# Patient Record
Sex: MALE | Race: WHITE | Employment: OTHER | URBAN - METROPOLITAN AREA
[De-identification: names, ages, dates, MRNs, and addresses within clinical notes are randomized per-mention and may not be internally consistent; named-entity substitution may affect disease eponyms.]

---

## 2017-10-09 ENCOUNTER — ED HISTORICAL/CONVERTED ENCOUNTER (OUTPATIENT)
Dept: OTHER | Age: 62
End: 2017-10-09

## 2017-11-10 ENCOUNTER — OP HISTORICAL/CONVERTED ENCOUNTER (OUTPATIENT)
Dept: OTHER | Age: 62
End: 2017-11-10

## 2018-01-05 ENCOUNTER — OP HISTORICAL/CONVERTED ENCOUNTER (OUTPATIENT)
Dept: OTHER | Age: 63
End: 2018-01-05

## 2018-02-12 ENCOUNTER — OP HISTORICAL/CONVERTED ENCOUNTER (OUTPATIENT)
Dept: OTHER | Age: 63
End: 2018-02-12

## 2018-02-14 ENCOUNTER — OP HISTORICAL/CONVERTED ENCOUNTER (OUTPATIENT)
Dept: OTHER | Age: 63
End: 2018-02-14

## 2018-02-15 ENCOUNTER — OP HISTORICAL/CONVERTED ENCOUNTER (OUTPATIENT)
Dept: OTHER | Age: 63
End: 2018-02-15

## 2018-03-12 ENCOUNTER — OP HISTORICAL/CONVERTED ENCOUNTER (OUTPATIENT)
Dept: OTHER | Age: 63
End: 2018-03-12

## 2018-03-14 ENCOUNTER — ED HISTORICAL/CONVERTED ENCOUNTER (OUTPATIENT)
Dept: OTHER | Age: 63
End: 2018-03-14

## 2018-03-19 ENCOUNTER — IP HISTORICAL/CONVERTED ENCOUNTER (OUTPATIENT)
Dept: OTHER | Age: 63
End: 2018-03-19

## 2018-04-01 ENCOUNTER — OP HISTORICAL/CONVERTED ENCOUNTER (OUTPATIENT)
Dept: OTHER | Age: 63
End: 2018-04-01

## 2018-04-13 ENCOUNTER — IP HISTORICAL/CONVERTED ENCOUNTER (OUTPATIENT)
Dept: OTHER | Age: 63
End: 2018-04-13

## 2018-05-21 ENCOUNTER — ED HISTORICAL/CONVERTED ENCOUNTER (OUTPATIENT)
Dept: OTHER | Age: 63
End: 2018-05-21

## 2018-05-23 ENCOUNTER — ED HISTORICAL/CONVERTED ENCOUNTER (OUTPATIENT)
Dept: OTHER | Age: 63
End: 2018-05-23

## 2018-05-24 ENCOUNTER — IP HISTORICAL/CONVERTED ENCOUNTER (OUTPATIENT)
Dept: OTHER | Age: 63
End: 2018-05-24

## 2018-06-02 ENCOUNTER — ED HISTORICAL/CONVERTED ENCOUNTER (OUTPATIENT)
Dept: OTHER | Age: 63
End: 2018-06-02

## 2018-06-24 ENCOUNTER — OP HISTORICAL/CONVERTED ENCOUNTER (OUTPATIENT)
Dept: OTHER | Age: 63
End: 2018-06-24

## 2018-06-29 ENCOUNTER — OP HISTORICAL/CONVERTED ENCOUNTER (OUTPATIENT)
Dept: OTHER | Age: 63
End: 2018-06-29

## 2018-06-29 ENCOUNTER — ED HISTORICAL/CONVERTED ENCOUNTER (OUTPATIENT)
Dept: OTHER | Age: 63
End: 2018-06-29

## 2018-07-04 ENCOUNTER — ED HISTORICAL/CONVERTED ENCOUNTER (OUTPATIENT)
Dept: OTHER | Age: 63
End: 2018-07-04

## 2018-07-06 ENCOUNTER — ED HISTORICAL/CONVERTED ENCOUNTER (OUTPATIENT)
Dept: OTHER | Age: 63
End: 2018-07-06

## 2018-07-20 ENCOUNTER — OP HISTORICAL/CONVERTED ENCOUNTER (OUTPATIENT)
Dept: OTHER | Age: 63
End: 2018-07-20

## 2018-07-23 ENCOUNTER — ED HISTORICAL/CONVERTED ENCOUNTER (OUTPATIENT)
Dept: OTHER | Age: 63
End: 2018-07-23

## 2018-08-12 ENCOUNTER — ED HISTORICAL/CONVERTED ENCOUNTER (OUTPATIENT)
Dept: OTHER | Age: 63
End: 2018-08-12

## 2022-02-07 ENCOUNTER — APPOINTMENT (OUTPATIENT)
Dept: GENERAL RADIOLOGY | Age: 67
DRG: 207 | End: 2022-02-07
Attending: STUDENT IN AN ORGANIZED HEALTH CARE EDUCATION/TRAINING PROGRAM
Payer: MEDICARE

## 2022-02-07 ENCOUNTER — APPOINTMENT (OUTPATIENT)
Dept: CT IMAGING | Age: 67
DRG: 207 | End: 2022-02-07
Attending: STUDENT IN AN ORGANIZED HEALTH CARE EDUCATION/TRAINING PROGRAM
Payer: MEDICARE

## 2022-02-07 ENCOUNTER — HOSPITAL ENCOUNTER (INPATIENT)
Age: 67
LOS: 18 days | Discharge: NURSING, REHAB OR LONG-TERM CARE | DRG: 207 | End: 2022-02-25
Attending: EMERGENCY MEDICINE | Admitting: INTERNAL MEDICINE
Payer: MEDICARE

## 2022-02-07 DIAGNOSIS — I67.4 HYPERTENSIVE ENCEPHALOPATHY: ICD-10-CM

## 2022-02-07 DIAGNOSIS — R09.02 HYPOXIA: ICD-10-CM

## 2022-02-07 DIAGNOSIS — I16.1 HYPERTENSIVE EMERGENCY: ICD-10-CM

## 2022-02-07 DIAGNOSIS — I21.4 NSTEMI (NON-ST ELEVATED MYOCARDIAL INFARCTION) (HCC): ICD-10-CM

## 2022-02-07 DIAGNOSIS — R55 SYNCOPE AND COLLAPSE: Primary | ICD-10-CM

## 2022-02-07 PROBLEM — J96.90 RESPIRATORY FAILURE (HCC): Status: ACTIVE | Noted: 2022-02-07

## 2022-02-07 LAB
ALBUMIN SERPL-MCNC: 2.9 G/DL (ref 3.5–5)
ALBUMIN/GLOB SERPL: 0.8 {RATIO} (ref 1.1–2.2)
ALP SERPL-CCNC: 78 U/L (ref 45–117)
ALT SERPL-CCNC: 34 U/L (ref 12–78)
ANION GAP SERPL CALC-SCNC: 3 MMOL/L (ref 5–15)
APPEARANCE UR: CLEAR
APTT PPP: 24.8 SEC (ref 21.2–34.1)
ARTERIAL PATENCY WRIST A: ABNORMAL
AST SERPL W P-5'-P-CCNC: 15 U/L (ref 15–37)
BACTERIA URNS QL MICRO: NEGATIVE /HPF
BASE EXCESS BLDA CALC-SCNC: 1.1 MMOL/L (ref 0–2)
BASOPHILS # BLD: 0 K/UL (ref 0–0.1)
BASOPHILS NFR BLD: 0 % (ref 0–1)
BDY SITE: ABNORMAL
BILIRUB SERPL-MCNC: 0.3 MG/DL (ref 0.2–1)
BILIRUB UR QL: NEGATIVE
BNP SERPL-MCNC: 30 PG/ML
BUN SERPL-MCNC: 24 MG/DL (ref 6–20)
BUN/CREAT SERPL: 22 (ref 12–20)
CA-I BLD-MCNC: 8.8 MG/DL (ref 8.5–10.1)
CHLORIDE SERPL-SCNC: 102 MMOL/L (ref 97–108)
CO2 SERPL-SCNC: 29 MMOL/L (ref 21–32)
COLOR UR: YELLOW
COVID-19 RAPID TEST, COVR: NOT DETECTED
CREAT SERPL-MCNC: 1.1 MG/DL (ref 0.7–1.3)
DIFFERENTIAL METHOD BLD: ABNORMAL
EOSINOPHIL # BLD: 0 K/UL (ref 0–0.4)
EOSINOPHIL NFR BLD: 0 % (ref 0–7)
EPAP/CPAP/PEEP, PAPEEP: 15
ERYTHROCYTE [DISTWIDTH] IN BLOOD BY AUTOMATED COUNT: 14.6 % (ref 11.5–14.5)
FIO2 ON VENT: 100 %
GAS FLOW.O2 SETTING OXYMISER: 15 L/MIN
GLOBULIN SER CALC-MCNC: 3.8 G/DL (ref 2–4)
GLUCOSE SERPL-MCNC: 115 MG/DL (ref 65–100)
GLUCOSE UR STRIP.AUTO-MCNC: 50 MG/DL
HCO3 BLDA-SCNC: 25 MMOL/L (ref 22–26)
HCT VFR BLD AUTO: 38.1 % (ref 36.6–50.3)
HGB BLD-MCNC: 12.5 G/DL (ref 12.1–17)
HGB UR QL STRIP: ABNORMAL
IMM GRANULOCYTES # BLD AUTO: 0.1 K/UL (ref 0–0.04)
IMM GRANULOCYTES NFR BLD AUTO: 1 % (ref 0–0.5)
INR PPP: 1 (ref 0.9–1.1)
IPAP/PIP, IPAPIP: 22
KETONES UR QL STRIP.AUTO: NEGATIVE MG/DL
LEUKOCYTE ESTERASE UR QL STRIP.AUTO: NEGATIVE
LYMPHOCYTES # BLD: 0.9 K/UL (ref 0.8–3.5)
LYMPHOCYTES NFR BLD: 8 % (ref 12–49)
MCH RBC QN AUTO: 29 PG (ref 26–34)
MCHC RBC AUTO-ENTMCNC: 32.8 G/DL (ref 30–36.5)
MCV RBC AUTO: 88.4 FL (ref 80–99)
MONOCYTES # BLD: 0.3 K/UL (ref 0–1)
MONOCYTES NFR BLD: 2 % (ref 5–13)
NEUTS SEG # BLD: 9.9 K/UL (ref 1.8–8)
NEUTS SEG NFR BLD: 89 % (ref 32–75)
NITRITE UR QL STRIP.AUTO: NEGATIVE
NRBC # BLD: 0 K/UL (ref 0–0.01)
NRBC BLD-RTO: 0 PER 100 WBC
PCO2 BLDA: 49 MMHG (ref 35–45)
PH BLDA: 7.36 [PH] (ref 7.35–7.45)
PH UR STRIP: 7 [PH] (ref 5–8)
PLATELET # BLD AUTO: 245 K/UL (ref 150–400)
PMV BLD AUTO: 9.2 FL (ref 8.9–12.9)
PO2 BLDA: 488 MMHG (ref 75–100)
POTASSIUM SERPL-SCNC: 4.8 MMOL/L (ref 3.5–5.1)
PROT SERPL-MCNC: 6.7 G/DL (ref 6.4–8.2)
PROT UR STRIP-MCNC: NEGATIVE MG/DL
PROTHROMBIN TIME: 12.6 SEC (ref 11.9–14.7)
RBC # BLD AUTO: 4.31 M/UL (ref 4.1–5.7)
RBC #/AREA URNS HPF: ABNORMAL /HPF (ref 0–5)
SAO2 % BLD: 99 %
SAO2% DEVICE SAO2% SENSOR NAME: ABNORMAL
SODIUM SERPL-SCNC: 134 MMOL/L (ref 136–145)
SP GR UR REFRACTOMETRY: >1.03 (ref 1–1.03)
SPECIMEN SITE: ABNORMAL
SPECIMEN SOURCE: NORMAL
THERAPEUTIC RANGE,PTTT: NORMAL SEC (ref 82–109)
TROPONIN-HIGH SENSITIVITY: 6 NG/L (ref 0–76)
UA: UC IF INDICATED,UAUC: ABNORMAL
UROBILINOGEN UR QL STRIP.AUTO: 0.1 EU/DL (ref 0.1–1)
VENTILATION MODE VENT: ABNORMAL
WBC # BLD AUTO: 11.1 K/UL (ref 4.1–11.1)
WBC URNS QL MICRO: ABNORMAL /HPF (ref 0–4)

## 2022-02-07 PROCEDURE — 85610 PROTHROMBIN TIME: CPT

## 2022-02-07 PROCEDURE — 36600 WITHDRAWAL OF ARTERIAL BLOOD: CPT

## 2022-02-07 PROCEDURE — 74011000636 HC RX REV CODE- 636: Performed by: STUDENT IN AN ORGANIZED HEALTH CARE EDUCATION/TRAINING PROGRAM

## 2022-02-07 PROCEDURE — 74011000250 HC RX REV CODE- 250: Performed by: STUDENT IN AN ORGANIZED HEALTH CARE EDUCATION/TRAINING PROGRAM

## 2022-02-07 PROCEDURE — 96374 THER/PROPH/DIAG INJ IV PUSH: CPT

## 2022-02-07 PROCEDURE — 31500 INSERT EMERGENCY AIRWAY: CPT

## 2022-02-07 PROCEDURE — 5A1955Z RESPIRATORY VENTILATION, GREATER THAN 96 CONSECUTIVE HOURS: ICD-10-PCS | Performed by: INTERNAL MEDICINE

## 2022-02-07 PROCEDURE — 70450 CT HEAD/BRAIN W/O DYE: CPT

## 2022-02-07 PROCEDURE — 93005 ELECTROCARDIOGRAM TRACING: CPT

## 2022-02-07 PROCEDURE — 84484 ASSAY OF TROPONIN QUANT: CPT

## 2022-02-07 PROCEDURE — 81001 URINALYSIS AUTO W/SCOPE: CPT

## 2022-02-07 PROCEDURE — 0BH17EZ INSERTION OF ENDOTRACHEAL AIRWAY INTO TRACHEA, VIA NATURAL OR ARTIFICIAL OPENING: ICD-10-PCS | Performed by: STUDENT IN AN ORGANIZED HEALTH CARE EDUCATION/TRAINING PROGRAM

## 2022-02-07 PROCEDURE — 94002 VENT MGMT INPAT INIT DAY: CPT

## 2022-02-07 PROCEDURE — 74011250637 HC RX REV CODE- 250/637: Performed by: INTERNAL MEDICINE

## 2022-02-07 PROCEDURE — 51702 INSERT TEMP BLADDER CATH: CPT

## 2022-02-07 PROCEDURE — 80307 DRUG TEST PRSMV CHEM ANLYZR: CPT

## 2022-02-07 PROCEDURE — 71045 X-RAY EXAM CHEST 1 VIEW: CPT

## 2022-02-07 PROCEDURE — 83880 ASSAY OF NATRIURETIC PEPTIDE: CPT

## 2022-02-07 PROCEDURE — 80053 COMPREHEN METABOLIC PANEL: CPT

## 2022-02-07 PROCEDURE — 85730 THROMBOPLASTIN TIME PARTIAL: CPT

## 2022-02-07 PROCEDURE — 83735 ASSAY OF MAGNESIUM: CPT

## 2022-02-07 PROCEDURE — 74011250636 HC RX REV CODE- 250/636: Performed by: STUDENT IN AN ORGANIZED HEALTH CARE EDUCATION/TRAINING PROGRAM

## 2022-02-07 PROCEDURE — 96375 TX/PRO/DX INJ NEW DRUG ADDON: CPT

## 2022-02-07 PROCEDURE — 71275 CT ANGIOGRAPHY CHEST: CPT

## 2022-02-07 PROCEDURE — 65270000029 HC RM PRIVATE

## 2022-02-07 PROCEDURE — 85025 COMPLETE CBC W/AUTO DIFF WBC: CPT

## 2022-02-07 PROCEDURE — 36415 COLL VENOUS BLD VENIPUNCTURE: CPT

## 2022-02-07 PROCEDURE — 82803 BLOOD GASES ANY COMBINATION: CPT

## 2022-02-07 PROCEDURE — 87635 SARS-COV-2 COVID-19 AMP PRB: CPT

## 2022-02-07 PROCEDURE — 99285 EMERGENCY DEPT VISIT HI MDM: CPT

## 2022-02-07 RX ORDER — METOPROLOL SUCCINATE 25 MG/1
25 TABLET, EXTENDED RELEASE ORAL DAILY
COMMUNITY

## 2022-02-07 RX ORDER — METOPROLOL SUCCINATE 25 MG/1
25 TABLET, EXTENDED RELEASE ORAL DAILY
Status: CANCELLED | OUTPATIENT
Start: 2022-02-08

## 2022-02-07 RX ORDER — FINASTERIDE 5 MG/1
5 TABLET, FILM COATED ORAL DAILY
COMMUNITY

## 2022-02-07 RX ORDER — LOSARTAN POTASSIUM 25 MG/1
25 TABLET ORAL DAILY
COMMUNITY
End: 2022-02-25

## 2022-02-07 RX ORDER — GUAIFENESIN 100 MG/5ML
81 LIQUID (ML) ORAL DAILY
COMMUNITY
End: 2022-03-12

## 2022-02-07 RX ORDER — ROCURONIUM BROMIDE 10 MG/ML
100 INJECTION, SOLUTION INTRAVENOUS
Status: COMPLETED | OUTPATIENT
Start: 2022-02-07 | End: 2022-02-07

## 2022-02-07 RX ORDER — HEPARIN SODIUM 1000 [USP'U]/ML
10000 INJECTION, SOLUTION INTRAVENOUS; SUBCUTANEOUS ONCE
Status: DISCONTINUED | OUTPATIENT
Start: 2022-02-07 | End: 2022-02-08

## 2022-02-07 RX ORDER — ALBUTEROL SULFATE 90 UG/1
1 AEROSOL, METERED RESPIRATORY (INHALATION)
COMMUNITY
End: 2022-03-04

## 2022-02-07 RX ORDER — ATORVASTATIN CALCIUM 40 MG/1
40 TABLET, FILM COATED ORAL DAILY
COMMUNITY

## 2022-02-07 RX ORDER — FUROSEMIDE 20 MG/1
20 TABLET ORAL DAILY
COMMUNITY
End: 2022-02-25

## 2022-02-07 RX ORDER — TAMSULOSIN HYDROCHLORIDE 0.4 MG/1
0.4 CAPSULE ORAL DAILY
COMMUNITY

## 2022-02-07 RX ORDER — CLOPIDOGREL BISULFATE 75 MG/1
75 TABLET ORAL DAILY
Status: CANCELLED | OUTPATIENT
Start: 2022-02-08

## 2022-02-07 RX ORDER — ASPIRIN 325 MG
325 TABLET ORAL
Status: COMPLETED | OUTPATIENT
Start: 2022-02-07 | End: 2022-02-07

## 2022-02-07 RX ORDER — ESOMEPRAZOLE MAGNESIUM 40 MG/1
40 CAPSULE, DELAYED RELEASE ORAL DAILY
COMMUNITY

## 2022-02-07 RX ORDER — LOSARTAN POTASSIUM 50 MG/1
25 TABLET ORAL DAILY
Status: CANCELLED | OUTPATIENT
Start: 2022-02-08

## 2022-02-07 RX ORDER — CLOPIDOGREL BISULFATE 75 MG/1
75 TABLET ORAL DAILY
COMMUNITY

## 2022-02-07 RX ORDER — GABAPENTIN 300 MG/1
300 CAPSULE ORAL DAILY
COMMUNITY
End: 2022-02-25

## 2022-02-07 RX ORDER — HEPARIN SODIUM 10000 [USP'U]/100ML
18-36 INJECTION, SOLUTION INTRAVENOUS
Status: DISCONTINUED | OUTPATIENT
Start: 2022-02-07 | End: 2022-02-08

## 2022-02-07 RX ORDER — GABAPENTIN 300 MG/1
300 CAPSULE ORAL DAILY
Status: CANCELLED | OUTPATIENT
Start: 2022-02-08

## 2022-02-07 RX ORDER — MIDAZOLAM IN 0.9 % SOD.CHLORID 1 MG/ML
0-10 PLASTIC BAG, INJECTION (ML) INTRAVENOUS
Status: DISCONTINUED | OUTPATIENT
Start: 2022-02-07 | End: 2022-02-18

## 2022-02-07 RX ORDER — ATORVASTATIN CALCIUM 40 MG/1
40 TABLET, FILM COATED ORAL DAILY
Status: CANCELLED | OUTPATIENT
Start: 2022-02-08

## 2022-02-07 RX ORDER — BUMETANIDE 1 MG/1
1 TABLET ORAL DAILY
COMMUNITY
End: 2022-02-25

## 2022-02-07 RX ORDER — OXCARBAZEPINE 300 MG/1
300 TABLET ORAL DAILY
COMMUNITY
End: 2022-03-04

## 2022-02-07 RX ORDER — GUAIFENESIN 100 MG/5ML
81 LIQUID (ML) ORAL DAILY
Status: CANCELLED | OUTPATIENT
Start: 2022-02-08

## 2022-02-07 RX ORDER — BETHANECHOL CHLORIDE 50 MG/1
200 TABLET ORAL DAILY
COMMUNITY
End: 2022-03-04

## 2022-02-07 RX ORDER — PROPOFOL 10 MG/ML
0-50 VIAL (ML) INTRAVENOUS
Status: DISCONTINUED | OUTPATIENT
Start: 2022-02-07 | End: 2022-02-18

## 2022-02-07 RX ORDER — ETOMIDATE 2 MG/ML
20 INJECTION INTRAVENOUS ONCE
Status: COMPLETED | OUTPATIENT
Start: 2022-02-07 | End: 2022-02-07

## 2022-02-07 RX ORDER — SERTRALINE HYDROCHLORIDE 100 MG/1
100 TABLET, FILM COATED ORAL DAILY
COMMUNITY
End: 2022-02-25

## 2022-02-07 RX ORDER — OMEPRAZOLE 20 MG/1
20 CAPSULE, DELAYED RELEASE ORAL DAILY
COMMUNITY
End: 2022-03-04

## 2022-02-07 RX ORDER — ISOSORBIDE DINITRATE 30 MG/1
30 TABLET ORAL 2 TIMES DAILY
COMMUNITY
End: 2022-02-25

## 2022-02-07 RX ORDER — QUETIAPINE FUMARATE 400 MG/1
400 TABLET, FILM COATED ORAL DAILY
COMMUNITY
End: 2022-02-25

## 2022-02-07 RX ADMIN — ASPIRIN 325 MG ORAL TABLET 325 MG: 325 PILL ORAL at 23:45

## 2022-02-07 RX ADMIN — PROPOFOL 10 MCG/KG/MIN: 10 INJECTION, EMULSION INTRAVENOUS at 22:30

## 2022-02-07 RX ADMIN — ROCURONIUM BROMIDE 100 MG: 10 SOLUTION INTRAVENOUS at 21:19

## 2022-02-07 RX ADMIN — IOPAMIDOL 100 ML: 755 INJECTION, SOLUTION INTRAVENOUS at 22:22

## 2022-02-07 RX ADMIN — Medication 2 MG/HR: at 21:28

## 2022-02-07 RX ADMIN — ETOMIDATE 20 MG: 20 INJECTION, SOLUTION INTRAVENOUS at 21:18

## 2022-02-07 NOTE — Clinical Note
Status[de-identified] INPATIENT [101]   Type of Bed: Intensive Care [6]   Cardiac Monitoring Required?: Yes   Inpatient Hospitalization Certified Necessary for the Following Reasons: 4.  Patient requires ICU level of care interventions (further clarification in H&P documentation)   Admitting Diagnosis: Respiratory failure [facility] [[id number]]   Admitting Physician: Susy RODRIGUEZ [facility] [[id number]]   Attending Physician: Robert Garcia [[id number]]   Estimated Length of Stay: 7+ Midnights   Discharge Plan[de-identified] Home with Office Follow-up

## 2022-02-08 ENCOUNTER — APPOINTMENT (OUTPATIENT)
Dept: GENERAL RADIOLOGY | Age: 67
DRG: 207 | End: 2022-02-08
Attending: HOSPITALIST
Payer: MEDICARE

## 2022-02-08 LAB
AMPHET UR QL SCN: NEGATIVE
ANION GAP SERPL CALC-SCNC: 8 MMOL/L (ref 5–15)
ARTERIAL PATENCY WRIST A: ABNORMAL
BARBITURATES UR QL SCN: NEGATIVE
BASE EXCESS BLDA CALC-SCNC: 3.4 MMOL/L (ref 0–2)
BASOPHILS # BLD: 0 K/UL (ref 0–0.1)
BASOPHILS NFR BLD: 0 % (ref 0–1)
BDY SITE: ABNORMAL
BENZODIAZ UR QL: NEGATIVE
BUN SERPL-MCNC: 20 MG/DL (ref 6–20)
BUN/CREAT SERPL: 24 (ref 12–20)
CA-I BLD-MCNC: 8.2 MG/DL (ref 8.5–10.1)
CANNABINOIDS UR QL SCN: NEGATIVE
CHLORIDE SERPL-SCNC: 103 MMOL/L (ref 97–108)
CO2 SERPL-SCNC: 25 MMOL/L (ref 21–32)
COCAINE UR QL SCN: NEGATIVE
CREAT SERPL-MCNC: 0.85 MG/DL (ref 0.7–1.3)
DIFFERENTIAL METHOD BLD: ABNORMAL
DRUG SCRN COMMENT,DRGCM: NORMAL
EOSINOPHIL # BLD: 0.1 K/UL (ref 0–0.4)
EOSINOPHIL NFR BLD: 0 % (ref 0–7)
EPAP/CPAP/PEEP, PAPEEP: 8
ERYTHROCYTE [DISTWIDTH] IN BLOOD BY AUTOMATED COUNT: 14.6 % (ref 11.5–14.5)
ERYTHROCYTE [SEDIMENTATION RATE] IN BLOOD: 14 MM/HR
FIO2 ON VENT: 80 %
GAS FLOW.O2 SETTING OXYMISER: 15 L/MIN
GLUCOSE SERPL-MCNC: 130 MG/DL (ref 65–100)
HCO3 BLDA-SCNC: 27 MMOL/L (ref 22–26)
HCT VFR BLD AUTO: 34.5 % (ref 36.6–50.3)
HGB BLD-MCNC: 11.7 G/DL (ref 12.1–17)
IMM GRANULOCYTES # BLD AUTO: 0.1 K/UL (ref 0–0.04)
IMM GRANULOCYTES NFR BLD AUTO: 1 % (ref 0–0.5)
IPAP/PIP, IPAPIP: 28
LACTATE SERPL-SCNC: 0.6 MMOL/L (ref 0.4–2)
LYMPHOCYTES # BLD: 2 K/UL (ref 0.8–3.5)
LYMPHOCYTES NFR BLD: 15 % (ref 12–49)
MAGNESIUM SERPL-MCNC: 2.1 MG/DL (ref 1.6–2.4)
MAGNESIUM SERPL-MCNC: 2.1 MG/DL (ref 1.6–2.4)
MCH RBC QN AUTO: 29.8 PG (ref 26–34)
MCHC RBC AUTO-ENTMCNC: 33.9 G/DL (ref 30–36.5)
MCV RBC AUTO: 87.8 FL (ref 80–99)
METHADONE UR QL: NEGATIVE
MONOCYTES # BLD: 0.6 K/UL (ref 0–1)
MONOCYTES NFR BLD: 4 % (ref 5–13)
MRSA DNA SPEC QL NAA+PROBE: NOT DETECTED
NEUTS SEG # BLD: 10.7 K/UL (ref 1.8–8)
NEUTS SEG NFR BLD: 80 % (ref 32–75)
NRBC # BLD: 0 K/UL (ref 0–0.01)
NRBC BLD-RTO: 0 PER 100 WBC
OPIATES UR QL: NEGATIVE
PCO2 BLDA: 41 MMHG (ref 35–45)
PCP UR QL: NEGATIVE
PH BLDA: 7.44 [PH] (ref 7.35–7.45)
PHOSPHATE SERPL-MCNC: 3.5 MG/DL (ref 2.6–4.7)
PLATELET # BLD AUTO: 221 K/UL (ref 150–400)
PMV BLD AUTO: 8.7 FL (ref 8.9–12.9)
PO2 BLDA: 268 MMHG (ref 75–100)
POTASSIUM SERPL-SCNC: 3.7 MMOL/L (ref 3.5–5.1)
RBC # BLD AUTO: 3.93 M/UL (ref 4.1–5.7)
RPR SER QL: NONREACTIVE
SAO2 % BLD: 98 %
SAO2% DEVICE SAO2% SENSOR NAME: ABNORMAL
SODIUM SERPL-SCNC: 136 MMOL/L (ref 136–145)
SPECIMEN SITE: ABNORMAL
TROPONIN-HIGH SENSITIVITY: 144 NG/L (ref 0–76)
TSH SERPL DL<=0.05 MIU/L-ACNC: 1.38 UIU/ML (ref 0.36–3.74)
VENTILATION MODE VENT: ABNORMAL
VIT B12 SERPL-MCNC: 195 PG/ML (ref 193–986)
WBC # BLD AUTO: 13.4 K/UL (ref 4.1–11.1)

## 2022-02-08 PROCEDURE — 74011250636 HC RX REV CODE- 250/636: Performed by: INTERNAL MEDICINE

## 2022-02-08 PROCEDURE — 74011000250 HC RX REV CODE- 250: Performed by: INTERNAL MEDICINE

## 2022-02-08 PROCEDURE — 71045 X-RAY EXAM CHEST 1 VIEW: CPT

## 2022-02-08 PROCEDURE — 86038 ANTINUCLEAR ANTIBODIES: CPT

## 2022-02-08 PROCEDURE — 74011250637 HC RX REV CODE- 250/637: Performed by: INTERNAL MEDICINE

## 2022-02-08 PROCEDURE — 94003 VENT MGMT INPAT SUBQ DAY: CPT

## 2022-02-08 PROCEDURE — 83605 ASSAY OF LACTIC ACID: CPT

## 2022-02-08 PROCEDURE — 82803 BLOOD GASES ANY COMBINATION: CPT

## 2022-02-08 PROCEDURE — 84100 ASSAY OF PHOSPHORUS: CPT

## 2022-02-08 PROCEDURE — 65610000006 HC RM INTENSIVE CARE

## 2022-02-08 PROCEDURE — 80048 BASIC METABOLIC PNL TOTAL CA: CPT

## 2022-02-08 PROCEDURE — 84484 ASSAY OF TROPONIN QUANT: CPT

## 2022-02-08 PROCEDURE — 36600 WITHDRAWAL OF ARTERIAL BLOOD: CPT

## 2022-02-08 PROCEDURE — 82607 VITAMIN B-12: CPT

## 2022-02-08 PROCEDURE — 74011250636 HC RX REV CODE- 250/636: Performed by: HOSPITALIST

## 2022-02-08 PROCEDURE — 85025 COMPLETE CBC W/AUTO DIFF WBC: CPT

## 2022-02-08 PROCEDURE — 86592 SYPHILIS TEST NON-TREP QUAL: CPT

## 2022-02-08 PROCEDURE — 77010033678 HC OXYGEN DAILY

## 2022-02-08 PROCEDURE — 87641 MR-STAPH DNA AMP PROBE: CPT

## 2022-02-08 PROCEDURE — 83735 ASSAY OF MAGNESIUM: CPT

## 2022-02-08 PROCEDURE — 94640 AIRWAY INHALATION TREATMENT: CPT

## 2022-02-08 PROCEDURE — 84443 ASSAY THYROID STIM HORMONE: CPT

## 2022-02-08 PROCEDURE — 85652 RBC SED RATE AUTOMATED: CPT

## 2022-02-08 PROCEDURE — 74011250637 HC RX REV CODE- 250/637: Performed by: HOSPITALIST

## 2022-02-08 RX ORDER — SODIUM CHLORIDE 0.9 % (FLUSH) 0.9 %
5-40 SYRINGE (ML) INJECTION EVERY 8 HOURS
Status: DISCONTINUED | OUTPATIENT
Start: 2022-02-08 | End: 2022-02-18

## 2022-02-08 RX ORDER — CLOPIDOGREL BISULFATE 75 MG/1
75 TABLET ORAL DAILY
Status: DISCONTINUED | OUTPATIENT
Start: 2022-02-09 | End: 2022-02-25 | Stop reason: HOSPADM

## 2022-02-08 RX ORDER — ENOXAPARIN SODIUM 100 MG/ML
40 INJECTION SUBCUTANEOUS DAILY
Status: DISCONTINUED | OUTPATIENT
Start: 2022-02-08 | End: 2022-02-25 | Stop reason: HOSPADM

## 2022-02-08 RX ORDER — ONDANSETRON 4 MG/1
4 TABLET, ORALLY DISINTEGRATING ORAL
Status: DISCONTINUED | OUTPATIENT
Start: 2022-02-08 | End: 2022-02-25 | Stop reason: HOSPADM

## 2022-02-08 RX ORDER — TAMSULOSIN HYDROCHLORIDE 0.4 MG/1
0.4 CAPSULE ORAL DAILY
Status: DISCONTINUED | OUTPATIENT
Start: 2022-02-08 | End: 2022-02-25 | Stop reason: HOSPADM

## 2022-02-08 RX ORDER — AMLODIPINE BESYLATE 5 MG/1
10 TABLET ORAL DAILY
Status: DISCONTINUED | OUTPATIENT
Start: 2022-02-08 | End: 2022-02-25 | Stop reason: HOSPADM

## 2022-02-08 RX ORDER — BUDESONIDE 0.5 MG/2ML
500 INHALANT ORAL
Status: DISCONTINUED | OUTPATIENT
Start: 2022-02-08 | End: 2022-02-25 | Stop reason: HOSPADM

## 2022-02-08 RX ORDER — ACETAMINOPHEN 325 MG/1
650 TABLET ORAL
Status: DISCONTINUED | OUTPATIENT
Start: 2022-02-08 | End: 2022-02-25 | Stop reason: HOSPADM

## 2022-02-08 RX ORDER — SODIUM CHLORIDE 0.9 % (FLUSH) 0.9 %
5-40 SYRINGE (ML) INJECTION AS NEEDED
Status: DISCONTINUED | OUTPATIENT
Start: 2022-02-08 | End: 2022-02-18

## 2022-02-08 RX ORDER — OXCARBAZEPINE 150 MG/1
150 TABLET, FILM COATED ORAL 2 TIMES DAILY
Status: DISCONTINUED | OUTPATIENT
Start: 2022-02-09 | End: 2022-02-25 | Stop reason: HOSPADM

## 2022-02-08 RX ORDER — METOPROLOL SUCCINATE 25 MG/1
25 TABLET, EXTENDED RELEASE ORAL DAILY
Status: DISCONTINUED | OUTPATIENT
Start: 2022-02-09 | End: 2022-02-25 | Stop reason: HOSPADM

## 2022-02-08 RX ORDER — IPRATROPIUM BROMIDE AND ALBUTEROL SULFATE 2.5; .5 MG/3ML; MG/3ML
3 SOLUTION RESPIRATORY (INHALATION)
Status: DISCONTINUED | OUTPATIENT
Start: 2022-02-08 | End: 2022-02-25 | Stop reason: HOSPADM

## 2022-02-08 RX ORDER — FUROSEMIDE 10 MG/ML
40 INJECTION INTRAMUSCULAR; INTRAVENOUS 2 TIMES DAILY
Status: DISCONTINUED | OUTPATIENT
Start: 2022-02-09 | End: 2022-02-09

## 2022-02-08 RX ORDER — ONDANSETRON 2 MG/ML
4 INJECTION INTRAMUSCULAR; INTRAVENOUS
Status: DISCONTINUED | OUTPATIENT
Start: 2022-02-08 | End: 2022-02-25 | Stop reason: HOSPADM

## 2022-02-08 RX ORDER — BETHANECHOL CHLORIDE 25 MG/1
100 TABLET ORAL 2 TIMES DAILY
Status: DISCONTINUED | OUTPATIENT
Start: 2022-02-08 | End: 2022-02-25 | Stop reason: HOSPADM

## 2022-02-08 RX ORDER — ACETAMINOPHEN 650 MG/1
650 SUPPOSITORY RECTAL
Status: DISCONTINUED | OUTPATIENT
Start: 2022-02-08 | End: 2022-02-25 | Stop reason: HOSPADM

## 2022-02-08 RX ORDER — FINASTERIDE 5 MG/1
5 TABLET, FILM COATED ORAL DAILY
Status: DISCONTINUED | OUTPATIENT
Start: 2022-02-08 | End: 2022-02-25 | Stop reason: HOSPADM

## 2022-02-08 RX ORDER — POLYETHYLENE GLYCOL 3350 17 G/17G
17 POWDER, FOR SOLUTION ORAL DAILY PRN
Status: DISCONTINUED | OUTPATIENT
Start: 2022-02-08 | End: 2022-02-25 | Stop reason: HOSPADM

## 2022-02-08 RX ORDER — ATORVASTATIN CALCIUM 40 MG/1
80 TABLET, FILM COATED ORAL DAILY
Status: DISCONTINUED | OUTPATIENT
Start: 2022-02-09 | End: 2022-02-25 | Stop reason: HOSPADM

## 2022-02-08 RX ORDER — LABETALOL HCL 20 MG/4 ML
10 SYRINGE (ML) INTRAVENOUS
Status: DISCONTINUED | OUTPATIENT
Start: 2022-02-08 | End: 2022-02-25 | Stop reason: HOSPADM

## 2022-02-08 RX ORDER — GUAIFENESIN 100 MG/5ML
81 LIQUID (ML) ORAL DAILY
Status: DISCONTINUED | OUTPATIENT
Start: 2022-02-09 | End: 2022-02-25 | Stop reason: HOSPADM

## 2022-02-08 RX ADMIN — PROPOFOL 45 MCG/KG/MIN: 10 INJECTION, EMULSION INTRAVENOUS at 11:02

## 2022-02-08 RX ADMIN — BETHANECHOL CHLORIDE 100 MG: 25 TABLET ORAL at 20:16

## 2022-02-08 RX ADMIN — Medication 7 MG/HR: at 17:51

## 2022-02-08 RX ADMIN — SODIUM CHLORIDE, PRESERVATIVE FREE 20 MG: 5 INJECTION INTRAVENOUS at 20:16

## 2022-02-08 RX ADMIN — Medication 8 MG/HR: at 12:40

## 2022-02-08 RX ADMIN — PROPOFOL 45 MCG/KG/MIN: 10 INJECTION, EMULSION INTRAVENOUS at 08:25

## 2022-02-08 RX ADMIN — Medication 9 MG/HR: at 01:17

## 2022-02-08 RX ADMIN — SODIUM CHLORIDE, PRESERVATIVE FREE 10 ML: 5 INJECTION INTRAVENOUS at 22:08

## 2022-02-08 RX ADMIN — LABETALOL HYDROCHLORIDE 10 MG: 5 INJECTION, SOLUTION INTRAVENOUS at 17:41

## 2022-02-08 RX ADMIN — Medication 10 MG/HR: at 07:06

## 2022-02-08 RX ADMIN — METHYLPREDNISOLONE SODIUM SUCCINATE 40 MG: 40 INJECTION, POWDER, FOR SOLUTION INTRAMUSCULAR; INTRAVENOUS at 20:15

## 2022-02-08 RX ADMIN — BUDESONIDE 500 MCG: 0.5 SUSPENSION RESPIRATORY (INHALATION) at 19:43

## 2022-02-08 RX ADMIN — PROPOFOL 40 MCG/KG/MIN: 10 INJECTION, EMULSION INTRAVENOUS at 16:53

## 2022-02-08 RX ADMIN — PROPOFOL 35 MCG/KG/MIN: 10 INJECTION, EMULSION INTRAVENOUS at 01:01

## 2022-02-08 RX ADMIN — PROPOFOL 45 MCG/KG/MIN: 10 INJECTION, EMULSION INTRAVENOUS at 04:18

## 2022-02-08 RX ADMIN — AMLODIPINE BESYLATE 10 MG: 5 TABLET ORAL at 15:16

## 2022-02-08 RX ADMIN — PROPOFOL 45 MCG/KG/MIN: 10 INJECTION, EMULSION INTRAVENOUS at 13:23

## 2022-02-08 RX ADMIN — PROPOFOL 45 MCG/KG/MIN: 10 INJECTION, EMULSION INTRAVENOUS at 20:14

## 2022-02-08 RX ADMIN — Medication 7 MG/HR: at 23:40

## 2022-02-08 RX ADMIN — SODIUM CHLORIDE, PRESERVATIVE FREE 20 MG: 5 INJECTION INTRAVENOUS at 11:04

## 2022-02-08 RX ADMIN — ENOXAPARIN SODIUM 40 MG: 100 INJECTION SUBCUTANEOUS at 15:13

## 2022-02-08 RX ADMIN — SODIUM CHLORIDE, PRESERVATIVE FREE 10 ML: 5 INJECTION INTRAVENOUS at 15:13

## 2022-02-08 RX ADMIN — IPRATROPIUM BROMIDE AND ALBUTEROL SULFATE 3 ML: 2.5; .5 SOLUTION RESPIRATORY (INHALATION) at 19:43

## 2022-02-08 NOTE — PROGRESS NOTES
1000: Chart reviewed. Reason for Admission:  Respiratory Failure                     RUR Score:          9%           Plan for utilizing home health:      Not at this time    PCP: First and Last name:  Veronica Jamison MD     Name of Practice:    Are you a current patient: Yes/No: YES   Approximate date of last visit: This weekend, per spouse Carlyle Nunes   Can you participate in a virtual visit with your PCP:                     Current Advanced Directive/Advance Care Plan: Full Code      Healthcare Decision Maker:   Click here to complete [address] including selection of the [address] Relationship (ie \"Primary\")           Ana Lilia Gregg, Spouse [phone]                  Transition of Care Plan:  CM telephoned patient's spouse, Carlyle Nunes for discharge planning. Demos verified. Patient lives with his wife and was independent with ADLs/IADLs prior to admission. No prior HH/SNF services. Mrs. Sherron Goldberg would like to see how her  progresses before making decision about future needs. At this time, she anticipates he will return home: self care. Medications are obtained from [facility] in [place]. When medically stable, Mrs. Sherron Goldberg will transport patient home.  CM will continue to follow patient and recs of medical team.    Current Dispo: Home: self-care with spouse, Carlyle Nunes

## 2022-02-08 NOTE — ED PROVIDER NOTES
[id number]  EMERGENCY DEPARTMENT ENCOUNTER NOTE    Date: 2/7/2022  Patient Name: Amna Pizano    History of Presenting Illness     Chief Complaint   Patient presents with    Altered mental status     HPI: Amna Pizano, 73 y.o. male with A past medical history of vocal cord dysfunction, history of PE and was on warfarin that was discontinued a few years ago due to GI bleed, and history of recurrent syncope presents for chest pain, shortness of breath, syncope, and altered mental status. [facility] and shortness of breath. EMS crew noted that over there he started shouting and became altered. He had back-to-back episodes of syncope that lasted for 5 to 10 seconds every 1 minute during which she has normal pulses, normal breathing, and no arrhythmias. On arrival, the patient continued to have the strand of syncope for 10 seconds every 1 minute. The patient startles after sternal rub, takes a few deep breaths, has no postictal phase or confusion, provides additional history and follows commands, and then has another episode of syncope. During his lucid period of 2 minutes during the resuscitation, the patient gave us the entire history of his vocal cord dysfunction PE on warfarin and GI bleed history. He is currently being worked up for recurrent syncope. Medical History   I reviewed the medical, surgical, family, and social history, as well as allergies:    PCP: Trevin Sloan MD    Past Medical History:  No past medical history on file. Past Surgical History:  No past surgical history on file.   Current Outpatient Medications:  Current Outpatient Medications   Medication Instructions    albuterol (ProAir HFA) 90 mcg/actuation inhaler 1 Puff, Inhalation, 4 TIMES DAILY AS NEEDED    aspirin 81 mg, Oral, DAILY    atorvastatin (LIPITOR) 40 mg, Oral, DAILY    bethanechol chloride (URECHOLINE) 200 mg, Oral, DAILY    bumetanide (BUMEX) 1 mg, Oral, DAILY    clopidogreL (PLAVIX) 75 mg, Oral, DAILY    esomeprazole (NEXIUM) 40 mg, Oral, DAILY    finasteride (PROSCAR) 5 mg, Oral, DAILY    fluticasone-umeclidinium-vilanterol (TRELEGY ELLIPTA) 100-62.5-25 mcg inhaler 1 Puff, Inhalation, DAILY    furosemide (LASIX) 20 mg, Oral, DAILY    gabapentin (NEURONTIN) 300 mg, Oral, DAILY    ipratropium-albuteroL (Combivent Respimat)  mcg/actuation inhaler 1 Puff, Inhalation, 2 TIMES DAILY AS NEEDED    isosorbide dinitrate (ISORDIL) 30 mg, Oral, 2 TIMES DAILY    losartan (COZAAR) 25 mg, Oral, DAILY    metoprolol succinate (TOPROL-XL) 25 mg, Oral, DAILY    omeprazole (PRILOSEC) 20 mg, Oral, DAILY    Oxtellar  mg, Oral, DAILY    QUEtiapine (SEROQUEL) 400 mg, Oral, DAILY    sertraline (ZOLOFT) 100 mg, Oral, DAILY    tamsulosin (FLOMAX) 0.4 mg, Oral, DAILY    ticagrelor (BRILINTA) 90 mg, Oral, 2 TIMES DAILY      Family History:  No family history on file. Social History:  Social History     Tobacco Use    Smoking status: Not on file    Smokeless tobacco: Not on file   Substance Use Topics    Alcohol use: Not on file    Drug use: Not on file     Allergies: Allergies   Allergen Reactions    Sulfa (Sulfonamide Antibiotics) Other (comments)     syncope         Review of Systems     Review of Systems  Negative: All other systems negative. Physical Exam and Vital Signs   Vital Signs - Reviewed the patient's vital signs.     Patient Vitals for the past 12 hrs:   Temp Pulse Resp BP SpO2   02/07/22 2317  (!) 119  135/79 93 %   02/07/22 2300  (!) 122 15  93 %   02/07/22 2225 98.4 °F (36.9 °C) (!) 125 16 (!) 219/126 (!) 84 %   02/07/22 2135  (!) 124 15 (!) 230/143 90 %   02/07/22 2111 97.7 °F (36.5 °C) (!) 105 9 (!) 155/119 (!) 86 %     Physical Exam:    GENERAL: awake, alert, cooperative, not in distress  HEENT:  * Pupils equal, EOMI  * Head atraumatic  CV:  * regular rhythm  * warm and perfused extremities bilaterally  PULMONARY: Good air movement, no wheezes or crackles  ABDOMEN: soft, not distended, no guarding, not tenderness to palpation  : No suprapubic tenderness  EXTREMITIES/BACK: warm and perfused, no tenderness, no edema  SKIN: no rashes or signs of trauma  NEURO:  * Speech clear  * Moves U&LE to command    Medical Decision Making   - I am the first and primary provider for this patient and am the primary provider of record. - I reviewed the vital signs, available nursing notes, past medical history, past surgical history, family history and social history. - Initial assessment performed. The patients presenting problems have been discussed, and the staff are in agreement with the care plan formulated and outlined with them. I have encouraged them to ask questions as they arise throughout their visit. - Available medical records, nursing notes, old EKGs, and EMS run sheets (if patient was EMS transported) were reviewed    MDM:   Patient is a 73 y.o. male presenting for syncope. Vitals reveal hypoxia and physical exam reveals recurrent syncope. EKG showed no ischemia. Based on the history, physical exam, risk factors, and vitals signs, I favor the following differential diagnoses: Hypercapnia, COVID-19, PE, ICH. Patient will be placed on the monitor in the ED. patient had an excellent waveform with a saturation between 20 to 50%. He was placed on a nonrebreather with improvement to 80%. The patient still had recurrent syncope. When he has the altered mental status, his rhythm on the monitor is normal without any arrhythmias and he has pulses and has spontaneous breathing. When he got sternal rub, he wakes up and gasps and take a deep breath and gets startled and continues to speak without any confusion. Due to the recurrent syncope, I would not be able to place him on BiPAP or high flow nasal cannula. Will intubate for hypoxia and concern for altered mental status and nontolerance of BiPAP or high flow nasal cannula.     The comprehensive list of differential diagnoses, including the less probable ones, include but is not limited to ACS, CHF, arrhythmia, orthostatic hypotention, vasovagal syncope, hypoglycemia, hypotension, aortic stenosis, seizures, CVA, anemia, PE. Will initiate workup and symptomatic treatment. See ED Course and Reassessment sections for results and interpretations. Results     Labs:  Recent Results (from the past 12 hour(s))   TROPONIN-HIGH SENSITIVITY    Collection Time: 02/07/22  9:18 PM   Result Value Ref Range    Troponin-High Sensitivity 6 0 - 76 ng/L   METABOLIC PANEL, COMPREHENSIVE    Collection Time: 02/07/22  9:20 PM   Result Value Ref Range    Sodium 134 (L) 136 - 145 mmol/L    Potassium 4.8 3.5 - 5.1 mmol/L    Chloride 102 97 - 108 mmol/L    CO2 29 21 - 32 mmol/L    Anion gap 3 (L) 5 - 15 mmol/L    Glucose 115 (H) 65 - 100 mg/dL    BUN 24 (H) 6 - 20 mg/dL    Creatinine 1.10 0.70 - 1.30 mg/dL    BUN/Creatinine ratio 22 (H) 12 - 20      GFR est AA >60 >60 ml/min/1.73m2    GFR est non-AA >60 >60 ml/min/1.73m2    Calcium 8.8 8.5 - 10.1 mg/dL    Bilirubin, total 0.3 0.2 - 1.0 mg/dL    AST (SGOT) 15 15 - 37 U/L    ALT (SGPT) 34 12 - 78 U/L    Alk.  phosphatase 78 45 - 117 U/L    Protein, total 6.7 6.4 - 8.2 g/dL    Albumin 2.9 (L) 3.5 - 5.0 g/dL    Globulin 3.8 2.0 - 4.0 g/dL    A-G Ratio 0.8 (L) 1.1 - 2.2     PROTHROMBIN TIME + INR    Collection Time: 02/07/22  9:20 PM   Result Value Ref Range    Prothrombin time 12.6 11.9 - 14.7 sec    INR 1.0 0.9 - 1.1     PTT    Collection Time: 02/07/22  9:20 PM   Result Value Ref Range    aPTT 24.8 21.2 - 34.1 sec    aPTT, therapeutic range   82 - 109 sec   CBC WITH AUTOMATED DIFF    Collection Time: 02/07/22  9:20 PM   Result Value Ref Range    WBC 11.1 4.1 - 11.1 K/uL    RBC 4.31 4.10 - 5.70 M/uL    HGB 12.5 12.1 - 17.0 g/dL    HCT 38.1 36.6 - 50.3 %    MCV 88.4 80.0 - 99.0 FL    MCH 29.0 26.0 - 34.0 PG    MCHC 32.8 30.0 - 36.5 g/dL    RDW 14.6 (H) 11.5 - 14.5 % PLATELET 356 930 - 937 K/uL    MPV 9.2 8.9 - 12.9 FL    NRBC 0.0 0.0  WBC    ABSOLUTE NRBC 0.00 0.00 - 0.01 K/uL    NEUTROPHILS 89 (H) 32 - 75 %    LYMPHOCYTES 8 (L) 12 - 49 %    MONOCYTES 2 (L) 5 - 13 %    EOSINOPHILS 0 0 - 7 %    BASOPHILS 0 0 - 1 %    IMMATURE GRANULOCYTES 1 (H) 0 - 0.5 %    ABS. NEUTROPHILS 9.9 (H) 1.8 - 8.0 K/UL    ABS. LYMPHOCYTES 0.9 0.8 - 3.5 K/UL    ABS. MONOCYTES 0.3 0.0 - 1.0 K/UL    ABS. EOSINOPHILS 0.0 0.0 - 0.4 K/UL    ABS. BASOPHILS 0.0 0.0 - 0.1 K/UL    ABS. IMM. GRANS. 0.1 (H) 0.00 - 0.04 K/UL    DF AUTOMATED     NT-PRO BNP    Collection Time: 02/07/22  9:20 PM   Result Value Ref Range    NT pro-BNP 30 <125 pg/mL   COVID-19 RAPID TEST    Collection Time: 02/07/22  9:32 PM   Result Value Ref Range    Specimen source Please find results under separate order      COVID-19 rapid test Not Detected Not Detected     BLOOD GAS, ARTERIAL    Collection Time: 02/07/22 10:25 PM   Result Value Ref Range    pH 7.36 7.35 - 7.45      PCO2 49 (H) 35 - 45 mmHg    PO2 488 (H) 75 - 100 mmHg    O2 SAT 99 >95 %    BICARBONATE 25 22 - 26 mmol/L    BASE EXCESS 1.1 0 - 2 mmol/L    O2 METHOD VENT      FIO2 100.0 %    MODE AssistControl/Pressure Control      SET RATE 15      IPAP/PIP 22      EPAP/CPAP/PEEP 15.0      Sample source Arterial      SITE Right Radial      VÍCTOR'S TEST PASS       Radiologic Studies:  CT Results  (Last 48 hours)               02/07/22 2221  CT HEAD WO CONT Final result    Impression:  No evidence of an acute intracranial process. Narrative:  CT HEAD WO CONT       Technique: CT scan of the head was performed with transaxial images obtained. Sagittal and coronal reconstructions were generated .        Dose Reduction:    All CT scans at this facility are performed using dose reduction optimization   techniques as appropriate to a performed exam including the following: Automated   exposure control, adjustments of the mA and/or kV according to patient size, or   use of iterative reconstruction technique. Comparison:  None available       Findings: There is mild generalized cerebral atrophy. There are mild changes of   microvascular ischemic disease. No evidence of acute large territorial   infarction. No intracerebral mass or hemorrhage. No abnormal fluid collections. The calvarium is intact. Mastoid and paranasal sinuses are mostly clear. Dental   disease noted. Orogastric tube looped in the pharynx. 02/07/22 2221  CTA CHEST ABD PELV W WO CONT Final result    Impression:      No aneurysm or dissection of aorta. Atherosclerosis including coronary arteries. Pulmonary emphysema. No intra-abdominal inflammatory changes or bowel obstruction. Follow-up as   clinically indicated. Please see full report. Narrative:  CTA chest, abdomen and pelvis without with intravenous contrast, 100 cc   Isovue-370, 3-D MIP images       Dose reduction: All CT scans at this facility are performed using dose reduction   optimization techniques as appropriate to a performed exam including the   following: Automated exposure control, adjustments of the mA and/or kV according   to patient size, or use of iterative reconstruction technique. INDICATION: Hypoxic       COMPARISON: CT chest 5/24/2018       FINDINGS:       No aneurysm or dissection of thoracic or abdominal aorta. Atherosclerosis   including coronary arteries. No central pulmonary emboli. No pleural or   pericardial effusions. No mediastinal adenopathy. Emphysematous changes in the   lungs. Minimal basilar atelectasis. Bilateral apical pleural thickening/scar   again noted. No pneumothorax. Endotracheal tube. Tip of NG tube in stomach. Difficult to exclude small thyroid nodules due to artifacts. Liver, spleen and pancreas are unremarkable. Gallbladder is present without   pericholecystic stranding. No urinary stone or hydronephrosis on either side.    Bilateral renal lesions, largest about 1.7 cm on the right side posteriorly, are   too small to characterize, may represent cyst cyst, hyperdense cyst or other. No bowel obstruction. Residual contrast in parts of colon. No contrast   extravasation in the remainder of the bowel. No evidence of diverticulitis. Normal appendix. No abscess. No free intraperitoneal air. Prostate measures   about 4.5 x 3.5 cm in diameter containing small calcifications. There are some   fatty changes/atrophy of some of the muscles. No acute fracture in the   visualized bony structures. CXR Results  (Last 48 hours)               02/07/22 2145  XR CHEST PORT Final result    Impression:  1. Endotracheal tube in satisfactory position. 2. Suspect nasogastric tube is coiled in the oropharynx. Its tip is entering the   stomach. 3. No focal infiltrate. No overt edema. 4. No effusion or pneumothorax. Narrative:  HISTORY:  Altered mental status       TECHNIQUE: Frontal view. COMPARISON: July 23, 2018   LIMITATIONS: None       TUBES/LINES: The tracheal tube in satisfactory position. Nasogastric tube in   place. This may be coiled in the oropharynx. Ad[facility] Ell[facility] HEART AND PULMONARY VESSELS: Heart size and pulmonary blood flow are normal.       LUNG PARENCHYMA: Lungs are moderately well inflated and clear. PLEURA: No effusion or pneumothorax.        MEDIASTINUM: Normal       BONE/SOFT TISSUES: Normal       OTHER: None               Medications ordered:  Medications   midazolam in normal saline (VERSED) 1 mg/mL infusion (10 mg/hr IntraVENous Transfusion Rate Change 2/7/22 2245)   heparin (porcine) 1,000 unit/mL injection 10,000 Units (has no administration in time range)   heparin 25,000 units in D5W 250 ml infusion (has no administration in time range)   aspirin tablet 325 mg (has no administration in time range)   propofol (DIPRIVAN) 10 mg/mL infusion (40 mcg/kg/min × 130 kg IntraVENous Transfusion Rate Change 2/7/22 2245)   etomidate (AMIDATE) 2 mg/mL injection 20 mg (20 mg IntraVENous Given 2/7/22 2118)   rocuronium injection 100 mg (100 mg IntraVENous Given 2/7/22 2119)   iopamidoL (ISOVUE-370) 76 % injection 100 mL (100 mL IntraVENous Given 2/7/22 2222)     ED Course and Reassessment     ED Course:     ED Course as of 02/07/22 2355   Mon Feb 07, 202207, 2022 2211 CBC does not show any evidence of acute process. Leukocytosis not present to suggest infection. Hemoglobin not suggestive of acute anemia. Platelet count is normal.   [SS]   8633 Chest x-ray negative for acute pathology: no concern for pulmonary edema, pleural effusion, pneumothorax, or pneumonia. [SS]   5226 Trop 6    COVID-19 testing is negative.   [SS]   3323 The non-contrasted head CT was negative for acute process making acute intracranial bleed unlikely. No evidence of large subacute stroke, ventriculomegaly, or masses. [SS]   0978 CTA -ve for dissection or PE. Patient currently on 80% FiO2 and 8 PEEP at 93%. Will admit. [SS]      ED Course User Index  [SS] Carlito Mendoza MD       Reassessment:    On reassessment, ABG showed normal methemoglobin, CO, but very high PO2 level of 488. Patient is being weaned off the oxygen, currently PEEP was dropped to 8 and FiO2 was dropped to 80%. The patient is responding to propofol without the need for nicardipine drip to drop his blood pressure. CT head was negative. Patient will need to be admitted for recurrent syncope and hypoxia for further work-up. Final Disposition     Admission: [place] [id number]    After completion of ED workup and discussion of results and diagnoses, patient was admitted to the hospital. Case was discussed with admitting provider. ED Procedures   Performed by: Angel Marte MD  Procedures     EKG interpretation (Preliminary):  Rhythm: normal sinus rhythm; and tachycardic . Rate (approx.): 112. Axis: normal;  NV interval: normal;  QRS interval: normal ;  ST/T wave: normal;  Other findings: abnormal EKG: Sinus tachycardia.     Other Procedures  PROCEDURE: Endotracheal Intubation    Performed by: Steve Holder MD  Date: 2/7/2022    - Indication: hypoxia, syncope  - No chart DNI was noted    - Consent obtained from patient prior to the procedure. Indications, risks, and benefits were explained at length including cardiac arrest, hypotension, hypoxia, esophageal intubation, unilateral intubation, vomiting, tracheal stenosis, etc.    An appropriate time out was taken documenting proper side and proper patient. My hands were washed immediately prior to the procedure. - Equipment readily available at bedside included multiple oxygen sources, intubation equipment, BMV, ETCO2, monitors, suction, bougie, Glidoscope, cric kit, ACLS cart. Nursing and support staff present at bedside.  - The patient was placed on a cardiac monitor including continuous pulse oximetry. Hemodynamic status was optimized as much as possible prior to initiating intubation procedure. - Pre-intubation assessment was done to assess for dentures, foreign bodies, anatomy, secretions, and other conditions that may cause difficulty in endotracheal intubation.  - Preoxygenation was achieved via NRB.  - Rapid Sequence Intubation was conducted. The patient received 20 etomidate for induction and 100 holger for adequate paralysis. - Blade used: glideoscope. - Cricoid pressure?: no  - Suction was required?: no  - Epiglottis and cords were visualized  - A 7.0 cuffed endotracheal tube with adequate stylet was used. - Appropriate endotracheal tube position was confirmed by direct visualization of vocal cord passage.  - The balloon was inflated with 10ml air.  - Color change, fogging of the tube, adequate ETCO2, bilateral air entry, and absence of gastric gurgling with bagging were observed. - ET Tube was secured at 24mm at the lip    - Patient was intubated at the 1 attempt. - Post intubation chest x-ray was done and showed appropriate position.     ED Critical Care   and Critical Care  SHOCK/HYPOTENSION CRITICAL CARE NOTE :  11:20 PM    Critical care time is being documented due to the fulfillment of at least one of the following:    - Critical conditions: condition that acutely impairs one or more vital organ systems such that there is a high probability of imminent or life-threatening deterioration in condition. Examples are diagnoses including but not limited to Afib RVR, DKA, PE, Etc. .    - Critical interventions: an action whose failure to initiate would likely allow a sudden, clinically significant decline in the patient's condition. These include  Requirement of transfer or ICU admission  Contemplation or provision of tPA  Drip initiation (pressors, antiarrhythmics, heparin, etc.)  Antidotes given (narcan, charcoal, epi for anaphylaxis, etc..)  >=2L fluid bolus  >=3 Duonebs  >1 IV/IM doses of sedatives, antiepileptics, BP meds, rate control meds, adenosine. Procedures that are suggestive of critical care: chest tubes, cardioversion, BiPAP, IO, etc..    Critical care time is documented based on continuous or non-continuous provision of care that includes face-to-face time, placing orders, chart review, documentation, discussion with consultants, discussion with family. This time calculation is a best approximation and does not include time spent on CPR, EKG interpretation, central line placement, intubation, laceration repairs, and other separately billed procedures. Amount of Critical Care Time: 35minutes    Details of critical care provision is documented above.  A general summary is listed below:    IMPENDING DETERIORATION - Metabolic, Cardiovascular  ASSOCIATED RISK FACTORS - Shock, Hypotension  MANAGEMENT- Bedside Assessment  INTERPRETATION -  Xrays, CT Scan, Blood Gases, ECG and Blood Pressure  INTERVENTIONS - Hemodynamic and Metabolic interventions  CASE REVIEW - Hospitalist/Intensivist  TREATMENT RESPONSE - Stable/Improved  PERFORMED BY - Self    NOTES   :  During this entire length of time I was immediately available to the patient . Gayle Finch MD    Diagnosis     Clinical Impression:   1. Syncope and collapse    2. Hypoxia    3. Hypertensive emergency    4. Hypertensive encephalopathy      Attestations:    Gayle Finch MD    Please note that this dictation was completed with [employer], the computer voice recognition software. Quite often unanticipated grammatical, syntax, homophones, and other interpretive errors are inadvertently transcribed by the computer software. Please disregard these errors. Please excuse any errors that have escaped final proofreading. Thank you.

## 2022-02-08 NOTE — ROUTINE PROCESS
TRANSFER - OUT REPORT:    Verbal report given to mimi on [facility]  being transferred to icu 2 for routine progression of care       Report consisted of patients Situation, Background, Assessment and   Recommendations(SBAR). Information from the following report(s) SBAR was reviewed with the receiving nurse. Lines:   Peripheral IV 02/07/22 Anterior; Left Forearm (Active)       Peripheral IV 02/07/22 Anterior;Proximal;Right Forearm (Active)        Opportunity for questions and clarification was provided.       Patient transported with:   [place]

## 2022-02-08 NOTE — CONSULTS
IMPRESSION:   1. Acute hypoxic respiratory failure  2. NSTEMI  3. Generalized Edema  4. Syncope  5. COPD  6. Additional workup outlined below  7. Pt is at high risk of sudden decline and decompensation with life threatening consequenses and continued end organ dysfunction and failure  8. Pt is critically ill. Time spent with pt and staff actively rendering care, managing pt and coordinating care as stated below;  55 minutes, exclusive of any procedures      RECOMMENDATIONS/PLAN:   1. ICU monitoring  1. Ventilator for mechanical life support and prevent respiratory arrest with protective lung strategies sedated with Versed and propofol  2. Patient is on assist control mode 80% FiO2 PEEP of 8 rate of 15 we will change vent setting and decrease FiO2  3. We will start patient on IV Solu-Medrol nebulizer treatment Covid negative  4. Hold trilogy inhaler will start patient on Pulmicort  5. Chest x-ray no acute infiltrate  6. Patient was to be followed with ENT he never had any appointment with them they were resuming patient has vocal cord dysfunction once extubated we will get ENT evaluation  7. CVP monitoring  8. IV vasopressors for circulatory shock refractory to fluids to maintain SBP> 90  9. Transfuse prn to maintain Hgb > 7  10. Labs to follow electrolytes, renal function and and blood counts  11. Bronchial hygiene with respiratory therapy techniques, bronchodilators  12. Pt needs IV fluids with additives and Drug therapy requiring intensive monitoring for toxicity  13. Prescription drug management with home med reconciliation reviewed  14. DVT, SUP prophylaxis  15.  Will be available to assist in medical management while in the CCU pending disposition     [x] High complexity decision making was performed  [x] See my orders for details  HPI  72-year-old male came in because of chest pain with shortness of breath he has also recurrent episodes of syncope significant past medical history of vocal cord dysfunction, pulmonary [place], coronary artery disease status post stent COPD current everyday smoker no history of sleep apnea he was having swelling of the lower extremities for couple of months today came into the emergency room as previously was having chest pain or shortness of breath he passed out subsequently got intubated and now on ventilator critical care consult was called. PMH:  has no past medical history on file. PSH:   has no past surgical history on file. FHX: family history is not on file. SHX:      ALL:   Allergies   Allergen Reactions    Sulfa (Sulfonamide Antibiotics) Other (comments)     syncope          MEDS:   [x] Reviewed - As Below   [] Not reviewed    Current Facility-Administered Medications   Medication    albuterol-ipratropium (DUO-NEB) 2.5 MG-0.5 MG/3 ML    bethanechol (URECHOLINE) tablet 100 mg    finasteride (PROSCAR) tablet 5 mg    fluticasone-umeclidinium-vilanterol (TRELEGY ELLIPTA) inhaler 1 Puff    . PHARMACY TO SUBSTITUTE PER PROTOCOL (Reordered from: OXcarbazepine (Oxtellar XR) 300 mg Tb24)    tamsulosin (FLOMAX) capsule 0.4 mg    sodium chloride (NS) flush 5-40 mL    sodium chloride (NS) flush 5-40 mL    acetaminophen (TYLENOL) tablet 650 mg    Or    acetaminophen (TYLENOL) suppository 650 mg    polyethylene glycol (MIRALAX) packet 17 g    ondansetron (ZOFRAN ODT) tablet 4 mg    Or    ondansetron (ZOFRAN) injection 4 mg    enoxaparin (LOVENOX) injection 40 mg    famotidine (PF) (PEPCID) 20 mg in 0.9% sodium chloride 10 mL injection    labetaloL (NORMODYNE;TRANDATE) 20 mg/4 mL (5 mg/mL) injection 10 mg    amLODIPine (NORVASC) tablet 10 mg    [START ON 2/9/2022] aspirin chewable tablet 81 mg    [START ON 2/9/2022] atorvastatin (LIPITOR) tablet 80 mg    [START ON 2/9/2022] clopidogreL (PLAVIX) tablet 75 mg    [START ON 2/9/2022] metoprolol succinate (TOPROL-XL) XL tablet 25 mg    midazolam in normal saline (VERSED) 1 mg/mL infusion    propofol (DIPRIVAN) 10 mg/mL infusion      MAR reviewed and pertinent medications noted or modified as needed   Current Facility-Administered Medications   Medication    albuterol-ipratropium (DUO-NEB) 2.5 MG-0.5 MG/3 ML    bethanechol (URECHOLINE) tablet 100 mg    finasteride (PROSCAR) tablet 5 mg    fluticasone-umeclidinium-vilanterol (TRELEGY ELLIPTA) inhaler 1 Puff    . PHARMACY TO SUBSTITUTE PER PROTOCOL (Reordered from: OXcarbazepine (Oxtellar XR) 300 mg Tb24)    tamsulosin (FLOMAX) capsule 0.4 mg    sodium chloride (NS) flush 5-40 mL    sodium chloride (NS) flush 5-40 mL    acetaminophen (TYLENOL) tablet 650 mg    Or    acetaminophen (TYLENOL) suppository 650 mg    polyethylene glycol (MIRALAX) packet 17 g    ondansetron (ZOFRAN ODT) tablet 4 mg    Or    ondansetron (ZOFRAN) injection 4 mg    enoxaparin (LOVENOX) injection 40 mg    famotidine (PF) (PEPCID) 20 mg in 0.9% sodium chloride 10 mL injection    labetaloL (NORMODYNE;TRANDATE) 20 mg/4 mL (5 mg/mL) injection 10 mg    amLODIPine (NORVASC) tablet 10 mg    [START ON 2/9/2022] aspirin chewable tablet 81 mg    [START ON 2/9/2022] atorvastatin (LIPITOR) tablet 80 mg    [START ON 2/9/2022] clopidogreL (PLAVIX) tablet 75 mg    [START ON 2/9/2022] metoprolol succinate (TOPROL-XL) XL tablet 25 mg    midazolam in normal saline (VERSED) 1 mg/mL infusion    propofol (DIPRIVAN) 10 mg/mL infusion      PMH:  has no past medical history on file. PSH:   has no past surgical history on file. FHX: family history is not on file. SHX:       ROS:  Unable to obtain sedated    Hemodynamics:    CO:    CI:    CVP:    SVR:   PAP Systolic:    PAP Diastolic:    PVR:    UZ13:        Ventilator Settings:      Mode Rate TV Press PEEP FiO2 PIP Min.  Vent   Assist control,Pressure control         8 cm H20 40 %  22 cm H2O  10.4 l/min        Vital Signs: Telemetry:    normal sinus rhythm Intake/Output:   Visit Vitals  BP (!) 166/103   Pulse 90   Temp 97.9 °F (36.6 °C)   Resp 15   Ht 6' (1.829 m)   Wt 130 kg (286 lb 9.6 oz)   SpO2 97%   BMI 38.87 kg/m²       Temp (24hrs), Av.5 °F (36.9 °C), Min:97.7 °F (36.5 °C), Max:100.4 °F (38 °C)        O2 Device: Ventilator         Wt Readings from Last 4 Encounters:   22 130 kg (286 lb 9.6 oz)        No intake or output data in the 24 hours ending 22 1608    Last shift:      No intake/output data recorded. Last 3 shifts: No intake/output data recorded. Physical Exam:     General:  intubated on vent  HEENT: NCAT, poor dentition, lips and mucosa dry  Eyes: anicteric; conjunctiva clear  Neck: no nodes, no cuff leak, trach midline; no accessory MM use. Chest: no deformity,   Cardiac: IR regular; no murmur;   Lungs: distant breath sounds;  bilateral wheeze  Abd: soft, NT, hypoactive BS  Ext: no edema; no joint swelling; No clubbing  : NO rubin, clear urine  Neuro: sedated;   Psych- unable to assess  Skin: warm, dry, no cyanosis;   Pulses: 1-2+ Bilateral pedal, radial  Capillary: brisk; pale       DATA:    MAR reviewed and pertinent medications noted or modified as needed  MEDS:   Current Facility-Administered Medications   Medication    albuterol-ipratropium (DUO-NEB) 2.5 MG-0.5 MG/3 ML    bethanechol (URECHOLINE) tablet 100 mg    finasteride (PROSCAR) tablet 5 mg    fluticasone-umeclidinium-vilanterol (TRELEGY ELLIPTA) inhaler 1 Puff    . PHARMACY TO SUBSTITUTE PER PROTOCOL (Reordered from: OXcarbazepine (Oxtellar XR) 300 mg Tb24)    tamsulosin (FLOMAX) capsule 0.4 mg    sodium chloride (NS) flush 5-40 mL    sodium chloride (NS) flush 5-40 mL    acetaminophen (TYLENOL) tablet 650 mg    Or    acetaminophen (TYLENOL) suppository 650 mg    polyethylene glycol (MIRALAX) packet 17 g    ondansetron (ZOFRAN ODT) tablet 4 mg    Or    ondansetron (ZOFRAN) injection 4 mg    enoxaparin (LOVENOX) injection 40 mg    famotidine (PF) (PEPCID) 20 mg in 0.9% sodium chloride 10 mL injection    labetaloL (NORMODYNE;TRANDATE) 20 mg/4 mL (5 mg/mL) injection 10 mg    amLODIPine (NORVASC) tablet 10 mg    [START ON 2/9/2022] aspirin chewable tablet 81 mg    [START ON 2/9/2022] atorvastatin (LIPITOR) tablet 80 mg    [START ON 2/9/2022] clopidogreL (PLAVIX) tablet 75 mg    [START ON 2/9/2022] metoprolol succinate (TOPROL-XL) XL tablet 25 mg    midazolam in normal saline (VERSED) 1 mg/mL infusion    propofol (DIPRIVAN) 10 mg/mL infusion        Labs:    Recent Labs     02/08/22  0353 02/07/22 2120   WBC 13.4* 11.1   HGB 11.7* 12.5    245   INR  --  1.0   APTT  --  24.8     Recent Labs     02/08/22  0352 02/07/22 2120    134*   K 3.7 4.8    102   CO2 25 29   * 115*   BUN 20 24*   CREA 0.85 1.10   CA 8.2* 8.8   MG 2.1 2.1   PHOS 3.5  --    LAC 0.6  --    ALB  --  2.9*   ALT  --  34     Recent Labs     02/08/22  0402 02/07/22  2225   PH 7.44 7.36   PCO2 41 49*   PO2 268* 488*   HCO3 27* 25   FIO2 80.0 100.0     No results for input(s): CPK, CKNDX, TROIQ in the last 72 hours. No lab exists for component: CPKMB  No results found for: BNPP, BNP   No results found for: CULT  Lab Results   Component Value Date/Time    TSH 1.38 02/08/2022 03:53 AM        Imaging:    Results from Hospital Encounter encounter on 02/07/22    XR CHEST PORT    Narrative  HISTORY:  Altered mental status    TECHNIQUE: Frontal view. COMPARISON: July 23, 2018  LIMITATIONS: None    TUBES/LINES: The tracheal tube in satisfactory position. Nasogastric tube in  place. This may be coiled in the oropharynx. Vearl Pippins HEART AND PULMONARY VESSELS: Heart size and pulmonary blood flow are normal.    LUNG PARENCHYMA: Lungs are moderately well inflated and clear. PLEURA: No effusion or pneumothorax. MEDIASTINUM: Normal    BONE/SOFT TISSUES: Normal    OTHER: None    Impression  1. Endotracheal tube in satisfactory position. 2. Suspect nasogastric tube is coiled in the oropharynx. Its tip is entering the  stomach. 3. No focal infiltrate. No overt edema.   4. No effusion or pneumothorax. Results from Hospital Encounter encounter on 02/07/22    CTA CHEST ABD PELV W WO CONT    Narrative  CTA chest, abdomen and pelvis without with intravenous contrast, 100 cc  [id number], 3-D MIP images    Dose reduction: All CT scans at this facility are performed using dose reduction  optimization techniques as appropriate to a performed exam including the  following: Automated exposure control, adjustments of the mA and/or kV according  to patient size, or use of iterative reconstruction technique. INDICATION: Hypoxic    COMPARISON: CT chest 5/24/2018    FINDINGS:    No aneurysm or dissection of thoracic or abdominal aorta. Atherosclerosis  including coronary arteries. No central pulmonary emboli. No pleural or  pericardial effusions. No mediastinal adenopathy. Emphysematous changes in the  lungs. Minimal basilar atelectasis. Bilateral apical pleural thickening/scar  again noted. No pneumothorax. Endotracheal tube. Tip of NG tube in stomach. Difficult to exclude small thyroid nodules due to artifacts. Liver, spleen and pancreas are unremarkable. Gallbladder is present without  pericholecystic stranding. No urinary stone or hydronephrosis on either side. Bilateral renal lesions, largest about 1.7 cm on the right side posteriorly, are  too small to characterize, may represent cyst cyst, hyperdense cyst or other. No bowel obstruction. Residual contrast in parts of colon. No contrast  extravasation in the remainder of the bowel. No evidence of diverticulitis. Normal appendix. No abscess. No free intraperitoneal air. Prostate measures  about 4.5 x 3.5 cm in diameter containing small calcifications. There are some  fatty changes/atrophy of some of the muscles. No acute fracture in the  visualized bony structures. Impression  No aneurysm or dissection of aorta. Atherosclerosis including coronary arteries. Pulmonary emphysema.     No intra-abdominal inflammatory changes or bowel obstruction. Follow-up as  clinically indicated. Please see full report. This care involved high complexity decision making which includes independently reviewing the patient's past medical records, current laboratory results, medication profiles that were immediately available to me and actual Xray images at the bedside in order to assess, support vital system function, and to treat this degree of vital organ system failure, and to prevent further life threatening deterioration of the patients condition. I was in direct communication with the nursing staff throughout this time. Medical Decision Making Today  · Reviewed the flowsheet and previous days notes  · Reviewed and summarized records or history from previous days note or discussions with staff, family  · Parenteral controlled substances - Reviewed/ Adjusted / Bella Spells / Started  · High Risk Drug therapy requiring intensive monitoring for toxicity: eg steroids, pressors, antibiotics  · Review and order of Clinical lab tests  · Review and Order of Radiology tests  · Review and Order of Medicine tests  · Independent visualization of radiologic Images  · Reviewed Ventilator / NiPPV  · I have personally reviewed the patients ECG / Telemetry  · Diagnostic endoscopies with identified risk factors    I have provided total of 55 minutes of critical care time rendering care exclusive of any procedures. During this entire length of time the patient's condition was unstable, unpredictable and critically ill in the CCU/ ICU. I was immediately available to the patient whose care required several interactions with nursing, multidisciplinary team members leading to multiple interventions with fluid resuscitation and medication adjustments to optimize respiratory support, hemodynamic treatment, medication changes based on repeat labs results, reviews, exams and assessments.  The reason for providing this level of medical care was due to a critical illness that impaired one or more vital organ systems, such that there was a high probability of sudden or life threatening deterioration in the patient's condition.

## 2022-02-08 NOTE — PROGRESS NOTES
Progress Note    Patient: Justice Andre MRN: [id number]  SSN: [id number]    YOB: 1955  Age: 73 y.o. Sex: male      Admit Date: 2/7/2022    LOS: 1 day     Subjective:     66M, h/o COPD not on oxygen, pulmonary embolism (not on AC s/t GI bleed), CAD s/p stent presented with acute encephalopathy and syncope complicated by HTN urgency and episode of hypoxia and acute hypoxic respiratory failure and was intubated    HOSPITAL COURSE:   Initial workup showed no signs of infection- CT chest. CXR no pneumonia, UA no signs of infection, cardiology plans on ischemic workup after extubation, description not consistent with seizures. He does have NSTEMI type II s.t HTN urgency. Review of Systems:  Cannot be assessed due to men bob status      Objective:     Vitals:    02/08/22 1500 02/08/22 1516 02/08/22 1533 02/08/22 1546   BP:  (!) 166/103     Pulse:  83 90    Resp:   15    Temp: 97.9 °F (36.6 °C)      SpO2:   97%    Weight:       Height:    6' (1.829 m)        Intake and Output:  Current Shift: No intake/output data recorded. Last three shifts: No intake/output data recorded. Physical Exam:   Physical Exam:  General: Intubated and sedated. Eye: conjunctivae/corneas clear. PERRL, EOM's intact. Throat and Neck: normal and no erythema or exudates noted.  No mass   Lung: clear to auscultation bilaterally  Heart: regular rate and rhythm,   Abdomen: soft, Bowel sounds normal. No masses,  Extremities:  all extremities normal, atraumatic  Skin: Normal.  Neurologic: Omitted   psychiatric: Omitted      Lab/Data Review:  Recent Results (from the past 24 hour(s))   TROPONIN-HIGH SENSITIVITY    Collection Time: 02/07/22  9:18 PM   Result Value Ref Range    Troponin-High Sensitivity 6 0 - 76 ng/L   METABOLIC PANEL, COMPREHENSIVE    Collection Time: 02/07/22  9:20 PM   Result Value Ref Range    Sodium 134 (L) 136 - 145 mmol/L    Potassium 4.8 3.5 - 5.1 mmol/L    Chloride 102 97 - 108 mmol/L    CO2 29 21 - 32 mmol/L    Anion gap 3 (L) 5 - 15 mmol/L    Glucose 115 (H) 65 - 100 mg/dL    BUN 24 (H) 6 - 20 mg/dL    Creatinine 1.10 0.70 - 1.30 mg/dL    BUN/Creatinine ratio 22 (H) 12 - 20      GFR est AA >60 >60 ml/min/1.73m2    GFR est non-AA >60 >60 ml/min/1.73m2    Calcium 8.8 8.5 - 10.1 mg/dL    Bilirubin, total 0.3 0.2 - 1.0 mg/dL    AST (SGOT) 15 15 - 37 U/L    ALT (SGPT) 34 12 - 78 U/L    Alk. phosphatase 78 45 - 117 U/L    Protein, total 6.7 6.4 - 8.2 g/dL    Albumin 2.9 (L) 3.5 - 5.0 g/dL    Globulin 3.8 2.0 - 4.0 g/dL    A-G Ratio 0.8 (L) 1.1 - 2.2     PROTHROMBIN TIME + INR    Collection Time: 02/07/22  9:20 PM   Result Value Ref Range    Prothrombin time 12.6 11.9 - 14.7 sec    INR 1.0 0.9 - 1.1     MAGNESIUM    Collection Time: 02/07/22  9:20 PM   Result Value Ref Range    Magnesium 2.1 1.6 - 2.4 mg/dL   PTT    Collection Time: 02/07/22  9:20 PM   Result Value Ref Range    aPTT 24.8 21.2 - 34.1 sec    aPTT, therapeutic range   82 - 109 sec   CBC WITH AUTOMATED DIFF    Collection Time: 02/07/22  9:20 PM   Result Value Ref Range    WBC 11.1 4.1 - 11.1 K/uL    RBC 4.31 4.10 - 5.70 M/uL    HGB 12.5 12.1 - 17.0 g/dL    HCT 38.1 36.6 - 50.3 %    MCV 88.4 80.0 - 99.0 FL    MCH 29.0 26.0 - 34.0 PG    MCHC 32.8 30.0 - 36.5 g/dL    RDW 14.6 (H) 11.5 - 14.5 %    PLATELET 620 554 - 760 K/uL    MPV 9.2 8.9 - 12.9 FL    NRBC 0.0 0.0  WBC    ABSOLUTE NRBC 0.00 0.00 - 0.01 K/uL    NEUTROPHILS 89 (H) 32 - 75 %    LYMPHOCYTES 8 (L) 12 - 49 %    MONOCYTES 2 (L) 5 - 13 %    EOSINOPHILS 0 0 - 7 %    BASOPHILS 0 0 - 1 %    IMMATURE GRANULOCYTES 1 (H) 0 - 0.5 %    ABS. NEUTROPHILS 9.9 (H) 1.8 - 8.0 K/UL    ABS. LYMPHOCYTES 0.9 0.8 - 3.5 K/UL    ABS. MONOCYTES 0.3 0.0 - 1.0 K/UL    ABS. EOSINOPHILS 0.0 0.0 - 0.4 K/UL    ABS. BASOPHILS 0.0 0.0 - 0.1 K/UL    ABS. IMM.  GRANS. 0.1 (H) 0.00 - 0.04 K/UL    DF AUTOMATED     NT-PRO BNP    Collection Time: 02/07/22  9:20 PM   Result Value Ref Range    NT pro-BNP 30 <125 pg/mL   COVID-19 RAPID TEST    Collection Time: 02/07/22  9:32 PM   Result Value Ref Range    Specimen source Please find results under separate order      COVID-19 rapid test Not Detected Not Detected     BLOOD GAS, ARTERIAL    Collection Time: 02/07/22 10:25 PM   Result Value Ref Range    pH 7.36 7.35 - 7.45      PCO2 49 (H) 35 - 45 mmHg    PO2 488 (H) 75 - 100 mmHg    O2 SAT 99 >95 %    BICARBONATE 25 22 - 26 mmol/L    BASE EXCESS 1.1 0 - 2 mmol/L    O2 METHOD VENT      FIO2 100.0 %    MODE AssistControl/Pressure Control      SET RATE 15      IPAP/PIP 22      EPAP/CPAP/PEEP 15.0      Sample source Arterial      SITE Right Radial      VÍCTOR'S TEST PASS     URINALYSIS W/ REFLEX CULTURE    Collection Time: 02/07/22 10:45 PM    Specimen: Urine   Result Value Ref Range    Color Yellow      Appearance Clear Clear      Specific gravity >1.030 (H) 1.003 - 1.030    pH (UA) 7.0 5.0 - 8.0      Protein Negative Negative mg/dL    Glucose 50 (A) Negative mg/dL    Ketone Negative Negative mg/dL    Bilirubin Negative Negative      Blood Small (A) Negative      Urobilinogen 0.1 0.1 - 1.0 EU/dL    Nitrites Negative Negative      Leukocyte Esterase Negative Negative      UA:UC IF INDICATED Culture not indicated by UA result Culture not indicated by UA result      WBC 0-4 0 - 4 /hpf    RBC 0-5 0 - 5 /hpf    Bacteria Negative Negative /hpf   DRUG SCREEN, URINE    Collection Time: 02/07/22 10:45 PM   Result Value Ref Range    AMPHETAMINES Negative Negative      BARBITURATES Negative Negative      BENZODIAZEPINES Negative Negative      COCAINE Negative Negative      METHADONE Negative Negative      OPIATES Negative Negative      PCP(PHENCYCLIDINE) Negative Negative      THC (TH-CANNABINOL) Negative Negative      Drug screen comment        This test is a screen for drugs of abuse in a medical setting only (i.e., they are unconfirmed results and as such must not be used for non-medical purposes, e.g.,employment testing, legal testing).  Due to its inherent nature, false positive (FP) and false negative (FN) results may be obtained. Therefore, if necessary for medical care, recommend confirmation of positive findings by GC/MS.    TROPONIN-HIGH SENSITIVITY    Collection Time: 02/08/22 12:59 AM   Result Value Ref Range    Troponin-High Sensitivity 144 (HH) 0 - 76 ng/L   LACTIC ACID    Collection Time: 02/08/22  3:52 AM   Result Value Ref Range    Lactic acid 0.6 0.4 - 2.0 mmol/L   MAGNESIUM    Collection Time: 02/08/22  3:52 AM   Result Value Ref Range    Magnesium 2.1 1.6 - 2.4 mg/dL   PHOSPHORUS    Collection Time: 02/08/22  3:52 AM   Result Value Ref Range    Phosphorus 3.5 2.6 - 4.7 mg/dL   METABOLIC PANEL, BASIC    Collection Time: 02/08/22  3:52 AM   Result Value Ref Range    Sodium 136 136 - 145 mmol/L    Potassium 3.7 3.5 - 5.1 mmol/L    Chloride 103 97 - 108 mmol/L    CO2 25 21 - 32 mmol/L    Anion gap 8 5 - 15 mmol/L    Glucose 130 (H) 65 - 100 mg/dL    BUN 20 6 - 20 mg/dL    Creatinine 0.85 0.70 - 1.30 mg/dL    BUN/Creatinine ratio 24 (H) 12 - 20      GFR est AA >60 >60 ml/min/1.73m2    GFR est non-AA >60 >60 ml/min/1.73m2    Calcium 8.2 (L) 8.5 - 10.1 mg/dL   VITAMIN B12    Collection Time: 02/08/22  3:52 AM   Result Value Ref Range    Vitamin B12 195 193 - 986 pg/mL   RPR    Collection Time: 02/08/22  3:52 AM   Result Value Ref Range    RPR NONREACTIVE NONREACTIVE   CBC WITH AUTOMATED DIFF    Collection Time: 02/08/22  3:53 AM   Result Value Ref Range    WBC 13.4 (H) 4.1 - 11.1 K/uL    RBC 3.93 (L) 4.10 - 5.70 M/uL    HGB 11.7 (L) 12.1 - 17.0 g/dL    HCT 34.5 (L) 36.6 - 50.3 %    MCV 87.8 80.0 - 99.0 FL    MCH 29.8 26.0 - 34.0 PG    MCHC 33.9 30.0 - 36.5 g/dL    RDW 14.6 (H) 11.5 - 14.5 %    PLATELET 636 005 - 541 K/uL    MPV 8.7 (L) 8.9 - 12.9 FL    NRBC 0.0 0.0  WBC    ABSOLUTE NRBC 0.00 0.00 - 0.01 K/uL    NEUTROPHILS 80 (H) 32 - 75 %    LYMPHOCYTES 15 12 - 49 %    MONOCYTES 4 (L) 5 - 13 %    EOSINOPHILS 0 0 - 7 %    BASOPHILS 0 0 - 1 % IMMATURE GRANULOCYTES 1 (H) 0 - 0.5 %    ABS. NEUTROPHILS 10.7 (H) 1.8 - 8.0 K/UL    ABS. LYMPHOCYTES 2.0 0.8 - 3.5 K/UL    ABS. MONOCYTES 0.6 0.0 - 1.0 K/UL    ABS. EOSINOPHILS 0.1 0.0 - 0.4 K/UL    ABS. BASOPHILS 0.0 0.0 - 0.1 K/UL    ABS. IMM. GRANS. 0.1 (H) 0.00 - 0.04 K/UL    DF AUTOMATED     SED RATE (ESR)    Collection Time: 02/08/22  3:53 AM   Result Value Ref Range    Sed rate, automated 14 mm/hr   TSH 3RD GENERATION    Collection Time: 02/08/22  3:53 AM   Result Value Ref Range    TSH 1.38 0.36 - 3.74 uIU/mL   BLOOD GAS, ARTERIAL    Collection Time: 02/08/22  4:02 AM   Result Value Ref Range    pH 7.44 7.35 - 7.45      PCO2 41 35 - 45 mmHg    PO2 268 (H) 75 - 100 mmHg    O2 SAT 98 >95 %    BICARBONATE 27 (H) 22 - 26 mmol/L    BASE EXCESS 3.4 (H) 0 - 2 mmol/L    O2 METHOD VENT      FIO2 80.0 %    MODE AssistControl/Pressure Control      SET RATE 15      IPAP/PIP 28      EPAP/CPAP/PEEP 8.0      Sample source Arterial      SITE Right Radial      VÍCTOR'S TEST PASS     MRSA SCREEN - PCR (NASAL)    Collection Time: 02/08/22  9:28 AM   Result Value Ref Range    MRSA by PCR, Nasal Not Detected Not Detected           Assessment and plan:        (1) Acute encephalopathy: GCS 12. Cardiology and neurology input appreciated. It does appear to have some severe apnea. However cardiac and neuro causes to be ruled out. (2) Acute hypoxic respiratory failure : intubated, seddated, wean sedation. Solumedrol , trelegy    (3) CAD s/p stent : aspirin, plavix,  statin    (4) NSTEMI: likely type II:    (4) COPD not on o2: complicates #1    (5) HTN urgency: PRN labetalol. amlodipine    (6) vocal chord dysfunction: OP ENT    DISPO: ICU today, likely extubation tomorrow.      Signed By: Nereida Rojas MD     February 8, 2022

## 2022-02-08 NOTE — ED TRIAGE NOTES
Per EMS: pt at [facility], being sent here for chest pain; pt with intermittent unresponsiveness en route; pt unresponsive to stimuli currently upon arrival

## 2022-02-08 NOTE — ED NOTES
Received care of patient. Currently tolerating sedation well. Hypertensive. Sinus tachy on monitor. To go to icu shortly. Per night shift nurse once patient sedation titrated down he starts to awaken suddenly.

## 2022-02-08 NOTE — H&P
History and Physical    Patient: Freya Nur MRN: [id number]  SSN: [id number]    YOB: 1955  Age: 73 y.o. Sex: male      Subjective:   Limited history was obtained as patient was intubated and sedated. [de-identified] of history obtained from wife at bedside and ED physician. Freya Nur is a 73 y.o. male possible history of vocal cord dysfunction, history of pulmonary embolism (on warfarin due to GI bleed), CAD s/p stent (6 years ago), and COPD. He presented to the ED today with chief complaint of chest pain, shortness of breath, syncope and altered mental status. He initially went to outside ED for chest pain shortness of breath, however noted to have altered mental status by EMS crew. Recurrent, back-to-back episodes of syncope that lasted 5 to 10 seconds, every 1 minutes. He was noted to have normal pulses, normal breathing and no arrhythmia. In the ED, he was initially hypertensive (230/143) and hypoxic, SPO2 in 30s. He continued to have episodes of syncope, but wakes up after sternal rub. No postictal phase of confusion noted per ED physician, and was able to provide history to ED physician. As patient was unable to protect his airway and continue to be hypoxic, patient was intubated. Per wife, patient has been having intermittent, generalized/whole body cramps that last between 15 minutes to 1 hour. Those episodes generally followed by loss of consciousness for about 15 seconds. He usually will have short period of confusion and slurred speech after regaining consciousness. No extremity weakness after the episodes. Wife reports no fever, chills or chest pain. Reports have shortness of breath and cough at baseline due to COPD and has not been worse than normal in the past 3 weeks. Of note, he has been worked-up in the hospital in [facility] (record not found in system), and no causes has been identified. EEG was never done.     Initial work-up showed no leukocytosis, no significant electrolytes abnormalities, no elevated troponin or BNP. UA not indicated of of UTI, tox screen negative. CT head and chest x-ray showed no acute findings. CT PE pending. No past medical history on file. No past surgical history on file. No family history on file. Social History     Tobacco Use    Smoking status: Not on file    Smokeless tobacco: Not on file   Substance Use Topics    Alcohol use: Not on file      Prior to Admission medications    Medication Sig Start Date End Date Taking? Authorizing Provider   albuterol (ProAir HFA) 90 mcg/actuation inhaler Take 1 Puff by inhalation four (4) times daily as needed. Patient not taking: Reported on 2/7/2022    Provider, Historical   aspirin 81 mg chewable tablet Take 81 mg by mouth daily. Patient not taking: Reported on 2/7/2022    Provider, Historical   atorvastatin (Lipitor) 40 mg tablet Take 40 mg by mouth daily. Patient not taking: Reported on 2/7/2022    Provider, Historical   bethanechol chloride (URECHOLINE) 50 mg tablet Take 200 mg by mouth daily. Provider, Historical   bumetanide (BUMEX) 1 mg tablet Take 1 mg by mouth daily. Patient not taking: Reported on 2/7/2022    Provider, Historical   clopidogreL (PLAVIX) 75 mg tab Take 75 mg by mouth daily. Patient not taking: Reported on 2/7/2022    Provider, Historical   esomeprazole (NexIUM) 40 mg capsule Take 40 mg by mouth daily. Patient not taking: Reported on 2/7/2022    Provider, Historical   finasteride (PROSCAR) 5 mg tablet Take 5 mg by mouth daily. Provider, Historical   fluticasone-umeclidinium-vilanterol (TRELEGY ELLIPTA) 100-62.5-25 mcg inhaler Take 1 Puff by inhalation daily. Provider, Historical   furosemide (LASIX) 20 mg tablet Take 20 mg by mouth daily. Patient not taking: Reported on 2/7/2022    Provider, Historical   gabapentin (NEURONTIN) 300 mg capsule Take 300 mg by mouth daily.   Patient not taking: Reported on 2/7/2022    Provider, Historical ipratropium-albuteroL (Combivent Respimat)  mcg/actuation inhaler Take 1 Puff by inhalation two (2) times daily as needed. Patient not taking: Reported on 2/7/2022    Provider, Historical   isosorbide dinitrate (ISORDIL) 30 mg tablet Take 30 mg by mouth two (2) times a day. Patient not taking: Reported on 2/7/2022    Provider, Historical   losartan (COZAAR) 25 mg tablet Take 25 mg by mouth daily. Patient not taking: Reported on 2/7/2022    Provider, Historical   omeprazole (PRILOSEC) 20 mg capsule Take 20 mg by mouth daily. Patient not taking: Reported on 2/7/2022    Provider, Historical   OXcarbazepine (Oxtellar XR) 300 mg Tb24 Take 300 mg by mouth daily. Patient not taking: Reported on 2/7/2022    Provider, Historical   QUEtiapine (SEROqueL) 400 mg tablet Take 400 mg by mouth daily. Patient not taking: Reported on 2/7/2022    Provider, Historical   sertraline (Zoloft) 100 mg tablet Take 100 mg by mouth daily. Patient not taking: Reported on 2/7/2022    Provider, Historical   tamsulosin (Flomax) 0.4 mg capsule Take 0.4 mg by mouth daily. Provider, Historical   ticagrelor (Brilinta) 90 mg tablet Take 90 mg by mouth two (2) times a day. Patient not taking: Reported on 2/7/2022    Provider, Historical   metoprolol succinate (TOPROL-XL) 25 mg XL tablet Take 25 mg by mouth daily. Patient not taking: Reported on 2/7/2022    Provider, Historical        Allergies   Allergen Reactions    Sulfa (Sulfonamide Antibiotics) Other (comments)     syncope         Review of Systems:  Omited      Objective:     Vitals:    02/07/22 2135 02/07/22 2225 02/07/22 2300 02/07/22 2317   BP: (!) 230/143 (!) 219/126  135/79   Pulse: (!) 124 (!) 125 (!) 122 (!) 119   Resp: 15 16 15    Temp:  98.4 °F (36.9 °C)     SpO2: 90% (!) 84% 93% 93%   Weight:       Height:            Physical Exam:  General: Intubated and sedated. Eye: conjunctivae/corneas clear. PERRL, EOM's intact.    Throat and Neck: normal and no erythema or exudates noted. No mass   Lung: clear to auscultation bilaterally  Heart: regular rate and rhythm,   Abdomen: soft, Bowel sounds normal. No masses,  Extremities:  all extremities normal, atraumatic  Skin: Normal.  Neurologic: Omitted   psychiatric: Omitted    Recent Results (from the past 24 hour(s))   TROPONIN-HIGH SENSITIVITY    Collection Time: 02/07/22  9:18 PM   Result Value Ref Range    Troponin-High Sensitivity 6 0 - 76 ng/L   METABOLIC PANEL, COMPREHENSIVE    Collection Time: 02/07/22  9:20 PM   Result Value Ref Range    Sodium 134 (L) 136 - 145 mmol/L    Potassium 4.8 3.5 - 5.1 mmol/L    Chloride 102 97 - 108 mmol/L    CO2 29 21 - 32 mmol/L    Anion gap 3 (L) 5 - 15 mmol/L    Glucose 115 (H) 65 - 100 mg/dL    BUN 24 (H) 6 - 20 mg/dL    Creatinine 1.10 0.70 - 1.30 mg/dL    BUN/Creatinine ratio 22 (H) 12 - 20      GFR est AA >60 >60 ml/min/1.73m2    GFR est non-AA >60 >60 ml/min/1.73m2    Calcium 8.8 8.5 - 10.1 mg/dL    Bilirubin, total 0.3 0.2 - 1.0 mg/dL    AST (SGOT) 15 15 - 37 U/L    ALT (SGPT) 34 12 - 78 U/L    Alk.  phosphatase 78 45 - 117 U/L    Protein, total 6.7 6.4 - 8.2 g/dL    Albumin 2.9 (L) 3.5 - 5.0 g/dL    Globulin 3.8 2.0 - 4.0 g/dL    A-G Ratio 0.8 (L) 1.1 - 2.2     PROTHROMBIN TIME + INR    Collection Time: 02/07/22  9:20 PM   Result Value Ref Range    Prothrombin time 12.6 11.9 - 14.7 sec    INR 1.0 0.9 - 1.1     PTT    Collection Time: 02/07/22  9:20 PM   Result Value Ref Range    aPTT 24.8 21.2 - 34.1 sec    aPTT, therapeutic range   82 - 109 sec   CBC WITH AUTOMATED DIFF    Collection Time: 02/07/22  9:20 PM   Result Value Ref Range    WBC 11.1 4.1 - 11.1 K/uL    RBC 4.31 4.10 - 5.70 M/uL    HGB 12.5 12.1 - 17.0 g/dL    HCT 38.1 36.6 - 50.3 %    MCV 88.4 80.0 - 99.0 FL    MCH 29.0 26.0 - 34.0 PG    MCHC 32.8 30.0 - 36.5 g/dL    RDW 14.6 (H) 11.5 - 14.5 %    PLATELET 035 071 - 779 K/uL    MPV 9.2 8.9 - 12.9 FL    NRBC 0.0 0.0  WBC    ABSOLUTE NRBC 0.00 0.00 - 0.01 K/uL NEUTROPHILS 89 (H) 32 - 75 %    LYMPHOCYTES 8 (L) 12 - 49 %    MONOCYTES 2 (L) 5 - 13 %    EOSINOPHILS 0 0 - 7 %    BASOPHILS 0 0 - 1 %    IMMATURE GRANULOCYTES 1 (H) 0 - 0.5 %    ABS. NEUTROPHILS 9.9 (H) 1.8 - 8.0 K/UL    ABS. LYMPHOCYTES 0.9 0.8 - 3.5 K/UL    ABS. MONOCYTES 0.3 0.0 - 1.0 K/UL    ABS. EOSINOPHILS 0.0 0.0 - 0.4 K/UL    ABS. BASOPHILS 0.0 0.0 - 0.1 K/UL    ABS. IMM. GRANS. 0.1 (H) 0.00 - 0.04 K/UL    DF AUTOMATED     NT-PRO BNP    Collection Time: 02/07/22  9:20 PM   Result Value Ref Range    NT pro-BNP 30 <125 pg/mL   COVID-19 RAPID TEST    Collection Time: 02/07/22  9:32 PM   Result Value Ref Range    Specimen source Please find results under separate order      COVID-19 rapid test Not Detected Not Detected     BLOOD GAS, ARTERIAL    Collection Time: 02/07/22 10:25 PM   Result Value Ref Range    pH 7.36 7.35 - 7.45      PCO2 49 (H) 35 - 45 mmHg    PO2 488 (H) 75 - 100 mmHg    O2 SAT 99 >95 %    BICARBONATE 25 22 - 26 mmol/L    BASE EXCESS 1.1 0 - 2 mmol/L    O2 METHOD VENT      FIO2 100.0 %    MODE AssistControl/Pressure Control      SET RATE 15      IPAP/PIP 22      EPAP/CPAP/PEEP 15.0      Sample source Arterial      SITE Right Radial      VÍCTOR'S TEST PASS     URINALYSIS W/ REFLEX CULTURE    Collection Time: 02/07/22 10:45 PM    Specimen: Urine   Result Value Ref Range    Color Yellow      Appearance Clear Clear      Specific gravity >1.030 (H) 1.003 - 1.030    pH (UA) 7.0 5.0 - 8.0      Protein Negative Negative mg/dL    Glucose 50 (A) Negative mg/dL    Ketone Negative Negative mg/dL    Bilirubin Negative Negative      Blood Small (A) Negative      Urobilinogen 0.1 0.1 - 1.0 EU/dL    Nitrites Negative Negative      Leukocyte Esterase Negative Negative      UA:UC IF INDICATED Culture not indicated by UA result Culture not indicated by UA result      WBC 0-4 0 - 4 /hpf    RBC 0-5 0 - 5 /hpf    Bacteria Negative Negative /hpf       XR Results (maximum last 3):   Results from [facility] encounter on 02/07/22    XR CHEST PORT    Narrative  HISTORY:  Altered mental status    TECHNIQUE: Frontal view. COMPARISON: July 23, 2018  LIMITATIONS: None    TUBES/LINES: The tracheal tube in satisfactory position. Nasogastric tube in  place. This may be coiled in the oropharynx. [facility] HEART AND PULMONARY VESSELS: Heart size and pulmonary blood flow are normal.    LUNG PARENCHYMA: Lungs are moderately well inflated and clear. PLEURA: No effusion or pneumothorax. MEDIASTINUM: Normal    BONE/SOFT TISSUES: Normal    OTHER: None    Impression  1. Endotracheal tube in satisfactory position. 2. Suspect nasogastric tube is coiled in the oropharynx. Its tip is entering the  stomach. 3. No focal infiltrate. No overt edema. 4. No effusion or pneumothorax. CT Results (maximum last 3): Results from Hospital Encounter encounter on 02/07/22    CTA CHEST ABD PELV W WO CONT    Narrative  CTA chest, abdomen and pelvis without with intravenous contrast, 100 cc  [id number], 3-D MIP images    Dose reduction: All CT scans at this facility are performed using dose reduction  optimization techniques as appropriate to a performed exam including the  following: Automated exposure control, adjustments of the mA and/or kV according  to patient size, or use of iterative reconstruction technique. INDICATION: Hypoxic    COMPARISON: CT chest 5/24/2018    FINDINGS:    No aneurysm or dissection of thoracic or abdominal aorta. Atherosclerosis  including coronary arteries. No central pulmonary emboli. No pleural or  pericardial effusions. No mediastinal adenopathy. Emphysematous changes in the  lungs. Minimal basilar atelectasis. Bilateral apical pleural thickening/scar  again noted. No pneumothorax. Endotracheal tube. Tip of NG tube in stomach. Difficult to exclude small thyroid nodules due to artifacts. Liver, spleen and pancreas are unremarkable. Gallbladder is present without  pericholecystic stranding.  No urinary stone or hydronephrosis on either side. Bilateral renal lesions, largest about 1.7 cm on the right side posteriorly, are  too small to characterize, may represent cyst cyst, hyperdense cyst or other. No bowel obstruction. Residual contrast in parts of colon. No contrast  extravasation in the remainder of the bowel. No evidence of diverticulitis. Normal appendix. No abscess. No free intraperitoneal air. Prostate measures  about 4.5 x 3.5 cm in diameter containing small calcifications. There are some  fatty changes/atrophy of some of the muscles. No acute fracture in the  visualized bony structures. Impression  No aneurysm or dissection of aorta. Atherosclerosis including coronary arteries. Pulmonary emphysema. No intra-abdominal inflammatory changes or bowel obstruction. Follow-up as  clinically indicated. Please see full report. CT HEAD WO CONT    Narrative  CT HEAD WO CONT    Technique: CT scan of the head was performed with transaxial images obtained. Sagittal and coronal reconstructions were generated . Dose Reduction:  All CT scans at this facility are performed using dose reduction optimization  techniques as appropriate to a performed exam including the following: Automated  exposure control, adjustments of the mA and/or kV according to patient size, or  use of iterative reconstruction technique. Comparison:  None available    Findings: There is mild generalized cerebral atrophy. There are mild changes of  microvascular ischemic disease. No evidence of acute large territorial  infarction. No intracerebral mass or hemorrhage. No abnormal fluid collections. The calvarium is intact. Mastoid and paranasal sinuses are mostly clear. Dental  disease noted. Orogastric tube looped in the pharynx. Impression  No evidence of an acute intracranial process. MRI Results (maximum last 3): No results found for this or any previous visit. Nuclear Medicine Results (maximum last 3):   No results found for this or any previous visit. US Results (maximum last 3): No results found for this or any previous visit. Assessment:   #Acute respiratory failure with hypoxia  #Altered mental status  #CAD  #COPD  #Hypertension  #Urinary retention  #Vocal cord dysfunction    Plan:   #Acute respiratory failure with hypoxia  -Admit to ICU  -Continue ventilatory support  -Awaiting CTA of lung. #Altered mental status  -Head imaging showed no acute findings  -Unknown cause. Differential diagnosis: ?partial seizure  -Consult neurology  - LFT, TSH, vitamin B12, ESR, SHANE, RPR.  - EEG ordered    #CAD  -Not on home medications. #COPD  -No wheezing on exam, unlikely in exacerbation.   -Nebulizer treatment    #Hypertension  -Monitor for now    #Urinary retention  -Continue home medications    #Vocal cord dysfunction  -Monitor for now    # Renal lesion  - unknown nature.    - Consult nephro  Signed By: Daily Rojas MD     February 8, 2022

## 2022-02-08 NOTE — CONSULTS
Consult Date: 2/8/2022    Consults Mr. WATKINS [facility] is a 73year old man with PMH of PE used to be on coumadin but stopped for GI bleed, vocal cord synfunctioon who was brought in to the Er for hypoxia, \" AMS\", chest pain and recurrent episodes of syncope lasting 5-10 sec. He would awaken after sternal rub but loose consciousness shortly after again. In between the episodes he is able to wake up and is alert and oriented and able to provide the Ed staff his history. He was found to be hypoxic to 86%. It seems he passed out multiple times in the ambulance as well. From the chart he has had episodes of loss of consciousness after whole body cramping episodes at home as well. CT head reported as \"no evidence of acute intracranial process\" although I cannot pull up the image myself. Ct chest/abd/pelvis WNL. Subjective     No past medical history on file. No past surgical history on file. No family history on file. Social History     Tobacco Use    Smoking status: Not on file    Smokeless tobacco: Not on file   Substance Use Topics    Alcohol use: Not on file       Current Facility-Administered Medications   Medication Dose Route Frequency Provider Last Rate Last Admin    albuterol-ipratropium (DUO-NEB) 2.5 MG-0.5 MG/3 ML  3 mL Nebulization Q4H PRN Antoinette Maurice MD        bethanechol chloride (URECHOLINE) tablet 100 mg  100 mg Oral BID Stephen Mason MD        finasteride (PROSCAR) tablet 5 mg  5 mg Oral DAILY Stephen Mason MD        fluticasone-umeclidinium-vilanterol (TRELEGY ELLIPTA) inhaler 1 Puff  1 Puff Inhalation DAILY Stephen Mason MD        . PHARMACY TO SUBSTITUTE PER PROTOCOL (Reordered from: OXcarbazepine (Oxtellar XR) 300 mg Tb24)    Per Protocol Antoinette Maurice MD        tamsulosin (FLOMAX) capsule 0.4 mg  0.4 mg Oral DAILY Antoinette Maurice MD        sodium chloride (NS) flush 5-40 mL  5-40 mL IntraVENous Q8H Antoinette Maurice MD        sodium chloride (NS) flush 5-40 mL  5-40 mL IntraVENous PRN Kimi Hunter MD        acetaminophen (TYLENOL) tablet 650 mg  650 mg Oral Q6H PRN Ralf Maurice MD        Or    acetaminophen (TYLENOL) suppository 650 mg  650 mg Rectal Q6H PRN Ralf Maurice MD        polyethylene glycol (MIRALAX) packet 17 g  17 g Oral DAILY PRN Kimi Hunter MD        ondansetron (ZOFRAN ODT) tablet 4 mg  4 mg Oral Q8H PRN Kimi Hunter MD        Or    ondansetron (ZOFRAN) injection 4 mg  4 mg IntraVENous Q6H PRN Kimi Hunter MD        enoxaparin (LOVENOX) injection 40 mg  40 mg SubCUTAneous DAILY Ralf Maurice MD        famotidine (PF) (PEPCID) 20 mg in 0.9% sodium chloride 10 mL injection  20 mg IntraVENous Q12H Kimi Hunter MD        midazolam in normal saline (VERSED) 1 mg/mL infusion  0-10 mg/hr IntraVENous TITRATE Kimi Hunter MD 10 mL/hr at 02/08/22 0847 10 mg/hr at 02/08/22 0847    propofol (DIPRIVAN) 10 mg/mL infusion  0-50 mcg/kg/min IntraVENous TITRATE Kimi Hunter MD 35.1 mL/hr at 02/08/22 0846 45 mcg/kg/min at 02/08/22 0846        Review of Systems   Unable to perform ROS: Intubated       Objective     Vital signs for last 24 hours:  Visit Vitals  BP (!) 163/68   Pulse 82   Temp 98.2 °F (36.8 °C)   Resp 20   Ht 6' (1.829 m)   Wt 130 kg (286 lb 9.6 oz)   SpO2 98%   BMI 38.87 kg/m²       Intake/Output this shift:  Current Shift: No intake/output data recorded. Last 3 Shifts: No intake/output data recorded.     Data Review:   Recent Results (from the past 24 hour(s))   TROPONIN-HIGH SENSITIVITY    Collection Time: 02/07/22  9:18 PM   Result Value Ref Range    Troponin-High Sensitivity 6 0 - 76 ng/L   METABOLIC PANEL, COMPREHENSIVE    Collection Time: 02/07/22  9:20 PM   Result Value Ref Range    Sodium 134 (L) 136 - 145 mmol/L    Potassium 4.8 3.5 - 5.1 mmol/L    Chloride 102 97 - 108 mmol/L    CO2 29 21 - 32 mmol/L    Anion gap 3 (L) 5 - 15 mmol/L    Glucose 115 (H) 65 - 100 mg/dL    BUN 24 (H) 6 - 20 mg/dL    Creatinine 1.10 0.70 - 1.30 mg/dL BUN/Creatinine ratio 22 (H) 12 - 20      GFR est AA >60 >60 ml/min/1.73m2    GFR est non-AA >60 >60 ml/min/1.73m2    Calcium 8.8 8.5 - 10.1 mg/dL    Bilirubin, total 0.3 0.2 - 1.0 mg/dL    AST (SGOT) 15 15 - 37 U/L    ALT (SGPT) 34 12 - 78 U/L    Alk. phosphatase 78 45 - 117 U/L    Protein, total 6.7 6.4 - 8.2 g/dL    Albumin 2.9 (L) 3.5 - 5.0 g/dL    Globulin 3.8 2.0 - 4.0 g/dL    A-G Ratio 0.8 (L) 1.1 - 2.2     PROTHROMBIN TIME + INR    Collection Time: 02/07/22  9:20 PM   Result Value Ref Range    Prothrombin time 12.6 11.9 - 14.7 sec    INR 1.0 0.9 - 1.1     MAGNESIUM    Collection Time: 02/07/22  9:20 PM   Result Value Ref Range    Magnesium 2.1 1.6 - 2.4 mg/dL   PTT    Collection Time: 02/07/22  9:20 PM   Result Value Ref Range    aPTT 24.8 21.2 - 34.1 sec    aPTT, therapeutic range   82 - 109 sec   CBC WITH AUTOMATED DIFF    Collection Time: 02/07/22  9:20 PM   Result Value Ref Range    WBC 11.1 4.1 - 11.1 K/uL    RBC 4.31 4.10 - 5.70 M/uL    HGB 12.5 12.1 - 17.0 g/dL    HCT 38.1 36.6 - 50.3 %    MCV 88.4 80.0 - 99.0 FL    MCH 29.0 26.0 - 34.0 PG    MCHC 32.8 30.0 - 36.5 g/dL    RDW 14.6 (H) 11.5 - 14.5 %    PLATELET 387 436 - 409 K/uL    MPV 9.2 8.9 - 12.9 FL    NRBC 0.0 0.0  WBC    ABSOLUTE NRBC 0.00 0.00 - 0.01 K/uL    NEUTROPHILS 89 (H) 32 - 75 %    LYMPHOCYTES 8 (L) 12 - 49 %    MONOCYTES 2 (L) 5 - 13 %    EOSINOPHILS 0 0 - 7 %    BASOPHILS 0 0 - 1 %    IMMATURE GRANULOCYTES 1 (H) 0 - 0.5 %    ABS. NEUTROPHILS 9.9 (H) 1.8 - 8.0 K/UL    ABS. LYMPHOCYTES 0.9 0.8 - 3.5 K/UL    ABS. MONOCYTES 0.3 0.0 - 1.0 K/UL    ABS. EOSINOPHILS 0.0 0.0 - 0.4 K/UL    ABS. BASOPHILS 0.0 0.0 - 0.1 K/UL    ABS. IMM.  GRANS. 0.1 (H) 0.00 - 0.04 K/UL    DF AUTOMATED     NT-PRO BNP    Collection Time: 02/07/22  9:20 PM   Result Value Ref Range    NT pro-BNP 30 <125 pg/mL   COVID-19 RAPID TEST    Collection Time: 02/07/22  9:32 PM   Result Value Ref Range    Specimen source Please find results under separate order COVID-19 rapid test Not Detected Not Detected     BLOOD GAS, ARTERIAL    Collection Time: 02/07/22 10:25 PM   Result Value Ref Range    pH 7.36 7.35 - 7.45      PCO2 49 (H) 35 - 45 mmHg    PO2 488 (H) 75 - 100 mmHg    O2 SAT 99 >95 %    BICARBONATE 25 22 - 26 mmol/L    BASE EXCESS 1.1 0 - 2 mmol/L    O2 METHOD VENT      FIO2 100.0 %    MODE AssistControl/Pressure Control      SET RATE 15      IPAP/PIP 22      EPAP/CPAP/PEEP 15.0      Sample source Arterial      SITE Right Radial      VÍCTOR'S TEST PASS     URINALYSIS W/ REFLEX CULTURE    Collection Time: 02/07/22 10:45 PM    Specimen: Urine   Result Value Ref Range    Color Yellow      Appearance Clear Clear      Specific gravity >1.030 (H) 1.003 - 1.030    pH (UA) 7.0 5.0 - 8.0      Protein Negative Negative mg/dL    Glucose 50 (A) Negative mg/dL    Ketone Negative Negative mg/dL    Bilirubin Negative Negative      Blood Small (A) Negative      Urobilinogen 0.1 0.1 - 1.0 EU/dL    Nitrites Negative Negative      Leukocyte Esterase Negative Negative      UA:UC IF INDICATED Culture not indicated by UA result Culture not indicated by UA result      WBC 0-4 0 - 4 /hpf    RBC 0-5 0 - 5 /hpf    Bacteria Negative Negative /hpf   DRUG SCREEN, URINE    Collection Time: 02/07/22 10:45 PM   Result Value Ref Range    AMPHETAMINES Negative Negative      BARBITURATES Negative Negative      BENZODIAZEPINES Negative Negative      COCAINE Negative Negative      METHADONE Negative Negative      OPIATES Negative Negative      PCP(PHENCYCLIDINE) Negative Negative      THC (TH-CANNABINOL) Negative Negative      Drug screen comment        This test is a screen for drugs of abuse in a medical setting only (i.e., they are unconfirmed results and as such must not be used for non-medical purposes, e.g.,employment testing, legal testing). Due to its inherent nature, false positive (FP) and false negative (FN) results may be obtained.  Therefore, if necessary for medical care, recommend confirmation of positive findings by GC/MS. TROPONIN-HIGH SENSITIVITY    Collection Time: 02/08/22 12:59 AM   Result Value Ref Range    Troponin-High Sensitivity 144 (HH) 0 - 76 ng/L   LACTIC ACID    Collection Time: 02/08/22  3:52 AM   Result Value Ref Range    Lactic acid 0.6 0.4 - 2.0 mmol/L   MAGNESIUM    Collection Time: 02/08/22  3:52 AM   Result Value Ref Range    Magnesium 2.1 1.6 - 2.4 mg/dL   PHOSPHORUS    Collection Time: 02/08/22  3:52 AM   Result Value Ref Range    Phosphorus 3.5 2.6 - 4.7 mg/dL   METABOLIC PANEL, BASIC    Collection Time: 02/08/22  3:52 AM   Result Value Ref Range    Sodium 136 136 - 145 mmol/L    Potassium 3.7 3.5 - 5.1 mmol/L    Chloride 103 97 - 108 mmol/L    CO2 25 21 - 32 mmol/L    Anion gap 8 5 - 15 mmol/L    Glucose 130 (H) 65 - 100 mg/dL    BUN 20 6 - 20 mg/dL    Creatinine 0.85 0.70 - 1.30 mg/dL    BUN/Creatinine ratio 24 (H) 12 - 20      GFR est AA >60 >60 ml/min/1.73m2    GFR est non-AA >60 >60 ml/min/1.73m2    Calcium 8.2 (L) 8.5 - 10.1 mg/dL   CBC WITH AUTOMATED DIFF    Collection Time: 02/08/22  3:53 AM   Result Value Ref Range    WBC 13.4 (H) 4.1 - 11.1 K/uL    RBC 3.93 (L) 4.10 - 5.70 M/uL    HGB 11.7 (L) 12.1 - 17.0 g/dL    HCT 34.5 (L) 36.6 - 50.3 %    MCV 87.8 80.0 - 99.0 FL    MCH 29.8 26.0 - 34.0 PG    MCHC 33.9 30.0 - 36.5 g/dL    RDW 14.6 (H) 11.5 - 14.5 %    PLATELET 017 167 - 764 K/uL    MPV 8.7 (L) 8.9 - 12.9 FL    NRBC 0.0 0.0  WBC    ABSOLUTE NRBC 0.00 0.00 - 0.01 K/uL    NEUTROPHILS 80 (H) 32 - 75 %    LYMPHOCYTES 15 12 - 49 %    MONOCYTES 4 (L) 5 - 13 %    EOSINOPHILS 0 0 - 7 %    BASOPHILS 0 0 - 1 %    IMMATURE GRANULOCYTES 1 (H) 0 - 0.5 %    ABS. NEUTROPHILS 10.7 (H) 1.8 - 8.0 K/UL    ABS. LYMPHOCYTES 2.0 0.8 - 3.5 K/UL    ABS. MONOCYTES 0.6 0.0 - 1.0 K/UL    ABS. EOSINOPHILS 0.1 0.0 - 0.4 K/UL    ABS. BASOPHILS 0.0 0.0 - 0.1 K/UL    ABS. IMM.  GRANS. 0.1 (H) 0.00 - 0.04 K/UL    DF AUTOMATED     SED RATE (ESR)    Collection Time: 02/08/22  3:53 AM   Result Value Ref Range    Sed rate, automated 14 mm/hr   TSH 3RD GENERATION    Collection Time: 02/08/22  3:53 AM   Result Value Ref Range    TSH 1.38 0.36 - 3.74 uIU/mL   BLOOD GAS, ARTERIAL    Collection Time: 02/08/22  4:02 AM   Result Value Ref Range    pH 7.44 7.35 - 7.45      PCO2 41 35 - 45 mmHg    PO2 268 (H) 75 - 100 mmHg    O2 SAT 98 >95 %    BICARBONATE 27 (H) 22 - 26 mmol/L    BASE EXCESS 3.4 (H) 0 - 2 mmol/L    O2 METHOD VENT      FIO2 80.0 %    MODE AssistControl/Pressure Control      SET RATE 15      IPAP/PIP 28      EPAP/CPAP/PEEP 8.0      Sample source Arterial      SITE Right Radial      VÍCTOR'S TEST PASS         Physical Exam    Neuro Physical Exam      General: Well developed, well nourished. Patient in no apparent distress. Neurological Exam:  Mental Status: Sedated on propofol: examined off propofol   Does not open eyes to sternal rub, pupils equal and reactive. Does not withdraw any extremity to pain or grimace  Babinski: mute b/l     Assessment and Plan: Mr. Rose Magdaleno is a 73year old man who comes in with hypoxia and recurrent episodes of syncope. The description of these events is NOT consistent with seizures. Cardiac workup needs to be done first. Cancel EEG.  Thank you

## 2022-02-08 NOTE — ED NOTES
Patient arrived in an unresponsive state. Awakens to deep painful stimuli and is alert to environment and time. Patient is able to give small amounts of medical information, then has episode of going unresponsive. Oxygen saturation maintaining around 24 with a good pleth. Intubated by Dr. Didier Martin.

## 2022-02-08 NOTE — CONSULTS
Consult    Patient: Pa Hamilton MRN: [id number]  SSN: [id number]    YOB: 1955  Age: 73 y.o. Sex: male      Subjective:      Pa Hamilton is a 73 y.o. male who is being seen for abnormal troponin. Patient currently intubated. History is taken from chart review and further discussion with other healthcare personnel. Patient with history of vocal cord dysfunction, history of PE on Coumadin that was stopped due to GI bleed. History of recurrent syncope who presented with chest pain, shortness of breath and altered mental status. Patient was having what is described as back-to-back syncopal episodes. These would last for 5 to 10 seconds and 1 minute. During which he has normal pulses and normal breathing and no arrhythmias noted. During these episodes patient will startle after sternal rub. No past medical history on file. No past surgical history on file. No family history on file. Social History     Tobacco Use    Smoking status: Not on file    Smokeless tobacco: Not on file   Substance Use Topics    Alcohol use: Not on file      Current Facility-Administered Medications   Medication Dose Route Frequency Provider Last Rate Last Admin    albuterol-ipratropium (DUO-NEB) 2.5 MG-0.5 MG/3 ML  3 mL Nebulization Q4H PRN Raheem Maurice MD        bethanechol (URECHOLINE) tablet 100 mg  100 mg Oral BID Derrell Smith MD        finasteride (PROSCAR) tablet 5 mg  5 mg Oral DAILY Derrell Smith MD        fluticasone-umeclidinium-vilanterol (TRELEGY ELLIPTA) inhaler 1 Puff  1 Puff Inhalation DAILY Derrell Smith MD        . PHARMACY TO SUBSTITUTE PER PROTOCOL (Reordered from: OXcarbazepine (Oxtellar XR) 300 mg Tb24)    Per Protocol Raheem Maurice MD        tamsulosin (FLOMAX) capsule 0.4 mg  0.4 mg Oral DAILY Raheem Maurice MD        sodium chloride (NS) flush 5-40 mL  5-40 mL IntraVENous Q8H Raheem Maurice MD        sodium chloride (NS) flush 5-40 mL  5-40 mL IntraVENous PRN Amparo Hirsch MD        acetaminophen (TYLENOL) tablet 650 mg  650 mg Oral Q6H PRN Xu Maurice MD        Or    acetaminophen (TYLENOL) suppository 650 mg  650 mg Rectal Q6H PRN Xu Maurice MD        polyethylene glycol (MIRALAX) packet 17 g  17 g Oral DAILY PRN Xu Maurice MD        ondansetron (ZOFRAN ODT) tablet 4 mg  4 mg Oral Q8H PRN Xu Maurice MD        Or    ondansetron (ZOFRAN) injection 4 mg  4 mg IntraVENous Q6H PRN Xu Maurice MD        enoxaparin (LOVENOX) injection 40 mg  40 mg SubCUTAneous DAILY Xu Maurice MD        famotidine (PF) (PEPCID) 20 mg in 0.9% sodium chloride 10 mL injection  20 mg IntraVENous Q12H Xu Maurice MD   20 mg at 02/08/22 1104    labetaloL (NORMODYNE;TRANDATE) 20 mg/4 mL (5 mg/mL) injection 10 mg  10 mg IntraVENous Q4H PRN Heriberto Mulligan MD        amLODIPine (NORVASC) tablet 10 mg  10 mg Oral DAILY Heriberto Mulligan MD        midazolam in normal saline (VERSED) 1 mg/mL infusion  0-10 mg/hr IntraVENous TITRATE Amparo Hirsch MD 8 mL/hr at 02/08/22 1240 8 mg/hr at 02/08/22 1240    propofol (DIPRIVAN) 10 mg/mL infusion  0-50 mcg/kg/min IntraVENous TITRATE Amparo Hirsch MD 35.1 mL/hr at 02/08/22 1323 45 mcg/kg/min at 02/08/22 1323        Allergies   Allergen Reactions    Sulfa (Sulfonamide Antibiotics) Other (comments)     syncope         Review of Systems:  Unable to obtain, intubated    Objective:     Vitals:    02/08/22 0800 02/08/22 0906 02/08/22 1010 02/08/22 1100   BP: (!) 163/68 (!) 153/97     Pulse: 82 81 81    Resp: 20 15 15    Temp:  98.4 °F (36.9 °C)  100.4 °F (38 °C)   SpO2: 98% 98% 98%    Weight:       Height:            Physical Exam:  General:   Intubated   Eyes:  Conjunctivae/corneas clear. PERRL, EOMs intact. Fundi benign   Ears:  Normal TMs and external ear canals both ears. Nose: Nares normal. Septum midline. Mucosa normal. No drainage or sinus tenderness.    Mouth/Throat: Lips, mucosa, and tongue normal. Teeth and gums normal.   Neck: Supple, symmetrical, trachea midline, no adenopathy, thyroid: no enlargment/tenderness/nodules, no carotid bruit and no JVD. Back:   Symmetric, no curvature. ROM normal. No CVA tenderness. Lungs:   Clear to auscultation bilaterally. Heart:  Regular rate and rhythm, S1, S2 normal, no murmur, click, rub or gallop. Abdomen:    Obese. Extremities:  Bilateral lower limb edema. Pulses: 2+ and symmetric all extremities. Skin: Skin color, texture, turgor normal. No rashes or lesions   Lymph nodes: Cervical, supraclavicular, and axillary nodes normal.   Neurologic:  Intubated         Assessment:     Hospital Problems  Never Reviewed          Codes Class Noted POA    Respiratory failure [facility]) ICD-10-CM: J96.90  ICD-9-CM: 636.16  2/7/2022 Unknown            Patient is 61-year-old white male with:  1. Syncope  2. Non-STEMI  3. CAD status post PCI  4. Hyperlipidemia  6. Vocal cord dysfunction  7. History of PE  8. Warfarin was stopped due to GI bleed  9. COPD  7. Obesity  8. Edema  9. Hypertensive emergency   10. Bilateral renal lesions, largest about 1.7 cm on the right side posteriorly. Plan:     Blood pressure upon arrival was 230/140. He was hypoxemic, SPO2 was in the 30% range. He was intubated to protect his airways. Continue to have syncopal episodes while he was down in the emergency room. His wife mentioned that patient was having cramps the last for about 15minutes to 1 hour. This followed by a loss of consciousness for 15 seconds. I think neurological evaluation can be done along with a cardiac evaluation concomitantly. Would recommend having an EEG done. During these episodes rhythm has been stable and pulse was never lost which speaks against cardiac arrhythmias. Anyhow patient with history of CAD which probably will need to be further evaluated with some sort of ischemic test.  We will await extubation. Platelet 688, hemoglobin 11.7, white count is elevated. Potassium is 3.7. Troponin is 144. This does not sound like a primary cardiac event. Blood pressure was severely elevated and could have resulted in troponin leak. Urine drug screen is negative. CT chest was negative for PE. There is pulmonary emphysema. No acute intra-abdominal changes.   CT head was nonacute        Signed By: Vero Rivera MD     February 8, 2022

## 2022-02-09 ENCOUNTER — APPOINTMENT (OUTPATIENT)
Dept: GENERAL RADIOLOGY | Age: 67
DRG: 207 | End: 2022-02-09
Attending: INTERNAL MEDICINE
Payer: MEDICARE

## 2022-02-09 ENCOUNTER — APPOINTMENT (OUTPATIENT)
Dept: NON INVASIVE DIAGNOSTICS | Age: 67
DRG: 207 | End: 2022-02-09
Attending: HOSPITALIST
Payer: MEDICARE

## 2022-02-09 LAB
ANA TITR SER IF: NEGATIVE {TITER}
ANION GAP SERPL CALC-SCNC: 4 MMOL/L (ref 5–15)
ARTERIAL PATENCY WRIST A: ABNORMAL
ATRIAL RATE: 115 BPM
ATRIAL RATE: 122 BPM
BASE EXCESS BLDA CALC-SCNC: 2.9 MMOL/L (ref 0–2)
BASOPHILS # BLD: 0 K/UL (ref 0–0.1)
BASOPHILS NFR BLD: 0 % (ref 0–1)
BDY SITE: ABNORMAL
BUN SERPL-MCNC: 22 MG/DL (ref 6–20)
BUN/CREAT SERPL: 20 (ref 12–20)
CA-I BLD-MCNC: 8.6 MG/DL (ref 8.5–10.1)
CALCULATED P AXIS, ECG09: 58 DEGREES
CALCULATED P AXIS, ECG09: 76 DEGREES
CALCULATED R AXIS, ECG10: 27 DEGREES
CALCULATED R AXIS, ECG10: 49 DEGREES
CALCULATED T AXIS, ECG11: 63 DEGREES
CALCULATED T AXIS, ECG11: 64 DEGREES
CHLORIDE SERPL-SCNC: 101 MMOL/L (ref 97–108)
CO2 SERPL-SCNC: 27 MMOL/L (ref 21–32)
CREAT SERPL-MCNC: 1.1 MG/DL (ref 0.7–1.3)
DIAGNOSIS, 93000: NORMAL
DIAGNOSIS, 93000: NORMAL
DIFFERENTIAL METHOD BLD: ABNORMAL
ECHO AO DESC DIAM: 2.3 CM
ECHO AO DESCENDING AORTA INDEX: 0.92 CM/M2
ECHO AO ROOT DIAM: 3.4 CM
ECHO AO ROOT INDEX: 1.36 CM/M2
ECHO AV PEAK GRADIENT: 8 MMHG
ECHO AV PEAK VELOCITY: 1.4 M/S
ECHO AV VELOCITY RATIO: 0.86
ECHO EST RA PRESSURE: 3 MMHG
ECHO LA AREA 4C: 16.7 CM2
ECHO LA DIAMETER INDEX: 1.48 CM/M2
ECHO LA DIAMETER: 3.7 CM
ECHO LA MAJOR AXIS: 5.5 CM
ECHO LA TO AORTIC ROOT RATIO: 1.09
ECHO LV E' LATERAL VELOCITY: 10 CM/S
ECHO LV E' SEPTAL VELOCITY: 8 CM/S
ECHO LV EJECTION FRACTION BIPLANE: 79 % (ref 55–100)
ECHO LV FRACTIONAL SHORTENING: 46 % (ref 28–44)
ECHO LV INTERNAL DIMENSION DIASTOLE INDEX: 1.64 CM/M2
ECHO LV INTERNAL DIMENSION DIASTOLIC: 4.1 CM (ref 4.2–5.9)
ECHO LV INTERNAL DIMENSION SYSTOLIC INDEX: 0.88 CM/M2
ECHO LV INTERNAL DIMENSION SYSTOLIC: 2.2 CM
ECHO LV IVSD: 1.5 CM (ref 0.6–1)
ECHO LV MASS 2D: 204.9 G (ref 88–224)
ECHO LV MASS INDEX 2D: 81.9 G/M2 (ref 49–115)
ECHO LV POSTERIOR WALL DIASTOLIC: 1.2 CM (ref 0.6–1)
ECHO LV RELATIVE WALL THICKNESS RATIO: 0.59
ECHO LVOT PEAK GRADIENT: 6 MMHG
ECHO LVOT PEAK VELOCITY: 1.2 M/S
ECHO MV A VELOCITY: 1.29 M/S
ECHO MV E DECELERATION TIME (DT): 102 MS
ECHO MV E VELOCITY: 0.84 M/S
ECHO MV E/A RATIO: 0.65
ECHO MV E/E' LATERAL: 8.4
ECHO MV E/E' RATIO (AVERAGED): 9.45
ECHO MV E/E' SEPTAL: 10.5
ECHO MV MAX VELOCITY: 1.3 M/S
ECHO MV MEAN GRADIENT: 3 MMHG
ECHO MV MEAN VELOCITY: 0.8 M/S
ECHO MV PEAK GRADIENT: 6 MMHG
ECHO MV REGURGITANT PEAK GRADIENT: 6 MMHG
ECHO MV REGURGITANT PEAK VELOCITY: 1.2 M/S
ECHO MV VTI: 19.5 CM
ECHO RIGHT VENTRICULAR SYSTOLIC PRESSURE (RVSP): 15 MMHG
ECHO RV TAPSE: 1.9 CM (ref 1.5–2)
ECHO TV REGURGITANT MAX VELOCITY: 1.7 M/S
ECHO TV REGURGITANT PEAK GRADIENT: 12 MMHG
EOSINOPHIL # BLD: 0 K/UL (ref 0–0.4)
EOSINOPHIL NFR BLD: 0 % (ref 0–7)
EPAP/CPAP/PEEP, PAPEEP: 8
ERYTHROCYTE [DISTWIDTH] IN BLOOD BY AUTOMATED COUNT: 14.7 % (ref 11.5–14.5)
FIO2 ON VENT: 40 %
GAS FLOW.O2 SETTING OXYMISER: 15 L/MIN
GLUCOSE SERPL-MCNC: 152 MG/DL (ref 65–100)
HCO3 BLDA-SCNC: 27 MMOL/L (ref 22–26)
HCT VFR BLD AUTO: 39.2 % (ref 36.6–50.3)
HGB BLD-MCNC: 13.1 G/DL (ref 12.1–17)
IMM GRANULOCYTES # BLD AUTO: 0.1 K/UL (ref 0–0.04)
IMM GRANULOCYTES NFR BLD AUTO: 1 % (ref 0–0.5)
IPAP/PIP, IPAPIP: 14
LYMPHOCYTES # BLD: 0.5 K/UL (ref 0.8–3.5)
LYMPHOCYTES NFR BLD: 4 % (ref 12–49)
MCH RBC QN AUTO: 29.4 PG (ref 26–34)
MCHC RBC AUTO-ENTMCNC: 33.4 G/DL (ref 30–36.5)
MCV RBC AUTO: 87.9 FL (ref 80–99)
MONOCYTES # BLD: 0.2 K/UL (ref 0–1)
MONOCYTES NFR BLD: 1 % (ref 5–13)
NEUTS SEG # BLD: 11.8 K/UL (ref 1.8–8)
NEUTS SEG NFR BLD: 94 % (ref 32–75)
NRBC # BLD: 0 K/UL (ref 0–0.01)
NRBC BLD-RTO: 0 PER 100 WBC
P-R INTERVAL, ECG05: 150 MS
P-R INTERVAL, ECG05: 152 MS
PCO2 BLDA: 45 MMHG (ref 35–45)
PH BLDA: 7.41 [PH] (ref 7.35–7.45)
PLATELET # BLD AUTO: 245 K/UL (ref 150–400)
PMV BLD AUTO: 8.9 FL (ref 8.9–12.9)
PO2 BLDA: 120 MMHG (ref 75–100)
POTASSIUM SERPL-SCNC: 5 MMOL/L (ref 3.5–5.1)
Q-T INTERVAL, ECG07: 314 MS
Q-T INTERVAL, ECG07: 334 MS
QRS DURATION, ECG06: 74 MS
QRS DURATION, ECG06: 86 MS
QTC CALCULATION (BEZET), ECG08: 447 MS
QTC CALCULATION (BEZET), ECG08: 462 MS
RBC # BLD AUTO: 4.46 M/UL (ref 4.1–5.7)
SAO2 % BLD: 97 %
SAO2% DEVICE SAO2% SENSOR NAME: ABNORMAL
SODIUM SERPL-SCNC: 132 MMOL/L (ref 136–145)
TROPONIN-HIGH SENSITIVITY: 15 NG/L (ref 0–76)
VENTILATION MODE VENT: ABNORMAL
VENTRICULAR RATE, ECG03: 115 BPM
VENTRICULAR RATE, ECG03: 122 BPM
WBC # BLD AUTO: 12.5 K/UL (ref 4.1–11.1)

## 2022-02-09 PROCEDURE — 77010033678 HC OXYGEN DAILY

## 2022-02-09 PROCEDURE — 74011250636 HC RX REV CODE- 250/636: Performed by: HOSPITALIST

## 2022-02-09 PROCEDURE — 71045 X-RAY EXAM CHEST 1 VIEW: CPT

## 2022-02-09 PROCEDURE — 85025 COMPLETE CBC W/AUTO DIFF WBC: CPT

## 2022-02-09 PROCEDURE — 65610000006 HC RM INTENSIVE CARE

## 2022-02-09 PROCEDURE — 74011250637 HC RX REV CODE- 250/637: Performed by: INTERNAL MEDICINE

## 2022-02-09 PROCEDURE — 94003 VENT MGMT INPAT SUBQ DAY: CPT

## 2022-02-09 PROCEDURE — 94640 AIRWAY INHALATION TREATMENT: CPT

## 2022-02-09 PROCEDURE — 74011000250 HC RX REV CODE- 250: Performed by: INTERNAL MEDICINE

## 2022-02-09 PROCEDURE — 80048 BASIC METABOLIC PNL TOTAL CA: CPT

## 2022-02-09 PROCEDURE — 36415 COLL VENOUS BLD VENIPUNCTURE: CPT

## 2022-02-09 PROCEDURE — 74011250637 HC RX REV CODE- 250/637: Performed by: HOSPITALIST

## 2022-02-09 PROCEDURE — 74011250636 HC RX REV CODE- 250/636: Performed by: INTERNAL MEDICINE

## 2022-02-09 PROCEDURE — 82803 BLOOD GASES ANY COMBINATION: CPT

## 2022-02-09 PROCEDURE — C8929 TTE W OR WO FOL WCON,DOPPLER: HCPCS

## 2022-02-09 PROCEDURE — 84484 ASSAY OF TROPONIN QUANT: CPT

## 2022-02-09 PROCEDURE — 36600 WITHDRAWAL OF ARTERIAL BLOOD: CPT

## 2022-02-09 RX ORDER — FUROSEMIDE 10 MG/ML
40 INJECTION INTRAMUSCULAR; INTRAVENOUS DAILY
Status: DISCONTINUED | OUTPATIENT
Start: 2022-02-10 | End: 2022-02-16

## 2022-02-09 RX ORDER — GLYCOPYRROLATE 0.2 MG/ML
0.1 INJECTION INTRAMUSCULAR; INTRAVENOUS 3 TIMES DAILY
Status: DISCONTINUED | OUTPATIENT
Start: 2022-02-09 | End: 2022-02-21

## 2022-02-09 RX ADMIN — PROPOFOL 35 MCG/KG/MIN: 10 INJECTION, EMULSION INTRAVENOUS at 11:44

## 2022-02-09 RX ADMIN — METHYLPREDNISOLONE SODIUM SUCCINATE 40 MG: 40 INJECTION, POWDER, FOR SOLUTION INTRAMUSCULAR; INTRAVENOUS at 22:40

## 2022-02-09 RX ADMIN — OXCARBAZEPINE 150 MG: 150 TABLET, FILM COATED ORAL at 22:40

## 2022-02-09 RX ADMIN — PROPOFOL 35 MCG/KG/MIN: 10 INJECTION, EMULSION INTRAVENOUS at 04:34

## 2022-02-09 RX ADMIN — IPRATROPIUM BROMIDE AND ALBUTEROL SULFATE 3 ML: 2.5; .5 SOLUTION RESPIRATORY (INHALATION) at 06:36

## 2022-02-09 RX ADMIN — SODIUM CHLORIDE, PRESERVATIVE FREE 10 ML: 5 INJECTION INTRAVENOUS at 05:16

## 2022-02-09 RX ADMIN — ASPIRIN 81 MG CHEWABLE TABLET 81 MG: 81 TABLET CHEWABLE at 09:56

## 2022-02-09 RX ADMIN — METOPROLOL SUCCINATE 25 MG: 25 TABLET, EXTENDED RELEASE ORAL at 09:57

## 2022-02-09 RX ADMIN — ENOXAPARIN SODIUM 40 MG: 100 INJECTION SUBCUTANEOUS at 09:57

## 2022-02-09 RX ADMIN — CLOPIDOGREL BISULFATE 75 MG: 75 TABLET, FILM COATED ORAL at 09:57

## 2022-02-09 RX ADMIN — ATORVASTATIN CALCIUM 80 MG: 40 TABLET, FILM COATED ORAL at 09:56

## 2022-02-09 RX ADMIN — TAMSULOSIN HYDROCHLORIDE 0.4 MG: 0.4 CAPSULE ORAL at 09:57

## 2022-02-09 RX ADMIN — PROPOFOL 35 MCG/KG/MIN: 10 INJECTION, EMULSION INTRAVENOUS at 08:41

## 2022-02-09 RX ADMIN — FINASTERIDE 5 MG: 5 TABLET, FILM COATED ORAL at 09:57

## 2022-02-09 RX ADMIN — Medication 7 MG/HR: at 06:52

## 2022-02-09 RX ADMIN — ACETAMINOPHEN 325MG 650 MG: 325 TABLET ORAL at 01:21

## 2022-02-09 RX ADMIN — SODIUM CHLORIDE, PRESERVATIVE FREE 20 MG: 5 INJECTION INTRAVENOUS at 09:57

## 2022-02-09 RX ADMIN — GLYCOPYRROLATE 0.1 MG: 0.2 INJECTION INTRAMUSCULAR; INTRAVENOUS at 22:40

## 2022-02-09 RX ADMIN — PROPOFOL 35 MCG/KG/MIN: 10 INJECTION, EMULSION INTRAVENOUS at 22:00

## 2022-02-09 RX ADMIN — Medication 7 MG/HR: at 14:25

## 2022-02-09 RX ADMIN — BUDESONIDE 500 MCG: 0.5 SUSPENSION RESPIRATORY (INHALATION) at 06:36

## 2022-02-09 RX ADMIN — SODIUM CHLORIDE, PRESERVATIVE FREE 20 ML: 5 INJECTION INTRAVENOUS at 13:06

## 2022-02-09 RX ADMIN — AMLODIPINE BESYLATE 10 MG: 5 TABLET ORAL at 09:57

## 2022-02-09 RX ADMIN — PROPOFOL 35 MCG/KG/MIN: 10 INJECTION, EMULSION INTRAVENOUS at 01:18

## 2022-02-09 RX ADMIN — PROPOFOL 35 MCG/KG/MIN: 10 INJECTION, EMULSION INTRAVENOUS at 15:39

## 2022-02-09 RX ADMIN — GLYCOPYRROLATE 0.1 MG: 0.2 INJECTION INTRAMUSCULAR; INTRAVENOUS at 15:40

## 2022-02-09 RX ADMIN — Medication 7 MG/HR: at 20:17

## 2022-02-09 RX ADMIN — BUDESONIDE 500 MCG: 0.5 SUSPENSION RESPIRATORY (INHALATION) at 19:56

## 2022-02-09 RX ADMIN — BETHANECHOL CHLORIDE 100 MG: 25 TABLET ORAL at 22:40

## 2022-02-09 RX ADMIN — GLYCOPYRROLATE 0.1 MG: 0.2 INJECTION INTRAMUSCULAR; INTRAVENOUS at 10:10

## 2022-02-09 RX ADMIN — METHYLPREDNISOLONE SODIUM SUCCINATE 40 MG: 40 INJECTION, POWDER, FOR SOLUTION INTRAMUSCULAR; INTRAVENOUS at 13:21

## 2022-02-09 RX ADMIN — IPRATROPIUM BROMIDE AND ALBUTEROL SULFATE 3 ML: 2.5; .5 SOLUTION RESPIRATORY (INHALATION) at 19:56

## 2022-02-09 RX ADMIN — PERFLUTREN 2 ML: 6.52 INJECTION, SUSPENSION INTRAVENOUS at 12:04

## 2022-02-09 RX ADMIN — SODIUM CHLORIDE, PRESERVATIVE FREE 10 ML: 5 INJECTION INTRAVENOUS at 22:49

## 2022-02-09 RX ADMIN — BETHANECHOL CHLORIDE 100 MG: 25 TABLET ORAL at 09:57

## 2022-02-09 RX ADMIN — SODIUM CHLORIDE, PRESERVATIVE FREE 20 MG: 5 INJECTION INTRAVENOUS at 22:39

## 2022-02-09 RX ADMIN — IPRATROPIUM BROMIDE AND ALBUTEROL SULFATE 3 ML: 2.5; .5 SOLUTION RESPIRATORY (INHALATION) at 14:45

## 2022-02-09 RX ADMIN — METHYLPREDNISOLONE SODIUM SUCCINATE 40 MG: 40 INJECTION, POWDER, FOR SOLUTION INTRAMUSCULAR; INTRAVENOUS at 05:16

## 2022-02-09 RX ADMIN — FUROSEMIDE 40 MG: 10 INJECTION, SOLUTION INTRAMUSCULAR; INTRAVENOUS at 09:57

## 2022-02-09 NOTE — CONSULTS
[facility] Consult Note      NAME:  Bennett Pringle   :   1955   MRN:  [id number]     Requesting Physician Kalyn Valle MD   Reason for Consult:  Bladder outlet obst     PCP:  Ramonita Ramon MD     Date/Time:  2022 10:01 PM          Subjective:     CHIEF COMPLAINT: syncope     HISTORY OF PRESENT ILLNESS:     Mr. Jacky Foley is a 73 y.o.  male who is admitted to the medical  Service with syncope and hypoxia. We are asked to evaluate for bladder outlet obst and edema. Hx obtained from wife at bedside. Patient intubated and sedated. Per wife, had recurrent generalized body cramping associated with syncopal episodes. On presentation to ER was hypoxic and subsequently intubated. No antecedent hx of renal failure. CT showed no hydronephrosis but bilateral renal cysts were noted. Serum creatinine normal. Hx of BPH and apparently ad urinary retention noted in ED; rubin placed. No past medical history on file. No past surgical history on file. Social History     Tobacco Use    Smoking status: Not on file    Smokeless tobacco: Not on file   Substance Use Topics    Alcohol use: Not on file        No family history on file. Allergies   Allergen Reactions    Sulfa (Sulfonamide Antibiotics) Other (comments)     syncope          Prior to Admission medications    Medication Sig Start Date End Date Taking? Authorizing Provider   albuterol (ProAir HFA) 90 mcg/actuation inhaler Take 1 Puff by inhalation four (4) times daily as needed. Patient not taking: Reported on 2022    Provider, Historical   aspirin 81 mg chewable tablet Take 81 mg by mouth daily. Patient not taking: Reported on 2022    Provider, Historical   atorvastatin (Lipitor) 40 mg tablet Take 40 mg by mouth daily. Patient not taking: Reported on 2022    Provider, Historical   bethanechol chloride (URECHOLINE) 50 mg tablet Take 200 mg by mouth daily.     Provider, Historical   bumetanide (BUMEX) 1 mg tablet Take 1 mg by mouth daily. Patient not taking: Reported on 2/7/2022    Provider, Historical   clopidogreL (PLAVIX) 75 mg tab Take 75 mg by mouth daily. Patient not taking: Reported on 2/7/2022    Provider, Historical   esomeprazole (NexIUM) 40 mg capsule Take 40 mg by mouth daily. Patient not taking: Reported on 2/7/2022    Provider, Historical   finasteride (PROSCAR) 5 mg tablet Take 5 mg by mouth daily. Provider, Historical   fluticasone-umeclidinium-vilanterol (TRELEGY ELLIPTA) 100-62.5-25 mcg inhaler Take 1 Puff by inhalation daily. Provider, Historical   furosemide (LASIX) 20 mg tablet Take 20 mg by mouth daily. Patient not taking: Reported on 2/7/2022    Provider, Historical   gabapentin (NEURONTIN) 300 mg capsule Take 300 mg by mouth daily. Patient not taking: Reported on 2/7/2022    Provider, Historical   ipratropium-albuteroL (Combivent Respimat)  mcg/actuation inhaler Take 1 Puff by inhalation two (2) times daily as needed. Patient not taking: Reported on 2/7/2022    Provider, Historical   isosorbide dinitrate (ISORDIL) 30 mg tablet Take 30 mg by mouth two (2) times a day. Patient not taking: Reported on 2/7/2022    Provider, Historical   losartan (COZAAR) 25 mg tablet Take 25 mg by mouth daily. Patient not taking: Reported on 2/7/2022    Provider, Historical   omeprazole (PRILOSEC) 20 mg capsule Take 20 mg by mouth daily. Patient not taking: Reported on 2/7/2022    Provider, Historical   OXcarbazepine (Oxtellar XR) 300 mg Tb24 Take 300 mg by mouth daily. Patient not taking: Reported on 2/7/2022    Provider, Historical   QUEtiapine (SEROqueL) 400 mg tablet Take 400 mg by mouth daily. Patient not taking: Reported on 2/7/2022    Provider, Historical   sertraline (Zoloft) 100 mg tablet Take 100 mg by mouth daily. Patient not taking: Reported on 2/7/2022    Provider, Historical   tamsulosin (Flomax) 0.4 mg capsule Take 0.4 mg by mouth daily.     Provider, Historical ticagrelor (Brilinta) 90 mg tablet Take 90 mg by mouth two (2) times a day. Patient not taking: Reported on 2/7/2022    Provider, Historical   metoprolol succinate (TOPROL-XL) 25 mg XL tablet Take 25 mg by mouth daily. Patient not taking: Reported on 2/7/2022    Provider, Historical         Current Facility-Administered Medications:     albuterol-ipratropium (DUO-NEB) 2.5 MG-0.5 MG/3 ML, 3 mL, Nebulization, Q4H PRN, Antoinette Maurice MD    bethanechol (URECHOLINE) tablet 100 mg, 100 mg, Oral, BID, Antoinette Maurice MD, 100 mg at 02/08/22 2016    finasteride (PROSCAR) tablet 5 mg, 5 mg, Oral, DAILY, Antoinette Maurice MD    fluticasone-umeclidinium-vilanterol (TRELEGY ELLIPTA) inhaler 1 Puff, 1 Puff, Inhalation, Iveth Orellana MD    . PHARMACY TO SUBSTITUTE PER PROTOCOL (Reordered from: OXcarbazepine (Oxtellar XR) 300 mg Tb24), , , Per Protocol, Antoinette Maurice MD    tamsulosin (FLOMAX) capsule 0.4 mg, 0.4 mg, Oral, DAILY, Antoinette Maurice MD    sodium chloride (NS) flush 5-40 mL, 5-40 mL, IntraVENous, Q8H, Antoinette Maurice MD, 10 mL at 02/08/22 1513    sodium chloride (NS) flush 5-40 mL, 5-40 mL, IntraVENous, PRN, Antoinette Maurice MD    acetaminophen (TYLENOL) tablet 650 mg, 650 mg, Oral, Q6H PRN **OR** acetaminophen (TYLENOL) suppository 650 mg, 650 mg, Rectal, Q6H PRN, Antoinette Maurice MD    polyethylene glycol (MIRALAX) packet 17 g, 17 g, Oral, DAILY PRN, Antoinette Maurice MD    ondansetron (ZOFRAN ODT) tablet 4 mg, 4 mg, Oral, Q8H PRN **OR** ondansetron (ZOFRAN) injection 4 mg, 4 mg, IntraVENous, Q6H PRN, Antoinette Maurice MD    enoxaparin (LOVENOX) injection 40 mg, 40 mg, SubCUTAneous, DAILY, Antoinette Maurice MD, 40 mg at 02/08/22 1513    famotidine (PF) (PEPCID) 20 mg in 0.9% sodium chloride 10 mL injection, 20 mg, IntraVENous, Q12H, Antoinette Maurice MD, 20 mg at 02/08/22 2016    labetaloL (NORMODYNE;TRANDATE) 20 mg/4 mL (5 mg/mL) injection 10 mg, 10 mg, IntraVENous, Q4H PRN, Stephanie Martinez MD, 10 mg at 02/08/22 1741    amLODIPine (NORVASC) tablet 10 mg, 10 mg, Oral, DAILY, Travis Hidalgo MD, 10 mg at 02/08/22 1516    [START ON 2/9/2022] aspirin chewable tablet 81 mg, 81 mg, Oral, DAILY, Duyen Teixeira MD    [START ON 2/9/2022] atorvastatin (LIPITOR) tablet 80 mg, 80 mg, Oral, DAILY, Duyen Teixeira MD    [START ON 2/9/2022] clopidogreL (PLAVIX) tablet 75 mg, 75 mg, Oral, DAILY, Duyen Teixeira MD  [employer]  [START ON 2/9/2022] metoprolol succinate (TOPROL-XL) XL tablet 25 mg, 25 mg, Oral, DAILY, Duyen Teixeira MD    methylPREDNISolone (PF) (SOLU-MEDROL) injection 40 mg, 40 mg, IntraVENous, Q8H, Aj Lovell MD, 40 mg at 02/08/22 2015    albuterol-ipratropium (DUO-NEB) 2.5 MG-0.5 MG/3 ML, 3 mL, Nebulization, Q6HWA RT, Aj Lovell MD, 3 mL at 02/08/22 1943    budesonide (PULMICORT) 500 mcg/2 ml nebulizer suspension, 500 mcg, Nebulization, BID RT, Aj Lovell MD, 500 mcg at 02/08/22 1943    midazolam in normal saline (VERSED) 1 mg/mL infusion, 0-10 mg/hr, IntraVENous, TITRATE, Humza Maurice MD, Last Rate: 7 mL/hr at 02/08/22 1751, 7 mg/hr at 02/08/22 1751    propofol (DIPRIVAN) 10 mg/mL infusion, 0-50 mcg/kg/min, IntraVENous, TITRATEKaylene Gideon Santos, MD, Last Rate: 35.1 mL/hr at 02/08/22 2014, 45 mcg/kg/min at 02/08/22 2014      Review of Systems:  Review of systems not obtained due to patient factors.        Objective:      VITALS:    Vital signs reviewed; most recent are:    Visit Vitals  BP (!) 135/93 (BP 1 Location: Left upper arm, BP Patient Position: At rest)   Pulse 94   Temp 97.5 °F (36.4 °C)   Resp 15   Ht 6' (1.829 m)   Wt 132 kg (291 lb 0.1 oz)   SpO2 99%   BMI 39.47 kg/m²     SpO2 Readings from Last 6 Encounters:   02/08/22 99%    O2 Flow Rate (L/min): 15 l/min       Intake/Output Summary (Last 24 hours) at 2/8/2022 2201  Last data filed at 2/8/2022 1813  Gross per 24 hour   Intake    Output 600 ml   Net -600 ml            Exam:   Physical Exam:General appearance: appears older than stated age, sedated and intubated  Head: Normocephalic, without obvious abnormality, atraumatic  Lungs: clear vent sounds  Heart: regular rate and rhythm, no S3 or S4, no rub  Abdomen: soft, non-tender. Bowel sounds normal. No masses,  no organomegaly  Extremities: edema: 1+ bilateral lower ext edema  Neurologic: sedated     LABS:  Recent Labs     02/08/22  0352 02/07/22 2120    134*   K 3.7 4.8    102   CO2 25 29   BUN 20 24*   CREA 0.85 1.10   CA 8.2* 8.8   ALB  --  2.9*   PHOS 3.5  --    MG 2.1 2.1     Recent Labs     02/08/22  0353 02/07/22 2120   WBC 13.4* 11.1   HGB 11.7* 12.5   HCT 34.5* 38.1    245     No results found for: GLUCPOC  Recent Labs     02/08/22  0402   PH 7.44   PCO2 41   PO2 268*   HCO3 27*   FIO2 80.0     Recent Labs     02/07/22 2120   INR 1.0     No results for input(s): MIHAI, KU, CLU, CREAU in the last 72 hours. No lab exists for component: PROU     CXR 2-8-22  IMPRESSION  The cardiomediastinal silhouette is appropriate for age, technique,  and lung expansion. Pulmonary vasculature is not congested. The lungs are  essentially clear. No effusion or pneumothorax is seen. CT chest and abd 2-7-22  FINDINGS:     No aneurysm or dissection of thoracic or abdominal aorta. Atherosclerosis  including coronary arteries. No central pulmonary emboli. No pleural or  pericardial effusions. No mediastinal adenopathy. Emphysematous changes in the  lungs. Minimal basilar atelectasis. Bilateral apical pleural thickening/scar  again noted. No pneumothorax. Endotracheal tube. Tip of NG tube in stomach. Difficult to exclude small thyroid nodules due to artifacts.     Liver, spleen and pancreas are unremarkable. Gallbladder is present without  pericholecystic stranding. No urinary stone or hydronephrosis on either side.   Bilateral renal lesions, largest about 1.7 cm on the right side posteriorly, are  too small to characterize, may represent cyst cyst, hyperdense cyst or other.     No bowel obstruction. Residual contrast in parts of colon. No contrast  extravasation in the remainder of the bowel. No evidence of diverticulitis. Normal appendix. No abscess. No free intraperitoneal air. Prostate measures  about 4.5 x 3.5 cm in diameter containing small calcifications. There are some  fatty changes/atrophy of some of the muscles. No acute fracture in the  visualized bony structures.     IMPRESSION     No aneurysm or dissection of aorta. Atherosclerosis including coronary arteries. Pulmonary emphysema.     No intra-abdominal inflammatory changes or bowel obstruction.  Follow-up as  clinically indicated    Assessment:   Renal Specific Problems  Recurrent syncopal episodes  Resp failure on vent  Hypertension  Volume overload with edema  Urinary retention corrected by rubin: hx of BPH        Plan:     Obtain/ Order: labs/cultures/radiology/procedures:  urine lytes and urine creatinine        Therapeutic:    Cont current care  Diuretic Rx  Metoprolol IV for hypertension for now  No renal w/u indicated at this time  Serial observation for renal lesion expansion in 4-6 months        Total time spent with patient: 30 Minutes                  Discussed:  patient's wife, ICU RN, Elda and Dr. Mena Dacosta           ___________________________________________________    Attending Physician: Mike Mtz MD

## 2022-02-09 NOTE — PROGRESS NOTES
Progress Note    Patient: Justice Andre MRN: [id number]  SSN: [id number]    YOB: 1955  Age: 73 y.o. Sex: male      Admit Date: 2/7/2022    LOS: 2 days     Subjective:     66M, h/o COPD not on oxygen, pulmonary embolism (not on AC s/t GI bleed), CAD s/p stent presented with acute encephalopathy and syncope complicated by HTN urgency and episode of hypoxia and acute hypoxic respiratory failure and was intubated    HOSPITAL COURSE:   Initial workup showed no signs of infection- CT chest. CXR no pneumonia, UA no signs of infection, cardiology plans on ischemic workup after extubation, description not consistent with seizures. He does have NSTEMI type II s.t HTN urgency. Plan to wean vent tomorrow    Review of Systems:  Cannot be assessed due to men bob status      Objective:     Vitals:    02/09/22 1200 02/09/22 1300 02/09/22 1445 02/09/22 1500   BP: 129/83 117/75  130/82   Pulse: 98 95 92 96   Resp: 21 21 19 20   Temp: 98.8 °F (37.1 °C) 98.8 °F (37.1 °C)  98.1 °F (36.7 °C)   SpO2: 97% 97% 96% 96%   Weight:       Height:            Intake and Output:  Current Shift: 02/09 0701 - 02/09 1900  In: 146.1 [I.V.:146.1]  Out: 1500 [Urine:1500]  Last three shifts: 02/07 1901 - 02/09 0700  In: -   Out: 1300 [Urine:1300]      Physical Exam:   Physical Exam:  General: Intubated and sedated. Eye: conjunctivae/corneas clear. PERRL, EOM's intact. Throat and Neck: normal and no erythema or exudates noted.  No mass   Lung: clear to auscultation bilaterally  Heart: regular rate and rhythm,   Abdomen: soft, Bowel sounds normal. No masses,  Extremities:  all extremities normal, atraumatic  Skin: Normal.  Neurologic: Omitted   psychiatric: Omitted      Lab/Data Review:  Recent Results (from the past 24 hour(s))   BLOOD GAS, ARTERIAL    Collection Time: 02/09/22  2:45 AM   Result Value Ref Range    pH 7.41 7.35 - 7.45      PCO2 45 35 - 45 mmHg    PO2 120 (H) 75 - 100 mmHg    O2 SAT 97 >95 %    BICARBONATE 27 (H) 22 - 26 mmol/L    BASE EXCESS 2.9 (H) 0 - 2 mmol/L    O2 METHOD VENT      FIO2 40.0 %    MODE AssistControl/Pressure Control      SET RATE 15      IPAP/PIP 14      EPAP/CPAP/PEEP 8.0      SITE Right Radial      VÍCTOR'S TEST PASS     CBC WITH AUTOMATED DIFF    Collection Time: 02/09/22  3:28 AM   Result Value Ref Range    WBC 12.5 (H) 4.1 - 11.1 K/uL    RBC 4.46 4.10 - 5.70 M/uL    HGB 13.1 12.1 - 17.0 g/dL    HCT 39.2 36.6 - 50.3 %    MCV 87.9 80.0 - 99.0 FL    MCH 29.4 26.0 - 34.0 PG    MCHC 33.4 30.0 - 36.5 g/dL    RDW 14.7 (H) 11.5 - 14.5 %    PLATELET 634 229 - 351 K/uL    MPV 8.9 8.9 - 12.9 FL    NRBC 0.0 0.0  WBC    ABSOLUTE NRBC 0.00 0.00 - 0.01 K/uL    NEUTROPHILS 94 (H) 32 - 75 %    LYMPHOCYTES 4 (L) 12 - 49 %    MONOCYTES 1 (L) 5 - 13 %    EOSINOPHILS 0 0 - 7 %    BASOPHILS 0 0 - 1 %    IMMATURE GRANULOCYTES 1 (H) 0 - 0.5 %    ABS. NEUTROPHILS 11.8 (H) 1.8 - 8.0 K/UL    ABS. LYMPHOCYTES 0.5 (L) 0.8 - 3.5 K/UL    ABS. MONOCYTES 0.2 0.0 - 1.0 K/UL    ABS. EOSINOPHILS 0.0 0.0 - 0.4 K/UL    ABS. BASOPHILS 0.0 0.0 - 0.1 K/UL    ABS. IMM.  GRANS. 0.1 (H) 0.00 - 0.04 K/UL    DF AUTOMATED     METABOLIC PANEL, BASIC    Collection Time: 02/09/22  3:28 AM   Result Value Ref Range    Sodium 132 (L) 136 - 145 mmol/L    Potassium 5.0 3.5 - 5.1 mmol/L    Chloride 101 97 - 108 mmol/L    CO2 27 21 - 32 mmol/L    Anion gap 4 (L) 5 - 15 mmol/L    Glucose 152 (H) 65 - 100 mg/dL    BUN 22 (H) 6 - 20 mg/dL    Creatinine 1.10 0.70 - 1.30 mg/dL    BUN/Creatinine ratio 20 12 - 20      GFR est AA >60 >60 ml/min/1.73m2    GFR est non-AA >60 >60 ml/min/1.73m2    Calcium 8.6 8.5 - 10.1 mg/dL   ECHO ADULT COMPLETE    Collection Time: 02/09/22 10:47 AM   Result Value Ref Range    LA Major [place] 5.5 cm    LA Area 4C 16.7 cm2    LA Diameter 3.7 cm    AV Peak Velocity 1.4 m/s    AV Peak Gradient 8 mmHg    Aortic Root 3.4 cm    Descending Aorta 2.3 cm    IVSd 1.5 (A) 0.6 - 1.0 cm    LVIDd 4.1 (A) 4.2 - 5.9 cm    LVIDs 2.2 cm    LVOT Peak Velocity 1.2 m/s    LVOT Peak Gradient 6 mmHg    LVPWd 1.2 (A) 0.6 - 1.0 cm    LV E' Lateral Velocity 10 cm/s    LV E' Septal Velocity 8 cm/s    MR Peak Velocity 1.2 m/s    MR Peak Gradient 6 mmHg    MV Max Velocity 1.3 m/s    MV Peak Gradient 6 mmHg    MV E Wave Deceleration Time 102.0 ms    MV A Velocity 1.29 m/s    MV E Velocity 0.84 m/s    MV Mean Gradient 3 mmHg    MV VTI 19.5 cm    MV Mean Velocity 0.8 m/s    Est. RA Pressure 3 mmHg    TR Max Velocity 1.70 m/s    TR Peak Gradient 12 mmHg    TAPSE 1.9 1.5 - 2.0 cm    Fractional Shortening 2D 46 28 - 44 %    LVIDd Index 1.64 cm/m2    LVIDs Index 0.88 cm/m2    LV RWT Ratio 0.59     LV Mass 2D 204.9 88 - 224 g    LV Mass 2D Index 81.9 49 - 115 g/m2    MV E/A 0.65     E/E' Ratio (Averaged) 9.45     E/E' Lateral 8.40     E/E' Septal 10.50     LA Size Index 1.48 cm/m2    LA/AO Root Ratio 1.09     Ao Root Index 1.36 cm/m2    AV Velocity Ratio 0.86     RVSP 15 mmHg    Descending Aorta Index 0.92 cm/m2    EF BP 79 55 - 100 %         Assessment and plan:        (1) Acute encephalopathy: GCS 12. Cardiology and neurology input appreciated. It does appear to have some severe apnea. However cardiac and neuro causes to be ruled out. (2) Acute hypoxic respiratory failure : intubated, seddated, wean sedation. Solumedrol , trelegy    (3) CAD s/p stent : aspirin, plavix,  statin    (4) NSTEMI: likely type II:    (4) COPD not on o2: complicates #1    (5) HTN urgency: PRN labetalol. amlodipine    (6) vocal chord dysfunction: OP ENT    (7) CHFpEF: lasix 40 daily     DISPO: ICU today, likely extubation tomorrow.      Signed By: Hazel Brandt MD     February 9, 2022

## 2022-02-09 NOTE — PROGRESS NOTES
[facility]     Renal Daily Progress Note:     Admission Date: 2022     Subjective: remain intubated and sedated. K up but specimen hemolyzed. Decreased lower ext edema. Review of Systems  Review of systems not obtained due to patient factors. Objective:     Visit Vitals  BP (!) 140/84   Pulse (!) 104   Temp 98.2 °F (36.8 °C)   Resp 22   Ht 6' (1.829 m)   Wt 132 kg (291 lb 0.1 oz)   SpO2 97%   BMI 39.47 kg/m²     Temp (24hrs), Av.5 °F (36.9 °C), Min:96.4 °F (35.8 °C), Max:100.9 °F (38.3 °C)        Intake/Output Summary (Last 24 hours) at 2022 1006  Last data filed at 2022 0545  Gross per 24 hour   Intake    Output 1300 ml   Net -1300 ml     Current Facility-Administered Medications   Medication Dose Route Frequency    perflutren lipid microspheres (DEFINITY) 1.1 mg/mL contrast injection        glycopyrrolate (ROBINUL) injection 0.1 mg  0.1 mg IntraVENous TID    albuterol-ipratropium (DUO-NEB) 2.5 MG-0.5 MG/3 ML  3 mL Nebulization Q4H PRN    bethanechol (URECHOLINE) tablet 100 mg  100 mg Oral BID    finasteride (PROSCAR) tablet 5 mg  5 mg Oral DAILY    fluticasone-umeclidinium-vilanterol (TRELEGY ELLIPTA) inhaler 1 Puff  1 Puff Inhalation DAILY    . PHARMACY TO SUBSTITUTE PER PROTOCOL (Reordered from: OXcarbazepine (Oxtellar XR) 300 mg Tb24)    Per Protocol    tamsulosin (FLOMAX) capsule 0.4 mg  0.4 mg Oral DAILY    sodium chloride (NS) flush 5-40 mL  5-40 mL IntraVENous Q8H    sodium chloride (NS) flush 5-40 mL  5-40 mL IntraVENous PRN    acetaminophen (TYLENOL) tablet 650 mg  650 mg Oral Q6H PRN    Or    acetaminophen (TYLENOL) suppository 650 mg  650 mg Rectal Q6H PRN    polyethylene glycol (MIRALAX) packet 17 g  17 g Oral DAILY PRN    ondansetron (ZOFRAN ODT) tablet 4 mg  4 mg Oral Q8H PRN    Or    ondansetron (ZOFRAN) injection 4 mg  4 mg IntraVENous Q6H PRN    enoxaparin (LOVENOX) injection 40 mg  40 mg SubCUTAneous DAILY    famotidine (PF) (PEPCID) 20 mg in 0.9% sodium chloride 10 mL injection  20 mg IntraVENous Q12H    labetaloL (NORMODYNE;TRANDATE) 20 mg/4 mL (5 mg/mL) injection 10 mg  10 mg IntraVENous Q4H PRN    amLODIPine (NORVASC) tablet 10 mg  10 mg Oral DAILY    aspirin chewable tablet 81 mg  81 mg Oral DAILY    atorvastatin (LIPITOR) tablet 80 mg  80 mg Oral DAILY    clopidogreL (PLAVIX) tablet 75 mg  75 mg Oral DAILY    metoprolol succinate (TOPROL-XL) XL tablet 25 mg  25 mg Oral DAILY    methylPREDNISolone (PF) (SOLU-MEDROL) injection 40 mg  40 mg IntraVENous Q8H    albuterol-ipratropium (DUO-NEB) 2.5 MG-0.5 MG/3 ML  3 mL Nebulization Q6HWA RT    budesonide (PULMICORT) 500 mcg/2 ml nebulizer suspension  500 mcg Nebulization BID RT    furosemide (LASIX) injection 40 mg  40 mg IntraVENous BID    midazolam in normal saline (VERSED) 1 mg/mL infusion  0-10 mg/hr IntraVENous TITRATE    propofol (DIPRIVAN) 10 mg/mL infusion  0-50 mcg/kg/min IntraVENous TITRATE       Physical Exam:General appearance: appears older than stated age, intubated and sedated  Head: Normocephalic, without obvious abnormality, atraumatic  Neck: supple, symmetrical, trachea midline, no adenopathy and no JVD  Lungs: clear ventilator breath sounds  Heart: RRR no S3 or rub  Abdomen: soft, non-tender. Bowel sounds normal. No masses,  no organomegaly  Extremities: edema -trace bilateral pre-tibial edema  Neurologic: sedated    Data Review:     LABS:  Recent Labs     02/09/22 0328 02/08/22  0352 02/07/22 2120   * 136 134*   K 5.0 3.7 4.8    103 102   CO2 27 25 29   BUN 22* 20 24*   CREA 1.10 0.85 1.10   CA 8.6 8.2* 8.8   ALB  --   --  2.9*   PHOS  --  3.5  --    MG  --  2.1 2.1     Recent Labs     02/09/22  0328 02/08/22  0353 02/07/22 2120   WBC 12.5* 13.4* 11.1   HGB 13.1 11.7* 12.5   HCT 39.2 34.5* 38.1    221 245     No results for input(s): MIHAI, KU, CLU, CREAU in the last 72 hours.     No lab exists for component: PROU    Assessment:   Renal Specific Problems  Hyperkalemia: specimen hemolyzed, will repeat  GILL resolved with rubin  Volume overload with edema - improved        Plan:     Obtain/ Order: labs/cultures/radiology/procedures:  Obtain urine lytes as ordered, renal fxn in AM        Therapeutic:    Decrease lasix to q day for now      Lawyer Carlo MD    [phone]

## 2022-02-09 NOTE — PROGRESS NOTES
IMPRESSION:   1. Acute hypoxic respiratory failure  2. NSTEMI  3. Generalized Edema  4. Syncope  5. COPD  6. Additional workup outlined below  7. Pt is at high risk of sudden decline and decompensation with life threatening consequenses and continued end organ dysfunction and failure  8. Pt is critically ill. Time spent with pt and staff actively rendering care, managing pt and coordinating care as stated below; 30 minutes, exclusive of any procedures      RECOMMENDATIONS/PLAN:   1. ICU monitoring  1. Ventilator for mechanical life support and prevent respiratory arrest with protective lung strategies sedated with Versed and propofol, lot of oral secretion 40 we will start patient on Robinul and also will give 1 dose of Lasix  2. Patient is on assist control mode 80% FiO2 PEEP of 8 rate of 15 we will change vent setting and decrease FiO2 sedated with propofol and Versed  3. We will start patient on IV Solu-Medrol nebulizer treatment Covid negative  4. Hold trilogy inhaler  Started patient on Pulmicort via neb  5. Chest x-ray no acute infiltrate  6. Patient was to be followed with ENT he never had any appointment with them they were resuming patient has vocal cord dysfunction once extubated we will get ENT evaluation  7. CVP monitoring  8. IV vasopressors for circulatory shock refractory to fluids to maintain SBP> 90  9. Transfuse prn to maintain Hgb > 7  10. Labs to follow electrolytes, renal function and and blood counts  11. Bronchial hygiene with respiratory therapy techniques, bronchodilators  12. Pt needs IV fluids with additives and Drug therapy requiring intensive monitoring for toxicity  13. Prescription drug management with home med reconciliation reviewed  14. DVT, SUP prophylaxis  15.  Will be available to assist in medical management while in the CCU pending disposition     [x] High complexity decision making was performed  [x] See my orders for details  HPI  28-year-old male came in because of chest pain with shortness of breath he has also recurrent episodes of syncope significant past medical history of vocal cord dysfunction, pulmonary Moll[place], coronary artery disease status post stent COPD current everyday smoker no history of sleep apnea he was having swelling of the lower extremities for couple of months today came into the emergency room as previously was having chest pain or shortness of breath he passed out subsequently got intubated and now on ventilator critical care consult was called. PMH:  has no past medical history on file. PSH:   has no past surgical history on file. FHX: family history is not on file. SHX:      ALL:   Allergies   Allergen Reactions    Sulfa (Sulfonamide Antibiotics) Other (comments)     syncope          MEDS:   [x] Reviewed - As Below   [] Not reviewed    Current Facility-Administered Medications   Medication    perflutren lipid microspheres (DEFINITY) 1.1 mg/mL contrast injection    albuterol-ipratropium (DUO-NEB) 2.5 MG-0.5 MG/3 ML    bethanechol (URECHOLINE) tablet 100 mg    finasteride (PROSCAR) tablet 5 mg    fluticasone-umeclidinium-vilanterol (TRELEGY ELLIPTA) inhaler 1 Puff    . PHARMACY TO SUBSTITUTE PER PROTOCOL (Reordered from: OXcarbazepine (Oxtellar XR) 300 mg Tb24)    tamsulosin (FLOMAX) capsule 0.4 mg    sodium chloride (NS) flush 5-40 mL    sodium chloride (NS) flush 5-40 mL    acetaminophen (TYLENOL) tablet 650 mg    Or    acetaminophen (TYLENOL) suppository 650 mg    polyethylene glycol (MIRALAX) packet 17 g    ondansetron (ZOFRAN ODT) tablet 4 mg    Or    ondansetron (ZOFRAN) injection 4 mg    enoxaparin (LOVENOX) injection 40 mg    famotidine (PF) (PEPCID) 20 mg in 0.9% sodium chloride 10 mL injection    labetaloL (NORMODYNE;TRANDATE) 20 mg/4 mL (5 mg/mL) injection 10 mg    amLODIPine (NORVASC) tablet 10 mg    aspirin chewable tablet 81 mg    atorvastatin (LIPITOR) tablet 80 mg    clopidogreL (PLAVIX) tablet 75 mg    metoprolol succinate (TOPROL-XL) XL tablet 25 mg    methylPREDNISolone (PF) (SOLU-MEDROL) injection 40 mg    albuterol-ipratropium (DUO-NEB) 2.5 MG-0.5 MG/3 ML    budesonide (PULMICORT) 500 mcg/2 ml nebulizer suspension    furosemide (LASIX) injection 40 mg    midazolam in normal saline (VERSED) 1 mg/mL infusion    propofol (DIPRIVAN) 10 mg/mL infusion      MAR reviewed and pertinent medications noted or modified as needed   Current Facility-Administered Medications   Medication    perflutren lipid microspheres (DEFINITY) 1.1 mg/mL contrast injection    albuterol-ipratropium (DUO-NEB) 2.5 MG-0.5 MG/3 ML    bethanechol (URECHOLINE) tablet 100 mg    finasteride (PROSCAR) tablet 5 mg    fluticasone-umeclidinium-vilanterol (TRELEGY ELLIPTA) inhaler 1 Puff    . PHARMACY TO SUBSTITUTE PER PROTOCOL (Reordered from: OXcarbazepine (Oxtellar XR) 300 mg Tb24)    tamsulosin (FLOMAX) capsule 0.4 mg    sodium chloride (NS) flush 5-40 mL    sodium chloride (NS) flush 5-40 mL    acetaminophen (TYLENOL) tablet 650 mg    Or    acetaminophen (TYLENOL) suppository 650 mg    polyethylene glycol (MIRALAX) packet 17 g    ondansetron (ZOFRAN ODT) tablet 4 mg    Or    ondansetron (ZOFRAN) injection 4 mg    enoxaparin (LOVENOX) injection 40 mg    famotidine (PF) (PEPCID) 20 mg in 0.9% sodium chloride 10 mL injection    labetaloL (NORMODYNE;TRANDATE) 20 mg/4 mL (5 mg/mL) injection 10 mg    amLODIPine (NORVASC) tablet 10 mg    aspirin chewable tablet 81 mg    atorvastatin (LIPITOR) tablet 80 mg    clopidogreL (PLAVIX) tablet 75 mg    metoprolol succinate (TOPROL-XL) XL tablet 25 mg    methylPREDNISolone (PF) (SOLU-MEDROL) injection 40 mg    albuterol-ipratropium (DUO-NEB) 2.5 MG-0.5 MG/3 ML    budesonide (PULMICORT) 500 mcg/2 ml nebulizer suspension    furosemide (LASIX) injection 40 mg    midazolam in normal saline (VERSED) 1 mg/mL infusion    propofol (DIPRIVAN) 10 mg/mL infusion      PMH:  has no past medical history on file. PSH:   has no past surgical history on file. FHX: family history is not on file. SHX:       ROS:  Unable to obtain sedated    Hemodynamics:    CO:    CI:    CVP:    SVR:   PAP Systolic:    PAP Diastolic:    PVR:    UW42:        Ventilator Settings:      Mode Rate TV Press PEEP FiO2 PIP Min. Vent   Assist control,Pressure control         8 cm H20 40 %  23 cm H2O  12.8 l/min        Vital Signs: Telemetry:    normal sinus rhythm Intake/Output:   Visit Vitals  /74 (BP 1 Location: Left upper arm, BP Patient Position: At rest)   Pulse (!) 105   Temp 98.2 °F (36.8 °C)   Resp 18   Ht 6' (1.829 m)   Wt 132 kg (291 lb 0.1 oz)   SpO2 98%   BMI 39.47 kg/m²       Temp (24hrs), Av.5 °F (36.9 °C), Min:96.4 °F (35.8 °C), Max:100.9 °F (38.3 °C)        O2 Device: Ventilator O2 Flow Rate (L/min): 15 l/min       Wt Readings from Last 4 Encounters:   22 132 kg (291 lb 0.1 oz)          Intake/Output Summary (Last 24 hours) at 2022 0850  Last data filed at 2022 0545  Gross per 24 hour   Intake    Output 1300 ml   Net -1300 ml       Last shift:      No intake/output data recorded. Last 3 shifts:  1901 -  0700  In: -   Out: 56 [Urine:1300]       Physical Exam:     General:  intubated on vent  HEENT: NCAT, poor dentition, lips and mucosa dry  Eyes: anicteric; conjunctiva clear  Neck: no nodes, no cuff leak, trach midline; no accessory MM use. Chest: no deformity,   Cardiac: IR regular; no murmur;   Lungs: distant breath sounds;  bilateral wheeze  Abd: soft, NT, hypoactive BS  Ext: no edema; no joint swelling;  No clubbing  : NO rubin, clear urine  Neuro: sedated;   Psych- unable to assess  Skin: warm, dry, no cyanosis;   Pulses: 1-2+ Bilateral pedal, radial  Capillary: brisk; pale       DATA:    MAR reviewed and pertinent medications noted or modified as needed  MEDS:   Current Facility-Administered Medications   Medication    perflutren lipid microspheres (DEFINITY) 1.1 mg/mL contrast injection    albuterol-ipratropium (DUO-NEB) 2.5 MG-0.5 MG/3 ML    bethanechol (URECHOLINE) tablet 100 mg    finasteride (PROSCAR) tablet 5 mg    fluticasone-umeclidinium-vilanterol (TRELEGY ELLIPTA) inhaler 1 Puff    . PHARMACY TO SUBSTITUTE PER PROTOCOL (Reordered from: OXcarbazepine (Oxtellar XR) 300 mg Tb24)    tamsulosin (FLOMAX) capsule 0.4 mg    sodium chloride (NS) flush 5-40 mL    sodium chloride (NS) flush 5-40 mL    acetaminophen (TYLENOL) tablet 650 mg    Or    acetaminophen (TYLENOL) suppository 650 mg    polyethylene glycol (MIRALAX) packet 17 g    ondansetron (ZOFRAN ODT) tablet 4 mg    Or    ondansetron (ZOFRAN) injection 4 mg    enoxaparin (LOVENOX) injection 40 mg    famotidine (PF) (PEPCID) 20 mg in 0.9% sodium chloride 10 mL injection    labetaloL (NORMODYNE;TRANDATE) 20 mg/4 mL (5 mg/mL) injection 10 mg    amLODIPine (NORVASC) tablet 10 mg    aspirin chewable tablet 81 mg    atorvastatin (LIPITOR) tablet 80 mg    clopidogreL (PLAVIX) tablet 75 mg    metoprolol succinate (TOPROL-XL) XL tablet 25 mg    methylPREDNISolone (PF) (SOLU-MEDROL) injection 40 mg    albuterol-ipratropium (DUO-NEB) 2.5 MG-0.5 MG/3 ML    budesonide (PULMICORT) 500 mcg/2 ml nebulizer suspension    furosemide (LASIX) injection 40 mg    midazolam in normal saline (VERSED) 1 mg/mL infusion    propofol (DIPRIVAN) 10 mg/mL infusion        Labs:    Recent Labs     02/09/22  0328 02/08/22  0353 02/07/22  2120   WBC 12.5* 13.4* 11.1   HGB 13.1 11.7* 12.5    221 245   INR  --   --  1.0   APTT  --   --  24.8     Recent Labs     02/09/22  0328 02/08/22  0352 02/07/22  2120   * 136 134*   K 5.0 3.7 4.8    103 102   CO2 27 25 29   * 130* 115*   BUN 22* 20 24*   CREA 1.10 0.85 1.10   CA 8.6 8.2* 8.8   MG  --  2.1 2.1   PHOS  --  3.5  --    LAC  --  0.6  --    ALB  --   --  2.9*   ALT  --   --  34     Recent Labs     02/09/22  0245 02/08/22  0402 02/07/22 2225   PH 7.41 7. 44 7.36   PCO2 45 41 49*   PO2 120* 268* 488*   HCO3 27* 27* 25   FIO2 40.0 80.0 100.0     No results for input(s): CPK, CKNDX, TROIQ in the last 72 hours. No lab exists for component: CPKMB  No results found for: BNPP, BNP   No results found for: CULT  Lab Results   Component Value Date/Time    TSH 1.38 02/08/2022 03:53 AM        Imaging:    Results from [place] encounter on 02/07/22    XR CHEST PORT    Narrative  Chest single view. Comparison single view chest 2/8/2022. Stable ET tube and NG tube. Unchanged appearance for the lungs; no interstitial or alveolar pulmonary edema. Cardiac and mediastinal structures unchanged. No pneumothorax or sizable pleural  effusion. Results from Hospital Encounter encounter on 02/07/22    CTA CHEST ABD PELV W WO CONT    Narrative  CTA chest, abdomen and pelvis without with intravenous contrast, 100 cc  [id number], 3-D MIP images    Dose reduction: All CT scans at this facility are performed using dose reduction  optimization techniques as appropriate to a performed exam including the  following: Automated exposure control, adjustments of the mA and/or kV according  to patient size, or use of iterative reconstruction technique. INDICATION: Hypoxic    COMPARISON: CT chest 5/24/2018    FINDINGS:    No aneurysm or dissection of thoracic or abdominal aorta. Atherosclerosis  including coronary arteries. No central pulmonary emboli. No pleural or  pericardial effusions. No mediastinal adenopathy. Emphysematous changes in the  lungs. Minimal basilar atelectasis. Bilateral apical pleural thickening/scar  again noted. No pneumothorax. Endotracheal tube. Tip of NG tube in stomach. Difficult to exclude small thyroid nodules due to artifacts. Liver, spleen and pancreas are unremarkable. Gallbladder is present without  pericholecystic stranding. No urinary stone or hydronephrosis on either side.   Bilateral renal lesions, largest about 1.7 cm on the right side posteriorly, are  too small to characterize, may represent cyst cyst, hyperdense cyst or other. No bowel obstruction. Residual contrast in parts of colon. No contrast  extravasation in the remainder of the bowel. No evidence of diverticulitis. Normal appendix. No abscess. No free intraperitoneal air. Prostate measures  about 4.5 x 3.5 cm in diameter containing small calcifications. There are some  fatty changes/atrophy of some of the muscles. No acute fracture in the  visualized bony structures. Impression  No aneurysm or dissection of aorta. Atherosclerosis including coronary arteries. Pulmonary emphysema. No intra-abdominal inflammatory changes or bowel obstruction. Follow-up as  clinically indicated. Please see full report. This care involved high complexity decision making which includes independently reviewing the patient's past medical records, current laboratory results, medication profiles that were immediately available to me and actual Xray images at the bedside in order to assess, support vital system function, and to treat this degree of vital organ system failure, and to prevent further life threatening deterioration of the patients condition. I was in direct communication with the nursing staff throughout this time.     Medical Decision Making Today  · Reviewed the flowsheet and previous days notes  · Reviewed and summarized records or history from previous days note or discussions with staff, family  · Parenteral controlled substances - Reviewed/ Adjusted / Rocio Blank / Started  · High Risk Drug therapy requiring intensive monitoring for toxicity: eg steroids, pressors, antibiotics  · Review and order of Clinical lab tests  · Review and Order of Radiology tests  · Review and Order of Medicine tests  · Independent visualization of radiologic Images  · Reviewed Ventilator / NiPPV  · I have personally reviewed the patients ECG / Telemetry

## 2022-02-10 ENCOUNTER — APPOINTMENT (OUTPATIENT)
Dept: GENERAL RADIOLOGY | Age: 67
DRG: 207 | End: 2022-02-10
Attending: INTERNAL MEDICINE
Payer: MEDICARE

## 2022-02-10 LAB
ALBUMIN SERPL-MCNC: 2.6 G/DL (ref 3.5–5)
ANION GAP SERPL CALC-SCNC: 7 MMOL/L (ref 5–15)
ARTERIAL PATENCY WRIST A: ABNORMAL
BASE EXCESS BLDA CALC-SCNC: 4.7 MMOL/L (ref 0–2)
BASOPHILS # BLD: 0 K/UL (ref 0–0.1)
BASOPHILS NFR BLD: 0 % (ref 0–1)
BDY SITE: ABNORMAL
BUN SERPL-MCNC: 35 MG/DL (ref 6–20)
BUN/CREAT SERPL: 37 (ref 12–20)
CA-I BLD-MCNC: 8.6 MG/DL (ref 8.5–10.1)
CHLORIDE SERPL-SCNC: 100 MMOL/L (ref 97–108)
CK SERPL-CCNC: 144 U/L (ref 39–308)
CO2 SERPL-SCNC: 26 MMOL/L (ref 21–32)
CREAT SERPL-MCNC: 0.95 MG/DL (ref 0.7–1.3)
CREAT UR-MCNC: 219 MG/DL
DIFFERENTIAL METHOD BLD: ABNORMAL
EOSINOPHIL # BLD: 0 K/UL (ref 0–0.4)
EOSINOPHIL NFR BLD: 0 % (ref 0–7)
EPAP/CPAP/PEEP, PAPEEP: 8
ERYTHROCYTE [DISTWIDTH] IN BLOOD BY AUTOMATED COUNT: 14.6 % (ref 11.5–14.5)
FIO2 ON VENT: 40 %
GAS FLOW.O2 SETTING OXYMISER: 15 L/MIN
GLUCOSE SERPL-MCNC: 151 MG/DL (ref 65–100)
HCO3 BLDA-SCNC: 29 MMOL/L (ref 22–26)
HCT VFR BLD AUTO: 36.9 % (ref 36.6–50.3)
HGB BLD-MCNC: 12.1 G/DL (ref 12.1–17)
IMM GRANULOCYTES # BLD AUTO: 0.1 K/UL (ref 0–0.04)
IMM GRANULOCYTES NFR BLD AUTO: 1 % (ref 0–0.5)
IPAP/PIP, IPAPIP: 14
LYMPHOCYTES # BLD: 0.6 K/UL (ref 0.8–3.5)
LYMPHOCYTES NFR BLD: 5 % (ref 12–49)
MAGNESIUM SERPL-MCNC: 2.6 MG/DL (ref 1.6–2.4)
MCH RBC QN AUTO: 29 PG (ref 26–34)
MCHC RBC AUTO-ENTMCNC: 32.8 G/DL (ref 30–36.5)
MCV RBC AUTO: 88.5 FL (ref 80–99)
MONOCYTES # BLD: 0.3 K/UL (ref 0–1)
MONOCYTES NFR BLD: 2 % (ref 5–13)
NEUTS SEG # BLD: 12 K/UL (ref 1.8–8)
NEUTS SEG NFR BLD: 92 % (ref 32–75)
NRBC # BLD: 0 K/UL (ref 0–0.01)
NRBC BLD-RTO: 0 PER 100 WBC
PCO2 BLDA: 44 MMHG (ref 35–45)
PH BLDA: 7.44 [PH] (ref 7.35–7.45)
PHOSPHATE SERPL-MCNC: 4.2 MG/DL (ref 2.6–4.7)
PLATELET # BLD AUTO: 221 K/UL (ref 150–400)
PMV BLD AUTO: 9.3 FL (ref 8.9–12.9)
PO2 BLDA: 94 MMHG (ref 75–100)
POTASSIUM SERPL-SCNC: 4.2 MMOL/L (ref 3.5–5.1)
POTASSIUM UR-SCNC: 119 MMOL/L
PROT UR-MCNC: 33 MG/DL (ref 0–11.9)
RBC # BLD AUTO: 4.17 M/UL (ref 4.1–5.7)
SAO2 % BLD: 96 %
SAO2% DEVICE SAO2% SENSOR NAME: ABNORMAL
SODIUM SERPL-SCNC: 133 MMOL/L (ref 136–145)
SODIUM UR-SCNC: 7 MMOL/L
VENTILATION MODE VENT: ABNORMAL
WBC # BLD AUTO: 12.9 K/UL (ref 4.1–11.1)

## 2022-02-10 PROCEDURE — 74011250637 HC RX REV CODE- 250/637: Performed by: INTERNAL MEDICINE

## 2022-02-10 PROCEDURE — 82803 BLOOD GASES ANY COMBINATION: CPT

## 2022-02-10 PROCEDURE — 83735 ASSAY OF MAGNESIUM: CPT

## 2022-02-10 PROCEDURE — 71045 X-RAY EXAM CHEST 1 VIEW: CPT

## 2022-02-10 PROCEDURE — 74011250637 HC RX REV CODE- 250/637: Performed by: HOSPITALIST

## 2022-02-10 PROCEDURE — 77010033678 HC OXYGEN DAILY

## 2022-02-10 PROCEDURE — 84156 ASSAY OF PROTEIN URINE: CPT

## 2022-02-10 PROCEDURE — 74011250636 HC RX REV CODE- 250/636: Performed by: INTERNAL MEDICINE

## 2022-02-10 PROCEDURE — 65610000006 HC RM INTENSIVE CARE

## 2022-02-10 PROCEDURE — 74011000250 HC RX REV CODE- 250: Performed by: INTERNAL MEDICINE

## 2022-02-10 PROCEDURE — 80069 RENAL FUNCTION PANEL: CPT

## 2022-02-10 PROCEDURE — 85025 COMPLETE CBC W/AUTO DIFF WBC: CPT

## 2022-02-10 PROCEDURE — 36600 WITHDRAWAL OF ARTERIAL BLOOD: CPT

## 2022-02-10 PROCEDURE — 82570 ASSAY OF URINE CREATININE: CPT

## 2022-02-10 PROCEDURE — 94640 AIRWAY INHALATION TREATMENT: CPT

## 2022-02-10 PROCEDURE — 82550 ASSAY OF CK (CPK): CPT

## 2022-02-10 PROCEDURE — 84300 ASSAY OF URINE SODIUM: CPT

## 2022-02-10 PROCEDURE — 84133 ASSAY OF URINE POTASSIUM: CPT

## 2022-02-10 PROCEDURE — 36415 COLL VENOUS BLD VENIPUNCTURE: CPT

## 2022-02-10 PROCEDURE — 94003 VENT MGMT INPAT SUBQ DAY: CPT

## 2022-02-10 RX ADMIN — METHYLPREDNISOLONE SODIUM SUCCINATE 40 MG: 40 INJECTION, POWDER, FOR SOLUTION INTRAMUSCULAR; INTRAVENOUS at 14:00

## 2022-02-10 RX ADMIN — PROPOFOL 35 MCG/KG/MIN: 10 INJECTION, EMULSION INTRAVENOUS at 10:25

## 2022-02-10 RX ADMIN — ENOXAPARIN SODIUM 40 MG: 100 INJECTION SUBCUTANEOUS at 08:00

## 2022-02-10 RX ADMIN — BUDESONIDE 500 MCG: 0.5 SUSPENSION RESPIRATORY (INHALATION) at 20:00

## 2022-02-10 RX ADMIN — IPRATROPIUM BROMIDE AND ALBUTEROL SULFATE 3 ML: 2.5; .5 SOLUTION RESPIRATORY (INHALATION) at 06:57

## 2022-02-10 RX ADMIN — SODIUM CHLORIDE, PRESERVATIVE FREE 20 MG: 5 INJECTION INTRAVENOUS at 21:08

## 2022-02-10 RX ADMIN — SODIUM CHLORIDE, PRESERVATIVE FREE 10 ML: 5 INJECTION INTRAVENOUS at 21:08

## 2022-02-10 RX ADMIN — GLYCOPYRROLATE 0.1 MG: 0.2 INJECTION INTRAMUSCULAR; INTRAVENOUS at 15:23

## 2022-02-10 RX ADMIN — SODIUM CHLORIDE, PRESERVATIVE FREE 10 ML: 5 INJECTION INTRAVENOUS at 06:07

## 2022-02-10 RX ADMIN — OXCARBAZEPINE 150 MG: 150 TABLET, FILM COATED ORAL at 08:05

## 2022-02-10 RX ADMIN — METOPROLOL SUCCINATE 25 MG: 25 TABLET, EXTENDED RELEASE ORAL at 08:05

## 2022-02-10 RX ADMIN — Medication 4 MG/HR: at 04:33

## 2022-02-10 RX ADMIN — FUROSEMIDE 40 MG: 10 INJECTION, SOLUTION INTRAMUSCULAR; INTRAVENOUS at 08:04

## 2022-02-10 RX ADMIN — PROPOFOL 30 MCG/KG/MIN: 10 INJECTION, EMULSION INTRAVENOUS at 06:06

## 2022-02-10 RX ADMIN — ATORVASTATIN CALCIUM 80 MG: 40 TABLET, FILM COATED ORAL at 08:05

## 2022-02-10 RX ADMIN — IPRATROPIUM BROMIDE AND ALBUTEROL SULFATE 3 ML: 2.5; .5 SOLUTION RESPIRATORY (INHALATION) at 20:00

## 2022-02-10 RX ADMIN — PROPOFOL 35 MCG/KG/MIN: 10 INJECTION, EMULSION INTRAVENOUS at 01:58

## 2022-02-10 RX ADMIN — BETHANECHOL CHLORIDE 100 MG: 25 TABLET ORAL at 21:10

## 2022-02-10 RX ADMIN — SODIUM CHLORIDE, PRESERVATIVE FREE 20 MG: 5 INJECTION INTRAVENOUS at 08:02

## 2022-02-10 RX ADMIN — METHYLPREDNISOLONE SODIUM SUCCINATE 40 MG: 40 INJECTION, POWDER, FOR SOLUTION INTRAMUSCULAR; INTRAVENOUS at 21:07

## 2022-02-10 RX ADMIN — METHYLPREDNISOLONE SODIUM SUCCINATE 40 MG: 40 INJECTION, POWDER, FOR SOLUTION INTRAMUSCULAR; INTRAVENOUS at 06:07

## 2022-02-10 RX ADMIN — IPRATROPIUM BROMIDE AND ALBUTEROL SULFATE 3 ML: 2.5; .5 SOLUTION RESPIRATORY (INHALATION) at 13:21

## 2022-02-10 RX ADMIN — CLOPIDOGREL BISULFATE 75 MG: 75 TABLET, FILM COATED ORAL at 08:05

## 2022-02-10 RX ADMIN — ASPIRIN 81 MG CHEWABLE TABLET 81 MG: 81 TABLET CHEWABLE at 08:06

## 2022-02-10 RX ADMIN — OXCARBAZEPINE 150 MG: 150 TABLET, FILM COATED ORAL at 21:10

## 2022-02-10 RX ADMIN — GLYCOPYRROLATE 0.1 MG: 0.2 INJECTION INTRAMUSCULAR; INTRAVENOUS at 08:01

## 2022-02-10 RX ADMIN — Medication 3 MG/HR: at 16:20

## 2022-02-10 RX ADMIN — GLYCOPYRROLATE 0.1 MG: 0.2 INJECTION INTRAMUSCULAR; INTRAVENOUS at 21:09

## 2022-02-10 RX ADMIN — PROPOFOL 35 MCG/KG/MIN: 10 INJECTION, EMULSION INTRAVENOUS at 15:20

## 2022-02-10 RX ADMIN — AMLODIPINE BESYLATE 10 MG: 5 TABLET ORAL at 08:06

## 2022-02-10 RX ADMIN — BETHANECHOL CHLORIDE 100 MG: 25 TABLET ORAL at 08:06

## 2022-02-10 RX ADMIN — BUDESONIDE 500 MCG: 0.5 SUSPENSION RESPIRATORY (INHALATION) at 06:57

## 2022-02-10 RX ADMIN — PROPOFOL 35 MCG/KG/MIN: 10 INJECTION, EMULSION INTRAVENOUS at 18:54

## 2022-02-10 RX ADMIN — FINASTERIDE 5 MG: 5 TABLET, FILM COATED ORAL at 08:05

## 2022-02-10 RX ADMIN — SODIUM CHLORIDE, PRESERVATIVE FREE 20 ML: 5 INJECTION INTRAVENOUS at 15:25

## 2022-02-10 RX ADMIN — TAMSULOSIN HYDROCHLORIDE 0.4 MG: 0.4 CAPSULE ORAL at 08:04

## 2022-02-10 NOTE — PROGRESS NOTES
IMPRESSION:   1. Acute hypoxic respiratory failure  2. NSTEMI  3. Generalized Edema  4. Syncope  5. COPD  6. Additional workup outlined below  7. Pt is at high risk of sudden decline and decompensation with life threatening consequenses and continued end organ dysfunction and failure  8. Pt is critically ill. Time spent with pt and staff actively rendering care, managing pt and coordinating care as stated below; 30 minutes, exclusive of any procedures      RECOMMENDATIONS/PLAN:   1. ICU monitoring  1. Ventilator for mechanical life support and prevent respiratory arrest with protective lung strategies sedated with Versed and propofol, oral secretions much improved  2. Patient is on assist control mode 40% FiO2 PEEP of 8 rate of 15 we will change vent setting and decrease FiO2 sedated with propofol and Versed  3. Did sedation vacation this morning patient was put on spontaneous he had low tidal volume goes into apnea now he is back on assist control back on sedation  4. We will start patient on IV Solu-Medrol nebulizer treatment Covid negative  5. Hold trilogy inhaler  Started patient on Pulmicort via neb  6. Chest x-ray no acute infiltrate  7. Patient was to be followed with ENT he never had any appointment with them they were resuming patient has vocal cord dysfunction once extubated we will get ENT evaluation  8. CVP monitoring  9. IV vasopressors for circulatory shock refractory to fluids to maintain SBP> 90  10. Transfuse prn to maintain Hgb > 7  11. Labs to follow electrolytes, renal function and and blood counts  12. Bronchial hygiene with respiratory therapy techniques, bronchodilators  13. Pt needs IV fluids with additives and Drug therapy requiring intensive monitoring for toxicity  14. Prescription drug management with home med reconciliation reviewed  15. DVT, SUP prophylaxis  16.  Will be available to assist in medical management while in the CCU pending disposition     [x] High complexity decision making was performed  [x] See my orders for details  HPI  43-year-old male came in because of chest pain with shortness of breath he has also recurrent episodes of syncope significant past medical history of vocal cord dysfunction, pulmonary [place], coronary artery disease status post stent COPD current everyday smoker no history of sleep apnea he was having swelling of the lower extremities for couple of months today came into the emergency room as previously was having chest pain or shortness of breath he passed out subsequently got intubated and now on ventilator critical care consult was called. PMH:  has no past medical history on file. PSH:   has no past surgical history on file. FHX: family history is not on file.      SHX:      ALL:   Allergies   Allergen Reactions    Sulfa (Sulfonamide Antibiotics) Other (comments)     syncope          MEDS:   [x] Reviewed - As Below   [] Not reviewed    Current Facility-Administered Medications   Medication    glycopyrrolate (ROBINUL) injection 0.1 mg    furosemide (LASIX) injection 40 mg    albuterol-ipratropium (DUO-NEB) 2.5 MG-0.5 MG/3 ML    bethanechol (URECHOLINE) tablet 100 mg    finasteride (PROSCAR) tablet 5 mg    OXcarbazepine (TRILEPTAL) tablet 150 mg    tamsulosin (FLOMAX) capsule 0.4 mg    sodium chloride (NS) flush 5-40 mL    sodium chloride (NS) flush 5-40 mL    acetaminophen (TYLENOL) tablet 650 mg    Or    acetaminophen (TYLENOL) suppository 650 mg    polyethylene glycol (MIRALAX) packet 17 g    ondansetron (ZOFRAN ODT) tablet 4 mg    Or    ondansetron (ZOFRAN) injection 4 mg    enoxaparin (LOVENOX) injection 40 mg    famotidine (PF) (PEPCID) 20 mg in 0.9% sodium chloride 10 mL injection    labetaloL (NORMODYNE;TRANDATE) 20 mg/4 mL (5 mg/mL) injection 10 mg    amLODIPine (NORVASC) tablet 10 mg    aspirin chewable tablet 81 mg    atorvastatin (LIPITOR) tablet 80 mg    clopidogreL (PLAVIX) tablet 75 mg    metoprolol succinate (TOPROL-XL) XL tablet 25 mg    methylPREDNISolone (PF) (SOLU-MEDROL) injection 40 mg    albuterol-ipratropium (DUO-NEB) 2.5 MG-0.5 MG/3 ML    budesonide (PULMICORT) 500 mcg/2 ml nebulizer suspension    midazolam in normal saline (VERSED) 1 mg/mL infusion    propofol (DIPRIVAN) 10 mg/mL infusion      MAR reviewed and pertinent medications noted or modified as needed   Current Facility-Administered Medications   Medication    glycopyrrolate (ROBINUL) injection 0.1 mg    furosemide (LASIX) injection 40 mg    albuterol-ipratropium (DUO-NEB) 2.5 MG-0.5 MG/3 ML    bethanechol (URECHOLINE) tablet 100 mg    finasteride (PROSCAR) tablet 5 mg    OXcarbazepine (TRILEPTAL) tablet 150 mg    tamsulosin (FLOMAX) capsule 0.4 mg    sodium chloride (NS) flush 5-40 mL    sodium chloride (NS) flush 5-40 mL    acetaminophen (TYLENOL) tablet 650 mg    Or    acetaminophen (TYLENOL) suppository 650 mg    polyethylene glycol (MIRALAX) packet 17 g    ondansetron (ZOFRAN ODT) tablet 4 mg    Or    ondansetron (ZOFRAN) injection 4 mg    enoxaparin (LOVENOX) injection 40 mg    famotidine (PF) (PEPCID) 20 mg in 0.9% sodium chloride 10 mL injection    labetaloL (NORMODYNE;TRANDATE) 20 mg/4 mL (5 mg/mL) injection 10 mg    amLODIPine (NORVASC) tablet 10 mg    aspirin chewable tablet 81 mg    atorvastatin (LIPITOR) tablet 80 mg    clopidogreL (PLAVIX) tablet 75 mg    metoprolol succinate (TOPROL-XL) XL tablet 25 mg    methylPREDNISolone (PF) (SOLU-MEDROL) injection 40 mg    albuterol-ipratropium (DUO-NEB) 2.5 MG-0.5 MG/3 ML    budesonide (PULMICORT) 500 mcg/2 ml nebulizer suspension    midazolam in normal saline (VERSED) 1 mg/mL infusion    propofol (DIPRIVAN) 10 mg/mL infusion      PMH:  has no past medical history on file. PSH:   has no past surgical history on file. FHX: family history is not on file.    SHX:       ROS:  Unable to obtain sedated    Hemodynamics:    CO:    CI:    CVP:    SVR:   PAP Systolic:    PAP Diastolic:    PVR:    JD36:        Ventilator Settings:      Mode Rate TV Press PEEP FiO2 PIP Min. Vent   Pressure control,Assist control         8 cm H20 40 %  23 cm H2O  13.7 l/min        Vital Signs: Telemetry:    normal sinus rhythm Intake/Output:   Visit Vitals  /83 (BP 1 Location: Left upper arm, BP Patient Position: At rest)   Pulse (!) 101   Temp (!) 96.6 °F (35.9 °C)   Resp 25   Ht 6' (1.829 m)   Wt 132 kg (291 lb 0.1 oz)   SpO2 97%   BMI 39.47 kg/m²       Temp (24hrs), Av.7 °F (36.5 °C), Min:96.6 °F (35.9 °C), Max:98.8 °F (37.1 °C)        O2 Device: Ventilator O2 Flow Rate (L/min): 15 l/min       Wt Readings from Last 4 Encounters:   22 132 kg (291 lb 0.1 oz)          Intake/Output Summary (Last 24 hours) at 2/10/2022 0926  Last data filed at 2/10/2022 0300  Gross per 24 hour   Intake 246.11 ml   Output 2400 ml   Net -2153.89 ml       Last shift:      No intake/output data recorded. Last 3 shifts:  1901 - 02/10 0700  In: 246.1 [I.V.:146.1]  Out: 3100 [Urine:3100]       Physical Exam:     General:  intubated on vent  HEENT: NCAT, poor dentition, lips and mucosa dry  Eyes: anicteric; conjunctiva clear  Neck: no nodes, no cuff leak, trach midline; no accessory MM use. Chest: no deformity,   Cardiac: IR regular; no murmur;   Lungs: distant breath sounds;  bilateral wheeze  Abd: soft, NT, hypoactive BS  Ext: no edema; no joint swelling;  No clubbing  : NO rubin, clear urine  Neuro: sedated;   Psych- unable to assess  Skin: warm, dry, no cyanosis;   Pulses: 1-2+ Bilateral pedal, radial  Capillary: brisk; pale       DATA:    MAR reviewed and pertinent medications noted or modified as needed  MEDS:   Current Facility-Administered Medications   Medication    glycopyrrolate (ROBINUL) injection 0.1 mg    furosemide (LASIX) injection 40 mg    albuterol-ipratropium (DUO-NEB) 2.5 MG-0.5 MG/3 ML    bethanechol (URECHOLINE) tablet 100 mg    finasteride (PROSCAR) tablet 5 mg    OXcarbazepine (TRILEPTAL) tablet 150 mg    tamsulosin (FLOMAX) capsule 0.4 mg    sodium chloride (NS) flush 5-40 mL    sodium chloride (NS) flush 5-40 mL    acetaminophen (TYLENOL) tablet 650 mg    Or    acetaminophen (TYLENOL) suppository 650 mg    polyethylene glycol (MIRALAX) packet 17 g    ondansetron (ZOFRAN ODT) tablet 4 mg    Or    ondansetron (ZOFRAN) injection 4 mg    enoxaparin (LOVENOX) injection 40 mg    famotidine (PF) (PEPCID) 20 mg in 0.9% sodium chloride 10 mL injection    labetaloL (NORMODYNE;TRANDATE) 20 mg/4 mL (5 mg/mL) injection 10 mg    amLODIPine (NORVASC) tablet 10 mg    aspirin chewable tablet 81 mg    atorvastatin (LIPITOR) tablet 80 mg    clopidogreL (PLAVIX) tablet 75 mg    metoprolol succinate (TOPROL-XL) XL tablet 25 mg    methylPREDNISolone (PF) (SOLU-MEDROL) injection 40 mg    albuterol-ipratropium (DUO-NEB) 2.5 MG-0.5 MG/3 ML    budesonide (PULMICORT) 500 mcg/2 ml nebulizer suspension    midazolam in normal saline (VERSED) 1 mg/mL infusion    propofol (DIPRIVAN) 10 mg/mL infusion        Labs:    Recent Labs     02/10/22  0514 02/09/22  0328 02/08/22  0353 02/07/22  2120 02/07/22  2120   WBC 12.9* 12.5* 13.4*   < > 11.1   HGB 12.1 13.1 11.7*   < > 12.5    245 221   < > 245   INR  --   --   --   --  1.0   APTT  --   --   --   --  24.8    < > = values in this interval not displayed. Recent Labs     02/10/22  0514 02/09/22  0328 02/08/22  0352 02/07/22  2120 02/07/22  2120   * 132* 136   < > 134*   K 4.2 5.0 3.7   < > 4.8    101 103   < > 102   CO2 26 27 25   < > 29   * 152* 130*   < > 115*   BUN 35* 22* 20   < > 24*   CREA 0.95 1.10 0.85   < > 1.10   CA 8.6 8.6 8.2*   < > 8.8   MG  --   --  2.1  --  2.1   PHOS 4.2  --  3.5  --   --    LAC  --   --  0.6  --   --    ALB 2.6*  --   --   --  2.9*   ALT  --   --   --   --  34    < > = values in this interval not displayed.      Recent Labs     02/10/22  0210 02/09/22  0245 02/08/22  0402   PH 7.44 7.41 7.44   PCO2 44 45 41   PO2 94 120* 268*   HCO3 29* 27* 27*   FIO2 40.0 40.0 80.0     Recent Labs     02/10/22  0514        No results found for: BNPP, BNP   No results found for: CULT  Lab Results   Component Value Date/Time    TSH 1.38 02/08/2022 03:53 AM        Imaging:    Results from [place] encounter on 02/07/22    XR CHEST PORT    Narrative  Chest single view. Comparison single view chest 2/8/2022. Stable ET tube and NG tube. Unchanged appearance for the lungs; no interstitial or alveolar pulmonary edema. Cardiac and mediastinal structures unchanged. No pneumothorax or sizable pleural  effusion. Results from Hospital Encounter encounter on 02/07/22    CTA CHEST ABD PELV W WO CONT    Narrative  CTA chest, abdomen and pelvis without with intravenous contrast, 100 cc  Isovue-370, 3-D MIP images    Dose reduction: All CT scans at this facility are performed using dose reduction  optimization techniques as appropriate to a performed exam including the  following: Automated exposure control, adjustments of the mA and/or kV according  to patient size, or use of iterative reconstruction technique. INDICATION: Hypoxic    COMPARISON: CT chest 5/24/2018    FINDINGS:    No aneurysm or dissection of thoracic or abdominal aorta. Atherosclerosis  including coronary arteries. No central pulmonary emboli. No pleural or  pericardial effusions. No mediastinal adenopathy. Emphysematous changes in the  lungs. Minimal basilar atelectasis. Bilateral apical pleural thickening/scar  again noted. No pneumothorax. Endotracheal tube. Tip of NG tube in stomach. Difficult to exclude small thyroid nodules due to artifacts. Liver, spleen and pancreas are unremarkable. Gallbladder is present without  pericholecystic stranding. No urinary stone or hydronephrosis on either side.   Bilateral renal lesions, largest about 1.7 cm on the right side posteriorly, are  too small to characterize, may represent cyst cyst, hyperdense cyst or other. No bowel obstruction. Residual contrast in parts of colon. No contrast  extravasation in the remainder of the bowel. No evidence of diverticulitis. Normal appendix. No abscess. No free intraperitoneal air. Prostate measures  about 4.5 x 3.5 cm in diameter containing small calcifications. There are some  fatty changes/atrophy of some of the muscles. No acute fracture in the  visualized bony structures. Impression  No aneurysm or dissection of aorta. Atherosclerosis including coronary arteries. Pulmonary emphysema. No intra-abdominal inflammatory changes or bowel obstruction. Follow-up as  clinically indicated. Please see full report. This care involved high complexity decision making which includes independently reviewing the patient's past medical records, current laboratory results, medication profiles that were immediately available to me and actual Xray images at the bedside in order to assess, support vital system function, and to treat this degree of vital organ system failure, and to prevent further life threatening deterioration of the patients condition. I was in direct communication with the nursing staff throughout this time.   ·

## 2022-02-10 NOTE — PROGRESS NOTES
Progress Note    Patient: Deion Bethea MRN: [id number]  SSN: [id number]    YOB: 1955  Age: 73 y.o. Sex: male      Admit Date: 2/7/2022    LOS: 3 days     Subjective:     No acute events overnight    Objective:     Vitals:    02/10/22 0500 02/10/22 0600 02/10/22 0700 02/10/22 0714   BP: 119/78 126/83 125/83    Pulse: 81 82 85 84   Resp: 17 17 17 18   Temp:   96.8 °F (36 °C) (!) 96.6 °F (35.9 °C)   SpO2: 95% 96% 95% 94%   Weight:       Height:            Intake and Output:  Current Shift: No intake/output data recorded. Last three shifts: 02/08 1901 - 02/10 0700  In: 246.1 [I.V.:146.1]  Out: 3100 [Urine:3100]    Physical Exam:   General:   Intubated. Eyes:  Conjunctivae/corneas clear. PERRL, EOMs intact. Fundi benign   Ears:  Normal TMs and external ear canals both ears. Nose: Nares normal. Septum midline. Mucosa normal. No drainage or sinus tenderness. Mouth/Throat: Lips, mucosa, and tongue normal. Teeth and gums normal.   Neck: Supple, symmetrical, trachea midline, no adenopathy, thyroid: no enlargment/tenderness/nodules, no carotid bruit and no JVD. Back:   Symmetric, no curvature. ROM normal. No CVA tenderness. Lungs:   Clear to auscultation bilaterally. Heart:  Regular rate and rhythm, S1, S2 normal, no murmur, click, rub or gallop. Abdomen:   Soft, non-tender. Bowel sounds normal. No masses,  No organomegaly. Extremities: Extremities normal, atraumatic, no cyanosis or edema. Pulses: 2+ and symmetric all extremities. Skin: Skin color, texture, turgor normal. No rashes or lesions   Lymph nodes: Cervical, supraclavicular, and axillary nodes normal.   Neurologic:  Intubated       Lab/Data Review: All lab results for the last 24 hours reviewed. Assessment:     Active Problems:    Respiratory failure ([address] (2/7/2022)    Patient is 32-year-old white male with:  1. Syncope  2. Non-STEMI  3. CAD status post PCI of left circumflex artery in 2018  4. Hyperlipidemia  6. Vocal cord dysfunction  7. History of PE  8. Warfarin was stopped due to GI bleed  9. COPD  7. Obesity  8. Edema  9. Hypertensive emergency   10. Bilateral renal lesions, largest about 1.7 cm on the right side posteriorly. Plan:     Patient had increased secretions through the ET tube yesterday. Rhythm has been stable. Currently normal sinus rhythm. Blood pressure is okay. Echocardiogram showed no significant valvular pathology. Ejection fraction is normal without regional wall motion abnormality. Apparently patient had cardiac catheterization and PCI of left circumflex artery in 2018. Currently on aspirin, amlodipine, atorvastatin, Plavix. I placed him tentatively for cardiac catheterization on Monday morning pending his clinical improvement. Good urine output.   Signed By: Myra Vogel MD     February 10, 2022

## 2022-02-10 NOTE — PROGRESS NOTES
Progress Note    Patient: Emilia Temple MRN: [id number]  SSN: [id number]    YOB: 1955  Age: 73 y.o. Sex: male      Admit Date: 2/7/2022    LOS: 3 days     Subjective:     66M, h/o COPD not on oxygen, pulmonary embolism (not on AC s/t GI bleed), CAD s/p stent presented with acute encephalopathy and syncope complicated by HTN urgency and episode of hypoxia and acute hypoxic respiratory failure and was intubated    HOSPITAL COURSE:   Initial workup showed no signs of infection- CT chest. CXR no pneumonia, UA no signs of infection, cardiology plans on ischemic workup after extubation, description not consistent with seizures. He does have NSTEMI type II s.t HTN urgency. Plan to wean vent tomorrow    Review of Systems:  Cannot be assessed due to men bob status      Objective:     Vitals:    02/10/22 1100 02/10/22 1149 02/10/22 1400 02/10/22 1531   BP:   122/72    Pulse:  100 (!) 104 (!) 102   Resp:  19 19 19   Temp: 99.1 °F (37.3 °C)  100.2 °F (37.9 °C)    SpO2:  94% 94% 95%   Weight:       Height:            Intake and Output:  Current Shift: 02/10 0701 - 02/10 1900  In: 581.4 [I.V.:581.4]  Out: 975 [Urine:975]  Last three shifts: 02/08 1901 - 02/10 0700  In: 246.1 [I.V.:146.1]  Out: 3100 [Urine:3100]      Physical Exam:   Physical Exam:  General: Intubated and sedated. Eye: conjunctivae/corneas clear. PERRL, EOM's intact. Throat and Neck: normal and no erythema or exudates noted.  No mass   Lung: clear to auscultation bilaterally  Heart: regular rate and rhythm,   Abdomen: soft, Bowel sounds normal. No masses,  Extremities:  all extremities normal, atraumatic  Skin: Normal.  Neurologic: Omitted   psychiatric: Omitted      Lab/Data Review:  Recent Results (from the past 24 hour(s))   TROPONIN-HIGH SENSITIVITY    Collection Time: 02/09/22  4:40 PM   Result Value Ref Range    Troponin-High Sensitivity 15 0 - 76 ng/L   BLOOD GAS, ARTERIAL    Collection Time: 02/10/22  2:10 AM   Result Value Ref Range    pH 7.44 7.35 - 7.45      PCO2 44 35 - 45 mmHg    PO2 94 75 - 100 mmHg    O2 SAT 96 >95 %    BICARBONATE 29 (H) 22 - 26 mmol/L    BASE EXCESS 4.7 (H) 0 - 2 mmol/L    O2 METHOD VENT      FIO2 40.0 %    MODE AssistControl/Pressure Control      SET RATE 15      IPAP/PIP 14      EPAP/CPAP/PEEP 8.0      SITE Right Radial      VÍCTOR'S TEST PASS     PROTEIN URINE, RANDOM    Collection Time: 02/10/22  5:14 AM   Result Value Ref Range    Protein, urine random 33 (H) 0.0 - 11.9 mg/dL   CREATININE, UR, RANDOM    Collection Time: 02/10/22  5:14 AM   Result Value Ref Range    Creatinine, urine 219.00 mg/dL   SODIUM, UR, RANDOM    Collection Time: 02/10/22  5:14 AM   Result Value Ref Range    Sodium,urine random 7 mmol/L   POTASSIUM, UR, RANDOM    Collection Time: 02/10/22  5:14 AM   Result Value Ref Range    Potassium urine, random 119 mmol/L   RENAL FUNCTION PANEL    Collection Time: 02/10/22  5:14 AM   Result Value Ref Range    Sodium 133 (L) 136 - 145 mmol/L    Potassium 4.2 3.5 - 5.1 mmol/L    Chloride 100 97 - 108 mmol/L    CO2 26 21 - 32 mmol/L    Anion gap 7 5 - 15 mmol/L    Glucose 151 (H) 65 - 100 mg/dL    BUN 35 (H) 6 - 20 mg/dL    Creatinine 0.95 0.70 - 1.30 mg/dL    BUN/Creatinine ratio 37 (H) 12 - 20      GFR est AA >60 >60 ml/min/1.73m2    GFR est non-AA >60 >60 ml/min/1.73m2    Calcium 8.6 8.5 - 10.1 mg/dL    Phosphorus 4.2 2.6 - 4.7 mg/dL    Albumin 2.6 (L) 3.5 - 5.0 g/dL   MAGNESIUM    Collection Time: 02/10/22  5:14 AM   Result Value Ref Range    Magnesium 2.6 (H) 1.6 - 2.4 mg/dL   CK    Collection Time: 02/10/22  5:14 AM   Result Value Ref Range     39 - 308 U/L   CBC WITH AUTOMATED DIFF    Collection Time: 02/10/22  5:14 AM   Result Value Ref Range    WBC 12.9 (H) 4.1 - 11.1 K/uL    RBC 4.17 4.10 - 5.70 M/uL    HGB 12.1 12.1 - 17.0 g/dL    HCT 36.9 36.6 - 50.3 %    MCV 88.5 80.0 - 99.0 FL    MCH 29.0 26.0 - 34.0 PG    MCHC 32.8 30.0 - 36.5 g/dL    RDW 14.6 (H) 11.5 - 14.5 %    PLATELET 671 444 - 400 K/uL    MPV 9.3 8.9 - 12.9 FL    NRBC 0.0 0.0  WBC    ABSOLUTE NRBC 0.00 0.00 - 0.01 K/uL    NEUTROPHILS 92 (H) 32 - 75 %    LYMPHOCYTES 5 (L) 12 - 49 %    MONOCYTES 2 (L) 5 - 13 %    EOSINOPHILS 0 0 - 7 %    BASOPHILS 0 0 - 1 %    IMMATURE GRANULOCYTES 1 (H) 0 - 0.5 %    ABS. NEUTROPHILS 12.0 (H) 1.8 - 8.0 K/UL    ABS. LYMPHOCYTES 0.6 (L) 0.8 - 3.5 K/UL    ABS. MONOCYTES 0.3 0.0 - 1.0 K/UL    ABS. EOSINOPHILS 0.0 0.0 - 0.4 K/UL    ABS. BASOPHILS 0.0 0.0 - 0.1 K/UL    ABS. IMM. GRANS. 0.1 (H) 0.00 - 0.04 K/UL    DF AUTOMATED           Assessment and plan:        (1) Acute encephalopathy: GCS 12. Cardiology and neurology input appreciated. It does appear to have some severe apnea. However cardiac and neuro causes to be ruled out. (2) Acute hypoxic respiratory failure : intubated, seddated, wean sedation. Solumedrol , trelegy    (3) CAD s/p stent : aspirin, plavix,  statin    (4) NSTEMI: likely type II:    (4) COPD not on o2: complicates #1    (5) HTN urgency: PRN labetalol. amlodipine    (6) vocal chord dysfunction: OP ENT    (7) CHFpEF: lasix 40 daily     DISPO: ICU today, likely extubation tomorrow.      Signed By: Alicia Overton MD     February 10, 2022

## 2022-02-10 NOTE — PROGRESS NOTES
Patient's propofol stopped per Dr. Betzaida Hennessy request for sedation vacation, additionally, midazolam stopped as well for same no titration needed per Dr. Kyle Grullon. Will continue to monitor patient.

## 2022-02-10 NOTE — PROGRESS NOTES
Clinical chart reviewed by CM. Discharge plan is to return home with wife, self care. CM will continue to follow.

## 2022-02-11 ENCOUNTER — APPOINTMENT (OUTPATIENT)
Dept: GENERAL RADIOLOGY | Age: 67
DRG: 207 | End: 2022-02-11
Attending: INTERNAL MEDICINE
Payer: MEDICARE

## 2022-02-11 LAB
ARTERIAL PATENCY WRIST A: ABNORMAL
BASE EXCESS BLDA CALC-SCNC: 5.3 MMOL/L (ref 0–2)
BDY SITE: ABNORMAL
EPAP/CPAP/PEEP, PAPEEP: 8
FIO2 ON VENT: 40 %
GAS FLOW.O2 SETTING OXYMISER: 15 L/MIN
GLUCOSE BLD STRIP.AUTO-MCNC: 112 MG/DL (ref 65–117)
HCO3 BLDA-SCNC: 29 MMOL/L (ref 22–26)
IPAP/PIP, IPAPIP: 14
PCO2 BLDA: 41 MMHG (ref 35–45)
PERFORMED BY, TECHID: NORMAL
PH BLDA: 7.47 [PH] (ref 7.35–7.45)
PO2 BLDA: 106 MMHG (ref 75–100)
SAO2 % BLD: 97 %
SAO2% DEVICE SAO2% SENSOR NAME: ABNORMAL
SPECIMEN SITE: ABNORMAL
VENTILATION MODE VENT: ABNORMAL

## 2022-02-11 PROCEDURE — 36600 WITHDRAWAL OF ARTERIAL BLOOD: CPT

## 2022-02-11 PROCEDURE — 82962 GLUCOSE BLOOD TEST: CPT

## 2022-02-11 PROCEDURE — 74011250637 HC RX REV CODE- 250/637: Performed by: INTERNAL MEDICINE

## 2022-02-11 PROCEDURE — 74011000250 HC RX REV CODE- 250: Performed by: INTERNAL MEDICINE

## 2022-02-11 PROCEDURE — 94640 AIRWAY INHALATION TREATMENT: CPT

## 2022-02-11 PROCEDURE — 74011250636 HC RX REV CODE- 250/636: Performed by: INTERNAL MEDICINE

## 2022-02-11 PROCEDURE — 74011000258 HC RX REV CODE- 258: Performed by: INTERNAL MEDICINE

## 2022-02-11 PROCEDURE — 71045 X-RAY EXAM CHEST 1 VIEW: CPT

## 2022-02-11 PROCEDURE — 77010033678 HC OXYGEN DAILY

## 2022-02-11 PROCEDURE — 74011250637 HC RX REV CODE- 250/637: Performed by: HOSPITALIST

## 2022-02-11 PROCEDURE — 82803 BLOOD GASES ANY COMBINATION: CPT

## 2022-02-11 PROCEDURE — 65610000006 HC RM INTENSIVE CARE

## 2022-02-11 RX ADMIN — PROPOFOL 50 MCG/KG/MIN: 10 INJECTION, EMULSION INTRAVENOUS at 23:31

## 2022-02-11 RX ADMIN — SODIUM CHLORIDE, PRESERVATIVE FREE 20 MG: 5 INJECTION INTRAVENOUS at 08:06

## 2022-02-11 RX ADMIN — SODIUM CHLORIDE, PRESERVATIVE FREE 10 ML: 5 INJECTION INTRAVENOUS at 13:49

## 2022-02-11 RX ADMIN — AMLODIPINE BESYLATE 10 MG: 5 TABLET ORAL at 08:06

## 2022-02-11 RX ADMIN — ASPIRIN 81 MG CHEWABLE TABLET 81 MG: 81 TABLET CHEWABLE at 08:06

## 2022-02-11 RX ADMIN — PROPOFOL 30 MCG/KG/MIN: 10 INJECTION, EMULSION INTRAVENOUS at 13:48

## 2022-02-11 RX ADMIN — BETHANECHOL CHLORIDE 100 MG: 25 TABLET ORAL at 08:05

## 2022-02-11 RX ADMIN — PROPOFOL 35 MCG/KG/MIN: 10 INJECTION, EMULSION INTRAVENOUS at 23:00

## 2022-02-11 RX ADMIN — GLYCOPYRROLATE 0.1 MG: 0.2 INJECTION INTRAMUSCULAR; INTRAVENOUS at 08:06

## 2022-02-11 RX ADMIN — PROPOFOL 45 MCG/KG/MIN: 10 INJECTION, EMULSION INTRAVENOUS at 23:15

## 2022-02-11 RX ADMIN — BUDESONIDE 500 MCG: 0.5 SUSPENSION RESPIRATORY (INHALATION) at 19:26

## 2022-02-11 RX ADMIN — DEXMEDETOMIDINE HYDROCHLORIDE 0.4 MCG/KG/HR: 100 INJECTION, SOLUTION, CONCENTRATE INTRAVENOUS at 23:34

## 2022-02-11 RX ADMIN — PROPOFOL 30 MCG/KG/MIN: 10 INJECTION, EMULSION INTRAVENOUS at 21:49

## 2022-02-11 RX ADMIN — SODIUM CHLORIDE, PRESERVATIVE FREE 10 ML: 5 INJECTION INTRAVENOUS at 05:51

## 2022-02-11 RX ADMIN — GLYCOPYRROLATE 0.1 MG: 0.2 INJECTION INTRAMUSCULAR; INTRAVENOUS at 17:03

## 2022-02-11 RX ADMIN — ATORVASTATIN CALCIUM 80 MG: 40 TABLET, FILM COATED ORAL at 08:05

## 2022-02-11 RX ADMIN — FUROSEMIDE 40 MG: 10 INJECTION, SOLUTION INTRAMUSCULAR; INTRAVENOUS at 08:06

## 2022-02-11 RX ADMIN — OXCARBAZEPINE 150 MG: 150 TABLET, FILM COATED ORAL at 09:08

## 2022-02-11 RX ADMIN — IPRATROPIUM BROMIDE AND ALBUTEROL SULFATE 3 ML: 2.5; .5 SOLUTION RESPIRATORY (INHALATION) at 07:23

## 2022-02-11 RX ADMIN — GLYCOPYRROLATE 0.1 MG: 0.2 INJECTION INTRAMUSCULAR; INTRAVENOUS at 21:50

## 2022-02-11 RX ADMIN — PROPOFOL 35 MCG/KG/MIN: 10 INJECTION, EMULSION INTRAVENOUS at 08:35

## 2022-02-11 RX ADMIN — PROPOFOL 30 MCG/KG/MIN: 10 INJECTION, EMULSION INTRAVENOUS at 18:14

## 2022-02-11 RX ADMIN — BETHANECHOL CHLORIDE 100 MG: 25 TABLET ORAL at 21:50

## 2022-02-11 RX ADMIN — PROPOFOL 30 MCG/KG/MIN: 10 INJECTION, EMULSION INTRAVENOUS at 13:02

## 2022-02-11 RX ADMIN — TAMSULOSIN HYDROCHLORIDE 0.4 MG: 0.4 CAPSULE ORAL at 08:05

## 2022-02-11 RX ADMIN — FINASTERIDE 5 MG: 5 TABLET, FILM COATED ORAL at 09:08

## 2022-02-11 RX ADMIN — METHYLPREDNISOLONE SODIUM SUCCINATE 40 MG: 40 INJECTION, POWDER, FOR SOLUTION INTRAMUSCULAR; INTRAVENOUS at 14:49

## 2022-02-11 RX ADMIN — BUDESONIDE 500 MCG: 0.5 SUSPENSION RESPIRATORY (INHALATION) at 07:22

## 2022-02-11 RX ADMIN — CLOPIDOGREL BISULFATE 75 MG: 75 TABLET, FILM COATED ORAL at 08:05

## 2022-02-11 RX ADMIN — PROPOFOL 35 MCG/KG/MIN: 10 INJECTION, EMULSION INTRAVENOUS at 04:11

## 2022-02-11 RX ADMIN — IPRATROPIUM BROMIDE AND ALBUTEROL SULFATE 3 ML: 2.5; .5 SOLUTION RESPIRATORY (INHALATION) at 19:26

## 2022-02-11 RX ADMIN — IPRATROPIUM BROMIDE AND ALBUTEROL SULFATE 3 ML: 2.5; .5 SOLUTION RESPIRATORY (INHALATION) at 13:26

## 2022-02-11 RX ADMIN — SODIUM CHLORIDE, PRESERVATIVE FREE 10 ML: 5 INJECTION INTRAVENOUS at 21:53

## 2022-02-11 RX ADMIN — METHYLPREDNISOLONE SODIUM SUCCINATE 40 MG: 40 INJECTION, POWDER, FOR SOLUTION INTRAMUSCULAR; INTRAVENOUS at 05:50

## 2022-02-11 RX ADMIN — OXCARBAZEPINE 150 MG: 150 TABLET, FILM COATED ORAL at 21:50

## 2022-02-11 RX ADMIN — SODIUM CHLORIDE, PRESERVATIVE FREE 20 MG: 5 INJECTION INTRAVENOUS at 21:50

## 2022-02-11 RX ADMIN — METOPROLOL SUCCINATE 25 MG: 25 TABLET, EXTENDED RELEASE ORAL at 09:08

## 2022-02-11 RX ADMIN — ENOXAPARIN SODIUM 40 MG: 100 INJECTION SUBCUTANEOUS at 08:05

## 2022-02-11 RX ADMIN — METHYLPREDNISOLONE SODIUM SUCCINATE 40 MG: 40 INJECTION, POWDER, FOR SOLUTION INTRAMUSCULAR; INTRAVENOUS at 21:52

## 2022-02-11 NOTE — PROGRESS NOTES
[facility]     Renal Daily Progress Note:     Admission Date: 2022     Subjective: remain intubated and sedated. Electrolytes normalized. Decreased lower ext edema. Venita --> F/U - ZONIA - 2/10/2022    On vent support. Review of Systems  Review of systems not obtained due to patient factors.     Objective:     Visit Vitals  BP (!) 136/91 (BP 1 Location: Right upper arm, BP Patient Position: At rest)   Pulse 95   Temp 97.5 °F (36.4 °C)   Resp 17   Ht 6' 0.01\" (1.829 m)   Wt 123.7 kg (272 lb 11.3 oz)   SpO2 94%   BMI 36.98 kg/m²     Temp (24hrs), Av.6 °F (36.4 °C), Min:96.8 °F (36 °C), Max:99 °F (37.2 °C)        Intake/Output Summary (Last 24 hours) at 2022 1815  Last data filed at 2022 1601  Gross per 24 hour   Intake 1298.01 ml   Output 2025 ml   Net -726.99 ml     Current Facility-Administered Medications   Medication Dose Route Frequency    glycopyrrolate (ROBINUL) injection 0.1 mg  0.1 mg IntraVENous TID    furosemide (LASIX) injection 40 mg  40 mg IntraVENous DAILY    albuterol-ipratropium (DUO-NEB) 2.5 MG-0.5 MG/3 ML  3 mL Nebulization Q4H PRN    bethanechol (URECHOLINE) tablet 100 mg  100 mg Oral BID    finasteride (PROSCAR) tablet 5 mg  5 mg Oral DAILY    OXcarbazepine (TRILEPTAL) tablet 150 mg  150 mg Oral BID    tamsulosin (FLOMAX) capsule 0.4 mg  0.4 mg Oral DAILY    sodium chloride (NS) flush 5-40 mL  5-40 mL IntraVENous Q8H    sodium chloride (NS) flush 5-40 mL  5-40 mL IntraVENous PRN    acetaminophen (TYLENOL) tablet 650 mg  650 mg Oral Q6H PRN    Or    acetaminophen (TYLENOL) suppository 650 mg  650 mg Rectal Q6H PRN    polyethylene glycol (MIRALAX) packet 17 g  17 g Oral DAILY PRN    ondansetron (ZOFRAN ODT) tablet 4 mg  4 mg Oral Q8H PRN    Or    ondansetron (ZOFRAN) injection 4 mg  4 mg IntraVENous Q6H PRN    enoxaparin (LOVENOX) injection 40 mg  40 mg SubCUTAneous DAILY    famotidine (PF) (PEPCID) 20 mg in 0.9% sodium chloride 10 mL injection  20 mg IntraVENous Q12H    labetaloL (NORMODYNE;TRANDATE) 20 mg/4 mL (5 mg/mL) injection 10 mg  10 mg IntraVENous Q4H PRN    amLODIPine (NORVASC) tablet 10 mg  10 mg Oral DAILY    aspirin chewable tablet 81 mg  81 mg Oral DAILY    atorvastatin (LIPITOR) tablet 80 mg  80 mg Oral DAILY    clopidogreL (PLAVIX) tablet 75 mg  75 mg Oral DAILY    metoprolol succinate (TOPROL-XL) XL tablet 25 mg  25 mg Oral DAILY    methylPREDNISolone (PF) (SOLU-MEDROL) injection 40 mg  40 mg IntraVENous Q8H    albuterol-ipratropium (DUO-NEB) 2.5 MG-0.5 MG/3 ML  3 mL Nebulization Q6HWA RT    budesonide (PULMICORT) 500 mcg/2 ml nebulizer suspension  500 mcg Nebulization BID RT    midazolam in normal saline (VERSED) 1 mg/mL infusion  0-10 mg/hr IntraVENous TITRATE    propofol (DIPRIVAN) 10 mg/mL infusion  0-50 mcg/kg/min IntraVENous TITRATE       Physical Exam:    General appearance: appears older than stated age, intubated and sedated    Head: Normocephalic    Lungs: No wheezes, no rales    Heart: No S3 gallop , no pericardial rub    Abdomen:  Not distended    Extremities:  Minimal oedema    Neurologic: sedated    Data Review:     LABS:  Recent Labs     02/10/22  0514 02/09/22  0328   * 132*   K 4.2 5.0    101   CO2 26 27   BUN 35* 22*   CREA 0.95 1.10   CA 8.6 8.6   ALB 2.6*  --    PHOS 4.2  --    MG 2.6*  --      Recent Labs     02/10/22  0514 02/09/22  0328   WBC 12.9* 12.5*   HGB 12.1 13.1   HCT 36.9 39.2    245     Recent Labs     02/10/22  0514   MIHAI 7      CREAU 219.00       Assessment:   Renal Specific Problems  Hyperkalemia: specimen hemolyzed - resolvedt  GILL resolved with rubin  Volume overload with edema - improved - in neg balance        Plan:     Obtain/ Order: labs/cultures/radiology/procedures:      Therapeutic:      Continue daily lasix but consider change to oral via NGT    Dose meds for his gfr. Avoid NSAIDS + IV contrast.    Follow lytes. Not a lot to add.  Labs will be reviewed and patient followed peripherally.  Please recall if additional assistance needed    [place], [address] [place]

## 2022-02-11 NOTE — PROGRESS NOTES
Comprehensive Nutrition Assessment    Type and Reason for Visit: Reassess (interim)    Nutrition Recommendations/Plan:   Continue TF of Promote at 25ml   Flush 165ml water q3hrs   Provide 2 pkt prosouce q3hrs (8pkt/day; 480kcals, 120g pro)  Providinkcals (60%), 158g (98%), 1823ml (100%)          Propofol 35mcg/kg/min providing 792kcals (108%)      Monitor and Record Wts, TF rates, Water flushes, OP, BMs in I/Os       Nutrition Assessment:  Admitted for hypoxia and multiple episodes of syncope, SOB & AMS. () Intubated. +troponin. Plan for cardiac cath Monday, minimal sedation per neuro to assess for brain damage, extube maybe Monday? No changes to TF. No pressors, sedated on versed and propofol. Labs: Na 133, BUN 35, -152, MG 2.6, Alb 2.6. Meds: Pepcid, Lasix, Robinul, solumedrol, versed, Propofol at 30mcg/kg/min. Malnutrition Assessment:  Malnutrition Status:  No malnutrition    Context:  Acute illness     Findings of the 6 clinical characteristics of malnutrition:   Energy Intake:  No significant decrease in energy intake  Weight Loss:  No significant weight loss     Body Fat Loss:  No significant body fat loss,     Muscle Mass Loss:  No significant muscle mass loss,    Fluid Accumulation:  No significant fluid accumulation,        Estimated Daily Nutrient Needs:  Energy (kcal): 1732kcals (14kcals/kg); Weight Used for Energy Requirements: Current  Protein (g): 162g (2.0g/kg IBW); Weight Used for Protein Requirements: Ideal  Fluid (ml/day): 1732ml; Method Used for Fluid Requirements: 1 ml/kcal      Nutrition Related Findings:  NFPE with no acute findings, pt obese. No hx dysphagia. Last BM pta. Trace generalized and BL UE, +1 pitting BL LE edema. Wounds:    None       Current Nutrition Therapies:  DIET NPO  ADULT TUBE FEEDING Orogastric; Other Tube Feeding (Specify);  Promote; Delivery Method: Continuous; Continuous Initial Rate (mL/hr): 25; Continuous Advance Tube Feeding: No; Water Flush Volume (mL): 300; Water Flush Frequency: Q 6 hours    Anthropometric Measures:  · Height:  6' 0.01\" (182.9 cm)  · Current Body Wt:  123.7 kg (272 lb 11.3 oz)   · Admission Body Wt:       · Usual Body Wt:  130 kg (286 lb 9.6 oz)     · Ideal Body Wt:  178 lbs:  153.2 %   · BMI Category:  Obese class 2 (BMI 35.0-39. 9)       Nutrition Diagnosis:   · Inadequate oral intake related to impaired respiratory function as evidenced by intubation      Nutrition Interventions:   Food and/or Nutrient Delivery: Modify tube feeding  Nutrition Education and Counseling: No recommendations at this time  Coordination of Nutrition Care: Continue to monitor while inpatient    Goals:  Meet >75%of EENs in 5 days, wt maintenance within +/-0.5kg in 5 days       Nutrition Monitoring and Evaluation:   Behavioral-Environmental Outcomes: None identified  Food/Nutrient Intake Outcomes: Enteral nutrition intake/tolerance  Physical Signs/Symptoms Outcomes: Hemodynamic status,Weight,Biochemical data,Fluid status or edema    Discharge Planning:    No discharge needs at this time     Electronically signed by [employer] on 2/11/2022 at 2:17 PM    Contact: Ext [phone], or via [employer]

## 2022-02-11 NOTE — PROGRESS NOTES
TRANSFER - OUT REPORT:    Verbal report given to Benedict Hay RN on Mert Vasquez  being transferred to PACU for routine progression of care       Report consisted of patients Situation, Background, Assessment and   Recommendations(SBAR). Information from the following report(s) SBAR, I & O's,& medications were reviewed with the receiving nurse. Opportunity for questions and clarification was provided. Patient transported with:   Respiratory, 2 Registered Nurses and Patient Care Tech. Patient belongings sent with patient.

## 2022-02-11 NOTE — PROGRESS NOTES
Progress Note    Patient: Getachew Farmer MRN: [id number]  SSN: [id number]    YOB: 1955  Age: 73 y.o. Sex: male      Admit Date: 2/7/2022    LOS: 4 days     Subjective:     Patient was seen and examined in the overflow ICU. Remains intubated. He failed weaning trials. Objective:     Vitals:    02/11/22 0500 02/11/22 0512 02/11/22 0600 02/11/22 0645   BP: 136/86  134/83    Pulse: 90  86 85   Resp: 18  16 16   Temp: 97.7 °F (36.5 °C)  97.3 °F (36.3 °C)    SpO2: 96%  98% 97%   Weight:  123.7 kg (272 lb 11.3 oz)     Height:  6' (1.829 m)          Intake and Output:  Current Shift: 02/10 1901 - 02/11 0700  In: 600   Out: 525 [Urine:525]  Last three shifts: 02/09 0701 - 02/10 1900  In: 1434.7 [I.V.:934.7]  Out: 3678 [Urine:3875]    Physical Exam:   General:   Intubated. Eyes:  Conjunctivae/corneas clear. PERRL, EOMs intact. Fundi benign   Ears:  Normal TMs and external ear canals both ears. Nose: Nares normal. Septum midline. Mucosa normal. No drainage or sinus tenderness. Mouth/Throat: Lips, mucosa, and tongue normal. Teeth and gums normal.   Neck: Supple, symmetrical, trachea midline, no adenopathy, thyroid: no enlargment/tenderness/nodules, no carotid bruit and no JVD. Back:   Symmetric, no curvature. ROM normal. No CVA tenderness. Lungs:   Clear to auscultation bilaterally. Heart:  Regular rate and rhythm, S1, S2 normal, no murmur, click, rub or gallop. Abdomen:   Soft, non-tender. Bowel sounds normal. No masses,  No organomegaly. Extremities: Extremities normal, atraumatic, no cyanosis or edema. Pulses: 2+ and symmetric all extremities. Skin: Skin color, texture, turgor normal. No rashes or lesions   Lymph nodes: Cervical, supraclavicular, and axillary nodes normal.   Neurologic:  Intubated       Lab/Data Review: All lab results for the last 24 hours reviewed.        Assessment:     Active Problems:    Respiratory failure ([address] (2/7/2022)    Patient is 23-year-old white male with:  1. Syncope  2. Non-STEMI  3. CAD status post PCI of left circumflex artery in 2018  4. Hyperlipidemia  6. Vocal cord dysfunction  7. History of PE  8. Warfarin was stopped due to GI bleed  9. COPD  7. Obesity  8. Edema  9. Hypertensive emergency   10. Bilateral renal lesions, largest about 1.7 cm on the right side posteriorly. Plan:     Currently in sinus rhythm. Blood pressure is normal.  Urine output is good 3.8 L overnight. White count is 12.9, hemoglobin 12.1. His BMP is pending this morning. Anyhow his troponin was at 15. I have him tentatively scheduled for cardiac catheterization on Monday morning at 730. Prior to that we will await extubating him to further discuss with the patient the consequences of events and his symptoms precisely. Currently on aspirin, atorvastatin, Plavix, IV Lasix daily. ,  Metoprolol.   I will follow  Signed By: Liv Adams MD     February 11, 2022

## 2022-02-11 NOTE — ROUTINE PROCESS
Attempted to contact patient's wife, Brenda Lockwood, via telephone, to inform her of the patient change of rooms. Call went to voice mail. Will attempt again at a later time. [address] with patient's wife, Brenda Lockwood via telephone and informed her of the change of patient bed location.

## 2022-02-11 NOTE — PROGRESS NOTES
Progress Note    Patient: Marco Antonio Lucas MRN: [id number]  SSN: [id number]    YOB: 1955  Age: 73 y.o. Sex: male      Admit Date: 2/7/2022    LOS: 4 days     Subjective:     66M, h/o COPD not on oxygen, pulmonary embolism (not on AC s/t GI bleed), CAD s/p stent presented with acute encephalopathy and syncope complicated by HTN urgency and episode of hypoxia and acute hypoxic respiratory failure and was intubated    HOSPITAL COURSE:   Initial workup showed no signs of infection- CT chest. CXR no pneumonia, UA no signs of infection, cardiology plans on ischemic workup after extubation, description not consistent with seizures. He does have NSTEMI type II s.t HTN urgency. Plan to wean vent tomorrow    Review of Systems:  Cannot be assessed due to men bob status      Objective:     Vitals:    02/11/22 1327 02/11/22 1447 02/11/22 1500 02/11/22 1503   BP:   (!) 143/89    Pulse:  93 95    Resp:  17 20    Temp:   97.5 °F (36.4 °C)    SpO2: 96% 94% 93%    Weight:       Height:    6' 0.01\" (1.829 m)        Intake and Output:  Current Shift: 02/11 0701 - 02/11 1900  In: 300   Out: 1000 [Urine:1000]  Last three shifts: 02/09 1901 - 02/11 0700  In: 1888.6 [I.V.:788.6]  Out: 2400 [Urine:2400]      Physical Exam:   Physical Exam:  General: Intubated and sedated. Eye: conjunctivae/corneas clear. PERRL, EOM's intact. Throat and Neck: normal and no erythema or exudates noted.  No mass   Lung: clear to auscultation bilaterally  Heart: regular rate and rhythm,   Abdomen: soft, Bowel sounds normal. No masses,  Extremities:  all extremities normal, atraumatic  Skin: Normal.  Neurologic: Omitted   psychiatric: Omitted      Lab/Data Review:  Recent Results (from the past 24 hour(s))   BLOOD GAS, ARTERIAL    Collection Time: 02/11/22  6:21 AM   Result Value Ref Range    pH 7.47 (H) 7.35 - 7.45      PCO2 41 35 - 45 mmHg    PO2 106 (H) 75 - 100 mmHg    O2 SAT 97 >95 %    BICARBONATE 29 (H) 22 - 26 mmol/L    BASE EXCESS 5.3 (H) 0 - 2 mmol/L    O2 METHOD VENT      FIO2 40.0 %    MODE AssistControl/Pressure Control      SET RATE 15      IPAP/PIP 14      EPAP/CPAP/PEEP 8.0      Sample source Arterial      SITE Left Radial      VÍCTOR'S TEST PASS           Assessment and plan:        (1) Acute encephalopathy: GCS 12. Cardiology and neurology input appreciated. It does appear to have some severe apnea. However cardiac and neuro causes to be ruled out. (2) Acute hypoxic respiratory failure : intubated, seddated, wean sedation. Solumedrol , trelegy    (3) CAD s/p stent : aspirin, plavix,  statin    (4) NSTEMI: likely type II:    (4) COPD not on o2: complicates #1    (5) HTN urgency: PRN labetalol. amlodipine    (6) vocal chord dysfunction: OP ENT    (7) CHFpEF: lasix 40 daily     DISPO: ICU today, likely extubation tomorrow.      Signed By: Sheba Hawkins MD     February 11, 2022

## 2022-02-11 NOTE — PROGRESS NOTES
IMPRESSION:   1. Acute hypoxic respiratory failure  2. NSTEMI  3. Generalized Edema  4. Syncope  5. COPD  6. Additional workup outlined below  7. Pt is at high risk of sudden decline and decompensation with life threatening consequenses and continued end organ dysfunction and failure  8. Pt is critically ill. Time spent with pt and staff actively rendering care, managing pt and coordinating care as stated below; 30 minutes, exclusive of any procedures      RECOMMENDATIONS/PLAN:   1. ICU monitoring  1. Ventilator for mechanical life support and prevent respiratory arrest with protective lung strategies sedated with Versed and propofol, oral secretions much improved  2. Patient is on assist control mode 40% FiO2 PEEP of 8 rate of 15 we will change vent setting and decrease FiO2 sedated with propofol and Versed  3. Did sedation vacation this morning patient was put on spontaneous he had low tidal volume goes into apnea now he is back on assist control back on sedation will repeat again today  4. Patient on IV Solu-Medrol nebulizer treatment Covid negative  5. Hold trilogy inhaler  Started patient on Pulmicort via neb  6. Chest x-ray no acute infiltrate  7. Patient was to be followed with ENT he never had any appointment with them they were resuming patient has vocal cord dysfunction once extubated we will get ENT evaluation  8. CVP monitoring  9. IV vasopressors for circulatory shock refractory to fluids to maintain SBP> 90  10. For cardiac catheterization on Monday  11. Transfuse prn to maintain Hgb > 7  12. Labs to follow electrolytes, renal function and and blood counts  13. Bronchial hygiene with respiratory therapy techniques, bronchodilators  14. Pt needs IV fluids with additives and Drug therapy requiring intensive monitoring for toxicity  15. Prescription drug management with home med reconciliation reviewed  16. DVT, SUP prophylaxis  17.  Will be available to assist in medical management while in the CCU pending disposition     [x] High complexity decision making was performed  [x] See my orders for details  HPI  59-year-old male came in because of chest pain with shortness of breath he has also recurrent episodes of syncope significant past medical history of vocal cord dysfunction, pulmonary Moll[place], coronary artery disease status post stent COPD current everyday smoker no history of sleep apnea he was having swelling of the lower extremities for couple of months today came into the emergency room as previously was having chest pain or shortness of breath he passed out subsequently got intubated and now on ventilator critical care consult was called. PMH:  has no past medical history on file. PSH:   has no past surgical history on file. FHX: family history is not on file.      SHX:      ALL:   Allergies   Allergen Reactions    Sulfa (Sulfonamide Antibiotics) Other (comments)     syncope          MEDS:   [x] Reviewed - As Below   [] Not reviewed    Current Facility-Administered Medications   Medication    glycopyrrolate (ROBINUL) injection 0.1 mg    furosemide (LASIX) injection 40 mg    albuterol-ipratropium (DUO-NEB) 2.5 MG-0.5 MG/3 ML    bethanechol (URECHOLINE) tablet 100 mg    finasteride (PROSCAR) tablet 5 mg    OXcarbazepine (TRILEPTAL) tablet 150 mg    tamsulosin (FLOMAX) capsule 0.4 mg    sodium chloride (NS) flush 5-40 mL    sodium chloride (NS) flush 5-40 mL    acetaminophen (TYLENOL) tablet 650 mg    Or    acetaminophen (TYLENOL) suppository 650 mg    polyethylene glycol (MIRALAX) packet 17 g    ondansetron (ZOFRAN ODT) tablet 4 mg    Or    ondansetron (ZOFRAN) injection 4 mg    enoxaparin (LOVENOX) injection 40 mg    famotidine (PF) (PEPCID) 20 mg in 0.9% sodium chloride 10 mL injection    labetaloL (NORMODYNE;TRANDATE) 20 mg/4 mL (5 mg/mL) injection 10 mg    amLODIPine (NORVASC) tablet 10 mg    aspirin chewable tablet 81 mg    atorvastatin (LIPITOR) tablet 80 mg    clopidogreL (PLAVIX) tablet 75 mg    metoprolol succinate (TOPROL-XL) XL tablet 25 mg    methylPREDNISolone (PF) (SOLU-MEDROL) injection 40 mg    albuterol-ipratropium (DUO-NEB) 2.5 MG-0.5 MG/3 ML    budesonide (PULMICORT) 500 mcg/2 ml nebulizer suspension    midazolam in normal saline (VERSED) 1 mg/mL infusion    propofol (DIPRIVAN) 10 mg/mL infusion      MAR reviewed and pertinent medications noted or modified as needed   Current Facility-Administered Medications   Medication    glycopyrrolate (ROBINUL) injection 0.1 mg    furosemide (LASIX) injection 40 mg    albuterol-ipratropium (DUO-NEB) 2.5 MG-0.5 MG/3 ML    bethanechol (URECHOLINE) tablet 100 mg    finasteride (PROSCAR) tablet 5 mg    OXcarbazepine (TRILEPTAL) tablet 150 mg    tamsulosin (FLOMAX) capsule 0.4 mg    sodium chloride (NS) flush 5-40 mL    sodium chloride (NS) flush 5-40 mL    acetaminophen (TYLENOL) tablet 650 mg    Or    acetaminophen (TYLENOL) suppository 650 mg    polyethylene glycol (MIRALAX) packet 17 g    ondansetron (ZOFRAN ODT) tablet 4 mg    Or    ondansetron (ZOFRAN) injection 4 mg    enoxaparin (LOVENOX) injection 40 mg    famotidine (PF) (PEPCID) 20 mg in 0.9% sodium chloride 10 mL injection    labetaloL (NORMODYNE;TRANDATE) 20 mg/4 mL (5 mg/mL) injection 10 mg    amLODIPine (NORVASC) tablet 10 mg    aspirin chewable tablet 81 mg    atorvastatin (LIPITOR) tablet 80 mg    clopidogreL (PLAVIX) tablet 75 mg    metoprolol succinate (TOPROL-XL) XL tablet 25 mg    methylPREDNISolone (PF) (SOLU-MEDROL) injection 40 mg    albuterol-ipratropium (DUO-NEB) 2.5 MG-0.5 MG/3 ML    budesonide (PULMICORT) 500 mcg/2 ml nebulizer suspension    midazolam in normal saline (VERSED) 1 mg/mL infusion    propofol (DIPRIVAN) 10 mg/mL infusion      PMH:  has no past medical history on file. PSH:   has no past surgical history on file. FHX: family history is not on file.    SHX:       ROS:  Unable to obtain sedated    Hemodynamics:    CO:    CI:    CVP:    SVR:   PAP Systolic:    PAP Diastolic:    PVR:    PQ38:        Ventilator Settings:      Mode Rate TV Press PEEP FiO2 PIP Min. Vent   Assist control,Pressure control         8 cm H20 35 %  22 cm H2O  40.4 l/min        Vital Signs: Telemetry:    normal sinus rhythm Intake/Output:   Visit Vitals  /83 (BP 1 Location: Right upper arm, BP Patient Position: At rest)   Pulse 84   Temp 97.3 °F (36.3 °C)   Resp 16   Ht 6' (1.829 m)   Wt 123.7 kg (272 lb 11.3 oz)   SpO2 96%   BMI 36.99 kg/m²       Temp (24hrs), Av.3 °F (36.8 °C), Min:96.8 °F (36 °C), Max:100.2 °F (37.9 °C)        O2 Device: Ventilator O2 Flow Rate (L/min): 15 l/min       Wt Readings from Last 4 Encounters:   22 123.7 kg (272 lb 11.3 oz)          Intake/Output Summary (Last 24 hours) at 2022 0850  Last data filed at 2022 0500  Gross per 24 hour   Intake 1788.57 ml   Output 2000 ml   Net -211.43 ml       Last shift:      No intake/output data recorded. Last 3 shifts:  1901 -  0700  In: 1888.6 [I.V.:788.6]  Out: 2400 [Urine:2400]       Physical Exam:     General:  intubated on vent  HEENT: NCAT, poor dentition, lips and mucosa dry  Eyes: anicteric; conjunctiva clear  Neck: no nodes, no cuff leak, trach midline; no accessory MM use. Chest: no deformity,   Cardiac: IR regular; no murmur;   Lungs: distant breath sounds;  bilateral wheeze  Abd: soft, NT, hypoactive BS  Ext: no edema; no joint swelling;  No clubbing  : NO rubin, clear urine  Neuro: sedated;   Psych- unable to assess  Skin: warm, dry, no cyanosis;   Pulses: 1-2+ Bilateral pedal, radial  Capillary: brisk; pale       DATA:    MAR reviewed and pertinent medications noted or modified as needed  MEDS:   Current Facility-Administered Medications   Medication    glycopyrrolate (ROBINUL) injection 0.1 mg    furosemide (LASIX) injection 40 mg    albuterol-ipratropium (DUO-NEB) 2.5 MG-0.5 MG/3 ML    bethanechol (URECHOLINE) tablet 100 mg    finasteride (PROSCAR) tablet 5 mg    OXcarbazepine (TRILEPTAL) tablet 150 mg    tamsulosin (FLOMAX) capsule 0.4 mg    sodium chloride (NS) flush 5-40 mL    sodium chloride (NS) flush 5-40 mL    acetaminophen (TYLENOL) tablet 650 mg    Or    acetaminophen (TYLENOL) suppository 650 mg    polyethylene glycol (MIRALAX) packet 17 g    ondansetron (ZOFRAN ODT) tablet 4 mg    Or    ondansetron (ZOFRAN) injection 4 mg    enoxaparin (LOVENOX) injection 40 mg    famotidine (PF) (PEPCID) 20 mg in 0.9% sodium chloride 10 mL injection    labetaloL (NORMODYNE;TRANDATE) 20 mg/4 mL (5 mg/mL) injection 10 mg    amLODIPine (NORVASC) tablet 10 mg    aspirin chewable tablet 81 mg    atorvastatin (LIPITOR) tablet 80 mg    clopidogreL (PLAVIX) tablet 75 mg    metoprolol succinate (TOPROL-XL) XL tablet 25 mg    methylPREDNISolone (PF) (SOLU-MEDROL) injection 40 mg    albuterol-ipratropium (DUO-NEB) 2.5 MG-0.5 MG/3 ML    budesonide (PULMICORT) 500 mcg/2 ml nebulizer suspension    midazolam in normal saline (VERSED) 1 mg/mL infusion    propofol (DIPRIVAN) 10 mg/mL infusion        Labs:    Recent Labs     02/10/22  0514 02/09/22  0328   WBC 12.9* 12.5*   HGB 12.1 13.1    245     Recent Labs     02/10/22  0514 02/09/22  0328   * 132*   K 4.2 5.0    101   CO2 26 27   * 152*   BUN 35* 22*   CREA 0.95 1.10   CA 8.6 8.6   MG 2.6*  --    PHOS 4.2  --    ALB 2.6*  --      Recent Labs     02/11/22  0621 02/10/22  0210 02/09/22  0245   PH 7.47* 7.44 7.41   PCO2 41 44 45   PO2 106* 94 120*   HCO3 29* 29* 27*   FIO2 40.0 40.0 40.0     Recent Labs     02/10/22  0514        No results found for: BNPP, BNP   No results found for: CULT  Lab Results   Component Value Date/Time    TSH 1.38 02/08/2022 03:53 AM        Imaging:    Results from Hospital Encounter encounter on 02/07/22    XR CHEST PORT    Narrative  AP semiupright radiograph of the chest 2:38 a.m. compared to February 9, 2022. INDICATION: CHF. Endotracheal tube tip projects between the clavicles approximately 7.6 cm above  the annie. Nasogastric tube projects over the stomach although the distal tip  is not included. No infiltrates or effusions. There is minimal apical pleural  thickening. Impression  No acute findings. Results from Hospital Encounter encounter on 02/07/22    CTA CHEST ABD PELV W WO CONT    Narrative  CTA chest, abdomen and pelvis without with intravenous contrast, 100 cc  Isovue-370, 3-D MIP images    Dose reduction: All CT scans at this facility are performed using dose reduction  optimization techniques as appropriate to a performed exam including the  following: Automated exposure control, adjustments of the mA and/or kV according  to patient size, or use of iterative reconstruction technique. INDICATION: Hypoxic    COMPARISON: CT chest 5/24/2018    FINDINGS:    No aneurysm or dissection of thoracic or abdominal aorta. Atherosclerosis  including coronary arteries. No central pulmonary emboli. No pleural or  pericardial effusions. No mediastinal adenopathy. Emphysematous changes in the  lungs. Minimal basilar atelectasis. Bilateral apical pleural thickening/scar  again noted. No pneumothorax. Endotracheal tube. Tip of NG tube in stomach. Difficult to exclude small thyroid nodules due to artifacts. Liver, spleen and pancreas are unremarkable. Gallbladder is present without  pericholecystic stranding. No urinary stone or hydronephrosis on either side. Bilateral renal lesions, largest about 1.7 cm on the right side posteriorly, are  too small to characterize, may represent cyst cyst, hyperdense cyst or other. No bowel obstruction. Residual contrast in parts of colon. No contrast  extravasation in the remainder of the bowel. No evidence of diverticulitis. Normal appendix. No abscess. No free intraperitoneal air.  Prostate measures  about 4.5 x 3.5 cm in diameter containing small calcifications. There are some  fatty changes/atrophy of some of the muscles. No acute fracture in the  visualized bony structures. Impression  No aneurysm or dissection of aorta. Atherosclerosis including coronary arteries. Pulmonary emphysema. No intra-abdominal inflammatory changes or bowel obstruction. Follow-up as  clinically indicated. Please see full report.         ·

## 2022-02-11 NOTE — CONSULTS
Consult Date: 2/11/2022    Consults Mr. Armida Lopez is a 73year old man with PMH of PE used to be on coumadin but stopped for GI bleed, vocal cord synfunctioon who was brought in to the Er for hypoxia, \" AMS\", chest pain and recurrent episodes of syncope lasting 5-10 sec. He would awaken after sternal rub but loose consciousness shortly after again. In between the episodes he is able to wake up and is alert and oriented and able to provide the Ed staff his history. He was found to be hypoxic to 86%. It seems he passed out multiple times in the ambulance as well. From the chart he has had episodes of loss of consciousness after whole body cramping episodes at home as well. CT head reported as \"no evidence of acute intracranial process\" although I cannot pull up the image myself. Ct chest/abd/pelvis WNL. Subjective  intubated and sedated. No past medical history on file. No past surgical history on file. No family history on file.    Social History     Tobacco Use    Smoking status: Not on file    Smokeless tobacco: Not on file   Substance Use Topics    Alcohol use: Not on file       Current Facility-Administered Medications   Medication Dose Route Frequency Provider Last Rate Last Admin    glycopyrrolate (ROBINUL) injection 0.1 mg  0.1 mg IntraVENous TID Ifeoma Lovell MD   0.1 mg at 02/11/22 0806    furosemide (LASIX) injection 40 mg  40 mg IntraVENous DAILY Andreia Owens MD   40 mg at 02/11/22 0806    albuterol-ipratropium (DUO-NEB) 2.5 MG-0.5 MG/3 ML  3 mL Nebulization Q4H PRN Hugo Maurice MD        bethanechol (URECHOLINE) tablet 100 mg  100 mg Oral BID Alix Hall MD   100 mg at 02/11/22 0805    finasteride (PROSCAR) tablet 5 mg  5 mg Oral DAILY Hugo Maurice MD   5 mg at 02/10/22 0805    OXcarbazepine (TRILEPTAL) tablet 150 mg  150 mg Oral BID Alix Hall MD   150 mg at 02/10/22 2110    tamsulosin (FLOMAX) capsule 0.4 mg  0.4 mg Oral DAILY Hugo Maurice MD   0.4 mg at 02/11/22 0805    sodium chloride (NS) flush 5-40 mL  5-40 mL IntraVENous Q8H Richardson Maurice MD   10 mL at 02/11/22 0551    sodium chloride (NS) flush 5-40 mL  5-40 mL IntraVENous PRN Richardson Maurice MD        acetaminophen (TYLENOL) tablet 650 mg  650 mg Oral Q6H PRN Daniel Doe MD   650 mg at 02/09/22 0121    Or    acetaminophen (TYLENOL) suppository 650 mg  650 mg Rectal Q6H PRN Richardson Maurice MD        polyethylene glycol (MIRALAX) packet 17 g  17 g Oral DAILY PRN Richardson Maurice MD        ondansetron (ZOFRAN ODT) tablet 4 mg  4 mg Oral Q8H PRN Richardson Maurice MD        Or    ondansetron (ZOFRAN) injection 4 mg  4 mg IntraVENous Q6H PRN Richardson Maurice MD        enoxaparin (LOVENOX) injection 40 mg  40 mg SubCUTAneous DAILY Richardson Maurice MD   40 mg at 02/11/22 0805    famotidine (PF) (PEPCID) 20 mg in 0.9% sodium chloride 10 mL injection  20 mg IntraVENous Q12H Daniel Doe MD   20 mg at 02/11/22 0806    labetaloL (NORMODYNE;TRANDATE) 20 mg/4 mL (5 mg/mL) injection 10 mg  10 mg IntraVENous Q4H PRN Dory Bosch MD   10 mg at 02/08/22 1741    amLODIPine (NORVASC) tablet 10 mg  10 mg Oral DAILY Dory Bosch MD   10 mg at 02/11/22 9078    aspirin chewable tablet 81 mg  81 mg Oral DAILY Duyen Teixeira MD   81 mg at 02/11/22 0806    atorvastatin (LIPITOR) tablet 80 mg  80 mg Oral DAILY Duyen Teixeira MD   80 mg at 02/11/22 0805    clopidogreL (PLAVIX) tablet 75 mg  75 mg Oral DAILY Duyen Teixeira MD   75 mg at 02/11/22 0805    metoprolol succinate (TOPROL-XL) XL tablet 25 mg  25 mg Oral DAILY Duyen Teixeira MD   25 mg at 02/10/22 0805    methylPREDNISolone (PF) (SOLU-MEDROL) injection 40 mg  40 mg IntraVENous Q8H Shivam Lovell MD   40 mg at 02/11/22 0550    albuterol-ipratropium (DUO-NEB) 2.5 MG-0.5 MG/3 ML  3 mL Nebulization Q6HWA RT Aj Lovell MD   3 mL at 02/11/22 0723    budesonide (PULMICORT) 500 mcg/2 ml nebulizer suspension  500 mcg Nebulization BID RT Chato Stewart MD   500 mcg at 02/11/22 1655    midazolam in normal saline (VERSED) 1 mg/mL infusion  0-10 mg/hr IntraVENous TITRATE Kp Apodaca MD 2 mL/hr at 02/10/22 2202 2 mg/hr at 02/10/22 2202    propofol (DIPRIVAN) 10 mg/mL infusion  0-50 mcg/kg/min IntraVENous TITRATE Kp Apodaca MD 27.3 mL/hr at 02/11/22 0835 35 mcg/kg/min at 02/11/22 0835        Review of Systems   Unable to perform ROS: Intubated       Objective     Vital signs for last 24 hours:  Visit Vitals  /83 (BP 1 Location: Right upper arm, BP Patient Position: At rest)   Pulse 84   Temp 97.3 °F (36.3 °C)   Resp 16   Ht 6' (1.829 m)   Wt 123.7 kg (272 lb 11.3 oz)   SpO2 96%   BMI 36.99 kg/m²       Intake/Output this shift:  Current Shift: No intake/output data recorded. Last 3 Shifts: 02/09 1901 - 02/11 0700  In: 1888.6 [I.V.:788.6]  Out: 2400 [Urine:2400]    Data Review:   Recent Results (from the past 24 hour(s))   BLOOD GAS, ARTERIAL    Collection Time: 02/11/22  6:21 AM   Result Value Ref Range    pH 7.47 (H) 7.35 - 7.45      PCO2 41 35 - 45 mmHg    PO2 106 (H) 75 - 100 mmHg    O2 SAT 97 >95 %    BICARBONATE 29 (H) 22 - 26 mmol/L    BASE EXCESS 5.3 (H) 0 - 2 mmol/L    O2 METHOD VENT      FIO2 40.0 %    MODE AssistControl/Pressure Control      SET RATE 15      IPAP/PIP 14      EPAP/CPAP/PEEP 8.0      Sample source Arterial      SITE Left Radial      VÍCTOR'S TEST PASS         Physical Exam    Neuro Physical Exam      General: Well developed, well nourished. intubated        Neurological Exam:  Mental Status: Sedated on propofol: examined off propofol and versed   Does not open eyes to sternal rub, pupils equal and reactive. Withdraws on left leg to pain  Babinski: mute b/l     Assessment and Plan: Mr. Nelly Bustamante is a 73year old man who comes in with hypoxia and recurrent episodes of syncope. The description of these events is NOT consistent with seizures. So far cardiac workup has not revealed any etiology for these events.    He needs to be on as minimal sedation as possible. STOP versed. Use as minimal propofol as needed. At this time etiology of recurrent syncope is not clear. If this recurs post extubation then eeg can be considered. I also ordered cta head and neck to look for any tight stenosis of bilateral carotids or basilar artery.

## 2022-02-12 ENCOUNTER — APPOINTMENT (OUTPATIENT)
Dept: CT IMAGING | Age: 67
DRG: 207 | End: 2022-02-12
Attending: PSYCHIATRY & NEUROLOGY
Payer: MEDICARE

## 2022-02-12 ENCOUNTER — APPOINTMENT (OUTPATIENT)
Dept: GENERAL RADIOLOGY | Age: 67
DRG: 207 | End: 2022-02-12
Attending: INTERNAL MEDICINE
Payer: MEDICARE

## 2022-02-12 LAB
ARTERIAL PATENCY WRIST A: ABNORMAL
BASE EXCESS BLDA CALC-SCNC: 7.5 MMOL/L (ref 0–2)
BDY SITE: ABNORMAL
EPAP/CPAP/PEEP, PAPEEP: 8
FIO2 ON VENT: 35 %
GAS FLOW.O2 SETTING OXYMISER: 15 L/MIN
GLUCOSE BLD STRIP.AUTO-MCNC: 138 MG/DL (ref 65–117)
HCO3 BLDA-SCNC: 31 MMOL/L (ref 22–26)
PCO2 BLDA: 48 MMHG (ref 35–45)
PERFORMED BY, TECHID: ABNORMAL
PH BLDA: 7.44 [PH] (ref 7.35–7.45)
PO2 BLDA: 81 MMHG (ref 75–100)
SAO2 % BLD: 95 %
SAO2% DEVICE SAO2% SENSOR NAME: ABNORMAL
VENTILATION MODE VENT: ABNORMAL
VT SETTING VENT: 580 ML

## 2022-02-12 PROCEDURE — 74011250637 HC RX REV CODE- 250/637: Performed by: INTERNAL MEDICINE

## 2022-02-12 PROCEDURE — 74011000250 HC RX REV CODE- 250: Performed by: INTERNAL MEDICINE

## 2022-02-12 PROCEDURE — 74011250636 HC RX REV CODE- 250/636: Performed by: INTERNAL MEDICINE

## 2022-02-12 PROCEDURE — 74011250637 HC RX REV CODE- 250/637: Performed by: HOSPITALIST

## 2022-02-12 PROCEDURE — 94640 AIRWAY INHALATION TREATMENT: CPT

## 2022-02-12 PROCEDURE — 65610000006 HC RM INTENSIVE CARE

## 2022-02-12 PROCEDURE — 74011000258 HC RX REV CODE- 258: Performed by: INTERNAL MEDICINE

## 2022-02-12 PROCEDURE — 71045 X-RAY EXAM CHEST 1 VIEW: CPT

## 2022-02-12 PROCEDURE — 70496 CT ANGIOGRAPHY HEAD: CPT

## 2022-02-12 PROCEDURE — 36600 WITHDRAWAL OF ARTERIAL BLOOD: CPT

## 2022-02-12 PROCEDURE — 74011000636 HC RX REV CODE- 636: Performed by: HOSPITALIST

## 2022-02-12 PROCEDURE — 94003 VENT MGMT INPAT SUBQ DAY: CPT

## 2022-02-12 PROCEDURE — 77010033678 HC OXYGEN DAILY

## 2022-02-12 PROCEDURE — 74011250636 HC RX REV CODE- 250/636

## 2022-02-12 PROCEDURE — 82803 BLOOD GASES ANY COMBINATION: CPT

## 2022-02-12 PROCEDURE — 82962 GLUCOSE BLOOD TEST: CPT

## 2022-02-12 RX ORDER — PROPOFOL 10 MG/ML
INJECTION, EMULSION INTRAVENOUS
Status: COMPLETED
Start: 2022-02-12 | End: 2022-02-12

## 2022-02-12 RX ADMIN — DEXMEDETOMIDINE HYDROCHLORIDE 0.5 MCG/KG/HR: 100 INJECTION, SOLUTION, CONCENTRATE INTRAVENOUS at 21:16

## 2022-02-12 RX ADMIN — PROPOFOL 30 MCG/KG/MIN: 10 INJECTION, EMULSION INTRAVENOUS at 10:23

## 2022-02-12 RX ADMIN — BETHANECHOL CHLORIDE 100 MG: 25 TABLET ORAL at 21:16

## 2022-02-12 RX ADMIN — AMLODIPINE BESYLATE 10 MG: 5 TABLET ORAL at 08:40

## 2022-02-12 RX ADMIN — PROPOFOL 30 MCG/KG/MIN: 10 INJECTION, EMULSION INTRAVENOUS at 21:15

## 2022-02-12 RX ADMIN — PROPOFOL 30 MCG/KG/MIN: 10 INJECTION, EMULSION INTRAVENOUS at 13:54

## 2022-02-12 RX ADMIN — FUROSEMIDE 40 MG: 10 INJECTION, SOLUTION INTRAMUSCULAR; INTRAVENOUS at 08:39

## 2022-02-12 RX ADMIN — METHYLPREDNISOLONE SODIUM SUCCINATE 40 MG: 40 INJECTION, POWDER, FOR SOLUTION INTRAMUSCULAR; INTRAVENOUS at 05:40

## 2022-02-12 RX ADMIN — PROPOFOL 30 MCG/KG/MIN: 10 INJECTION, EMULSION INTRAVENOUS at 21:14

## 2022-02-12 RX ADMIN — ASPIRIN 81 MG CHEWABLE TABLET 81 MG: 81 TABLET CHEWABLE at 08:40

## 2022-02-12 RX ADMIN — ENOXAPARIN SODIUM 40 MG: 100 INJECTION SUBCUTANEOUS at 08:40

## 2022-02-12 RX ADMIN — SODIUM CHLORIDE, PRESERVATIVE FREE 10 ML: 5 INJECTION INTRAVENOUS at 13:08

## 2022-02-12 RX ADMIN — METHYLPREDNISOLONE SODIUM SUCCINATE 40 MG: 40 INJECTION, POWDER, FOR SOLUTION INTRAMUSCULAR; INTRAVENOUS at 13:54

## 2022-02-12 RX ADMIN — SODIUM CHLORIDE, PRESERVATIVE FREE 10 ML: 5 INJECTION INTRAVENOUS at 21:18

## 2022-02-12 RX ADMIN — GLYCOPYRROLATE 0.1 MG: 0.2 INJECTION INTRAMUSCULAR; INTRAVENOUS at 15:21

## 2022-02-12 RX ADMIN — TAMSULOSIN HYDROCHLORIDE 0.4 MG: 0.4 CAPSULE ORAL at 08:40

## 2022-02-12 RX ADMIN — IPRATROPIUM BROMIDE AND ALBUTEROL SULFATE 3 ML: 2.5; .5 SOLUTION RESPIRATORY (INHALATION) at 20:07

## 2022-02-12 RX ADMIN — SODIUM CHLORIDE, PRESERVATIVE FREE 20 MG: 5 INJECTION INTRAVENOUS at 08:39

## 2022-02-12 RX ADMIN — PROPOFOL 30 MCG/KG/MIN: 10 INJECTION, EMULSION INTRAVENOUS at 17:41

## 2022-02-12 RX ADMIN — METOPROLOL SUCCINATE 25 MG: 25 TABLET, EXTENDED RELEASE ORAL at 08:43

## 2022-02-12 RX ADMIN — GLYCOPYRROLATE 0.1 MG: 0.2 INJECTION INTRAMUSCULAR; INTRAVENOUS at 21:17

## 2022-02-12 RX ADMIN — BETHANECHOL CHLORIDE 100 MG: 25 TABLET ORAL at 08:40

## 2022-02-12 RX ADMIN — IPRATROPIUM BROMIDE AND ALBUTEROL SULFATE 3 ML: 2.5; .5 SOLUTION RESPIRATORY (INHALATION) at 08:46

## 2022-02-12 RX ADMIN — IPRATROPIUM BROMIDE AND ALBUTEROL SULFATE 3 ML: 2.5; .5 SOLUTION RESPIRATORY (INHALATION) at 14:04

## 2022-02-12 RX ADMIN — GLYCOPYRROLATE 0.1 MG: 0.2 INJECTION INTRAMUSCULAR; INTRAVENOUS at 08:39

## 2022-02-12 RX ADMIN — OXCARBAZEPINE 150 MG: 150 TABLET, FILM COATED ORAL at 08:40

## 2022-02-12 RX ADMIN — SODIUM CHLORIDE, PRESERVATIVE FREE 10 ML: 5 INJECTION INTRAVENOUS at 05:41

## 2022-02-12 RX ADMIN — METHYLPREDNISOLONE SODIUM SUCCINATE 40 MG: 40 INJECTION, POWDER, FOR SOLUTION INTRAMUSCULAR; INTRAVENOUS at 21:16

## 2022-02-12 RX ADMIN — ATORVASTATIN CALCIUM 80 MG: 40 TABLET, FILM COATED ORAL at 08:40

## 2022-02-12 RX ADMIN — BUDESONIDE 500 MCG: 0.5 SUSPENSION RESPIRATORY (INHALATION) at 20:07

## 2022-02-12 RX ADMIN — IOPAMIDOL 100 ML: 755 INJECTION, SOLUTION INTRAVENOUS at 11:40

## 2022-02-12 RX ADMIN — PROPOFOL 30 MCG/KG/MIN: 10 INJECTION, EMULSION INTRAVENOUS at 16:04

## 2022-02-12 RX ADMIN — SODIUM CHLORIDE, PRESERVATIVE FREE 20 MG: 5 INJECTION INTRAVENOUS at 21:17

## 2022-02-12 RX ADMIN — PROPOFOL 30 MCG/KG/MIN: 10 INJECTION, EMULSION INTRAVENOUS at 05:40

## 2022-02-12 RX ADMIN — DEXMEDETOMIDINE HYDROCHLORIDE 0.5 MCG/KG/HR: 100 INJECTION, SOLUTION, CONCENTRATE INTRAVENOUS at 05:00

## 2022-02-12 RX ADMIN — DEXMEDETOMIDINE HYDROCHLORIDE 0.5 MCG/KG/HR: 100 INJECTION, SOLUTION, CONCENTRATE INTRAVENOUS at 10:23

## 2022-02-12 RX ADMIN — PROPOFOL 30 MCG/KG/MIN: 10 INJECTION, EMULSION INTRAVENOUS at 03:13

## 2022-02-12 RX ADMIN — CLOPIDOGREL BISULFATE 75 MG: 75 TABLET, FILM COATED ORAL at 08:41

## 2022-02-12 RX ADMIN — FINASTERIDE 5 MG: 5 TABLET, FILM COATED ORAL at 08:40

## 2022-02-12 RX ADMIN — OXCARBAZEPINE 150 MG: 150 TABLET, FILM COATED ORAL at 21:18

## 2022-02-12 RX ADMIN — BUDESONIDE 500 MCG: 0.5 SUSPENSION RESPIRATORY (INHALATION) at 08:46

## 2022-02-12 RX ADMIN — DEXMEDETOMIDINE HYDROCHLORIDE 0.5 MCG/KG/HR: 100 INJECTION, SOLUTION, CONCENTRATE INTRAVENOUS at 17:42

## 2022-02-12 NOTE — PROGRESS NOTES
Progress Note    Patient: Cynthia Chand MRN: [id number]  SSN: [id number]    YOB: 1955  Age: 73 y.o. Sex: male      Admit Date: 2/7/2022    LOS: 5 days     Subjective:     Patient was seen and examined in ICU o  Objective:     Vitals:    02/12/22 0500 02/12/22 0600 02/12/22 0700 02/12/22 0846   BP: 125/80 125/78 134/86    Pulse: 81 80 78 77   Resp: 18 19 17 17   Temp: 97.1 °F (36.2 °C) 98.8 °F (37.1 °C) 98.6 °F (37 °C)    SpO2: 98% 98% 98% 99%   Weight: 120.7 kg (266 lb 1.5 oz)      Height:            Intake and Output:  Current Shift: 02/12 0701 - 02/12 1900  In: 300   Out: -   Last three shifts: 02/10 1901 - 02/12 0700  In: 3676.3 [I.V.:676.3]  Out: 6383 [Urine:3675]    Physical Exam:   General:   Intubated. Eyes:  Conjunctivae/corneas clear. PERRL, EOMs intact. Fundi benign   Ears:  Normal TMs and external ear canals both ears. Nose: Nares normal. Septum midline. Mucosa normal. No drainage or sinus tenderness. Mouth/Throat: Lips, mucosa, and tongue normal. Teeth and gums normal.   Neck: Supple, symmetrical, trachea midline, no adenopathy, thyroid: no enlargment/tenderness/nodules, no carotid bruit and no JVD. Back:   Symmetric, no curvature. ROM normal. No CVA tenderness. Lungs:   Clear to auscultation bilaterally. Heart:  Regular rate and rhythm, S1, S2 normal, no murmur, click, rub or gallop. Abdomen:   Soft, non-tender. Bowel sounds normal. No masses,  No organomegaly. Extremities: Extremities normal, atraumatic, no cyanosis or edema. Pulses: 2+ and symmetric all extremities. Skin: Skin color, texture, turgor normal. No rashes or lesions   Lymph nodes: Cervical, supraclavicular, and axillary nodes normal.   Neurologic:  Intubated       Lab/Data Review: All lab results for the last 24 hours reviewed. Assessment:     Active Problems:    Respiratory failure ([facility] [address] (2/7/2022)    Patient is 71-year-old white male with:  1. Syncope  2. Non-STEMI  3.   CAD status post PCI of left circumflex artery in 2018  4. Hyperlipidemia  6. Vocal cord dysfunction  7. History of PE  8. Warfarin was stopped due to GI bleed  9. COPD  7. Obesity  8. Edema  9. Hypertensive emergency   10. Bilateral renal lesions, largest about 1.7 cm on the right side posteriorly. Plan:     Patient is agitated every time the sedatives weaned off. Plans noted for CT head. Continue supportive care for now. Plans once extubated is to proceed with cardiac catheterization. Possible extubation tomorrow. Sodium 133.   Signed By: Aziza Avelar MD     February 12, 2022

## 2022-02-12 NOTE — PROGRESS NOTES
Neurology Progress Note    Date: 2/12/2022    Mr. Nelly Bustamante is a 73year old man is seen in follow-up and he is still intubated on sedation. With PMH of PE used to be on coumadin but stopped for GI bleed, vocal cord dysfunctioon who was brought in to the ER for hypoxia, \" AMS\", chest pain and recurrent episodes of syncope lasting 5-10 sec. He would awaken after sternal rub but loose consciousness shortly after again. In between the episodes he is able to wake up and is alert and oriented and able to provide the Ed staff his history. He was found to be hypoxic to 86%. It seems he passed out multiple times in the ambulance as well. From the chart he has had episodes of loss of consciousness after whole body cramping episodes at home as well. CT head reported as \"no evidence of acute intracranial process\". Ct chest/abd/pelvis WNL. Subjective  intubated and sedated. No past medical history on file. No past surgical history on file. No family history on file.    Social History     Tobacco Use    Smoking status: Not on file    Smokeless tobacco: Not on file   Substance Use Topics    Alcohol use: Not on file       Current Facility-Administered Medications   Medication Dose Route Frequency Provider Last Rate Last Admin    dexmedeTOMidine (PRECEDEX) 400 mcg in 0.9% sodium chloride (MBP/ADV) 100 mL MBP  0.1-1.4 mcg/kg/hr IntraVENous TITRATE Gabriella Mccoy MD 15.5 mL/hr at 02/12/22 1023 0.5 mcg/kg/hr at 02/12/22 1023    glycopyrrolate (ROBINUL) injection 0.1 mg  0.1 mg IntraVENous TID Jose Lovell MD   0.1 mg at 02/12/22 1521    furosemide (LASIX) injection 40 mg  40 mg IntraVENous DAILY Parker Rothman MD   40 mg at 02/12/22 0839    albuterol-ipratropium (DUO-NEB) 2.5 MG-0.5 MG/3 ML  3 mL Nebulization Q4H PRN Etta Maurice MD        bethanechol (URECHOLINE) tablet 100 mg  100 mg Oral BID Kp Apodaca MD   100 mg at 02/12/22 0840    finasteride (PROSCAR) tablet 5 mg  5 mg Oral DAILY [facility] MD   5 mg at 02/12/22 0840    OXcarbazepine (TRILEPTAL) tablet 150 mg  150 mg Oral BID Derrell Smith MD   150 mg at 02/12/22 0840    tamsulosin (FLOMAX) capsule 0.4 mg  0.4 mg Oral DAILY Raheem Maurice MD   0.4 mg at 02/12/22 0840    sodium chloride (NS) flush 5-40 mL  5-40 mL IntraVENous Q8H Raheem Maurice MD   10 mL at 02/12/22 1308    sodium chloride (NS) flush 5-40 mL  5-40 mL IntraVENous PRN Raheem Maurice MD        acetaminophen (TYLENOL) tablet 650 mg  650 mg Oral Q6H PRN Derrell Smith MD   650 mg at 02/09/22 0121    Or    acetaminophen (TYLENOL) suppository 650 mg  650 mg Rectal Q6H PRN Raheem Maurice MD        polyethylene glycol (MIRALAX) packet 17 g  17 g Oral DAILY PRN Raheem Maurice MD        ondansetron (ZOFRAN ODT) tablet 4 mg  4 mg Oral Q8H PRN Raheem Maurice MD        Or    ondansetron (ZOFRAN) injection 4 mg  4 mg IntraVENous Q6H PRN Raheem Maurice MD        enoxaparin (LOVENOX) injection 40 mg  40 mg SubCUTAneous DAILY Raheem Maurice MD   40 mg at 02/12/22 0840    famotidine (PF) (PEPCID) 20 mg in 0.9% sodium chloride 10 mL injection  20 mg IntraVENous Q12H Derrell Smith MD   20 mg at 02/12/22 0839    labetaloL (NORMODYNE;TRANDATE) 20 mg/4 mL (5 mg/mL) injection 10 mg  10 mg IntraVENous Q4H PRN Iam Matthews MD   10 mg at 02/08/22 1741    amLODIPine (NORVASC) tablet 10 mg  10 mg Oral DAILY Iam Matthews MD   10 mg at 02/12/22 0840    aspirin chewable tablet 81 mg  81 mg Oral DAILY Duyen Teixeira MD   81 mg at 02/12/22 0840    atorvastatin (LIPITOR) tablet 80 mg  80 mg Oral DAILY Duyen Teixeira MD   80 mg at 02/12/22 0840    clopidogreL (PLAVIX) tablet 75 mg  75 mg Oral DAILY Duyen Teixeira MD   75 mg at 02/12/22 0841    metoprolol succinate (TOPROL-XL) XL tablet 25 mg  25 mg Oral DAILY Duyen Teixeira MD   25 mg at 02/12/22 0843    methylPREDNISolone (PF) (SOLU-MEDROL) injection 40 mg  40 mg IntraVENous Q8H Karol Lovell MD   40 mg at 02/12/22 1345  albuterol-ipratropium (DUO-NEB) 2.5 MG-0.5 MG/3 ML  3 mL Nebulization Q6HWA RT Aj Lovell MD   3 mL at 02/12/22 1404    budesonide (PULMICORT) 500 mcg/2 ml nebulizer suspension  500 mcg Nebulization BID RT Uriah Lovell MD   500 mcg at 02/12/22 0846    midazolam in normal saline (VERSED) 1 mg/mL infusion  0-10 mg/hr IntraVENous TITRATE Hayde Armenta MD 2 mL/hr at 02/10/22 2202 2 mg/hr at 02/10/22 2202    propofol (DIPRIVAN) 10 mg/mL infusion  0-50 mcg/kg/min IntraVENous TITRATE Hayde Armenta MD 23.4 mL/hr at 02/12/22 1604 30 mcg/kg/min at 02/12/22 1604      Review of Systems   Unable to perform ROS: Intubated     Objective     Vital signs for last 24 hours:  Visit Vitals  /80 (BP 1 Location: Right upper arm, BP Patient Position: At rest)   Pulse 76   Temp 98.8 °F (37.1 °C)   Resp 18   Ht 6' 0.01\" (1.829 m)   Wt 120.7 kg (266 lb 1.5 oz)   SpO2 93%   BMI 36.08 kg/m²     Intake/Output this shift:  Current Shift: 02/12 0701 - 02/12 1900  In: 900   Out: -   Last 3 Shifts: 02/10 1901 - 02/12 0700  In: 3676.3 [I.V.:676.3]  Out: 6215 [Urine:3675]    Data Review:   Recent Results (from the past 24 hour(s))   GLUCOSE, POC    Collection Time: 02/11/22 11:03 PM   Result Value Ref Range    Glucose (POC) 112 65 - 117 mg/dL    Performed by Nash Gomez    BLOOD GAS, ARTERIAL    Collection Time: 02/12/22  3:17 AM   Result Value Ref Range    pH 7.44 7.35 - 7.45      PCO2 48 (H) 35 - 45 mmHg    PO2 81 75 - 100 mmHg    O2 SAT 95 (L) >95 %    BICARBONATE 31 (H) 22 - 26 mmol/L    BASE EXCESS 7.5 (H) 0 - 2 mmol/L    O2 METHOD VENT      FIO2 35.0 %    MODE Assist Control/Volume Control      Tidal volume 580      SET RATE 15      EPAP/CPAP/PEEP 8.0      SITE Right Radial      VÍCTOR'S TEST PASS       Physical Exam    Neuro Physical Exam    General: Well developed, well nourished.   Morbidly obese and intubated on sedation    Neurological Exam:  Mental Status: Sedated on propofol: examined Does not open eyes to sternal rub, pupils equal and reactive. Withdraws both legs to pain  Babinski: mute b/l     Assessment and Plan: Mr. Marianne Mariee is a 73year old man who comes in with hypoxia and recurrent episodes of syncope. The description of these events is NOT consistent with seizures. So far cardiac workup has not revealed any etiology for these events. He needs to be on as minimal sedation as possible. He is still on versed. Use as minimal propofol as needed. At this time etiology of recurrent syncope is not clear. If this recurs post extubation then eeg can be considered. CTA head and neck did not reveal any stenotic disease, other than 3035% stenosis of the origin of the right ICA, which does not explain the spells he had been having. It is possible that he may be going in and out of hypoxia contributing to these spells.     Thank you,    Bk Frost MD

## 2022-02-12 NOTE — PROGRESS NOTES
Spiritual Care Assessment/Progress Note  [facility]      NAME: Sherri Salinas      MRN: [id number]  AGE: 73 y.o. SEX: male  Catholic Affiliation: Sikhism   Language: English     2/12/2022     Total Time (in minutes): 30     Spiritual Assessment begun in [address] ICU through conversation with:         [x]Patient        [x] Family    [] Friend(s)        Reason for Consult: Initial/Spiritual assessment, critical care,Request by staff     Spiritual beliefs: (Please include comment if needed)     [] Identifies with a edward tradition:         [] Supported by a edward community:            [] Claims no spiritual orientation:           [] Seeking spiritual identity:                [] Adheres to an individual form of spirituality:           [x] Not able to assess:                           Identified resources for coping:      [] Prayer                               [] Music                  [] Guided Imagery     [x] Family/friends                 [] Pet visits     [] Devotional reading                         [] Unknown     [] Other:                                               Interventions offered during this visit: (See comments for more details)          Family/Friend(s):  Affirmation of emotions/emotional suffering,Affirmation of edward,Catharsis/review of pertinent events in supportive environment,Coping skills reviewed/reinforced,Iconic (affirming the presence of God/Higher Power),Initial Assessment,Life review/legacy,Normalization of emotional/spiritual concerns,Prayer (assurance of),Catholic beliefs/image of God discussed     Plan of Care:     [] Support spiritual and/or cultural needs    [] Support AMD and/or advance care planning process      [] Support grieving process   [] Coordinate Rites and/or Rituals    [] Coordination with community clergy   [] No spiritual needs identified at this time   [] Detailed Plan of Care below (See Comments)  [] Make referral to Music Therapy  [] Make referral to Pet Therapy     [] Make referral to Addiction services  [] Make referral to [facility]  [] Make referral to Spiritual Care Partner  [] No future visits requested        [x] Contact Spiritual Care for further referrals     Comments: The purpose of the visit was in response to a staff referral to offer family care. The patient was was unable to share during the visit, however his daughter was at the bedside. She was tearful as she shared the events leading up to where her father being admitted. She shared how she had been struggling with his sudden decline. She mentioned how she has been willing to accept her father's condition and how she believes he may not recover entirely, but she feels encouraged by her family and strengthened by the comfort and support they have for her father, their grandfather, and the rest of his family. She tearfully sings to him a childhood song that he taught her, and when she did, he moved his lips along with her, of which she mentioned was hopeful. The  listened empathically, encouraged meaningful story-telling and reflection, supported singing as comfort, and provided the ministry of spiritual care. [facility] [address] [place].    can be reached by calling the  at General acute hospital  [phone]

## 2022-02-12 NOTE — PROGRESS NOTES
Progress Note    Patient: Harmeet Martel MRN: [id number]  SSN: [id number]    YOB: 1955  Age: 73 y.o. Sex: male      Admit Date: 2/7/2022    LOS: 5 days     Subjective:     66M, h/o COPD not on oxygen, pulmonary embolism (not on AC s/t GI bleed), CAD s/p stent presented with acute encephalopathy and syncope complicated by HTN urgency and episode of hypoxia and acute hypoxic respiratory failure and was intubated    HOSPITAL COURSE:   Initial workup showed no signs of infection- CT chest. CXR no pneumonia, UA no signs of infection, cardiology plans on ischemic workup after extubation, description not consistent with seizures. He does have NSTEMI type II s.t HTN urgency. Plan to wean vent tomorrow    Review of Systems:  Cannot be assessed due to men bob status      Objective:     Vitals:    02/12/22 1200 02/12/22 1300 02/12/22 1400 02/12/22 1405   BP: 133/78 128/80 (!) (P) 107/53    Pulse:       Resp: 20 19 (P) 20    Temp:       SpO2: 97% 97% 96% 94%   Weight:       Height:            Intake and Output:  Current Shift: 02/12 0701 - 02/12 1900  In: 600   Out: -   Last three shifts: 02/10 1901 - 02/12 0700  In: 3676.3 [I.V.:676.3]  Out: 8948 [Urine:3675]      Physical Exam:   Physical Exam:  General: Intubated and sedated. Eye: conjunctivae/corneas clear. PERRL, EOM's intact. Throat and Neck: normal and no erythema or exudates noted.  No mass   Lung: clear to auscultation bilaterally  Heart: regular rate and rhythm,   Abdomen: soft, Bowel sounds normal. No masses,  Extremities:  all extremities normal, atraumatic  Skin: Normal.  Neurologic: Omitted   psychiatric: Omitted      Lab/Data Review:  Recent Results (from the past 24 hour(s))   GLUCOSE, POC    Collection Time: 02/11/22 11:03 PM   Result Value Ref Range    Glucose (POC) 112 65 - 117 mg/dL    Performed by Lendon Cabot    BLOOD GAS, ARTERIAL    Collection Time: 02/12/22  3:17 AM   Result Value Ref Range    pH 7.44 7.35 - 7.45      PCO2 48 (H) 35 - 45 mmHg    PO2 81 75 - 100 mmHg    O2 SAT 95 (L) >95 %    BICARBONATE 31 (H) 22 - 26 mmol/L    BASE EXCESS 7.5 (H) 0 - 2 mmol/L    O2 METHOD VENT      FIO2 35.0 %    MODE Assist Control/Volume Control      Tidal volume 580      SET RATE 15      EPAP/CPAP/PEEP 8.0      SITE Right Radial      VÍCTOR'S TEST PASS           Assessment and plan:        (1) Acute encephalopathy: GCS 12. Cardiology and neurology input appreciated. It does appear to have some severe apnea. However cardiac and neuro causes to be ruled out. (2) Acute hypoxic respiratory failure : intubated, seddated, wean sedation. Solumedrol , trelegy    (3) CAD s/p stent : aspirin, plavix,  statin    (4) NSTEMI: likely type II:    (4) COPD not on o2: complicates #1    (5) HTN urgency: PRN labetalol. amlodipine    (6) vocal chord dysfunction: OP ENT    (7) CHFpEF: lasix 40 daily     DISPO: ICU today, likely extubation tomorrow.      Signed By: Ca Zeng MD     February 12, 2022

## 2022-02-12 NOTE — PROGRESS NOTES
IMPRESSION:   1. Acute hypoxic respiratory failure  2. NSTEMI  3. Generalized Edema  4. Syncope  5. COPD  6. Additional workup outlined below  7. Pt is at high risk of sudden decline and decompensation with life threatening consequenses and continued end organ dysfunction and failure  8. Pt is critically ill. Time spent with pt and staff actively rendering care, managing pt and coordinating care as stated below; 30 minutes, exclusive of any procedures      RECOMMENDATIONS/PLAN:   1. ICU monitoring  1. Ventilator for mechanical life support and prevent respiratory arrest with protective lung strategies sedated   2. Patient is on assist control mode 40% FiO2 PEEP of 8 rate of 15 will change vent setting and decrease FiO2 sedated with propofol and Versed  3. Did sedation vacation this morning patient was put on spontaneous he had low tidal volume goes into apnea now he is back on assist control back on sedation   4. Patient on IV Solu-Medrol nebulizer treatment Covid negative  5. Hold trilogy inhaler  Started patient on Pulmicort via neb  6. Chest x-ray no acute infiltrate which shows emphysematous changes  7. Patient was to be followed with ENT he never had any appointment with them they were resuming patient has vocal cord dysfunction once extubated we will get ENT evaluation  8. CVP monitoring  9. IV vasopressors for circulatory shock refractory to fluids to maintain SBP> 90  10. For cardiac catheterization on Monday  11. Transfuse prn to maintain Hgb > 7  12. Labs to follow electrolytes, renal function and and blood counts  13. Bronchial hygiene with respiratory therapy techniques, bronchodilators  14. Pt needs IV fluids with additives and Drug therapy requiring intensive monitoring for toxicity  15. Prescription drug management with home med reconciliation reviewed  16. DVT, SUP prophylaxis  17.  Will be available to assist in medical management while in the CCU pending disposition     [x] High complexity decision making was performed  [x] See my orders for details  HPI  61-year-old male came in because of chest pain with shortness of breath he has also recurrent episodes of syncope significant past medical history of vocal cord dysfunction, pulmonary Moll[place], coronary artery disease status post stent COPD current everyday smoker no history of sleep apnea he was having swelling of the lower extremities for couple of months today came into the emergency room as previously was having chest pain or shortness of breath he passed out subsequently got intubated and now on ventilator critical care consult was called. PMH:  has no past medical history on file. PSH:   has no past surgical history on file. FHX: family history is not on file.      SHX:      ALL:   Allergies   Allergen Reactions    Sulfa (Sulfonamide Antibiotics) Other (comments)     syncope          MEDS:   [x] Reviewed - As Below   [] Not reviewed    Current Facility-Administered Medications   Medication    dexmedeTOMidine (PRECEDEX) 400 mcg in 0.9% sodium chloride (MBP/ADV) 100 mL MBP    glycopyrrolate (ROBINUL) injection 0.1 mg    furosemide (LASIX) injection 40 mg    albuterol-ipratropium (DUO-NEB) 2.5 MG-0.5 MG/3 ML    bethanechol (URECHOLINE) tablet 100 mg    finasteride (PROSCAR) tablet 5 mg    OXcarbazepine (TRILEPTAL) tablet 150 mg    tamsulosin (FLOMAX) capsule 0.4 mg    sodium chloride (NS) flush 5-40 mL    sodium chloride (NS) flush 5-40 mL    acetaminophen (TYLENOL) tablet 650 mg    Or    acetaminophen (TYLENOL) suppository 650 mg    polyethylene glycol (MIRALAX) packet 17 g    ondansetron (ZOFRAN ODT) tablet 4 mg    Or    ondansetron (ZOFRAN) injection 4 mg    enoxaparin (LOVENOX) injection 40 mg    famotidine (PF) (PEPCID) 20 mg in 0.9% sodium chloride 10 mL injection    labetaloL (NORMODYNE;TRANDATE) 20 mg/4 mL (5 mg/mL) injection 10 mg    amLODIPine (NORVASC) tablet 10 mg    aspirin chewable tablet 81 mg    atorvastatin (LIPITOR) tablet 80 mg    clopidogreL (PLAVIX) tablet 75 mg    metoprolol succinate (TOPROL-XL) XL tablet 25 mg    methylPREDNISolone (PF) (SOLU-MEDROL) injection 40 mg    albuterol-ipratropium (DUO-NEB) 2.5 MG-0.5 MG/3 ML    budesonide (PULMICORT) 500 mcg/2 ml nebulizer suspension    midazolam in normal saline (VERSED) 1 mg/mL infusion    propofol (DIPRIVAN) 10 mg/mL infusion      MAR reviewed and pertinent medications noted or modified as needed   Current Facility-Administered Medications   Medication    dexmedeTOMidine (PRECEDEX) 400 mcg in 0.9% sodium chloride (MBP/ADV) 100 mL MBP    glycopyrrolate (ROBINUL) injection 0.1 mg    furosemide (LASIX) injection 40 mg    albuterol-ipratropium (DUO-NEB) 2.5 MG-0.5 MG/3 ML    bethanechol (URECHOLINE) tablet 100 mg    finasteride (PROSCAR) tablet 5 mg    OXcarbazepine (TRILEPTAL) tablet 150 mg    tamsulosin (FLOMAX) capsule 0.4 mg    sodium chloride (NS) flush 5-40 mL    sodium chloride (NS) flush 5-40 mL    acetaminophen (TYLENOL) tablet 650 mg    Or    acetaminophen (TYLENOL) suppository 650 mg    polyethylene glycol (MIRALAX) packet 17 g    ondansetron (ZOFRAN ODT) tablet 4 mg    Or    ondansetron (ZOFRAN) injection 4 mg    enoxaparin (LOVENOX) injection 40 mg    famotidine (PF) (PEPCID) 20 mg in 0.9% sodium chloride 10 mL injection    labetaloL (NORMODYNE;TRANDATE) 20 mg/4 mL (5 mg/mL) injection 10 mg    amLODIPine (NORVASC) tablet 10 mg    aspirin chewable tablet 81 mg    atorvastatin (LIPITOR) tablet 80 mg    clopidogreL (PLAVIX) tablet 75 mg    metoprolol succinate (TOPROL-XL) XL tablet 25 mg    methylPREDNISolone (PF) (SOLU-MEDROL) injection 40 mg    albuterol-ipratropium (DUO-NEB) 2.5 MG-0.5 MG/3 ML    budesonide (PULMICORT) 500 mcg/2 ml nebulizer suspension    midazolam in normal saline (VERSED) 1 mg/mL infusion    propofol (DIPRIVAN) 10 mg/mL infusion      [facility]:  has no past medical history on file.   PSH:   has no past surgical history on file. FHX: family history is not on file. SHX:       ROS:  Unable to obtain sedated    Hemodynamics:    CO:    CI:    CVP:    SVR:   PAP Systolic:    PAP Diastolic:    PVR:    HY83:        Ventilator Settings:      Mode Rate TV Press PEEP FiO2 PIP Min. Vent   Assist control,Volume control    580 ml    8 cm H20 35 %  39 cm H2O  9.7 l/min        Vital Signs: Telemetry:    normal sinus rhythm Intake/Output:   Visit Vitals  /86 (BP 1 Location: Right upper arm, BP Patient Position: At rest)   Pulse 78   Temp 98.6 °F (37 °C)   Resp 17   Ht 6' 0.01\" (1.829 m)   Wt 120.7 kg (266 lb 1.5 oz)   SpO2 98%   BMI 36.08 kg/m²       Temp (24hrs), Av.5 °F (36.9 °C), Min:97.1 °F (36.2 °C), Max:99.3 °F (37.4 °C)        O2 Device: Ventilator O2 Flow Rate (L/min): 15 l/min       Wt Readings from Last 4 Encounters:   22 120.7 kg (266 lb 1.5 oz)          Intake/Output Summary (Last 24 hours) at 2022 0750  Last data filed at 2022 0748  Gross per 24 hour   Intake 3376.31 ml   Output 3150 ml   Net 226.31 ml       Last shift:       07 -  1900  In: 300   Out: -   Last 3 shifts: 02/10 190 -  0700  In: 3676.3 [I.V.:676.3]  Out: 4192 [Urine:3675]       Physical Exam:     General:  intubated on vent  HEENT: NCAT, poor dentition, lips and mucosa dry  Eyes: anicteric; conjunctiva clear  Neck: no nodes, no cuff leak, trach midline; no accessory MM use. Chest: no deformity,   Cardiac: IR regular; no murmur;   Lungs: distant breath sounds;  bilateral wheeze  Abd: soft, NT, hypoactive BS  Ext: no edema; no joint swelling;  No clubbing  : NO rubin, clear urine  Neuro: sedated;   Psych- unable to assess  Skin: warm, dry, no cyanosis;   Pulses: 1-2+ Bilateral pedal, radial  Capillary: brisk; pale       DATA:    MAR reviewed and pertinent medications noted or modified as needed  MEDS:   Current Facility-Administered Medications   Medication    dexmedeTOMidine (PRECEDEX) 400 mcg in 0.9% sodium chloride (MBP/ADV) 100 mL MBP    glycopyrrolate (ROBINUL) injection 0.1 mg    furosemide (LASIX) injection 40 mg    albuterol-ipratropium (DUO-NEB) 2.5 MG-0.5 MG/3 ML    bethanechol (URECHOLINE) tablet 100 mg    finasteride (PROSCAR) tablet 5 mg    OXcarbazepine (TRILEPTAL) tablet 150 mg    tamsulosin (FLOMAX) capsule 0.4 mg    sodium chloride (NS) flush 5-40 mL    sodium chloride (NS) flush 5-40 mL    acetaminophen (TYLENOL) tablet 650 mg    Or    acetaminophen (TYLENOL) suppository 650 mg    polyethylene glycol (MIRALAX) packet 17 g    ondansetron (ZOFRAN ODT) tablet 4 mg    Or    ondansetron (ZOFRAN) injection 4 mg    enoxaparin (LOVENOX) injection 40 mg    famotidine (PF) (PEPCID) 20 mg in 0.9% sodium chloride 10 mL injection    labetaloL (NORMODYNE;TRANDATE) 20 mg/4 mL (5 mg/mL) injection 10 mg    amLODIPine (NORVASC) tablet 10 mg    aspirin chewable tablet 81 mg    atorvastatin (LIPITOR) tablet 80 mg    clopidogreL (PLAVIX) tablet 75 mg    metoprolol succinate (TOPROL-XL) XL tablet 25 mg    methylPREDNISolone (PF) (SOLU-MEDROL) injection 40 mg    albuterol-ipratropium (DUO-NEB) 2.5 MG-0.5 MG/3 ML    budesonide (PULMICORT) 500 mcg/2 ml nebulizer suspension    midazolam in normal saline (VERSED) 1 mg/mL infusion    propofol (DIPRIVAN) 10 mg/mL infusion        Labs:    Recent Labs     02/10/22  0514   WBC 12.9*   HGB 12.1        Recent Labs     02/10/22  0514   *   K 4.2      CO2 26   *   BUN 35*   CREA 0.95   CA 8.6   MG 2.6*   PHOS 4.2   ALB 2.6*     Recent Labs     02/12/22  0317 02/11/22  0621 02/10/22  0210   PH 7.44 7.47* 7.44   PCO2 48* 41 44   PO2 81 106* 94   HCO3 31* 29* 29*   FIO2 35.0 40.0 40.0     Recent Labs     02/10/22  0514        No results found for: BNPP, BNP   No results found for: CULT  Lab Results   Component Value Date/Time    TSH 1.38 02/08/2022 03:53 AM        Imaging:    Results from Hospital Encounter encounter on 02/07/22    XR CHEST PORT    Narrative  CHF. Comparison chest x-ray 2/10/2022. Impression  FINDINGS: IMPRESSION: Single frontal view of the chest.  ET tube, enteric tube remain. Normal heart size. No vascular congestion or pulmonary edema. The lungs are well inflated. No focal infiltrate, pleural effusion, or  pneumothorax. Unchanged biapical pleural-parenchymal thickening. Upper lung  oligemia from emphysema. No free air under the diaphragm. Results from Hospital Encounter encounter on 02/07/22    CTA CHEST ABD PELV W WO CONT    Narrative  CTA chest, abdomen and pelvis without with intravenous contrast, 100 cc  Isovue-370, 3-D MIP images    Dose reduction: All CT scans at this facility are performed using dose reduction  optimization techniques as appropriate to a performed exam including the  following: Automated exposure control, adjustments of the mA and/or kV according  to patient size, or use of iterative reconstruction technique. INDICATION: Hypoxic    COMPARISON: CT chest 5/24/2018    FINDINGS:    No aneurysm or dissection of thoracic or abdominal aorta. Atherosclerosis  including coronary arteries. No central pulmonary emboli. No pleural or  pericardial effusions. No mediastinal adenopathy. Emphysematous changes in the  lungs. Minimal basilar atelectasis. Bilateral apical pleural thickening/scar  again noted. No pneumothorax. Endotracheal tube. Tip of NG tube in stomach. Difficult to exclude small thyroid nodules due to artifacts. Liver, spleen and pancreas are unremarkable. Gallbladder is present without  pericholecystic stranding. No urinary stone or hydronephrosis on either side. Bilateral renal lesions, largest about 1.7 cm on the right side posteriorly, are  too small to characterize, may represent cyst cyst, hyperdense cyst or other. No bowel obstruction. Residual contrast in parts of colon. No contrast  extravasation in the remainder of the bowel.  No evidence of diverticulitis. Normal appendix. No abscess. No free intraperitoneal air. Prostate measures  about 4.5 x 3.5 cm in diameter containing small calcifications. There are some  fatty changes/atrophy of some of the muscles. No acute fracture in the  visualized bony structures. Impression  No aneurysm or dissection of aorta. Atherosclerosis including coronary arteries. Pulmonary emphysema. No intra-abdominal inflammatory changes or bowel obstruction. Follow-up as  clinically indicated. Please see full report.         · 2/12 patient condition got worse yesterday and he became cyanotic while he was on a ventilator transferred to ICU he is back on Precedex and propofol ABG acceptable

## 2022-02-13 ENCOUNTER — APPOINTMENT (OUTPATIENT)
Dept: GENERAL RADIOLOGY | Age: 67
DRG: 207 | End: 2022-02-13
Attending: INTERNAL MEDICINE
Payer: MEDICARE

## 2022-02-13 LAB
ARTERIAL PATENCY WRIST A: ABNORMAL
BASE EXCESS BLDA CALC-SCNC: 7.5 MMOL/L (ref 0–2)
BDY SITE: ABNORMAL
EPAP/CPAP/PEEP, PAPEEP: 8
FIO2 ON VENT: 35 %
GAS FLOW.O2 SETTING OXYMISER: 15 L/MIN
GLUCOSE BLD STRIP.AUTO-MCNC: 130 MG/DL (ref 65–117)
GLUCOSE BLD STRIP.AUTO-MCNC: 132 MG/DL (ref 65–117)
GLUCOSE BLD STRIP.AUTO-MCNC: 143 MG/DL (ref 65–117)
HCO3 BLDA-SCNC: 31 MMOL/L (ref 22–26)
PCO2 BLDA: 48 MMHG (ref 35–45)
PERFORMED BY, TECHID: ABNORMAL
PH BLDA: 7.45 [PH] (ref 7.35–7.45)
PO2 BLDA: 88 MMHG (ref 75–100)
SAO2 % BLD: 95 %
SAO2% DEVICE SAO2% SENSOR NAME: ABNORMAL
VENTILATION MODE VENT: ABNORMAL
VT SETTING VENT: 580 ML

## 2022-02-13 PROCEDURE — 74011250637 HC RX REV CODE- 250/637: Performed by: INTERNAL MEDICINE

## 2022-02-13 PROCEDURE — 82962 GLUCOSE BLOOD TEST: CPT

## 2022-02-13 PROCEDURE — 77010033678 HC OXYGEN DAILY

## 2022-02-13 PROCEDURE — 74011000250 HC RX REV CODE- 250: Performed by: INTERNAL MEDICINE

## 2022-02-13 PROCEDURE — 65610000006 HC RM INTENSIVE CARE

## 2022-02-13 PROCEDURE — 74011250636 HC RX REV CODE- 250/636: Performed by: INTERNAL MEDICINE

## 2022-02-13 PROCEDURE — 87086 URINE CULTURE/COLONY COUNT: CPT

## 2022-02-13 PROCEDURE — 94640 AIRWAY INHALATION TREATMENT: CPT

## 2022-02-13 PROCEDURE — 71045 X-RAY EXAM CHEST 1 VIEW: CPT

## 2022-02-13 PROCEDURE — 74011000258 HC RX REV CODE- 258: Performed by: INTERNAL MEDICINE

## 2022-02-13 PROCEDURE — 36600 WITHDRAWAL OF ARTERIAL BLOOD: CPT

## 2022-02-13 PROCEDURE — 87185 SC STD ENZYME DETCJ PER NZM: CPT

## 2022-02-13 PROCEDURE — 82803 BLOOD GASES ANY COMBINATION: CPT

## 2022-02-13 PROCEDURE — 74011250637 HC RX REV CODE- 250/637: Performed by: HOSPITALIST

## 2022-02-13 PROCEDURE — 94003 VENT MGMT INPAT SUBQ DAY: CPT

## 2022-02-13 PROCEDURE — 87077 CULTURE AEROBIC IDENTIFY: CPT

## 2022-02-13 PROCEDURE — 87070 CULTURE OTHR SPECIMN AEROBIC: CPT

## 2022-02-13 PROCEDURE — 87186 SC STD MICRODIL/AGAR DIL: CPT

## 2022-02-13 RX ORDER — ACETAZOLAMIDE 500 MG/1
500 CAPSULE, EXTENDED RELEASE ORAL EVERY 6 HOURS
Status: COMPLETED | OUTPATIENT
Start: 2022-02-13 | End: 2022-02-13

## 2022-02-13 RX ADMIN — OXCARBAZEPINE 150 MG: 150 TABLET, FILM COATED ORAL at 09:06

## 2022-02-13 RX ADMIN — SODIUM CHLORIDE, PRESERVATIVE FREE 10 ML: 5 INJECTION INTRAVENOUS at 21:24

## 2022-02-13 RX ADMIN — TAMSULOSIN HYDROCHLORIDE 0.4 MG: 0.4 CAPSULE ORAL at 09:06

## 2022-02-13 RX ADMIN — METHYLPREDNISOLONE SODIUM SUCCINATE 40 MG: 40 INJECTION, POWDER, FOR SOLUTION INTRAMUSCULAR; INTRAVENOUS at 13:06

## 2022-02-13 RX ADMIN — METHYLPREDNISOLONE SODIUM SUCCINATE 40 MG: 40 INJECTION, POWDER, FOR SOLUTION INTRAMUSCULAR; INTRAVENOUS at 21:22

## 2022-02-13 RX ADMIN — BETHANECHOL CHLORIDE 100 MG: 25 TABLET ORAL at 09:06

## 2022-02-13 RX ADMIN — SODIUM CHLORIDE, PRESERVATIVE FREE 20 MG: 5 INJECTION INTRAVENOUS at 20:37

## 2022-02-13 RX ADMIN — ACETAMINOPHEN 325MG 650 MG: 325 TABLET ORAL at 20:38

## 2022-02-13 RX ADMIN — ACETAMINOPHEN 325MG 650 MG: 325 TABLET ORAL at 05:52

## 2022-02-13 RX ADMIN — SODIUM CHLORIDE, PRESERVATIVE FREE 10 ML: 5 INJECTION INTRAVENOUS at 13:08

## 2022-02-13 RX ADMIN — METOPROLOL SUCCINATE 25 MG: 25 TABLET, EXTENDED RELEASE ORAL at 09:06

## 2022-02-13 RX ADMIN — BUDESONIDE 500 MCG: 0.5 SUSPENSION RESPIRATORY (INHALATION) at 08:00

## 2022-02-13 RX ADMIN — GLYCOPYRROLATE 0.1 MG: 0.2 INJECTION INTRAMUSCULAR; INTRAVENOUS at 21:22

## 2022-02-13 RX ADMIN — CLOPIDOGREL BISULFATE 75 MG: 75 TABLET, FILM COATED ORAL at 09:06

## 2022-02-13 RX ADMIN — PROPOFOL 40 MCG/KG/MIN: 10 INJECTION, EMULSION INTRAVENOUS at 23:06

## 2022-02-13 RX ADMIN — ASPIRIN 81 MG CHEWABLE TABLET 81 MG: 81 TABLET CHEWABLE at 09:06

## 2022-02-13 RX ADMIN — PROPOFOL 30 MCG/KG/MIN: 10 INJECTION, EMULSION INTRAVENOUS at 13:06

## 2022-02-13 RX ADMIN — AMLODIPINE BESYLATE 10 MG: 5 TABLET ORAL at 09:06

## 2022-02-13 RX ADMIN — BETHANECHOL CHLORIDE 100 MG: 25 TABLET ORAL at 20:39

## 2022-02-13 RX ADMIN — ACETAZOLAMIDE 500 MG: 500 CAPSULE, EXTENDED RELEASE ORAL at 12:27

## 2022-02-13 RX ADMIN — GLYCOPYRROLATE 0.1 MG: 0.2 INJECTION INTRAMUSCULAR; INTRAVENOUS at 17:46

## 2022-02-13 RX ADMIN — FUROSEMIDE 40 MG: 10 INJECTION, SOLUTION INTRAMUSCULAR; INTRAVENOUS at 09:05

## 2022-02-13 RX ADMIN — GLYCOPYRROLATE 0.1 MG: 0.2 INJECTION INTRAMUSCULAR; INTRAVENOUS at 09:05

## 2022-02-13 RX ADMIN — METHYLPREDNISOLONE SODIUM SUCCINATE 40 MG: 40 INJECTION, POWDER, FOR SOLUTION INTRAMUSCULAR; INTRAVENOUS at 05:52

## 2022-02-13 RX ADMIN — OXCARBAZEPINE 150 MG: 150 TABLET, FILM COATED ORAL at 20:38

## 2022-02-13 RX ADMIN — BUDESONIDE 500 MCG: 0.5 SUSPENSION RESPIRATORY (INHALATION) at 20:29

## 2022-02-13 RX ADMIN — DEXMEDETOMIDINE HYDROCHLORIDE 1.1 MCG/KG/HR: 100 INJECTION, SOLUTION, CONCENTRATE INTRAVENOUS at 12:27

## 2022-02-13 RX ADMIN — SODIUM CHLORIDE, PRESERVATIVE FREE 10 ML: 5 INJECTION INTRAVENOUS at 05:54

## 2022-02-13 RX ADMIN — ACETAMINOPHEN 325MG 650 MG: 325 TABLET ORAL at 13:10

## 2022-02-13 RX ADMIN — PROPOFOL 40 MCG/KG/MIN: 10 INJECTION, EMULSION INTRAVENOUS at 17:07

## 2022-02-13 RX ADMIN — DEXMEDETOMIDINE HYDROCHLORIDE 0.5 MCG/KG/HR: 100 INJECTION, SOLUTION, CONCENTRATE INTRAVENOUS at 01:21

## 2022-02-13 RX ADMIN — FINASTERIDE 5 MG: 5 TABLET, FILM COATED ORAL at 09:06

## 2022-02-13 RX ADMIN — PROPOFOL 30 MCG/KG/MIN: 10 INJECTION, EMULSION INTRAVENOUS at 01:20

## 2022-02-13 RX ADMIN — IPRATROPIUM BROMIDE AND ALBUTEROL SULFATE 3 ML: 2.5; .5 SOLUTION RESPIRATORY (INHALATION) at 13:11

## 2022-02-13 RX ADMIN — SODIUM CHLORIDE, PRESERVATIVE FREE 20 MG: 5 INJECTION INTRAVENOUS at 09:06

## 2022-02-13 RX ADMIN — IPRATROPIUM BROMIDE AND ALBUTEROL SULFATE 3 ML: 2.5; .5 SOLUTION RESPIRATORY (INHALATION) at 20:29

## 2022-02-13 RX ADMIN — IPRATROPIUM BROMIDE AND ALBUTEROL SULFATE 3 ML: 2.5; .5 SOLUTION RESPIRATORY (INHALATION) at 07:59

## 2022-02-13 RX ADMIN — PROPOFOL 30 MCG/KG/MIN: 10 INJECTION, EMULSION INTRAVENOUS at 08:53

## 2022-02-13 RX ADMIN — PROPOFOL 40 MCG/KG/MIN: 10 INJECTION, EMULSION INTRAVENOUS at 19:39

## 2022-02-13 RX ADMIN — ENOXAPARIN SODIUM 40 MG: 100 INJECTION SUBCUTANEOUS at 09:06

## 2022-02-13 RX ADMIN — PROPOFOL 30 MCG/KG/MIN: 10 INJECTION, EMULSION INTRAVENOUS at 04:51

## 2022-02-13 RX ADMIN — DEXMEDETOMIDINE HYDROCHLORIDE 0.5 MCG/KG/HR: 100 INJECTION, SOLUTION, CONCENTRATE INTRAVENOUS at 20:44

## 2022-02-13 RX ADMIN — ATORVASTATIN CALCIUM 80 MG: 40 TABLET, FILM COATED ORAL at 09:06

## 2022-02-13 RX ADMIN — ACETAZOLAMIDE 500 MG: 500 CAPSULE, EXTENDED RELEASE ORAL at 17:46

## 2022-02-13 NOTE — PROGRESS NOTES
Neurology Progress Note    Date: 2/13/2022    Mr. Darius Wilkinson is a 73year old man is seen in follow-up and he is still intubated on sedation. He failed attempt to extubate this morning as he became very agitated and restless. About the same at the time of my evaluation. With PMH of PE used to be on coumadin but stopped for GI bleed, vocal cord dysfunctioon who was brought in to the ER for hypoxia, \" AMS\", chest pain and recurrent episodes of syncope lasting 5-10 sec. He would awaken after sternal rub but loose consciousness shortly after again. In between the episodes he is able to wake up and is alert and oriented and able to provide the Ed staff his history. He was found to be hypoxic to 86%. It seems he passed out multiple times in the ambulance as well. From the chart he has had episodes of loss of consciousness after whole body cramping episodes at home as well. CT head reported as \"no evidence of acute intracranial process\". Ct chest/abd/pelvis WNL. Subjective  intubated and sedated. No past medical history on file. No past surgical history on file. No family history on file.    Social History     Tobacco Use    Smoking status: Not on file    Smokeless tobacco: Not on file   Substance Use Topics    Alcohol use: Not on file       Current Facility-Administered Medications   Medication Dose Route Frequency Provider Last Rate Last Admin    acetaZOLAMIDE SR (DIAMOX) capsule 500 mg  500 mg Oral Q6H Erickson Nugent MD   500 mg at 02/13/22 1227    dexmedeTOMidine (PRECEDEX) 400 mcg in 0.9% sodium chloride (MBP/ADV) 100 mL MBP  0.1-1.4 mcg/kg/hr IntraVENous TITRATE Erickson Nugent MD 34 mL/hr at 02/13/22 1227 1.1 mcg/kg/hr at 02/13/22 1227    glycopyrrolate (ROBINUL) injection 0.1 mg  0.1 mg IntraVENous TID Abran Lovell MD   0.1 mg at 02/13/22 0905    furosemide (LASIX) injection 40 mg  40 mg IntraVENous DAILY Gracie Craft MD   40 mg at 02/13/22 0905    albuterol-ipratropium (DUO-NEB) 2.5 MG-0.5 MG/3 ML  3 mL Nebulization Q4H PRN Tesfaye Maurice MD        bethanechol (URECHOLINE) tablet 100 mg  100 mg Oral BID Nitin Cristina MD   100 mg at 02/13/22 8602    finasteride (PROSCAR) tablet 5 mg  5 mg Oral DAILY Tesfaye Maurice MD   5 mg at 02/13/22 2627    OXcarbazepine (TRILEPTAL) tablet 150 mg  150 mg Oral BID Nitin Cristina MD   150 mg at 02/13/22 8020    tamsulosin (FLOMAX) capsule 0.4 mg  0.4 mg Oral DAILY Nitin Cristina MD   0.4 mg at 02/13/22 0861    sodium chloride (NS) flush 5-40 mL  5-40 mL IntraVENous Q8H Tesfaye Maurice MD   10 mL at 02/13/22 1308    sodium chloride (NS) flush 5-40 mL  5-40 mL IntraVENous PRN Nitin Cristina MD        acetaminophen (TYLENOL) tablet 650 mg  650 mg Oral Q6H PRN Nitin Cristina MD   650 mg at 02/13/22 1310    Or    acetaminophen (TYLENOL) suppository 650 mg  650 mg Rectal Q6H PRN Tesfaye Maurice MD        polyethylene glycol (MIRALAX) packet 17 g  17 g Oral DAILY PRN Tesfaye Maurice MD        ondansetron (ZOFRAN ODT) tablet 4 mg  4 mg Oral Q8H PRN Tesfaye Maurice MD        Or    ondansetron (ZOFRAN) injection 4 mg  4 mg IntraVENous Q6H PRN Tesfaye Maurice MD        enoxaparin (LOVENOX) injection 40 mg  40 mg SubCUTAneous DAILY Tesfaye Maurice MD   40 mg at 02/13/22 0906    famotidine (PF) (PEPCID) 20 mg in 0.9% sodium chloride 10 mL injection  20 mg IntraVENous Q12H Nitin Cristina MD   20 mg at 02/13/22 0906    labetaloL (NORMODYNE;TRANDATE) 20 mg/4 mL (5 mg/mL) injection 10 mg  10 mg IntraVENous Q4H PRN Chio Nix MD   10 mg at 02/08/22 1741    amLODIPine (NORVASC) tablet 10 mg  10 mg Oral DAILY Chio Nix MD   10 mg at 02/13/22 4399    aspirin chewable tablet 81 mg  81 mg Oral DAILY Duyen Teixeira MD   81 mg at 02/13/22 0906    atorvastatin (LIPITOR) tablet 80 mg  80 mg Oral DAILY Duyen Teixeira MD   80 mg at 02/13/22 0906    clopidogreL (PLAVIX) tablet 75 mg  75 mg Oral DAILY Duyen Teixeira MD   75 mg at 02/13/22 0906    metoprolol succinate (TOPROL-XL) XL tablet 25 mg  25 mg Oral DAILY Duyen Teixeira MD   25 mg at 02/13/22 0906    methylPREDNISolone (PF) (SOLU-MEDROL) injection 40 mg  40 mg IntraVENous Q8H Kina Lovell MD   40 mg at 02/13/22 1306    albuterol-ipratropium (DUO-NEB) 2.5 MG-0.5 MG/3 ML  3 mL Nebulization Q6HWA RT Kina Lovell MD   3 mL at 02/13/22 1311    budesonide (PULMICORT) 500 mcg/2 ml nebulizer suspension  500 mcg Nebulization BID RT Kina Lovell MD   500 mcg at 02/13/22 0800    midazolam in normal saline (VERSED) 1 mg/mL infusion  0-10 mg/hr IntraVENous TITRATE Grayson Rosa MD 2 mL/hr at 02/10/22 2202 2 mg/hr at 02/10/22 2202    propofol (DIPRIVAN) 10 mg/mL infusion  0-50 mcg/kg/min IntraVENous TITRATE Grayson Rosa MD 31.2 mL/hr at 02/13/22 1550 40 mcg/kg/min at 02/13/22 1550      Review of Systems   Unable to perform ROS: Intubated     Objective     Vital signs for last 24 hours:  Visit Vitals  /81 (BP 1 Location: Right upper arm, BP Patient Position: At rest)   Pulse 86   Temp (!) 101.3 °F (38.5 °C)   Resp 17   Ht 6' 0.01\" (1.829 m)   Wt 111.7 kg (246 lb 4.1 oz)   SpO2 96%   BMI 33.39 kg/m²     Intake/Output this shift:  Current Shift: 02/13 0701 - 02/13 1900  In: 620   Out: 1800 [Urine:1800]  Last 3 Shifts: 02/11 1901 - 02/13 0700  In: 6669.5 [I.V.:1394.5]  Out: 3124 [Urine:3250]    Data Review:   Recent Results (from the past 24 hour(s))   GLUCOSE, POC    Collection Time: 02/12/22 11:45 PM   Result Value Ref Range    Glucose (POC) 138 (H) 65 - 117 mg/dL    Performed by Elvi Landeros    BLOOD GAS, ARTERIAL    Collection Time: 02/13/22  4:00 AM   Result Value Ref Range    pH 7.45 7.35 - 7.45      PCO2 48 (H) 35 - 45 mmHg    PO2 88 75 - 100 mmHg    O2 SAT 95 (L) >95 %    BICARBONATE 31 (H) 22 - 26 mmol/L    BASE EXCESS 7.5 (H) 0 - 2 mmol/L    O2 METHOD VENT      FIO2 35.0 %    MODE Assist Control/Volume Control      Tidal volume 580      SET RATE 15      EPAP/CPAP/PEEP 8.0 SITE Right Radial      VÍCTOR'S TEST PASS     GLUCOSE, POC    Collection Time: 02/13/22  5:13 AM   Result Value Ref Range    Glucose (POC) 132 (H) 65 - 117 mg/dL    Performed by [facility]      Physical Exam    Neuro Physical Exam    General: Well developed, well nourished. Morbidly obese and intubated on sedation    Neurological Exam:  Mental Status: Sedated on propofol: examined Does not open eyes to sternal rub, pupils equal and sluggishly reactive. No withdrawal to pain noticed today  Babinski: mute b/l     Assessment and Plan: Mr. All Lundberg is a 73year old man who comes in with hypoxia and recurrent episodes of syncope. The description of these events is NOT consistent with seizures. So far cardiac workup has not revealed any etiology for these events. He needs to be on as minimal sedation as possible. He is still on versed. Use as minimal propofol as needed and taper off the Versed. At this time etiology of recurrent syncope is not clear. If this recurs post extubation then eeg can be considered. CTA head and neck did not reveal any stenotic disease, other than 30-35% stenosis of the origin of the right ICA, which does not explain the spells he had been having. It is possible that he may be going in and out of hypoxia contributing to these spells.     Thank you,    Nubia Watson MD

## 2022-02-13 NOTE — PROGRESS NOTES
IMPRESSION:   1. Acute hypoxic respiratory failure evelyn on the ventilator FiO2 down to 35%  2. Low-grade fever no obvious site chest x-ray looks clear we will send a sputum  3. Hypercapnia with metabolic alkalosis will give Diamox today  4. NSTEMI  5. Generalized Edema  6. Syncope  7. COPD  8.       RECOMMENDATIONS/PLAN:   1. ICU monitoring  1. Ventilator for mechanical life support and prevent respiratory arrest with protective lung strategies sedated   2. Patient is on assist control mode 35% FiO2 PEEP of 8 rate of 15   3. Did sedation vacation yesterday patient was put on spontaneous he had low tidal volume goes into apnea now he is back on assist control back on sedation   4. Patient on IV Solu-Medrol nebulizer treatment Covid negative  5. Hold trilogy inhaler continue nebulized albuterol Atrovent budesonide  6. Chest x-ray no acute infiltrate which shows emphysematous changes  7. Patient was to be followed with ENT he never had any appointment with them they were resuming patient has vocal cord dysfunction once extubated we will get ENT evaluation  8. [x] High complexity decision making was performed  [x] See my orders for details  HPI  28-year-old male came in because of chest pain with shortness of breath he has also recurrent episodes of syncope significant past medical history of vocal cord dysfunction, pulmonary [place], coronary artery disease status post stent COPD current everyday smoker no history of sleep apnea he was having swelling of the lower extremities for couple of months today came into the emergency room as previously was having chest pain or shortness of breath he passed out subsequently got intubated and now on ventilator critical care consult was called. PMH:  has no past medical history on file. PSH:   has no past surgical history on file. FHX: family history is not on file.      SHX:      ALL:   Allergies   Allergen Reactions    Sulfa (Sulfonamide Antibiotics) Other (comments)     syncope          MEDS:   [x] Reviewed - As Below   [] Not reviewed    Current Facility-Administered Medications   Medication    dexmedeTOMidine (PRECEDEX) 400 mcg in 0.9% sodium chloride (MBP/ADV) 100 mL MBP    glycopyrrolate (ROBINUL) injection 0.1 mg    furosemide (LASIX) injection 40 mg    albuterol-ipratropium (DUO-NEB) 2.5 MG-0.5 MG/3 ML    bethanechol (URECHOLINE) tablet 100 mg    finasteride (PROSCAR) tablet 5 mg    OXcarbazepine (TRILEPTAL) tablet 150 mg    tamsulosin (FLOMAX) capsule 0.4 mg    sodium chloride (NS) flush 5-40 mL    sodium chloride (NS) flush 5-40 mL    acetaminophen (TYLENOL) tablet 650 mg    Or    acetaminophen (TYLENOL) suppository 650 mg    polyethylene glycol (MIRALAX) packet 17 g    ondansetron (ZOFRAN ODT) tablet 4 mg    Or    ondansetron (ZOFRAN) injection 4 mg    enoxaparin (LOVENOX) injection 40 mg    famotidine (PF) (PEPCID) 20 mg in 0.9% sodium chloride 10 mL injection    labetaloL (NORMODYNE;TRANDATE) 20 mg/4 mL (5 mg/mL) injection 10 mg    amLODIPine (NORVASC) tablet 10 mg    aspirin chewable tablet 81 mg    atorvastatin (LIPITOR) tablet 80 mg    clopidogreL (PLAVIX) tablet 75 mg    metoprolol succinate (TOPROL-XL) XL tablet 25 mg    methylPREDNISolone (PF) (SOLU-MEDROL) injection 40 mg    albuterol-ipratropium (DUO-NEB) 2.5 MG-0.5 MG/3 ML    budesonide (PULMICORT) 500 mcg/2 ml nebulizer suspension    midazolam in normal saline (VERSED) 1 mg/mL infusion    propofol (DIPRIVAN) 10 mg/mL infusion      MAR reviewed and pertinent medications noted or modified as needed   Current Facility-Administered Medications   Medication    dexmedeTOMidine (PRECEDEX) 400 mcg in 0.9% sodium chloride (MBP/ADV) 100 mL MBP    glycopyrrolate (ROBINUL) injection 0.1 mg    furosemide (LASIX) injection 40 mg    albuterol-ipratropium (DUO-NEB) 2.5 MG-0.5 MG/3 ML    bethanechol (URECHOLINE) tablet 100 mg    finasteride (PROSCAR) tablet 5 mg    OXcarbazepine (TRILEPTAL) tablet 150 mg    tamsulosin (FLOMAX) capsule 0.4 mg    sodium chloride (NS) flush 5-40 mL    sodium chloride (NS) flush 5-40 mL    acetaminophen (TYLENOL) tablet 650 mg    Or    acetaminophen (TYLENOL) suppository 650 mg    polyethylene glycol (MIRALAX) packet 17 g    ondansetron (ZOFRAN ODT) tablet 4 mg    Or    ondansetron (ZOFRAN) injection 4 mg    enoxaparin (LOVENOX) injection 40 mg    famotidine (PF) (PEPCID) 20 mg in 0.9% sodium chloride 10 mL injection    labetaloL (NORMODYNE;TRANDATE) 20 mg/4 mL (5 mg/mL) injection 10 mg    amLODIPine (NORVASC) tablet 10 mg    aspirin chewable tablet 81 mg    atorvastatin (LIPITOR) tablet 80 mg    clopidogreL (PLAVIX) tablet 75 mg    metoprolol succinate (TOPROL-XL) XL tablet 25 mg    methylPREDNISolone (PF) (SOLU-MEDROL) injection 40 mg    albuterol-ipratropium (DUO-NEB) 2.5 MG-0.5 MG/3 ML    budesonide (PULMICORT) 500 mcg/2 ml nebulizer suspension    midazolam in normal saline (VERSED) 1 mg/mL infusion    propofol (DIPRIVAN) 10 mg/mL infusion      PMH:  has no past medical history on file. PSH:   has no past surgical history on file. FHX: family history is not on file. SHX:       ROS:  Unable to obtain sedated    Hemodynamics:    CO:    CI:    CVP:    SVR:   PAP Systolic:    PAP Diastolic:    PVR:    DM69:        Ventilator Settings:      Mode Rate TV Press PEEP FiO2 PIP Min.  Vent   Assist control,Volume control    580 ml    8 cm H20 35 %  35 cm H2O  12.1 l/min        Vital Signs: Telemetry:    normal sinus rhythm Intake/Output:   Visit Vitals  /86   Pulse 79   Temp 100.2 °F (37.9 °C)   Resp 17   Ht 6' 0.01\" (1.829 m)   Wt 111.7 kg (246 lb 4.1 oz)   SpO2 96%   BMI 33.39 kg/m²       Temp (24hrs), Av.8 °F (37.7 °C), Min:98.6 °F (37 °C), Max:100.6 °F (38.1 °C)        O2 Device: Ventilator O2 Flow Rate (L/min): 15 l/min       Wt Readings from Last 4 Encounters:   22 111.7 kg (246 lb 4.1 oz)          Intake/Output Summary (Last 24 hours) at 2/13/2022 1025  Last data filed at 2/13/2022 0630  Gross per 24 hour   Intake 4435.74 ml   Output 2400 ml   Net 2035.74 ml       Last shift:      No intake/output data recorded. Last 3 shifts: 02/11 1901 - 02/13 0700  In: 6344.5 [I.V.:1394.5]  Out: 0 [Urine:3250]       Physical Exam:     General:  intubated on vent unresponsive on propofol  HEENT: NCAT,   Eyes: anicteric; conjunctiva clear doll's eye reflex present  Neck: no nodes, no cuff leak, trach midline; no accessory MM use. Chest: no deformity,   Cardiac: IR regular; no murmur;   Lungs: distant breath sounds; there anteriorly and laterally diminished in the bases no wheezing or rales  Abd: soft, NT, hypoactive BS  Ext: no edema; no joint swelling;  No clubbing  : clear urine  Neuro: Sedated on propofol and Precedex unresponsive doll's eye reflex present flaccid extremities  Psych- unable to assess  Skin: warm, dry, no cyanosis;   Pulses: Brachial and radial pulses intact  Capillary: Normal capillary refill      DATA:    MAR reviewed and pertinent medications noted or modified as needed  MEDS:   Current Facility-Administered Medications   Medication    dexmedeTOMidine (PRECEDEX) 400 mcg in 0.9% sodium chloride (MBP/ADV) 100 mL MBP    glycopyrrolate (ROBINUL) injection 0.1 mg    furosemide (LASIX) injection 40 mg    albuterol-ipratropium (DUO-NEB) 2.5 MG-0.5 MG/3 ML    bethanechol (URECHOLINE) tablet 100 mg    finasteride (PROSCAR) tablet 5 mg    OXcarbazepine (TRILEPTAL) tablet 150 mg    tamsulosin (FLOMAX) capsule 0.4 mg    sodium chloride (NS) flush 5-40 mL    sodium chloride (NS) flush 5-40 mL    acetaminophen (TYLENOL) tablet 650 mg    Or    acetaminophen (TYLENOL) suppository 650 mg    polyethylene glycol (MIRALAX) packet 17 g    ondansetron (ZOFRAN ODT) tablet 4 mg    Or    ondansetron (ZOFRAN) injection 4 mg    enoxaparin (LOVENOX) injection 40 mg    famotidine (PF) (PEPCID) 20 mg in 0.9% sodium chloride 10 mL injection    labetaloL (NORMODYNE;TRANDATE) 20 mg/4 mL (5 mg/mL) injection 10 mg    amLODIPine (NORVASC) tablet 10 mg    aspirin chewable tablet 81 mg    atorvastatin (LIPITOR) tablet 80 mg    clopidogreL (PLAVIX) tablet 75 mg    metoprolol succinate (TOPROL-XL) XL tablet 25 mg    methylPREDNISolone (PF) (SOLU-MEDROL) injection 40 mg    albuterol-ipratropium (DUO-NEB) 2.5 MG-0.5 MG/3 ML    budesonide (PULMICORT) 500 mcg/2 ml nebulizer suspension    midazolam in normal saline (VERSED) 1 mg/mL infusion    propofol (DIPRIVAN) 10 mg/mL infusion        Labs:      Recent Labs     02/13/22  0400 02/12/22  0317 02/11/22  0621   PH 7.45 7.44 7.47*   PCO2 48* 48* 41   PO2 88 81 106*   HCO3 31* 31* 29*   FIO2 35.0 35.0 40.0   2/13 AC 15  PEEP 8 FiO2 35%  Lab Results   Component Value Date/Time    TSH 1.38 02/08/2022 03:53 AM        Imaging:    Results from Hospital Encounter encounter on 02/07/22    XR CHEST PORT    Narrative  Examination: XR CHEST PORT    History: chf    Comparison: Chest radiograph 2/12/2022    FINDINGS:    Frontal portable views chest. 2 images total. Suboptimal patient positioning. Within exam limitations endotracheal tube projects over the mid intrathoracic  trachea. Enteric tube courses below the diaphragm with tip excluded from view. Please note that the right lung base is incompletely imaged. Within exam  limitations there is no definite focal airspace consolidation, pneumothorax, or  significant pleural effusion evident. The cardiac silhouette is within normal  limits for size. Mediastinal contours and osseous structures appear unchanged. Impression  No significant interval change. Results from [place] encounter on 02/07/22    CTA HEAD NECK W WO CONT    Narrative  Study: Head and Neck CTA without and with contrast.    Clinical Indication: Altered mental status. Comparison: Head CT dated 2/7/2022.     Technique: Routine unenhanced axial acquisition of the head and neck was  performed. Subsequently, contiguous thin section axial acquisition of the head  and neck was performed in the arterial phase from the thoracic inlet to the  skull vertex following the intravenous administration of 100 mL Isovue-370. Coronal, sagittal, and MIP reconstructions were generated and reviewed. Dose  reduction: All CT scans at this facility are performed using dose reduction  optimization techniques as appropriate to a performed exam including the  following-automated exposure control, adjustments of mA and/or Kv according to  patient size, or use of iterative reconstructive technique. Findings:    Head CT:  The ventricles are unchanged in size and configuration. No midline shift. The basal cisterns are patent. No acute hemorrhage, abnormal  extra-axial fluid, or evidence of large territorial ischemia. Scattered foci of  hypodensity involving the cerebral hemispheric white matter are nonspecific but  most commonly associated with chronic small vessel ischemic changes. Unremarkable orbits. Trace right maxillary sinus mucosal thickening. No  evidence of an aggressive calvarial or extracalvarial lesion. Neck CTA:  The aortic arch and great vessel origins are unremarkable. The common carotid arteries are patent without high-grade stenosis. Outside  plaque in the carotid bulbs causing 30-35% stenosis of the right internal  carotid artery origin based on NASCET-like criteria. The left more distal right  cervical internal carotid arteries are patent without high-grade stenosis. The  external carotid arteries are unremarkable. The cervical vertebral arteries are patent without high-grade stenosis. Endotracheal and orogastric tubes. Emphysema. Multilevel degenerative changes of  the cervical spine. Fusion of the C3 and C4 vertebral bodies.     Head CTA:  Scattered calcified atherosclerosis involving the bilateral intracranial  internal carotid arteries without significant stenosis. The anterior cerebral  arteries are patent without high-grade stenosis. The middle cerebral arteries  are patent without high-grade stenosis. The intracranial vertebral arteries are patent without high-grade stenosis. The  basilar artery is patent without high-grade stenosis. The posterior cerebral  arteries are patent without high-grade stenosis. No aneurysm or high flow vascular malformation. Impression  Head CT:  No acute intracranial abnormality. Head and neck CTA:  1. Mild (30-35%) stenosis of the right ICA origin. 2.  No occlusion or high-grade stenosis of the remaining major cervical or  intracranial arterial vasculature. Please note that degrees of carotid stenosis are measured in accordance with  NASCET-like criteria. · 2/12 patient condition got worse yesterday and he became cyanotic while he was on a ventilator transferred to ICU he is back on Precedex and propofol ABG acceptable  · 2/13 back on ventilator assist control. Low-grade fever 100.6. Will check urine and sputum cultures although chest x-ray looks clear. Will decrease PEEP to 5.   Repeat labs in a.m. hypercapnia today we will give Diamox  · Time of care 30 minutes  · Dyllan Cohen MD

## 2022-02-13 NOTE — PROGRESS NOTES
Progress Note    Patient: Melanie Tolliver MRN: [id number]  SSN: [id number]    YOB: 1955  Age: 73 y.o. Sex: male      Admit Date: 2/7/2022    LOS: 6 days     Subjective:     66M, h/o COPD not on oxygen, pulmonary embolism (not on AC s/t GI bleed), CAD s/p stent presented with acute encephalopathy and syncope complicated by HTN urgency and episode of hypoxia and acute hypoxic respiratory failure and was intubated    HOSPITAL COURSE:   Initial workup showed no signs of infection- CT chest. CXR no pneumonia, UA no signs of infection, cardiology plans on ischemic workup after extubation, description not consistent with seizures. He does have NSTEMI type II s.t HTN urgency. Plan to wean vent tomorrow    Review of Systems:  Cannot be assessed due to men bob status      Objective:     Vitals:    02/13/22 0803 02/13/22 0900 02/13/22 1000 02/13/22 1116   BP:  130/78 136/86    Pulse: 81 78 79 76   Resp: 19 19 17    Temp:       SpO2: 97% 96% 96% 96%   Weight:       Height:            Intake and Output:  Current Shift: 02/13 0701 - 02/13 1900  In: 135   Out: -   Last three shifts: 02/11 1901 - 02/13 0700  In: 6669.5 [I.V.:1394.5]  Out: 3250 [Urine:3250]      Physical Exam:   Physical Exam:  General: Intubated and sedated. Eye: conjunctivae/corneas clear. PERRL, EOM's intact. Throat and Neck: normal and no erythema or exudates noted.  No mass   Lung: clear to auscultation bilaterally  Heart: regular rate and rhythm,   Abdomen: soft, Bowel sounds normal. No masses,  Extremities:  all extremities normal, atraumatic  Skin: Normal.  Neurologic: Omitted   psychiatric: Omitted      Lab/Data Review:  Recent Results (from the past 24 hour(s))   GLUCOSE, POC    Collection Time: 02/12/22 11:45 PM   Result Value Ref Range    Glucose (POC) 138 (H) 65 - 117 mg/dL    Performed by Jasbir Sánchez, ARTERIAL    Collection Time: 02/13/22  4:00 AM   Result Value Ref Range    pH 7.45 7.35 - 7.45      PCO2 48 (H) 35 - 45 mmHg    PO2 88 75 - 100 mmHg    O2 SAT 95 (L) >95 %    BICARBONATE 31 (H) 22 - 26 mmol/L    BASE EXCESS 7.5 (H) 0 - 2 mmol/L    O2 METHOD VENT      FIO2 35.0 %    MODE Assist Control/Volume Control      Tidal volume 580      SET RATE 15      EPAP/CPAP/PEEP 8.0      SITE Right Radial      VÍCTOR'S TEST PASS     GLUCOSE, POC    Collection Time: 02/13/22  5:13 AM   Result Value Ref Range    Glucose (POC) 132 (H) 65 - 117 mg/dL    Performed by [facility]          Assessment and plan:        (1) Acute encephalopathy: GCS 12. Cardiology and neurology input appreciated. It does appear to have some severe apnea. However cardiac and neuro causes to be ruled out. (2) Acute hypoxic respiratory failure : intubated, seddated, wean sedation. Solumedrol , trelegy    (3) CAD s/p stent : aspirin, plavix,  statin    (4) NSTEMI: likely type II:    (4) COPD not on o2: complicates #1    (5) HTN urgency: PRN labetalol. amlodipine    (6) vocal chord dysfunction: OP ENT    (7) CHFpEF: lasix 40 daily     DISPO: ICU today, likely extubation tomorrow.      Signed By: Sue Tom MD     February 13, 2022

## 2022-02-13 NOTE — PROGRESS NOTES
Progress Note    Patient: Gallo Lemus MRN: [id number]  SSN: [id number]    YOB: 1955  Age: 73 y.o. Sex: male      Admit Date: 2/7/2022    LOS: 6 days     Subjective:     No acute events overnight. Remains intubated  Objective:     Vitals:    02/13/22 0803 02/13/22 0900 02/13/22 1000 02/13/22 1116   BP:  130/78 136/86    Pulse: 81 78 79 76   Resp: 19 19 17    Temp:       SpO2: 97% 96% 96% 96%   Weight:       Height:            Intake and Output:  Current Shift: 02/13 0701 - 02/13 1900  In: 135   Out: 1800 [Urine:1800]  Last three shifts: 02/11 1901 - 02/13 0700  In: 6669.5 [I.V.:1394.5]  Out: 3250 [Urine:3250]    Physical Exam:   General:   Intubated. Eyes:  Conjunctivae/corneas clear. PERRL, EOMs intact. Fundi benign   Ears:  Normal TMs and external ear canals both ears. Nose: Nares normal. Septum midline. Mucosa normal. No drainage or sinus tenderness. Mouth/Throat: Lips, mucosa, and tongue normal. Teeth and gums normal.   Neck: Supple, symmetrical, trachea midline, no adenopathy, thyroid: no enlargment/tenderness/nodules, no carotid bruit and no JVD. Back:   Symmetric, no curvature. ROM normal. No CVA tenderness. Lungs:   Clear to auscultation bilaterally. Heart:  Regular rate and rhythm, S1, S2 normal, no murmur, click, rub or gallop. Abdomen:   Soft, non-tender. Bowel sounds normal. No masses,  No organomegaly. Extremities: Extremities normal, atraumatic, no cyanosis or edema. Pulses: 2+ and symmetric all extremities. Skin: Skin color, texture, turgor normal. No rashes or lesions   Lymph nodes: Cervical, supraclavicular, and axillary nodes normal.   Neurologic:  Intubated       Lab/Data Review: All lab results for the last 24 hours reviewed. Assessment:     Active Problems:    Respiratory failure ([address] (2/7/2022)    Patient is 61-year-old white male with:  1. Syncope  2. Non-STEMI  3. CAD status post PCI of left circumflex artery in 2018  4. Hyperlipidemia  6. Vocal cord dysfunction  7. History of PE  8. Warfarin was stopped due to GI bleed  9. COPD  7. Obesity  8. Edema  9. Hypertensive emergency   10. Bilateral renal lesions, largest about 1.7 cm on the right side posteriorly. Plan:     Nikko Cote was stable overnight. Head CT was unremarkable. Chest x-ray is unremarkable. Plan is to proceed with cardiac catheterization once extubated. BUN of 35. I think he is euvolemic.   Signed By: Lieutenant Brady MD     February 13, 2022

## 2022-02-14 ENCOUNTER — APPOINTMENT (OUTPATIENT)
Dept: GENERAL RADIOLOGY | Age: 67
DRG: 207 | End: 2022-02-14
Attending: INTERNAL MEDICINE
Payer: MEDICARE

## 2022-02-14 LAB
ALBUMIN SERPL-MCNC: 1.7 G/DL (ref 3.5–5)
ANION GAP SERPL CALC-SCNC: 8 MMOL/L (ref 5–15)
ARTERIAL PATENCY WRIST A: ABNORMAL
BACTERIA SPEC CULT: NORMAL
BASE EXCESS BLDA CALC-SCNC: 2.6 MMOL/L (ref 0–2)
BASOPHILS # BLD: 0 K/UL (ref 0–0.1)
BASOPHILS NFR BLD: 0 % (ref 0–1)
BDY SITE: ABNORMAL
BUN SERPL-MCNC: 34 MG/DL (ref 6–20)
BUN/CREAT SERPL: 45 (ref 12–20)
CA-I BLD-MCNC: 5.8 MG/DL (ref 8.5–10.1)
CHLORIDE SERPL-SCNC: 112 MMOL/L (ref 97–108)
CO2 SERPL-SCNC: 22 MMOL/L (ref 21–32)
CREAT SERPL-MCNC: 0.75 MG/DL (ref 0.7–1.3)
CRP SERPL-MCNC: 0.79 MG/DL (ref 0–0.6)
DIFFERENTIAL METHOD BLD: ABNORMAL
EOSINOPHIL # BLD: 0 K/UL (ref 0–0.4)
EOSINOPHIL NFR BLD: 0 % (ref 0–7)
EPAP/CPAP/PEEP, PAPEEP: 5
ERYTHROCYTE [DISTWIDTH] IN BLOOD BY AUTOMATED COUNT: 14 % (ref 11.5–14.5)
FIO2 ON VENT: 30 %
GLUCOSE BLD STRIP.AUTO-MCNC: 164 MG/DL (ref 65–117)
GLUCOSE SERPL-MCNC: 118 MG/DL (ref 65–100)
HCO3 BLDA-SCNC: 27 MMOL/L (ref 22–26)
HCT VFR BLD AUTO: 43 % (ref 36.6–50.3)
HGB BLD-MCNC: 14.1 G/DL (ref 12.1–17)
IMM GRANULOCYTES # BLD AUTO: 0.1 K/UL (ref 0–0.04)
IMM GRANULOCYTES NFR BLD AUTO: 1 % (ref 0–0.5)
LYMPHOCYTES # BLD: 1.1 K/UL (ref 0.8–3.5)
LYMPHOCYTES NFR BLD: 10 % (ref 12–49)
MAGNESIUM SERPL-MCNC: 1.9 MG/DL (ref 1.6–2.4)
MCH RBC QN AUTO: 29 PG (ref 26–34)
MCHC RBC AUTO-ENTMCNC: 32.8 G/DL (ref 30–36.5)
MCV RBC AUTO: 88.5 FL (ref 80–99)
MONOCYTES # BLD: 0.8 K/UL (ref 0–1)
MONOCYTES NFR BLD: 7 % (ref 5–13)
NEUTS SEG # BLD: 9.5 K/UL (ref 1.8–8)
NEUTS SEG NFR BLD: 82 % (ref 32–75)
NRBC # BLD: 0 K/UL (ref 0–0.01)
NRBC BLD-RTO: 0 PER 100 WBC
PCO2 BLDA: 44 MMHG (ref 35–45)
PERFORMED BY, TECHID: ABNORMAL
PH BLDA: 7.41 [PH] (ref 7.35–7.45)
PHOSPHATE SERPL-MCNC: 2.8 MG/DL (ref 2.6–4.7)
PLATELET # BLD AUTO: 239 K/UL (ref 150–400)
PMV BLD AUTO: 9.2 FL (ref 8.9–12.9)
PO2 BLDA: 119 MMHG (ref 75–100)
POTASSIUM SERPL-SCNC: 2.5 MMOL/L (ref 3.5–5.1)
PRESSURE SUPPORT SETTING VENT: 15 CM[H2O]
PROCALCITONIN SERPL-MCNC: 0.06 NG/ML
RBC # BLD AUTO: 4.86 M/UL (ref 4.1–5.7)
SAO2 % BLD: 97 %
SAO2% DEVICE SAO2% SENSOR NAME: ABNORMAL
SODIUM SERPL-SCNC: 142 MMOL/L (ref 136–145)
SPECIAL REQUESTS,SREQ: NORMAL
SPECIMEN SITE: ABNORMAL
VENTILATION MODE VENT: ABNORMAL
VT SETTING VENT: 550 ML
WBC # BLD AUTO: 11.5 K/UL (ref 4.1–11.1)

## 2022-02-14 PROCEDURE — 94640 AIRWAY INHALATION TREATMENT: CPT

## 2022-02-14 PROCEDURE — 74011250637 HC RX REV CODE- 250/637: Performed by: HOSPITALIST

## 2022-02-14 PROCEDURE — 71045 X-RAY EXAM CHEST 1 VIEW: CPT

## 2022-02-14 PROCEDURE — 94003 VENT MGMT INPAT SUBQ DAY: CPT

## 2022-02-14 PROCEDURE — 86140 C-REACTIVE PROTEIN: CPT

## 2022-02-14 PROCEDURE — 80069 RENAL FUNCTION PANEL: CPT

## 2022-02-14 PROCEDURE — 74011250636 HC RX REV CODE- 250/636: Performed by: INTERNAL MEDICINE

## 2022-02-14 PROCEDURE — 74011250637 HC RX REV CODE- 250/637: Performed by: INTERNAL MEDICINE

## 2022-02-14 PROCEDURE — 82803 BLOOD GASES ANY COMBINATION: CPT

## 2022-02-14 PROCEDURE — 74011000258 HC RX REV CODE- 258: Performed by: INTERNAL MEDICINE

## 2022-02-14 PROCEDURE — 65610000006 HC RM INTENSIVE CARE

## 2022-02-14 PROCEDURE — 74011000250 HC RX REV CODE- 250: Performed by: INTERNAL MEDICINE

## 2022-02-14 PROCEDURE — 82962 GLUCOSE BLOOD TEST: CPT

## 2022-02-14 PROCEDURE — 84145 PROCALCITONIN (PCT): CPT

## 2022-02-14 PROCEDURE — 83735 ASSAY OF MAGNESIUM: CPT

## 2022-02-14 PROCEDURE — 36415 COLL VENOUS BLD VENIPUNCTURE: CPT

## 2022-02-14 PROCEDURE — 77010033678 HC OXYGEN DAILY

## 2022-02-14 PROCEDURE — 85025 COMPLETE CBC W/AUTO DIFF WBC: CPT

## 2022-02-14 PROCEDURE — 36600 WITHDRAWAL OF ARTERIAL BLOOD: CPT

## 2022-02-14 RX ORDER — MAGNESIUM SULFATE HEPTAHYDRATE 40 MG/ML
2 INJECTION, SOLUTION INTRAVENOUS ONCE
Status: COMPLETED | OUTPATIENT
Start: 2022-02-14 | End: 2022-02-14

## 2022-02-14 RX ORDER — POTASSIUM CHLORIDE 7.45 MG/ML
10 INJECTION INTRAVENOUS
Status: COMPLETED | OUTPATIENT
Start: 2022-02-14 | End: 2022-02-14

## 2022-02-14 RX ADMIN — ACETAMINOPHEN 325MG 650 MG: 325 TABLET ORAL at 16:28

## 2022-02-14 RX ADMIN — DEXMEDETOMIDINE HYDROCHLORIDE 0.5 MCG/KG/HR: 100 INJECTION, SOLUTION, CONCENTRATE INTRAVENOUS at 03:29

## 2022-02-14 RX ADMIN — PIPERACILLIN AND TAZOBACTAM 3.38 G: 3; .375 INJECTION, POWDER, LYOPHILIZED, FOR SOLUTION INTRAVENOUS at 14:19

## 2022-02-14 RX ADMIN — BETHANECHOL CHLORIDE 100 MG: 25 TABLET ORAL at 21:39

## 2022-02-14 RX ADMIN — ACETAMINOPHEN 650 MG: 650 SUPPOSITORY RECTAL at 02:58

## 2022-02-14 RX ADMIN — PIPERACILLIN AND TAZOBACTAM 3.38 G: 3; .375 INJECTION, POWDER, LYOPHILIZED, FOR SOLUTION INTRAVENOUS at 21:44

## 2022-02-14 RX ADMIN — CLOPIDOGREL BISULFATE 75 MG: 75 TABLET, FILM COATED ORAL at 08:59

## 2022-02-14 RX ADMIN — SODIUM CHLORIDE, PRESERVATIVE FREE 10 ML: 5 INJECTION INTRAVENOUS at 14:19

## 2022-02-14 RX ADMIN — METHYLPREDNISOLONE SODIUM SUCCINATE 40 MG: 40 INJECTION, POWDER, FOR SOLUTION INTRAMUSCULAR; INTRAVENOUS at 14:19

## 2022-02-14 RX ADMIN — POTASSIUM CHLORIDE 10 MEQ: 7.46 INJECTION, SOLUTION INTRAVENOUS at 08:58

## 2022-02-14 RX ADMIN — BETHANECHOL CHLORIDE 100 MG: 25 TABLET ORAL at 08:59

## 2022-02-14 RX ADMIN — ENOXAPARIN SODIUM 40 MG: 100 INJECTION SUBCUTANEOUS at 08:58

## 2022-02-14 RX ADMIN — VANCOMYCIN HYDROCHLORIDE 1250 MG: 1.25 INJECTION, POWDER, LYOPHILIZED, FOR SOLUTION INTRAVENOUS at 11:54

## 2022-02-14 RX ADMIN — IPRATROPIUM BROMIDE AND ALBUTEROL SULFATE 3 ML: 2.5; .5 SOLUTION RESPIRATORY (INHALATION) at 08:41

## 2022-02-14 RX ADMIN — PROPOFOL 40 MCG/KG/MIN: 10 INJECTION, EMULSION INTRAVENOUS at 08:58

## 2022-02-14 RX ADMIN — MAGNESIUM SULFATE HEPTAHYDRATE 2 G: 40 INJECTION, SOLUTION INTRAVENOUS at 07:34

## 2022-02-14 RX ADMIN — PROPOFOL 40 MCG/KG/MIN: 10 INJECTION, EMULSION INTRAVENOUS at 12:36

## 2022-02-14 RX ADMIN — SODIUM CHLORIDE, PRESERVATIVE FREE 10 ML: 5 INJECTION INTRAVENOUS at 05:18

## 2022-02-14 RX ADMIN — BUDESONIDE 500 MCG: 0.5 SUSPENSION RESPIRATORY (INHALATION) at 20:49

## 2022-02-14 RX ADMIN — POTASSIUM CHLORIDE 10 MEQ: 7.46 INJECTION, SOLUTION INTRAVENOUS at 09:01

## 2022-02-14 RX ADMIN — DEXMEDETOMIDINE HYDROCHLORIDE 0.5 MCG/KG/HR: 100 INJECTION, SOLUTION, CONCENTRATE INTRAVENOUS at 21:56

## 2022-02-14 RX ADMIN — OXCARBAZEPINE 150 MG: 150 TABLET, FILM COATED ORAL at 08:59

## 2022-02-14 RX ADMIN — IPRATROPIUM BROMIDE AND ALBUTEROL SULFATE 3 ML: 2.5; .5 SOLUTION RESPIRATORY (INHALATION) at 13:39

## 2022-02-14 RX ADMIN — PROPOFOL 40 MCG/KG/MIN: 10 INJECTION, EMULSION INTRAVENOUS at 19:06

## 2022-02-14 RX ADMIN — VANCOMYCIN HYDROCHLORIDE 1250 MG: 1.25 INJECTION, POWDER, LYOPHILIZED, FOR SOLUTION INTRAVENOUS at 22:42

## 2022-02-14 RX ADMIN — PROPOFOL 40 MCG/KG/MIN: 10 INJECTION, EMULSION INTRAVENOUS at 06:00

## 2022-02-14 RX ADMIN — ATORVASTATIN CALCIUM 80 MG: 40 TABLET, FILM COATED ORAL at 08:59

## 2022-02-14 RX ADMIN — FINASTERIDE 5 MG: 5 TABLET, FILM COATED ORAL at 08:58

## 2022-02-14 RX ADMIN — SODIUM CHLORIDE, PRESERVATIVE FREE 10 ML: 5 INJECTION INTRAVENOUS at 22:03

## 2022-02-14 RX ADMIN — TAMSULOSIN HYDROCHLORIDE 0.4 MG: 0.4 CAPSULE ORAL at 09:00

## 2022-02-14 RX ADMIN — GLYCOPYRROLATE 0.1 MG: 0.2 INJECTION INTRAMUSCULAR; INTRAVENOUS at 15:26

## 2022-02-14 RX ADMIN — AMLODIPINE BESYLATE 10 MG: 5 TABLET ORAL at 08:59

## 2022-02-14 RX ADMIN — METHYLPREDNISOLONE SODIUM SUCCINATE 40 MG: 40 INJECTION, POWDER, FOR SOLUTION INTRAMUSCULAR; INTRAVENOUS at 21:42

## 2022-02-14 RX ADMIN — BUDESONIDE 500 MCG: 0.5 SUSPENSION RESPIRATORY (INHALATION) at 08:41

## 2022-02-14 RX ADMIN — SODIUM CHLORIDE, PRESERVATIVE FREE 20 MG: 5 INJECTION INTRAVENOUS at 21:39

## 2022-02-14 RX ADMIN — METOPROLOL SUCCINATE 25 MG: 25 TABLET, EXTENDED RELEASE ORAL at 08:58

## 2022-02-14 RX ADMIN — IPRATROPIUM BROMIDE AND ALBUTEROL SULFATE 3 ML: 2.5; .5 SOLUTION RESPIRATORY (INHALATION) at 20:49

## 2022-02-14 RX ADMIN — POTASSIUM CHLORIDE 10 MEQ: 7.46 INJECTION, SOLUTION INTRAVENOUS at 08:31

## 2022-02-14 RX ADMIN — PROPOFOL 40 MCG/KG/MIN: 10 INJECTION, EMULSION INTRAVENOUS at 22:20

## 2022-02-14 RX ADMIN — DEXMEDETOMIDINE HYDROCHLORIDE 0.5 MCG/KG/HR: 100 INJECTION, SOLUTION, CONCENTRATE INTRAVENOUS at 08:31

## 2022-02-14 RX ADMIN — GLYCOPYRROLATE 0.1 MG: 0.2 INJECTION INTRAMUSCULAR; INTRAVENOUS at 08:59

## 2022-02-14 RX ADMIN — PROPOFOL 40 MCG/KG/MIN: 10 INJECTION, EMULSION INTRAVENOUS at 16:19

## 2022-02-14 RX ADMIN — GLYCOPYRROLATE 0.1 MG: 0.2 INJECTION INTRAMUSCULAR; INTRAVENOUS at 21:41

## 2022-02-14 RX ADMIN — ASPIRIN 81 MG CHEWABLE TABLET 81 MG: 81 TABLET CHEWABLE at 08:58

## 2022-02-14 RX ADMIN — OXCARBAZEPINE 150 MG: 150 TABLET, FILM COATED ORAL at 21:39

## 2022-02-14 RX ADMIN — PROPOFOL 40 MCG/KG/MIN: 10 INJECTION, EMULSION INTRAVENOUS at 02:19

## 2022-02-14 RX ADMIN — DEXMEDETOMIDINE HYDROCHLORIDE 0.5 MCG/KG/HR: 100 INJECTION, SOLUTION, CONCENTRATE INTRAVENOUS at 15:26

## 2022-02-14 RX ADMIN — SODIUM CHLORIDE, PRESERVATIVE FREE 20 MG: 5 INJECTION INTRAVENOUS at 08:59

## 2022-02-14 RX ADMIN — METHYLPREDNISOLONE SODIUM SUCCINATE 40 MG: 40 INJECTION, POWDER, FOR SOLUTION INTRAMUSCULAR; INTRAVENOUS at 05:18

## 2022-02-14 NOTE — PROGRESS NOTES
Progress Note    Patient: Macario Heard MRN: [id number]  SSN: [id number]    YOB: 1955  Age: 73 y.o. Sex: male      Admit Date: 2/7/2022    LOS: 7 days     Subjective:     No acute events overnight. Remains intubated. Had a temperature of 101.5  Objective:     Vitals:    02/14/22 0300 02/14/22 0400 02/14/22 0500 02/14/22 0600   BP: 120/84 121/83 117/85 118/84   Pulse: 79 75 74 74   Resp: 14 10 13 13   Temp: (!) 101.5 °F (38.6 °C)      SpO2: 97% 98% 98% 99%   Weight:       Height:            Intake and Output:  Current Shift: No intake/output data recorded. Last three shifts: 02/12 1901 - 02/14 0700  In: 4841.3 [I.V.:1061.3]  Out: 3750 [Urine:3750]    Physical Exam:   General:   Intubated. Eyes:  Conjunctivae/corneas clear. PERRL, EOMs intact. Fundi benign   Ears:  Normal TMs and external ear canals both ears. Nose: Nares normal. Septum midline. Mucosa normal. No drainage or sinus tenderness. Mouth/Throat: Lips, mucosa, and tongue normal. Teeth and gums normal.   Neck: Supple, symmetrical, trachea midline, no adenopathy, thyroid: no enlargment/tenderness/nodules, no carotid bruit and no JVD. Back:   Symmetric, no curvature. ROM normal. No CVA tenderness. Lungs:   Clear to auscultation bilaterally. Heart:  Regular rate and rhythm, S1, S2 normal, no murmur, click, rub or gallop. Abdomen:   Soft, non-tender. Bowel sounds normal. No masses,  No organomegaly. Extremities: Extremities normal, atraumatic, no cyanosis or edema. Pulses: 2+ and symmetric all extremities. Skin: Skin color, texture, turgor normal. No rashes or lesions   Lymph nodes: Cervical, supraclavicular, and axillary nodes normal.   Neurologic:  Intubated       Lab/Data Review: All lab results for the last 24 hours reviewed. Assessment:     Active Problems:    Respiratory failure ([address] (2/7/2022)    Patient is 71-year-old white male with:  1. Syncope  2. Non-STEMI  3.   CAD status post PCI of left circumflex artery in 2018  4. Hyperlipidemia  6. Vocal cord dysfunction  7. History of PE  8. Warfarin was stopped due to GI bleed  9. COPD  7. Obesity  8. Edema  9. Hypertensive emergency   10. Bilateral renal lesions, largest about 1.7 cm on the right side posteriorly. Plan:     currently normal sinus rhythm. Hemodynamically stable. Appears to be euvolemic. Potassium is 2.5. Replete potassium. Magnesium 1.9. Hold off any further diuresis. Chloride is 112. His sodium has increased from 1 33-1 42. We will await extubation prior to Proceeding with cardiac catheterization     I have Rescheduled him for Wednesday morning tentatively.   Signed By: Jacy Veras MD     February 14, 2022

## 2022-02-14 NOTE — PROGRESS NOTES
Progress Note    Patient: Renee Ramos MRN: [id number]  SSN: [id number]    YOB: 1955  Age: 73 y.o. Sex: male      Admit Date: 2/7/2022    LOS: 7 days     Subjective:     66M, h/o COPD not on oxygen, pulmonary embolism (not on AC s/t GI bleed), CAD s/p stent presented with acute encephalopathy and syncope complicated by HTN urgency and episode of hypoxia and acute hypoxic respiratory failure and was intubated    HOSPITAL COURSE:   Initial workup showed no signs of infection- CT chest. CXR no pneumonia, UA no signs of infection, cardiology plans on ischemic workup after extubation, description not consistent with seizures. He does have NSTEMI type II s.t HTN urgency. Plan to wean vent tomorrow, unsuccessful since last 4 days. Spiked a fever of 101, added vancomycin to zosyn, and sputum and pan cultres sent    Review of Systems:  Cannot be assessed due to men bob status      Objective:     Vitals:    02/14/22 1300 02/14/22 1400 02/14/22 1500 02/14/22 1518   BP: 114/80 101/72 103/76    Pulse: 77 82 82 81   Resp: 15 17 15 15   Temp: (!) 101.3 °F (38.5 °C) (!) 101.3 °F (38.5 °C) (!) 101.1 °F (38.4 °C)    SpO2: 98% 96% 96% 96%   Weight:       Height:            Intake and Output:  Current Shift: 02/14 0701 - 02/14 1900  In: 2537.6 [I.V.:1612.6]  Out: 750 [Urine:750]  Last three shifts: 02/12 1901 - 02/14 0700  In: 4841.3 [I.V.:1061.3]  Out: 3517 [Urine:3750]      Physical Exam:   Physical Exam:  General: Intubated and sedated. Eye: conjunctivae/corneas clear. PERRL, EOM's intact. Throat and Neck: normal and no erythema or exudates noted.  No mass   Lung: clear to auscultation bilaterally  Heart: regular rate and rhythm,   Abdomen: soft, Bowel sounds normal. No masses,  Extremities:  all extremities normal, atraumatic  Skin: Normal.  Neurologic: Omitted   psychiatric: Omitted      Lab/Data Review:  Recent Results (from the past 24 hour(s))   GLUCOSE, POC    Collection Time: 02/13/22  9:44 PM Result Value Ref Range    Glucose (POC) 130 (H) 65 - 117 mg/dL    Performed by Ezekiel GARCIA    GLUCOSE, POC    Collection Time: 02/13/22 11:49 PM   Result Value Ref Range    Glucose (POC) 143 (H) 65 - 117 mg/dL    Performed by Chantelle King    RENAL FUNCTION PANEL    Collection Time: 02/14/22  3:30 AM   Result Value Ref Range    Sodium 142 136 - 145 mmol/L    Potassium 2.5 (LL) 3.5 - 5.1 mmol/L    Chloride 112 (H) 97 - 108 mmol/L    CO2 22 21 - 32 mmol/L    Anion gap 8 5 - 15 mmol/L    Glucose 118 (H) 65 - 100 mg/dL    BUN 34 (H) 6 - 20 mg/dL    Creatinine 0.75 0.70 - 1.30 mg/dL    BUN/Creatinine ratio 45 (H) 12 - 20      GFR est AA >60 >60 ml/min/1.73m2    GFR est non-AA >60 >60 ml/min/1.73m2    Calcium 5.8 (LL) 8.5 - 10.1 mg/dL    Phosphorus 2.8 2.6 - 4.7 mg/dL    Albumin 1.7 (L) 3.5 - 5.0 g/dL   MAGNESIUM    Collection Time: 02/14/22  3:30 AM   Result Value Ref Range    Magnesium 1.9 1.6 - 2.4 mg/dL   CBC WITH AUTOMATED DIFF    Collection Time: 02/14/22  3:30 AM   Result Value Ref Range    WBC 11.5 (H) 4.1 - 11.1 K/uL    RBC 4.86 4.10 - 5.70 M/uL    HGB 14.1 12.1 - 17.0 g/dL    HCT 43.0 36.6 - 50.3 %    MCV 88.5 80.0 - 99.0 FL    MCH 29.0 26.0 - 34.0 PG    MCHC 32.8 30.0 - 36.5 g/dL    RDW 14.0 11.5 - 14.5 %    PLATELET 217 004 - 130 K/uL    MPV 9.2 8.9 - 12.9 FL    NRBC 0.0 0.0  WBC    ABSOLUTE NRBC 0.00 0.00 - 0.01 K/uL    NEUTROPHILS 82 (H) 32 - 75 %    LYMPHOCYTES 10 (L) 12 - 49 %    MONOCYTES 7 5 - 13 %    EOSINOPHILS 0 0 - 7 %    BASOPHILS 0 0 - 1 %    IMMATURE GRANULOCYTES 1 (H) 0 - 0.5 %    ABS. NEUTROPHILS 9.5 (H) 1.8 - 8.0 K/UL    ABS. LYMPHOCYTES 1.1 0.8 - 3.5 K/UL    ABS. MONOCYTES 0.8 0.0 - 1.0 K/UL    ABS. EOSINOPHILS 0.0 0.0 - 0.4 K/UL    ABS. BASOPHILS 0.0 0.0 - 0.1 K/UL    ABS. IMM.  GRANS. 0.1 (H) 0.00 - 0.04 K/UL    DF AUTOMATED     BLOOD GAS, ARTERIAL    Collection Time: 02/14/22  5:09 AM   Result Value Ref Range    pH 7.41 7.35 - 7.45      PCO2 44 35 - 45 mmHg    PO2 119 (H) 75 - 100 mmHg    O2 SAT 97 >95 %    BICARBONATE 27 (H) 22 - 26 mmol/L    BASE EXCESS 2.6 (H) 0 - 2 mmol/L    O2 METHOD VENT      FIO2 30.0 %    MODE SIMV      Tidal volume 550      PRESSURE SUPPORT 15.0      EPAP/CPAP/PEEP 5.0      Sample source Arterial      SITE Left Brachial      VÍCTOR'S TEST PASS     PROCALCITONIN    Collection Time: 02/14/22  9:25 AM   Result Value Ref Range    Procalcitonin 0.06 (H) 0 ng/mL   C REACTIVE PROTEIN, QT    Collection Time: 02/14/22  9:25 AM   Result Value Ref Range    C-Reactive protein 0.79 (H) 0.00 - 0.60 mg/dL         Assessment and plan:        (1) Acute encephalopathy: GCS 12. Cardiology and neurology input appreciated. It does appear to have some severe apnea. However cardiac and neuro causes to be ruled out. (2) Acute hypoxic respiratory failure : intubated, seddated, wean sedation. Solumedrol , trelegy    (3) CAD s/p stent : aspirin, plavix,  statin    (4) NSTEMI: likely type II:    (4) COPD not on o2: complicates #1    (5) HTN urgency: PRN labetalol.  amlodipine    (6) vocal chord dysfunction: OP ENT    (7) CHFpEF: lasix 40 daily     DISPO: Iin ICU until extubated    Signed By: Guillaume James MD     February 14, 2022

## 2022-02-14 NOTE — PROGRESS NOTES
Neurology Progress Note    Date: 2/14/2022    Mr. Andreia Diaz is a 73year old man is seen in follow-up and he is still intubated on sedation. He failed attempt to extubate on 02/13/22 as he became very agitated and restless. About the same at the time of my evaluation. With PMH of PE used to be on coumadin but stopped for GI bleed, vocal cord dysfunctioon who was brought in to the ER for hypoxia, \" AMS\", chest pain and recurrent episodes of syncope lasting 5-10 sec. He would awaken after sternal rub but loose consciousness shortly after again. In between the episodes he is able to wake up and is alert and oriented and able to provide the Ed staff his history. He was found to be hypoxic to 86%. It seems he passed out multiple times in the ambulance as well. From the chart he has had episodes of loss of consciousness after whole body cramping episodes at home as well. CT head reported as \"no evidence of acute intracranial process\". Ct chest/abd/pelvis WNL. Subjective  intubated and sedated. No past medical history on file. No past surgical history on file. No family history on file.    Social History     Tobacco Use    Smoking status: Not on file    Smokeless tobacco: Not on file   Substance Use Topics    Alcohol use: Not on file       Current Facility-Administered Medications   Medication Dose Route Frequency Provider Last Rate Last Admin    potassium chloride 10 mEq in 100 ml IVPB  10 mEq IntraVENous Q1H Silas Hightower MD        magnesium sulfate 2 g/50 ml IVPB (premix or compounded)  2 g IntraVENous ONCE Silas Hightower MD 25 mL/hr at 02/14/22 0734 2 g at 02/14/22 0734    dexmedeTOMidine (PRECEDEX) 400 mcg in 0.9% sodium chloride (MBP/ADV) 100 mL MBP  0.1-1.4 mcg/kg/hr IntraVENous TITRATE Demarcus Coates MD 15.5 mL/hr at 02/14/22 0329 0.5 mcg/kg/hr at 02/14/22 0329    glycopyrrolate (ROBINUL) injection 0.1 mg  0.1 mg IntraVENous TID Sandrine Silva MD   0.1 mg at 02/13/22 2122    [Held by provider] furosemide (LASIX) injection 40 mg  40 mg IntraVENous DAILY Tez Crooks MD   40 mg at 02/13/22 0905    albuterol-ipratropium (DUO-NEB) 2.5 MG-0.5 MG/3 ML  3 mL Nebulization Q4H PRN Rita Maurice MD        bethanechol (URECHOLINE) tablet 100 mg  100 mg Oral BID Noris Mejia MD   100 mg at 02/13/22 2039    finasteride (PROSCAR) tablet 5 mg  5 mg Oral DAILY Rita Maurice MD   5 mg at 02/13/22 5792    OXcarbazepine (TRILEPTAL) tablet 150 mg  150 mg Oral BID Noris Mejia MD   150 mg at 02/13/22 2038    tamsulosin (FLOMAX) capsule 0.4 mg  0.4 mg Oral DAILY Noris Mejia MD   0.4 mg at 02/13/22 5409    sodium chloride (NS) flush 5-40 mL  5-40 mL IntraVENous Q8H Rita Maurice MD   10 mL at 02/14/22 0518    sodium chloride (NS) flush 5-40 mL  5-40 mL IntraVENous PRN Noris Mejia MD        acetaminophen (TYLENOL) tablet 650 mg  650 mg Oral Q6H PRN Noris Mejia MD   650 mg at 02/13/22 2038    Or    acetaminophen (TYLENOL) suppository 650 mg  650 mg Rectal Q6H PRN Noris Mejia MD   650 mg at 02/14/22 0258    polyethylene glycol (MIRALAX) packet 17 g  17 g Oral DAILY PRN Rita Maurice MD        ondansetron (ZOFRAN ODT) tablet 4 mg  4 mg Oral Q8H PRN Rita Maurice MD        Or    ondansetron (ZOFRAN) injection 4 mg  4 mg IntraVENous Q6H PRN Rita Maurice MD        enoxaparin (LOVENOX) injection 40 mg  40 mg SubCUTAneous DAILY Rita Maurice MD   40 mg at 02/13/22 0906    famotidine (PF) (PEPCID) 20 mg in 0.9% sodium chloride 10 mL injection  20 mg IntraVENous Q12H Noris Mejia MD   20 mg at 02/13/22 2037    labetaloL (NORMODYNE;TRANDATE) 20 mg/4 mL (5 mg/mL) injection 10 mg  10 mg IntraVENous Q4H PRN Arnold Arenas MD   10 mg at 02/08/22 1741    amLODIPine (NORVASC) tablet 10 mg  10 mg Oral DAILY Arnold Arenas MD   10 mg at 02/13/22 1930    aspirin chewable tablet 81 mg  81 mg Oral DAILY Duyen Teixeira MD   81 mg at 02/13/22 0906    atorvastatin (LIPITOR) tablet 80 mg  80 mg Oral DAILY Duyen Teixeira MD   80 mg at 02/13/22 0906    clopidogreL (PLAVIX) tablet 75 mg  75 mg Oral DAILY Duyen Teixeira MD   75 mg at 02/13/22 0906    metoprolol succinate (TOPROL-XL) XL tablet 25 mg  25 mg Oral DAILY Duyen Teixeira MD   25 mg at 02/13/22 0906    methylPREDNISolone (PF) (SOLU-MEDROL) injection 40 mg  40 mg IntraVENous Q8H Clari Lovell MD   40 mg at 02/14/22 0518    albuterol-ipratropium (DUO-NEB) 2.5 MG-0.5 MG/3 ML  3 mL Nebulization Q6HWA RT Clari Lovell MD   3 mL at 02/13/22 2029    budesonide (PULMICORT) 500 mcg/2 ml nebulizer suspension  500 mcg Nebulization BID RT Clari Lovell MD   500 mcg at 02/13/22 2029    midazolam in normal saline (VERSED) 1 mg/mL infusion  0-10 mg/hr IntraVENous TITRATE Amparo Hirsch MD 2 mL/hr at 02/10/22 2202 2 mg/hr at 02/10/22 2202    propofol (DIPRIVAN) 10 mg/mL infusion  0-50 mcg/kg/min IntraVENous TITRATE Amparo Hisrch MD 31.2 mL/hr at 02/14/22 0600 40 mcg/kg/min at 02/14/22 0600      Review of Systems   Unable to perform ROS: Intubated     Objective     Vital signs for last 24 hours:  Visit Vitals  /84 (BP 1 Location: Right upper arm, BP Patient Position: At rest)   Pulse 74   Temp (!) 101.5 °F (38.6 °C)   Resp 13   Ht 6' 0.01\" (1.829 m)   Wt 111.7 kg (246 lb 4.1 oz)   SpO2 99%   BMI 33.39 kg/m²     Intake/Output this shift:  Current Shift: No intake/output data recorded.   Last 3 Shifts: 02/12 1901 - 02/14 0700  In: 4841.3 [I.V.:1061.3]  Out: 3750 [Urine:3750]    Data Review:   Recent Results (from the past 24 hour(s))   CULTURE, RESPIRATORY/SPUTUM/BRONCH W GRAM STAIN    Collection Time: 02/13/22 11:25 AM    Specimen: Sputum   Result Value Ref Range    Special Requests: No Special Requests      GRAM STAIN 1+ WBCs seen      GRAM STAIN Rare Epithelial cells seen      GRAM STAIN 2+ Gram Positive Cocci in clusters      Culture result: PENDING    GLUCOSE, POC    Collection Time: 02/13/22  9:44 PM   Result Value Ref Range    Glucose (POC) 130 (H) 65 - 117 mg/dL    Performed by Sammie GARCIA    GLUCOSE, POC    Collection Time: 02/13/22 11:49 PM   Result Value Ref Range    Glucose (POC) 143 (H) 65 - 117 mg/dL    Performed by Lexy Marx    RENAL FUNCTION PANEL    Collection Time: 02/14/22  3:30 AM   Result Value Ref Range    Sodium 142 136 - 145 mmol/L    Potassium 2.5 (LL) 3.5 - 5.1 mmol/L    Chloride 112 (H) 97 - 108 mmol/L    CO2 22 21 - 32 mmol/L    Anion gap 8 5 - 15 mmol/L    Glucose 118 (H) 65 - 100 mg/dL    BUN 34 (H) 6 - 20 mg/dL    Creatinine 0.75 0.70 - 1.30 mg/dL    BUN/Creatinine ratio 45 (H) 12 - 20      GFR est AA >60 >60 ml/min/1.73m2    GFR est non-AA >60 >60 ml/min/1.73m2    Calcium 5.8 (LL) 8.5 - 10.1 mg/dL    Phosphorus 2.8 2.6 - 4.7 mg/dL    Albumin 1.7 (L) 3.5 - 5.0 g/dL   MAGNESIUM    Collection Time: 02/14/22  3:30 AM   Result Value Ref Range    Magnesium 1.9 1.6 - 2.4 mg/dL   CBC WITH AUTOMATED DIFF    Collection Time: 02/14/22  3:30 AM   Result Value Ref Range    WBC 11.5 (H) 4.1 - 11.1 K/uL    RBC 4.86 4.10 - 5.70 M/uL    HGB 14.1 12.1 - 17.0 g/dL    HCT 43.0 36.6 - 50.3 %    MCV 88.5 80.0 - 99.0 FL    MCH 29.0 26.0 - 34.0 PG    MCHC 32.8 30.0 - 36.5 g/dL    RDW 14.0 11.5 - 14.5 %    PLATELET 563 826 - 310 K/uL    MPV 9.2 8.9 - 12.9 FL    NRBC 0.0 0.0  WBC    ABSOLUTE NRBC 0.00 0.00 - 0.01 K/uL    NEUTROPHILS 82 (H) 32 - 75 %    LYMPHOCYTES 10 (L) 12 - 49 %    MONOCYTES 7 5 - 13 %    EOSINOPHILS 0 0 - 7 %    BASOPHILS 0 0 - 1 %    IMMATURE GRANULOCYTES 1 (H) 0 - 0.5 %    ABS. NEUTROPHILS 9.5 (H) 1.8 - 8.0 K/UL    ABS. LYMPHOCYTES 1.1 0.8 - 3.5 K/UL    ABS. MONOCYTES 0.8 0.0 - 1.0 K/UL    ABS. EOSINOPHILS 0.0 0.0 - 0.4 K/UL    ABS. BASOPHILS 0.0 0.0 - 0.1 K/UL    ABS. IMM.  GRANS. 0.1 (H) 0.00 - 0.04 K/UL    DF AUTOMATED     BLOOD GAS, ARTERIAL    Collection Time: 02/14/22  5:09 AM   Result Value Ref Range    pH 7.41 7.35 - 7.45      PCO2 44 35 - 45 mmHg    PO2 119 (H) 75 - 100 mmHg    O2 SAT 97 >95 %    BICARBONATE 27 (H) 22 - 26 mmol/L    BASE EXCESS 2.6 (H) 0 - 2 mmol/L    O2 METHOD VENT      FIO2 30.0 %    MODE SIMV      Tidal volume 550      PRESSURE SUPPORT 15.0      EPAP/CPAP/PEEP 5.0      Sample source Arterial      SITE Left Brachial      VÍCTOR'S TEST PASS       Physical Exam    Neuro Physical Exam    General: Well developed, well nourished. Morbidly obese and intubated on sedation    Neurological Exam:  Mental Status: Sedated on propofol: examined Does not open eyes to sternal rub, pupils equal and sluggishly reactive. No withdrawal to pain noticed today  Babinski: mute b/l     Assessment and Plan: Mr. Nahed Serra is a 73year old man who comes in with hypoxia and recurrent episodes of syncope. The description of these events is NOT consistent with seizures. So far cardiac workup has not revealed any etiology for these events. He needs to be on as minimal sedation as possible. He is still on versed. Use as minimal propofol as needed and taper off the Versed. At this time etiology of recurrent syncope is not clear. If this recurs post extubation then eeg can be considered. CTA head and neck did not reveal any stenotic disease, other than 30-35% stenosis of the origin of the right ICA, which does not explain the spells he had been having. It is possible that he may be going in and out of hypoxia contributing to these spells.     Thank you,    Janet Baca MD

## 2022-02-14 NOTE — PROGRESS NOTES
Clinical chart reviewed by CM. Discharge plan is to return home self care with his wife. CM will continue to follow.

## 2022-02-14 NOTE — PROGRESS NOTES
IMPRESSION:   1. Acute hypoxic respiratory failure evelyn on the ventilator FiO2 down to 35%  2. Low-grade fever no obvious site chest x-ray looks clear we will send a sputum  3. Hypercapnia with metabolic alkalosis resolved  4. NSTEMI  5. Generalized Edema  6. Syncope  7. COPD   8. Severe hypokalemia  9. Pneumonia sputum positive for gram-positive cocci      RECOMMENDATIONS/PLAN:   1. ICU monitoring  1. Ventilator for mechanical life support and prevent respiratory arrest with protective lung strategies sedated   2. Patient is on assist control mode 35% FiO2 PEEP of 8 rate of 15   3. Did sedation vacation yesterday patient was put on spontaneous he had low tidal volume goes into apnea now he is back on assist control back on sedation   4. Patient on IV Solu-Medrol nebulizer treatment Covid negative  5. Hold trilogy inhaler continue nebulized albuterol Atrovent budesonide  6. Chest x-ray no acute infiltrate which shows emphysematous changes  7. Patient was to be followed with ENT he never had any appointment with them they were resuming patient has vocal cord dysfunction once extubated we will get ENT evaluation  8. Start patient on vancomycin and Zosyn pending cultures patient still continues to spike temperature sputum shows gram-positive cocci  9. Will replace potassium     [x] High complexity decision making was performed  [x] See my orders for details  HPI  71-year-old male came in because of chest pain with shortness of breath he has also recurrent episodes of syncope significant past medical history of vocal cord dysfunction, pulmonary [place], coronary artery disease status post stent COPD current everyday smoker no history of sleep apnea he was having swelling of the lower extremities for couple of months today came into the emergency room as previously was having chest pain or shortness of breath he passed out subsequently got intubated and now on ventilator critical care consult was called.   PMH:  has no past medical history on file. PSH:   has no past surgical history on file. FHX: family history is not on file.      SHX:      ALL:   Allergies   Allergen Reactions    Sulfa (Sulfonamide Antibiotics) Other (comments)     syncope          MEDS:   [x] Reviewed - As Below   [] Not reviewed    Current Facility-Administered Medications   Medication    potassium chloride 10 mEq in 100 ml IVPB    magnesium sulfate 2 g/50 ml IVPB (premix or compounded)    piperacillin-tazobactam (ZOSYN) 3.375 g in 0.9% sodium chloride (MBP/ADV) 100 mL MBP    dexmedeTOMidine (PRECEDEX) 400 mcg in 0.9% sodium chloride (MBP/ADV) 100 mL MBP    glycopyrrolate (ROBINUL) injection 0.1 mg    [Held by provider] furosemide (LASIX) injection 40 mg    albuterol-ipratropium (DUO-NEB) 2.5 MG-0.5 MG/3 ML    bethanechol (URECHOLINE) tablet 100 mg    finasteride (PROSCAR) tablet 5 mg    OXcarbazepine (TRILEPTAL) tablet 150 mg    tamsulosin (FLOMAX) capsule 0.4 mg    sodium chloride (NS) flush 5-40 mL    sodium chloride (NS) flush 5-40 mL    acetaminophen (TYLENOL) tablet 650 mg    Or    acetaminophen (TYLENOL) suppository 650 mg    polyethylene glycol (MIRALAX) packet 17 g    ondansetron (ZOFRAN ODT) tablet 4 mg    Or    ondansetron (ZOFRAN) injection 4 mg    enoxaparin (LOVENOX) injection 40 mg    famotidine (PF) (PEPCID) 20 mg in 0.9% sodium chloride 10 mL injection    labetaloL (NORMODYNE;TRANDATE) 20 mg/4 mL (5 mg/mL) injection 10 mg    amLODIPine (NORVASC) tablet 10 mg    aspirin chewable tablet 81 mg    atorvastatin (LIPITOR) tablet 80 mg    clopidogreL (PLAVIX) tablet 75 mg    metoprolol succinate (TOPROL-XL) XL tablet 25 mg    methylPREDNISolone (PF) (SOLU-MEDROL) injection 40 mg    albuterol-ipratropium (DUO-NEB) 2.5 MG-0.5 MG/3 ML    budesonide (PULMICORT) 500 mcg/2 ml nebulizer suspension    midazolam in normal saline (VERSED) 1 mg/mL infusion    propofol (DIPRIVAN) 10 mg/mL infusion      MAR reviewed and pertinent medications noted or modified as needed   Current Facility-Administered Medications   Medication    potassium chloride 10 mEq in 100 ml IVPB    magnesium sulfate 2 g/50 ml IVPB (premix or compounded)    piperacillin-tazobactam (ZOSYN) 3.375 g in 0.9% sodium chloride (MBP/ADV) 100 mL MBP    dexmedeTOMidine (PRECEDEX) 400 mcg in 0.9% sodium chloride (MBP/ADV) 100 mL MBP    glycopyrrolate (ROBINUL) injection 0.1 mg    [Held by provider] furosemide (LASIX) injection 40 mg    albuterol-ipratropium (DUO-NEB) 2.5 MG-0.5 MG/3 ML    bethanechol (URECHOLINE) tablet 100 mg    finasteride (PROSCAR) tablet 5 mg    OXcarbazepine (TRILEPTAL) tablet 150 mg    tamsulosin (FLOMAX) capsule 0.4 mg    sodium chloride (NS) flush 5-40 mL    sodium chloride (NS) flush 5-40 mL    acetaminophen (TYLENOL) tablet 650 mg    Or    acetaminophen (TYLENOL) suppository 650 mg    polyethylene glycol (MIRALAX) packet 17 g    ondansetron (ZOFRAN ODT) tablet 4 mg    Or    ondansetron (ZOFRAN) injection 4 mg    enoxaparin (LOVENOX) injection 40 mg    famotidine (PF) (PEPCID) 20 mg in 0.9% sodium chloride 10 mL injection    labetaloL (NORMODYNE;TRANDATE) 20 mg/4 mL (5 mg/mL) injection 10 mg    amLODIPine (NORVASC) tablet 10 mg    aspirin chewable tablet 81 mg    atorvastatin (LIPITOR) tablet 80 mg    clopidogreL (PLAVIX) tablet 75 mg    metoprolol succinate (TOPROL-XL) XL tablet 25 mg    methylPREDNISolone (PF) (SOLU-MEDROL) injection 40 mg    albuterol-ipratropium (DUO-NEB) 2.5 MG-0.5 MG/3 ML    budesonide (PULMICORT) 500 mcg/2 ml nebulizer suspension    midazolam in normal saline (VERSED) 1 mg/mL infusion    propofol (DIPRIVAN) 10 mg/mL infusion      PMH:  has no past medical history on file. PSH:   has no past surgical history on file. FHX: family history is not on file.    SHX:       ROS:  Unable to obtain sedated    Hemodynamics:    CO:    CI:    CVP:    SVR:   PAP Systolic:    PAP Diastolic:    PVR: SV02:        Ventilator Settings:      Mode Rate TV Press PEEP FiO2 PIP Min. Vent   SIMV,Pressure support,Volume control    550 ml 15 cm H2O  5 cm H20 30 %  21 cm H2O  12.4 l/min        Vital Signs: Telemetry:    normal sinus rhythm Intake/Output:   Visit Vitals  /84 (BP 1 Location: Right upper arm, BP Patient Position: At rest)   Pulse 74   Temp (!) 101.5 °F (38.6 °C)   Resp 13   Ht 6' 0.01\" (1.829 m)   Wt 111.7 kg (246 lb 4.1 oz)   SpO2 99%   BMI 33.39 kg/m²       Temp (24hrs), Av.4 °F (38.6 °C), Min:101.1 °F (38.4 °C), Max:101.8 °F (38.8 °C)        O2 Device: Ventilator O2 Flow Rate (L/min): 15 l/min       Wt Readings from Last 4 Encounters:   22 111.7 kg (246 lb 4.1 oz)          Intake/Output Summary (Last 24 hours) at 2022 0827  Last data filed at 2022 0310  Gross per 24 hour   Intake 2141.16 ml   Output 2550 ml   Net -408.84 ml       Last shift:      No intake/output data recorded. Last 3 shifts:  1901 -  0700  In: 4841.3 [I.V.:1061.3]  Out: 8308 [Urine:3750]       Physical Exam:     General:  intubated on vent unresponsive on propofol  HEENT: NCAT,   Eyes: anicteric; conjunctiva clear doll's eye reflex present  Neck: no nodes, no cuff leak, trach midline; no accessory MM use. Chest: no deformity,   Cardiac: IR regular; no murmur;   Lungs: distant breath sounds; there anteriorly and laterally diminished in the bases no wheezing or rales  Abd: soft, NT, hypoactive BS  Ext: no edema; no joint swelling;  No clubbing  : clear urine  Neuro: Sedated on propofol and Precedex unresponsive doll's eye reflex present flaccid extremities  Psych- unable to assess  Skin: warm, dry, no cyanosis;   Pulses: Brachial and radial pulses intact  Capillary: Normal capillary refill      DATA:    MAR reviewed and pertinent medications noted or modified as needed  MEDS:   Current Facility-Administered Medications   Medication    potassium chloride 10 mEq in 100 ml IVPB    magnesium sulfate 2 g/50 ml IVPB (premix or compounded)    piperacillin-tazobactam (ZOSYN) 3.375 g in 0.9% sodium chloride (MBP/ADV) 100 mL MBP    dexmedeTOMidine (PRECEDEX) 400 mcg in 0.9% sodium chloride (MBP/ADV) 100 mL MBP    glycopyrrolate (ROBINUL) injection 0.1 mg    [Held by provider] furosemide (LASIX) injection 40 mg    albuterol-ipratropium (DUO-NEB) 2.5 MG-0.5 MG/3 ML    bethanechol (URECHOLINE) tablet 100 mg    finasteride (PROSCAR) tablet 5 mg    OXcarbazepine (TRILEPTAL) tablet 150 mg    tamsulosin (FLOMAX) capsule 0.4 mg    sodium chloride (NS) flush 5-40 mL    sodium chloride (NS) flush 5-40 mL    acetaminophen (TYLENOL) tablet 650 mg    Or    acetaminophen (TYLENOL) suppository 650 mg    polyethylene glycol (MIRALAX) packet 17 g    ondansetron (ZOFRAN ODT) tablet 4 mg    Or    ondansetron (ZOFRAN) injection 4 mg    enoxaparin (LOVENOX) injection 40 mg    famotidine (PF) (PEPCID) 20 mg in 0.9% sodium chloride 10 mL injection    labetaloL (NORMODYNE;TRANDATE) 20 mg/4 mL (5 mg/mL) injection 10 mg    amLODIPine (NORVASC) tablet 10 mg    aspirin chewable tablet 81 mg    atorvastatin (LIPITOR) tablet 80 mg    clopidogreL (PLAVIX) tablet 75 mg    metoprolol succinate (TOPROL-XL) XL tablet 25 mg    methylPREDNISolone (PF) (SOLU-MEDROL) injection 40 mg    albuterol-ipratropium (DUO-NEB) 2.5 MG-0.5 MG/3 ML    budesonide (PULMICORT) 500 mcg/2 ml nebulizer suspension    midazolam in normal saline (VERSED) 1 mg/mL infusion    propofol (DIPRIVAN) 10 mg/mL infusion        Labs:      Recent Labs     02/14/22  0509 02/13/22  0400 02/12/22  0317   PH 7.41 7.45 7.44   PCO2 44 48* 48*   PO2 119* 88 81   HCO3 27* 31* 31*   FIO2 30.0 35.0 35.0   2/13 AC 15  PEEP 8 FiO2 35%  Lab Results   Component Value Date/Time    TSH 1.38 02/08/2022 03:53 AM        Imaging:    Results from Hospital Encounter encounter on 02/07/22    XR CHEST PORT    Narrative  Examination: XR CHEST PORT    History: chf    Comparison: Chest radiograph 2/12/2022    FINDINGS:    Frontal portable views chest. 2 images total. Suboptimal patient positioning. Within exam limitations endotracheal tube projects over the mid intrathoracic  trachea. Enteric tube courses below the diaphragm with tip excluded from view. Please note that the right lung base is incompletely imaged. Within exam  limitations there is no definite focal airspace consolidation, pneumothorax, or  significant pleural effusion evident. The cardiac silhouette is within normal  limits for size. Mediastinal contours and osseous structures appear unchanged. Impression  No significant interval change. Results from [place] encounter on 02/07/22    CTA HEAD NECK W WO CONT    Narrative  Study: Head and Neck CTA without and with contrast.    Clinical Indication: Altered mental status. Comparison: Head CT dated 2/7/2022. Technique: Routine unenhanced axial acquisition of the head and neck was  performed. Subsequently, contiguous thin section axial acquisition of the head  and neck was performed in the arterial phase from the thoracic inlet to the  skull vertex following the intravenous administration of 100 mL Isovue-370. Coronal, sagittal, and MIP reconstructions were generated and reviewed. Dose  reduction: All CT scans at this facility are performed using dose reduction  optimization techniques as appropriate to a performed exam including the  following-automated exposure control, adjustments of mA and/or Kv according to  patient size, or use of iterative reconstructive technique. Findings:    Head CT:  The ventricles are unchanged in size and configuration. No midline shift. The basal cisterns are patent. No acute hemorrhage, abnormal  extra-axial fluid, or evidence of large territorial ischemia.  Scattered foci of  hypodensity involving the cerebral hemispheric white matter are nonspecific but  most commonly associated with chronic small vessel ischemic changes. Unremarkable orbits. Trace right maxillary sinus mucosal thickening. No  evidence of an aggressive calvarial or extracalvarial lesion. Neck CTA:  The aortic arch and great vessel origins are unremarkable. The common carotid arteries are patent without high-grade stenosis. Outside  plaque in the carotid bulbs causing 30-35% stenosis of the right internal  carotid artery origin based on NASCET-like criteria. The left more distal right  cervical internal carotid arteries are patent without high-grade stenosis. The  external carotid arteries are unremarkable. The cervical vertebral arteries are patent without high-grade stenosis. Endotracheal and orogastric tubes. Emphysema. Multilevel degenerative changes of  the cervical spine. Fusion of the C3 and C4 vertebral bodies. Head CTA:  Scattered calcified atherosclerosis involving the bilateral intracranial  internal carotid arteries without significant stenosis. The anterior cerebral  arteries are patent without high-grade stenosis. The middle cerebral arteries  are patent without high-grade stenosis. The intracranial vertebral arteries are patent without high-grade stenosis. The  basilar artery is patent without high-grade stenosis. The posterior cerebral  arteries are patent without high-grade stenosis. No aneurysm or high flow vascular malformation. Impression  Head CT:  No acute intracranial abnormality. Head and neck CTA:  1. Mild (30-35%) stenosis of the right ICA origin. 2.  No occlusion or high-grade stenosis of the remaining major cervical or  intracranial arterial vasculature. Please note that degrees of carotid stenosis are measured in accordance with  NASCET-like criteria. · 2/12 patient condition got worse yesterday and he became cyanotic while he was on a ventilator transferred to ICU he is back on Precedex and propofol ABG acceptable  · 2/13 back on ventilator assist control. Low-grade fever 100.6.   Will check urine and sputum cultures although chest x-ray looks clear. Will decrease PEEP to 5.   Repeat labs in a.m. hypercapnia today we will give Diamox  · 2/14 will decrease FiO2 sputum positive for gram-positive cocci in clusters started on vancomycin and Zosyn pending sensitivity still spiking temperature  · Time of care 30 minutes  ·

## 2022-02-14 NOTE — PROGRESS NOTES
Spiritual Care Assessment/Progress Note  [facility]      NAME: Judah Ponce      MRN: [id number]  AGE: 73 y.o. SEX: male  Sabianist Affiliation: Quaker   Language: English     2/14/2022     Total Time (in minutes): 16     Spiritual Assessment begun in [facility] [place] ICU through conversation with:         [x]Patient        [] Family    [] Friend(s)        Reason for Consult: Follow-up, critical     Spiritual beliefs: (Please include comment if needed)     [x] Identifies with a edward tradition:  Annamaria        [] Supported by a edward community:            [] Claims no spiritual orientation:           [] Seeking spiritual identity:                [] Adheres to an individual form of spirituality:           [] Not able to assess:                           Identified resources for coping:      [x] Prayer                               [] Music                  [] Guided Imagery     [x] Family/friends                 [] Pet visits     [] Devotional reading                         [] Unknown     [] Other:                                             Interventions offered during this visit: (See comments for more details)    Patient Interventions: Other (comment) (Silent support and prayer)     Family/Friend(s):  Affirmation of emotions/emotional suffering,Affirmation of edward,Bridging,Catharsis/review of pertinent events in supportive environment,Iconic (affirming the presence of God/Higher Power),Prayer (assurance of),Sabianist beliefs/image of God discussed     Plan of Care:     [] Support spiritual and/or cultural needs    [] Support AMD and/or advance care planning process      [] Support grieving process   [] Coordinate Rites and/or Rituals    [] Coordination with community clergy   [] No spiritual needs identified at this time   [] Detailed Plan of Care below (See Comments)  [] Make referral to Music Therapy  [] Make referral to Pet Therapy     [] Make referral to Addiction services  [] Make referral to [facility]  [] Make referral to Spiritual Care Partner  [] No future visits requested        [x] Contact Spiritual Care for further referrals     Comments:  Visited patient in 2 ICU during daily rounds. Patient and wife Bridgette Welsh were present during the visit. Patient seemed to be resting and did not engage. Bridgette Welsh expressed appreciation towards the  who previously provided support to their daughter in distress. Bridgette Welsh shared about their experience and her hope of enjoying simple things in life when patient is discharged. Stated she is [place] but is not involved with a Christianity at this time. Provided chaplaincy education, listened with empathy and refletion while exploring resources as well as facilitating storytelling. Affirmed his health care priorities, their edward in God and their mutual support of one another. Advised of  availability. Contact chaplains for further referrals. Chaplain Ifrah Ames M.Div.    can be reached by calling the  at [facility]  [phone]

## 2022-02-15 ENCOUNTER — APPOINTMENT (OUTPATIENT)
Dept: GENERAL RADIOLOGY | Age: 67
DRG: 207 | End: 2022-02-15
Attending: INTERNAL MEDICINE
Payer: MEDICARE

## 2022-02-15 LAB
ANION GAP SERPL CALC-SCNC: 8 MMOL/L (ref 5–15)
ARTERIAL PATENCY WRIST A: NORMAL
BACTERIA SPEC CULT: NORMAL
BASE EXCESS BLDA CALC-SCNC: 0.5 MMOL/L (ref 0–2)
BASOPHILS # BLD: 0 K/UL (ref 0–0.1)
BASOPHILS NFR BLD: 0 % (ref 0–1)
BDY SITE: NORMAL
BUN SERPL-MCNC: 46 MG/DL (ref 6–20)
BUN/CREAT SERPL: 48 (ref 12–20)
CA-I BLD-MCNC: 8.5 MG/DL (ref 8.5–10.1)
CHLORIDE SERPL-SCNC: 100 MMOL/L (ref 97–108)
CO2 SERPL-SCNC: 24 MMOL/L (ref 21–32)
CREAT SERPL-MCNC: 0.96 MG/DL (ref 0.7–1.3)
DIFFERENTIAL METHOD BLD: ABNORMAL
EOSINOPHIL # BLD: 0 K/UL (ref 0–0.4)
EOSINOPHIL NFR BLD: 0 % (ref 0–7)
EPAP/CPAP/PEEP, PAPEEP: 5
ERYTHROCYTE [DISTWIDTH] IN BLOOD BY AUTOMATED COUNT: 13.7 % (ref 11.5–14.5)
FIO2 ON VENT: 30 %
GLUCOSE SERPL-MCNC: 171 MG/DL (ref 65–100)
GRAM STN SPEC: NORMAL
HCO3 BLDA-SCNC: 25 MMOL/L (ref 22–26)
HCT VFR BLD AUTO: 39.8 % (ref 36.6–50.3)
HGB BLD-MCNC: 13.3 G/DL (ref 12.1–17)
IMM GRANULOCYTES # BLD AUTO: 0.1 K/UL (ref 0–0.04)
IMM GRANULOCYTES NFR BLD AUTO: 1 % (ref 0–0.5)
LYMPHOCYTES # BLD: 0.9 K/UL (ref 0.8–3.5)
LYMPHOCYTES NFR BLD: 8 % (ref 12–49)
MCH RBC QN AUTO: 29.1 PG (ref 26–34)
MCHC RBC AUTO-ENTMCNC: 33.4 G/DL (ref 30–36.5)
MCV RBC AUTO: 87.1 FL (ref 80–99)
MONOCYTES # BLD: 0.6 K/UL (ref 0–1)
MONOCYTES NFR BLD: 5 % (ref 5–13)
NEUTS SEG # BLD: 9.7 K/UL (ref 1.8–8)
NEUTS SEG NFR BLD: 86 % (ref 32–75)
NRBC # BLD: 0 K/UL (ref 0–0.01)
NRBC BLD-RTO: 0 PER 100 WBC
PCO2 BLDA: 37 MMHG (ref 35–45)
PH BLDA: 7.43 [PH] (ref 7.35–7.45)
PLATELET # BLD AUTO: 205 K/UL (ref 150–400)
PMV BLD AUTO: 9.2 FL (ref 8.9–12.9)
PO2 BLDA: 88 MMHG (ref 75–100)
POTASSIUM SERPL-SCNC: 3.5 MMOL/L (ref 3.5–5.1)
PRESSURE SUPPORT SETTING VENT: 15 CM[H2O]
RBC # BLD AUTO: 4.57 M/UL (ref 4.1–5.7)
SAO2 % BLD: 96 %
SAO2% DEVICE SAO2% SENSOR NAME: NORMAL
SODIUM SERPL-SCNC: 132 MMOL/L (ref 136–145)
SPECIAL REQUESTS,SREQ: NORMAL
VENTILATION MODE VENT: NORMAL
VT SETTING VENT: 550 ML
WBC # BLD AUTO: 11.3 K/UL (ref 4.1–11.1)

## 2022-02-15 PROCEDURE — 74011000258 HC RX REV CODE- 258: Performed by: INTERNAL MEDICINE

## 2022-02-15 PROCEDURE — 94640 AIRWAY INHALATION TREATMENT: CPT

## 2022-02-15 PROCEDURE — 74011250637 HC RX REV CODE- 250/637: Performed by: HOSPITALIST

## 2022-02-15 PROCEDURE — 74011250637 HC RX REV CODE- 250/637: Performed by: INTERNAL MEDICINE

## 2022-02-15 PROCEDURE — 74011250636 HC RX REV CODE- 250/636: Performed by: INTERNAL MEDICINE

## 2022-02-15 PROCEDURE — 85025 COMPLETE CBC W/AUTO DIFF WBC: CPT

## 2022-02-15 PROCEDURE — 80048 BASIC METABOLIC PNL TOTAL CA: CPT

## 2022-02-15 PROCEDURE — 71045 X-RAY EXAM CHEST 1 VIEW: CPT

## 2022-02-15 PROCEDURE — 77010033678 HC OXYGEN DAILY

## 2022-02-15 PROCEDURE — 74011000250 HC RX REV CODE- 250: Performed by: INTERNAL MEDICINE

## 2022-02-15 PROCEDURE — 82803 BLOOD GASES ANY COMBINATION: CPT

## 2022-02-15 PROCEDURE — 65610000006 HC RM INTENSIVE CARE

## 2022-02-15 PROCEDURE — 36415 COLL VENOUS BLD VENIPUNCTURE: CPT

## 2022-02-15 PROCEDURE — 94003 VENT MGMT INPAT SUBQ DAY: CPT

## 2022-02-15 PROCEDURE — 36600 WITHDRAWAL OF ARTERIAL BLOOD: CPT

## 2022-02-15 RX ADMIN — DEXMEDETOMIDINE HYDROCHLORIDE 0.5 MCG/KG/HR: 100 INJECTION, SOLUTION, CONCENTRATE INTRAVENOUS at 09:30

## 2022-02-15 RX ADMIN — BUDESONIDE 500 MCG: 0.5 SUSPENSION RESPIRATORY (INHALATION) at 21:34

## 2022-02-15 RX ADMIN — BETHANECHOL CHLORIDE 100 MG: 25 TABLET ORAL at 21:33

## 2022-02-15 RX ADMIN — GLYCOPYRROLATE 0.1 MG: 0.2 INJECTION INTRAMUSCULAR; INTRAVENOUS at 09:31

## 2022-02-15 RX ADMIN — PROPOFOL 40 MCG/KG/MIN: 10 INJECTION, EMULSION INTRAVENOUS at 23:33

## 2022-02-15 RX ADMIN — SODIUM CHLORIDE, PRESERVATIVE FREE 10 ML: 5 INJECTION INTRAVENOUS at 21:33

## 2022-02-15 RX ADMIN — IPRATROPIUM BROMIDE AND ALBUTEROL SULFATE 3 ML: 2.5; .5 SOLUTION RESPIRATORY (INHALATION) at 07:53

## 2022-02-15 RX ADMIN — ASPIRIN 81 MG CHEWABLE TABLET 81 MG: 81 TABLET CHEWABLE at 09:31

## 2022-02-15 RX ADMIN — PROPOFOL 40 MCG/KG/MIN: 10 INJECTION, EMULSION INTRAVENOUS at 05:08

## 2022-02-15 RX ADMIN — PIPERACILLIN AND TAZOBACTAM 3.38 G: 3; .375 INJECTION, POWDER, LYOPHILIZED, FOR SOLUTION INTRAVENOUS at 06:30

## 2022-02-15 RX ADMIN — FINASTERIDE 5 MG: 5 TABLET, FILM COATED ORAL at 09:31

## 2022-02-15 RX ADMIN — SODIUM CHLORIDE, PRESERVATIVE FREE 20 MG: 5 INJECTION INTRAVENOUS at 21:31

## 2022-02-15 RX ADMIN — VANCOMYCIN HYDROCHLORIDE 1250 MG: 1.25 INJECTION, POWDER, LYOPHILIZED, FOR SOLUTION INTRAVENOUS at 22:15

## 2022-02-15 RX ADMIN — SODIUM CHLORIDE, PRESERVATIVE FREE 10 ML: 5 INJECTION INTRAVENOUS at 16:59

## 2022-02-15 RX ADMIN — CLOPIDOGREL BISULFATE 75 MG: 75 TABLET, FILM COATED ORAL at 09:32

## 2022-02-15 RX ADMIN — PIPERACILLIN AND TAZOBACTAM 3.38 G: 3; .375 INJECTION, POWDER, LYOPHILIZED, FOR SOLUTION INTRAVENOUS at 21:31

## 2022-02-15 RX ADMIN — OXCARBAZEPINE 150 MG: 150 TABLET, FILM COATED ORAL at 09:32

## 2022-02-15 RX ADMIN — OXCARBAZEPINE 150 MG: 150 TABLET, FILM COATED ORAL at 21:33

## 2022-02-15 RX ADMIN — BUDESONIDE 500 MCG: 0.5 SUSPENSION RESPIRATORY (INHALATION) at 07:53

## 2022-02-15 RX ADMIN — PROPOFOL 40 MCG/KG/MIN: 10 INJECTION, EMULSION INTRAVENOUS at 12:34

## 2022-02-15 RX ADMIN — PIPERACILLIN AND TAZOBACTAM 3.38 G: 3; .375 INJECTION, POWDER, LYOPHILIZED, FOR SOLUTION INTRAVENOUS at 13:06

## 2022-02-15 RX ADMIN — ACETAMINOPHEN 325MG 650 MG: 325 TABLET ORAL at 09:48

## 2022-02-15 RX ADMIN — SODIUM CHLORIDE, PRESERVATIVE FREE 10 ML: 5 INJECTION INTRAVENOUS at 06:30

## 2022-02-15 RX ADMIN — DEXMEDETOMIDINE HYDROCHLORIDE 0.5 MCG/KG/HR: 100 INJECTION, SOLUTION, CONCENTRATE INTRAVENOUS at 05:08

## 2022-02-15 RX ADMIN — PROPOFOL 40 MCG/KG/MIN: 10 INJECTION, EMULSION INTRAVENOUS at 20:32

## 2022-02-15 RX ADMIN — AMLODIPINE BESYLATE 10 MG: 5 TABLET ORAL at 09:33

## 2022-02-15 RX ADMIN — METOPROLOL SUCCINATE 25 MG: 25 TABLET, EXTENDED RELEASE ORAL at 09:31

## 2022-02-15 RX ADMIN — ENOXAPARIN SODIUM 40 MG: 100 INJECTION SUBCUTANEOUS at 09:30

## 2022-02-15 RX ADMIN — PROPOFOL 40 MCG/KG/MIN: 10 INJECTION, EMULSION INTRAVENOUS at 08:50

## 2022-02-15 RX ADMIN — METHYLPREDNISOLONE SODIUM SUCCINATE 40 MG: 40 INJECTION, POWDER, FOR SOLUTION INTRAMUSCULAR; INTRAVENOUS at 06:29

## 2022-02-15 RX ADMIN — SODIUM CHLORIDE, PRESERVATIVE FREE 20 MG: 5 INJECTION INTRAVENOUS at 09:31

## 2022-02-15 RX ADMIN — PROPOFOL 40 MCG/KG/MIN: 10 INJECTION, EMULSION INTRAVENOUS at 15:43

## 2022-02-15 RX ADMIN — VANCOMYCIN HYDROCHLORIDE 1250 MG: 1.25 INJECTION, POWDER, LYOPHILIZED, FOR SOLUTION INTRAVENOUS at 11:00

## 2022-02-15 RX ADMIN — DEXMEDETOMIDINE HYDROCHLORIDE 0.5 MCG/KG/HR: 100 INJECTION, SOLUTION, CONCENTRATE INTRAVENOUS at 15:43

## 2022-02-15 RX ADMIN — METHYLPREDNISOLONE SODIUM SUCCINATE 40 MG: 40 INJECTION, POWDER, FOR SOLUTION INTRAMUSCULAR; INTRAVENOUS at 21:31

## 2022-02-15 RX ADMIN — METHYLPREDNISOLONE SODIUM SUCCINATE 40 MG: 40 INJECTION, POWDER, FOR SOLUTION INTRAMUSCULAR; INTRAVENOUS at 13:06

## 2022-02-15 RX ADMIN — BETHANECHOL CHLORIDE 100 MG: 25 TABLET ORAL at 09:31

## 2022-02-15 RX ADMIN — IPRATROPIUM BROMIDE AND ALBUTEROL SULFATE 3 ML: 2.5; .5 SOLUTION RESPIRATORY (INHALATION) at 14:07

## 2022-02-15 RX ADMIN — GLYCOPYRROLATE 0.1 MG: 0.2 INJECTION INTRAMUSCULAR; INTRAVENOUS at 21:30

## 2022-02-15 RX ADMIN — IPRATROPIUM BROMIDE AND ALBUTEROL SULFATE 3 ML: 2.5; .5 SOLUTION RESPIRATORY (INHALATION) at 21:34

## 2022-02-15 RX ADMIN — PROPOFOL 40 MCG/KG/MIN: 10 INJECTION, EMULSION INTRAVENOUS at 02:31

## 2022-02-15 RX ADMIN — GLYCOPYRROLATE 0.1 MG: 0.2 INJECTION INTRAMUSCULAR; INTRAVENOUS at 15:43

## 2022-02-15 RX ADMIN — DEXMEDETOMIDINE HYDROCHLORIDE 0.5 MCG/KG/HR: 100 INJECTION, SOLUTION, CONCENTRATE INTRAVENOUS at 20:32

## 2022-02-15 RX ADMIN — TAMSULOSIN HYDROCHLORIDE 0.4 MG: 0.4 CAPSULE ORAL at 09:00

## 2022-02-15 RX ADMIN — ATORVASTATIN CALCIUM 80 MG: 40 TABLET, FILM COATED ORAL at 09:31

## 2022-02-15 RX ADMIN — PROPOFOL 40 MCG/KG/MIN: 10 INJECTION, EMULSION INTRAVENOUS at 11:00

## 2022-02-15 NOTE — PROGRESS NOTES
IMPRESSION:   1. Acute hypoxic respiratory failure evelyn on the ventilator FiO2 down to 35%  2. Low-grade fever no obvious site chest x-ray looks clear we will send a sputum  3. Hypercapnia with metabolic alkalosis resolved  4. NSTEMI  5. Generalized Edema  6. Syncope  7. COPD   8. Hypokalemia  9. Pneumonia sputum positive for gram-positive cocci      RECOMMENDATIONS/PLAN:   1. ICU monitoring  1. Ventilator for mechanical life support and prevent respiratory arrest with protective lung strategies sedated   2. Patient is on SIMV mode 35% FiO2 PEEP of 8 rate of 15   3. Did sedation vacation patient was put on spontaneous he had low tidal volume goes into apnea now he is back on assist control back on sedation will do sedation vacation again today  4. Patient on IV Solu-Medrol nebulizer treatment Covid negative  5. Hold trilogy inhaler continue nebulized albuterol Atrovent budesonide  6. Chest x-ray no acute infiltrate which shows emphysematous changes  7. Patient was to be followed with ENT he never had any appointment with them they were resuming patient has vocal cord dysfunction once extubated we will get ENT evaluation  8. Start patient on vancomycin and Zosyn pending cultures patient still continues to spike temperature sputum shows gram-positive cocci  9.  Will replace potassium     [x] High complexity decision making was performed  [x] See my orders for details  HPI  55-year-old male came in because of chest pain with shortness of breath he has also recurrent episodes of syncope significant past medical history of vocal cord dysfunction, pulmonary [place], coronary artery disease status post stent COPD current everyday smoker no history of sleep apnea he was having swelling of the lower extremities for couple of months today came into the emergency room as previously was having chest pain or shortness of breath he passed out subsequently got intubated and now on ventilator critical care consult was called. PMH:  has no past medical history on file. PSH:   has no past surgical history on file. FHX: family history is not on file. SHX:      ALL:   Allergies   Allergen Reactions    Sulfa (Sulfonamide Antibiotics) Other (comments)     syncope          MEDS:   [x] Reviewed - As Below   [] Not reviewed    Current Facility-Administered Medications   Medication    piperacillin-tazobactam (ZOSYN) 3.375 g in 0.9% sodium chloride (MBP/ADV) 100 mL MBP    VANCOMYCIN INFORMATION NOTE    vancomycin (VANCOCIN) 1,250 mg in 0.9% sodium chloride 250 mL (VIAL-MATE)    [START ON 2/16/2022] Vancomycin trough level to be drawn on 2/16/22 at 0900.     dexmedeTOMidine (PRECEDEX) 400 mcg in 0.9% sodium chloride (MBP/ADV) 100 mL MBP    glycopyrrolate (ROBINUL) injection 0.1 mg    [Held by provider] furosemide (LASIX) injection 40 mg    albuterol-ipratropium (DUO-NEB) 2.5 MG-0.5 MG/3 ML    bethanechol (URECHOLINE) tablet 100 mg    finasteride (PROSCAR) tablet 5 mg    OXcarbazepine (TRILEPTAL) tablet 150 mg    tamsulosin (FLOMAX) capsule 0.4 mg    sodium chloride (NS) flush 5-40 mL    sodium chloride (NS) flush 5-40 mL    acetaminophen (TYLENOL) tablet 650 mg    Or    acetaminophen (TYLENOL) suppository 650 mg    polyethylene glycol (MIRALAX) packet 17 g    ondansetron (ZOFRAN ODT) tablet 4 mg    Or    ondansetron (ZOFRAN) injection 4 mg    enoxaparin (LOVENOX) injection 40 mg    famotidine (PF) (PEPCID) 20 mg in 0.9% sodium chloride 10 mL injection    labetaloL (NORMODYNE;TRANDATE) 20 mg/4 mL (5 mg/mL) injection 10 mg    amLODIPine (NORVASC) tablet 10 mg    aspirin chewable tablet 81 mg    atorvastatin (LIPITOR) tablet 80 mg    clopidogreL (PLAVIX) tablet 75 mg    metoprolol succinate (TOPROL-XL) XL tablet 25 mg    methylPREDNISolone (PF) (SOLU-MEDROL) injection 40 mg    albuterol-ipratropium (DUO-NEB) 2.5 MG-0.5 MG/3 ML    budesonide (PULMICORT) 500 mcg/2 ml nebulizer suspension    midazolam in normal saline (VERSED) 1 mg/mL infusion    propofol (DIPRIVAN) 10 mg/mL infusion      MAR reviewed and pertinent medications noted or modified as needed   Current Facility-Administered Medications   Medication    piperacillin-tazobactam (ZOSYN) 3.375 g in 0.9% sodium chloride (MBP/ADV) 100 mL MBP    VANCOMYCIN INFORMATION NOTE    vancomycin (VANCOCIN) 1,250 mg in 0.9% sodium chloride 250 mL (VIAL-MATE)    [START ON 2/16/2022] Vancomycin trough level to be drawn on 2/16/22 at 0900.  dexmedeTOMidine (PRECEDEX) 400 mcg in 0.9% sodium chloride (MBP/ADV) 100 mL MBP    glycopyrrolate (ROBINUL) injection 0.1 mg    [Held by provider] furosemide (LASIX) injection 40 mg    albuterol-ipratropium (DUO-NEB) 2.5 MG-0.5 MG/3 ML    bethanechol (URECHOLINE) tablet 100 mg    finasteride (PROSCAR) tablet 5 mg    OXcarbazepine (TRILEPTAL) tablet 150 mg    tamsulosin (FLOMAX) capsule 0.4 mg    sodium chloride (NS) flush 5-40 mL    sodium chloride (NS) flush 5-40 mL    acetaminophen (TYLENOL) tablet 650 mg    Or    acetaminophen (TYLENOL) suppository 650 mg    polyethylene glycol (MIRALAX) packet 17 g    ondansetron (ZOFRAN ODT) tablet 4 mg    Or    ondansetron (ZOFRAN) injection 4 mg    enoxaparin (LOVENOX) injection 40 mg    famotidine (PF) (PEPCID) 20 mg in 0.9% sodium chloride 10 mL injection    labetaloL (NORMODYNE;TRANDATE) 20 mg/4 mL (5 mg/mL) injection 10 mg    amLODIPine (NORVASC) tablet 10 mg    aspirin chewable tablet 81 mg    atorvastatin (LIPITOR) tablet 80 mg    clopidogreL (PLAVIX) tablet 75 mg    metoprolol succinate (TOPROL-XL) XL tablet 25 mg    methylPREDNISolone (PF) (SOLU-MEDROL) injection 40 mg    albuterol-ipratropium (DUO-NEB) 2.5 MG-0.5 MG/3 ML    budesonide (PULMICORT) 500 mcg/2 ml nebulizer suspension    midazolam in normal saline (VERSED) 1 mg/mL infusion    propofol (DIPRIVAN) 10 mg/mL infusion      [facility]:  has no past medical history on file.   PSH:   has no past surgical history on file. FHX: family history is not on file. SHX:       ROS:  Unable to obtain sedated    Hemodynamics:    CO:    CI:    CVP:    SVR:   PAP Systolic:    PAP Diastolic:    PVR:    YI68:        Ventilator Settings:      Mode Rate TV Press PEEP FiO2 PIP Min. Vent   SIMV,Volume control    550 ml 15 cm H2O  5 cm H20 30 %  16 cm H2O  10 l/min        Vital Signs: Telemetry:    normal sinus rhythm Intake/Output:   Visit Vitals  /76 (BP 1 Location: Right upper arm, BP Patient Position: At rest)   Pulse 74   Temp 100.4 °F (38 °C)   Resp 15   Ht 6' 0.01\" (1.829 m)   Wt 106.4 kg (234 lb 9.1 oz)   SpO2 96%   BMI 31.81 kg/m²       Temp (24hrs), Av.8 °F (38.2 °C), Min:99.5 °F (37.5 °C), Max:101.3 °F (38.5 °C)        O2 Device: Ventilator O2 Flow Rate (L/min): 15 l/min       Wt Readings from Last 4 Encounters:   02/15/22 106.4 kg (234 lb 9.1 oz)          Intake/Output Summary (Last 24 hours) at 2/15/2022 0831  Last data filed at 2/15/2022 0532  Gross per 24 hour   Intake 2587.82 ml   Output 2250 ml   Net 337.82 ml       Last shift:      No intake/output data recorded. Last 3 shifts:  1901 - 02/15 0700  In: 4227.6 [I.V.:1612.6]  Out: 2250 [Urine:2250]       Physical Exam:     General:  intubated on vent unresponsive on propofol  HEENT: NCAT,   Eyes: anicteric; conjunctiva clear doll's eye reflex present  Neck: no nodes, no cuff leak, trach midline; no accessory MM use. Chest: no deformity,   Cardiac: IR regular; no murmur;   Lungs: distant breath sounds; there anteriorly and laterally diminished in the bases no wheezing or rales  Abd: soft, NT, hypoactive BS  Ext: no edema; no joint swelling;  No clubbing  : clear urine  Neuro: Sedated on propofol and Precedex unresponsive doll's eye reflex present flaccid extremities  Psych- unable to assess  Skin: warm, dry, no cyanosis;   Pulses: Brachial and radial pulses intact  Capillary: Normal capillary refill      DATA:    MAR reviewed and pertinent medications noted or modified as needed  MEDS:   Current Facility-Administered Medications   Medication    piperacillin-tazobactam (ZOSYN) 3.375 g in 0.9% sodium chloride (MBP/ADV) 100 mL MBP    VANCOMYCIN INFORMATION NOTE    vancomycin (VANCOCIN) 1,250 mg in 0.9% sodium chloride 250 mL (VIAL-MATE)    [START ON 2/16/2022] Vancomycin trough level to be drawn on 2/16/22 at 0900.     dexmedeTOMidine (PRECEDEX) 400 mcg in 0.9% sodium chloride (MBP/ADV) 100 mL MBP    glycopyrrolate (ROBINUL) injection 0.1 mg    [Held by provider] furosemide (LASIX) injection 40 mg    albuterol-ipratropium (DUO-NEB) 2.5 MG-0.5 MG/3 ML    bethanechol (URECHOLINE) tablet 100 mg    finasteride (PROSCAR) tablet 5 mg    OXcarbazepine (TRILEPTAL) tablet 150 mg    tamsulosin (FLOMAX) capsule 0.4 mg    sodium chloride (NS) flush 5-40 mL    sodium chloride (NS) flush 5-40 mL    acetaminophen (TYLENOL) tablet 650 mg    Or    acetaminophen (TYLENOL) suppository 650 mg    polyethylene glycol (MIRALAX) packet 17 g    ondansetron (ZOFRAN ODT) tablet 4 mg    Or    ondansetron (ZOFRAN) injection 4 mg    enoxaparin (LOVENOX) injection 40 mg    famotidine (PF) (PEPCID) 20 mg in 0.9% sodium chloride 10 mL injection    labetaloL (NORMODYNE;TRANDATE) 20 mg/4 mL (5 mg/mL) injection 10 mg    amLODIPine (NORVASC) tablet 10 mg    aspirin chewable tablet 81 mg    atorvastatin (LIPITOR) tablet 80 mg    clopidogreL (PLAVIX) tablet 75 mg    metoprolol succinate (TOPROL-XL) XL tablet 25 mg    methylPREDNISolone (PF) (SOLU-MEDROL) injection 40 mg    albuterol-ipratropium (DUO-NEB) 2.5 MG-0.5 MG/3 ML    budesonide (PULMICORT) 500 mcg/2 ml nebulizer suspension    midazolam in normal saline (VERSED) 1 mg/mL infusion    propofol (DIPRIVAN) 10 mg/mL infusion        Labs:      Recent Labs     02/15/22  0412 02/14/22  0509 02/13/22  0400   PH 7.43 7.41 7.45   PCO2 37 44 48*   PO2 88 119* 88   HCO3 25 27* 31*   FIO2 30.0 30.0 35.0   2/13 AC 15 VT 580 PEEP 8 FiO2 35%  Lab Results   Component Value Date/Time    TSH 1.38 02/08/2022 03:53 AM        Imaging:    Results from [place] encounter on 02/07/22    XR CHEST PORT    Narrative  Chest single view. Comparison single view chest February 14, 2022. Stable ET tube and NG tube. Unchanged appearance for the lungs. No gross interstitial or alveolar pulmonary  edema. Cardiac and mediastinal structures unchanged. No pneumothorax or sizable  pleural effusion. Results from [place] encounter on 02/07/22    CTA HEAD NECK W WO CONT    Narrative  Study: Head and Neck CTA without and with contrast.    Clinical Indication: Altered mental status. Comparison: Head CT dated 2/7/2022. Technique: Routine unenhanced axial acquisition of the head and neck was  performed. Subsequently, contiguous thin section axial acquisition of the head  and neck was performed in the arterial phase from the thoracic inlet to the  skull vertex following the intravenous administration of 100 mL Isovue-370. Coronal, sagittal, and MIP reconstructions were generated and reviewed. Dose  reduction: All CT scans at this facility are performed using dose reduction  optimization techniques as appropriate to a performed exam including the  following-automated exposure control, adjustments of mA and/or Kv according to  patient size, or use of iterative reconstructive technique. Findings:    Head CT:  The ventricles are unchanged in size and configuration. No midline shift. The basal cisterns are patent. No acute hemorrhage, abnormal  extra-axial fluid, or evidence of large territorial ischemia. Scattered foci of  hypodensity involving the cerebral hemispheric white matter are nonspecific but  most commonly associated with chronic small vessel ischemic changes. Unremarkable orbits. Trace right maxillary sinus mucosal thickening. No  evidence of an aggressive calvarial or extracalvarial lesion.     Neck CTA:  The aortic arch and great vessel origins are unremarkable. The common carotid arteries are patent without high-grade stenosis. Outside  plaque in the carotid bulbs causing 30-35% stenosis of the right internal  carotid artery origin based on NASCET-like criteria. The left more distal right  cervical internal carotid arteries are patent without high-grade stenosis. The  external carotid arteries are unremarkable. The cervical vertebral arteries are patent without high-grade stenosis. Endotracheal and orogastric tubes. Emphysema. Multilevel degenerative changes of  the cervical spine. Fusion of the C3 and C4 vertebral bodies. Head CTA:  Scattered calcified atherosclerosis involving the bilateral intracranial  internal carotid arteries without significant stenosis. The anterior cerebral  arteries are patent without high-grade stenosis. The middle cerebral arteries  are patent without high-grade stenosis. The intracranial vertebral arteries are patent without high-grade stenosis. The  basilar artery is patent without high-grade stenosis. The posterior cerebral  arteries are patent without high-grade stenosis. No aneurysm or high flow vascular malformation. Impression  Head CT:  No acute intracranial abnormality. Head and neck CTA:  1. Mild (30-35%) stenosis of the right ICA origin. 2.  No occlusion or high-grade stenosis of the remaining major cervical or  intracranial arterial vasculature. Please note that degrees of carotid stenosis are measured in accordance with  NASCET-like criteria. · 2/12 patient condition got worse yesterday and he became cyanotic while he was on a ventilator transferred to ICU he is back on Precedex and propofol ABG acceptable  · 2/13 back on ventilator assist control. Low-grade fever 100.6. Will check urine and sputum cultures although chest x-ray looks clear. Will decrease PEEP to 5.   Repeat labs in a.m. hypercapnia today we will give Diamox  · 2/14 will decrease FiO2 sputum positive for gram-positive cocci in clusters started on vancomycin and Zosyn pending sensitivity still spiking temperature  · Time of care 30 minutes  ·

## 2022-02-15 NOTE — PROGRESS NOTES
Nutrition Assessment     Type and Reason for Visit: Reassess (goal)    Nutrition Recommendations/Plan:   Continue TF of Promote at 25ml   Flush 165ml water q3hrs   Provide 2 pkt prosouce q3hrs (8pkt/day; 480kcals, 120g pro)  Providinkcals (60%), 158g (98%), 1823ml (100%)  Propofol 40mcg/kg/min/823kcals(109%)     Monitor and Record wts, TF rates, water flushes, OP, bms in I/O     Nutrition Assessment:  Admitted for hypoxia and multiple episodes of syncope, SOB & AMS. () Intubated. afebrile Plan for cardiac cath once extubation. minimal sedation per neuro to assess for brain damage. (2/15) Plan to wean vent however, unsuccessful r/t Spiked a fever of 101.3, remain intubated. No pressors, sedated on versed and propofol. Tolerating TF well Labs: NA (132), glucose (161), BUN (46), glucose (171). Meds: Zosyn, vancomycin, Pepcid, Lasix, Robinul, solumedrol. Malnutrition Assessment:  Malnutrition Status: No malnutrition     Estimated Daily Nutrient Needs:  Energy (kcal):  1732kcals (14kcals/kg)  Protein (g):  162g (2.0g/kg IBW)       Fluid (ml/day):  1732ml    Nutrition Related Findings:  NFPE with no acute findings, pt obese. No hx dysphagia. Last BM pta. Trace generalized and BL UE, +1 pitting BL LE edema. Current Nutrition Therapies:  DIET NPO  ADULT TUBE FEEDING Orogastric; Other Tube Feeding (Specify); Promote; Delivery Method: Continuous; Continuous Initial Rate (mL/hr): 25; Continuous Advance Tube Feeding: No; Water Flush Volume (mL): 300;  Water Flush Frequency: Q 6 hours    Anthropometric Measures:  · Height:  6' (182.9 cm)  · Current Body Wt:  106.4 kg (234 lb 9.1 oz)  · BMI: 31.8    Nutrition Diagnosis:   · Inadequate oral intake related to impaired respiratory function as evidenced by intubation      Nutrition Intervention:  Food and/or Nutrient Delivery: Continue tube feeding  Nutrition Education and Counseling: Education not indicated  Coordination of Nutrition Care: Continue to monitor while inpatient    Goals:  Meet >75%of EENs in 5 days, wt maintenance within +/-0.5kg in 5 days       Nutrition Monitoring and Evaluation:   Behavioral-Environmental Outcomes: None identified  Food/Nutrient Intake Outcomes: Diet advancement/tolerance,Enteral nutrition intake/tolerance  Physical Signs/Symptoms Outcomes: Fluid status or edema,Hemodynamic status,Weight,Biochemical data    Discharge Planning:    No discharge needs at this time     Electronically signed by Bari Porter on 2/15/2022 at 2:30 PM    Contact Number: [employer]

## 2022-02-15 NOTE — PROGRESS NOTES
Hospitalist Progress Note               Daily Progress Note: 2/15/2022      Subjective:   Hospital course to date:  52-year-old male with history of COPD, CAD with PCI and pulmonary embolism (not on anticoagulation due to GI bleed) who was admitted on 2/7 with altered mental status and syncope complicated by hypertensive urgency and hypoxia. Initially had a blood pressure of 230/143 with oxygen saturations in the 30s. She was intubated in the ED. CT of the chest was unremarkable. Patient has a history of recurrent syncopal episodes. Per wife, patient has been having intermittent, generalized/whole body cramps that last between 15 minutes to 1 hour. Those episodes generally followed by loss of consciousness for about 15 seconds. He usually will have short period of confusion and slurred speech after regaining consciousness. No extremity weakness after the episodes. Wife reports no fever, chills or chest pain. Reports have shortness of breath and cough at baseline due to COPD and has not been worse than normal in the past 3 weeks. Of note, he has been worked-up in the hospital in [facility] (record not found in system), and no causes has been identified. EEG was never done. Patient was seen by neurology who does not feel the above episodes represented seizures. COVID test was negative. Hospital course was complicated by non-STEMI felt due to malignant hypertension. He was maintained on the ventilator and followed by pulmonology. Etiology of respiratory failure seems to be largely COPD exacerbation. There was some question about possible vocal cord dysfunction with plans for ENT evaluation    Patient was treated with IV antibiotics. Currently on Zosyn and vancomycin    Sputum culture from 2/14 was positive for heavy growth of Moraxella catarrhalis    -------    Patient seen today for follow-up. He remains intubated. On FiO2 of 30%.  Had a temp of 101.3 yesterday evening, afebrile since then          Problem List:  Problem List as of 2/15/2022 Never Reviewed          Codes Class Noted - Resolved    Respiratory failure [facility]) ICD-10-CM: J96.90  ICD-9-CM: 518.81  2/7/2022 - Present              Medications reviewed  Current Facility-Administered Medications   Medication Dose Route Frequency    piperacillin-tazobactam (ZOSYN) 3.375 g in 0.9% sodium chloride (MBP/ADV) 100 mL MBP  3.375 g IntraVENous Q8H    VANCOMYCIN INFORMATION NOTE   Other PRN    vancomycin (VANCOCIN) 1,250 mg in 0.9% sodium chloride 250 mL (VIAL-MATE)  1,250 mg IntraVENous Q12H    [START ON 2/16/2022] Vancomycin trough level to be drawn on 2/16/22 at 0900.    Other ONCE    dexmedeTOMidine (PRECEDEX) 400 mcg in 0.9% sodium chloride (MBP/ADV) 100 mL MBP  0.1-1.4 mcg/kg/hr IntraVENous TITRATE    glycopyrrolate (ROBINUL) injection 0.1 mg  0.1 mg IntraVENous TID    [Held by provider] furosemide (LASIX) injection 40 mg  40 mg IntraVENous DAILY    albuterol-ipratropium (DUO-NEB) 2.5 MG-0.5 MG/3 ML  3 mL Nebulization Q4H PRN    bethanechol (URECHOLINE) tablet 100 mg  100 mg Oral BID    finasteride (PROSCAR) tablet 5 mg  5 mg Oral DAILY    OXcarbazepine (TRILEPTAL) tablet 150 mg  150 mg Oral BID    tamsulosin (FLOMAX) capsule 0.4 mg  0.4 mg Oral DAILY    sodium chloride (NS) flush 5-40 mL  5-40 mL IntraVENous Q8H    sodium chloride (NS) flush 5-40 mL  5-40 mL IntraVENous PRN    acetaminophen (TYLENOL) tablet 650 mg  650 mg Oral Q6H PRN    Or    acetaminophen (TYLENOL) suppository 650 mg  650 mg Rectal Q6H PRN    polyethylene glycol (MIRALAX) packet 17 g  17 g Oral DAILY PRN    ondansetron (ZOFRAN ODT) tablet 4 mg  4 mg Oral Q8H PRN    Or    ondansetron (ZOFRAN) injection 4 mg  4 mg IntraVENous Q6H PRN    enoxaparin (LOVENOX) injection 40 mg  40 mg SubCUTAneous DAILY    famotidine (PF) (PEPCID) 20 mg in 0.9% sodium chloride 10 mL injection  20 mg IntraVENous Q12H    labetaloL (NORMODYNE;TRANDATE) 20 mg/4 mL (5 mg/mL) injection 10 mg  10 mg IntraVENous Q4H PRN    amLODIPine (NORVASC) tablet 10 mg  10 mg Oral DAILY    aspirin chewable tablet 81 mg  81 mg Oral DAILY    atorvastatin (LIPITOR) tablet 80 mg  80 mg Oral DAILY    clopidogreL (PLAVIX) tablet 75 mg  75 mg Oral DAILY    metoprolol succinate (TOPROL-XL) XL tablet 25 mg  25 mg Oral DAILY    methylPREDNISolone (PF) (SOLU-MEDROL) injection 40 mg  40 mg IntraVENous Q8H    albuterol-ipratropium (DUO-NEB) 2.5 MG-0.5 MG/3 ML  3 mL Nebulization Q6HWA RT    budesonide (PULMICORT) 500 mcg/2 ml nebulizer suspension  500 mcg Nebulization BID RT    midazolam in normal saline (VERSED) 1 mg/mL infusion  0-10 mg/hr IntraVENous TITRATE    propofol (DIPRIVAN) 10 mg/mL infusion  0-50 mcg/kg/min IntraVENous TITRATE       Review of Systems:   A comprehensive review of systems was negative except for that written in the HPI. Objective:   Physical Exam:     Visit Vitals  /76 (BP 1 Location: Right upper arm, BP Patient Position: At rest)   Pulse 74   Temp 100.4 °F (38 °C)   Resp 15   Ht 6' 0.01\" (1.829 m)   Wt 106.4 kg (234 lb 9.1 oz)   SpO2 96%   BMI 31.81 kg/m²    O2 Flow Rate (L/min): 15 l/min O2 Device: Ventilator    Temp (24hrs), Av.9 °F (38.3 °C), Min:99.5 °F (37.5 °C), Max:101.3 °F (38.5 °C)    No intake/output data recorded.  1901 - 02/15 0700  In: 4227.6 [I.V.:1612.6]  Out: 2250 [Urine:2250]    General:   Intubated and sedated   Lungs:   Clear to auscultation bilaterally. Chest wall:  No tenderness or deformity. Heart:  Regular rate and rhythm, S1, S2 normal, no murmur, click, rub or gallop. Abdomen:   Soft, non-tender. Bowel sounds normal. No masses,  No organomegaly. Extremities: Extremities normal, atraumatic, no cyanosis or edema. Pulses: 2+ and symmetric all extremities. Skin: Skin color, texture, turgor normal. No rashes or lesions   Neurologic: CNII-XII intact.    Unable to assess         Data Review:       Recent Days:  Recent Labs 02/15/22  0356 02/14/22  0330   WBC 11.3* 11.5*   HGB 13.3 14.1   HCT 39.8 43.0    239     Recent Labs     02/15/22  0356 02/14/22  0330   * 142   K 3.5 2.5*    112*   CO2 24 22   * 118*   BUN 46* 34*   CREA 0.96 0.75   CA 8.5 5.8*   MG  --  1.9   PHOS  --  2.8   ALB  --  1.7*     Recent Labs     02/15/22  0412 02/14/22  0509 02/13/22  0400   PH 7.43 7.41 7.45   PCO2 37 44 48*   PO2 88 119* 88   HCO3 25 27* 31*   FIO2 30.0 30.0 35.0       24 Hour Results:  Recent Results (from the past 24 hour(s))   PROCALCITONIN    Collection Time: 02/14/22  9:25 AM   Result Value Ref Range    Procalcitonin 0.06 (H) 0 ng/mL   C REACTIVE PROTEIN, QT    Collection Time: 02/14/22  9:25 AM   Result Value Ref Range    C-Reactive protein 0.79 (H) 0.00 - 0.60 mg/dL   GLUCOSE, POC    Collection Time: 02/14/22  8:40 PM   Result Value Ref Range    Glucose (POC) 164 (H) 65 - 117 mg/dL    Performed by OZIEL GARCIA    CBC WITH AUTOMATED DIFF    Collection Time: 02/15/22  3:56 AM   Result Value Ref Range    WBC 11.3 (H) 4.1 - 11.1 K/uL    RBC 4.57 4.10 - 5.70 M/uL    HGB 13.3 12.1 - 17.0 g/dL    HCT 39.8 36.6 - 50.3 %    MCV 87.1 80.0 - 99.0 FL    MCH 29.1 26.0 - 34.0 PG    MCHC 33.4 30.0 - 36.5 g/dL    RDW 13.7 11.5 - 14.5 %    PLATELET 131 747 - 973 K/uL    MPV 9.2 8.9 - 12.9 FL    NRBC 0.0 0.0  WBC    ABSOLUTE NRBC 0.00 0.00 - 0.01 K/uL    NEUTROPHILS 86 (H) 32 - 75 %    LYMPHOCYTES 8 (L) 12 - 49 %    MONOCYTES 5 5 - 13 %    EOSINOPHILS 0 0 - 7 %    BASOPHILS 0 0 - 1 %    IMMATURE GRANULOCYTES 1 (H) 0 - 0.5 %    ABS. NEUTROPHILS 9.7 (H) 1.8 - 8.0 K/UL    ABS. LYMPHOCYTES 0.9 0.8 - 3.5 K/UL    ABS. MONOCYTES 0.6 0.0 - 1.0 K/UL    ABS. EOSINOPHILS 0.0 0.0 - 0.4 K/UL    ABS. BASOPHILS 0.0 0.0 - 0.1 K/UL    ABS. IMM.  GRANS. 0.1 (H) 0.00 - 0.04 K/UL    DF AUTOMATED     METABOLIC PANEL, BASIC    Collection Time: 02/15/22  3:56 AM   Result Value Ref Range    Sodium 132 (L) 136 - 145 mmol/L    Potassium 3.5 3.5 - 5.1 mmol/L    Chloride 100 97 - 108 mmol/L    CO2 24 21 - 32 mmol/L    Anion gap 8 5 - 15 mmol/L    Glucose 171 (H) 65 - 100 mg/dL    BUN 46 (H) 6 - 20 mg/dL    Creatinine 0.96 0.70 - 1.30 mg/dL    BUN/Creatinine ratio 48 (H) 12 - 20      GFR est AA >60 >60 ml/min/1.73m2    GFR est non-AA >60 >60 ml/min/1.73m2    Calcium 8.5 8.5 - 10.1 mg/dL   BLOOD GAS, ARTERIAL    Collection Time: 02/15/22  4:12 AM   Result Value Ref Range    pH 7.43 7.35 - 7.45      PCO2 37 35 - 45 mmHg    PO2 88 75 - 100 mmHg    O2 SAT 96 >95 %    BICARBONATE 25 22 - 26 mmol/L    BASE EXCESS 0.5 0 - 2 mmol/L    O2 METHOD VENT      FIO2 30.0 %    MODE SIMV      Tidal volume 550      PRESSURE SUPPORT 15.0      EPAP/CPAP/PEEP 5.0      SITE Right Radial      VÍCTOR'S TEST PASS         XR CHEST PORT   Final Result      XR CHEST PORT   Final Result   No acute findings. XR CHEST PORT   Final Result   No significant interval change. CTA HEAD NECK W WO CONT   Final Result   Head CT:   No acute intracranial abnormality. Head and neck CTA:   1. Mild (30-35%) stenosis of the right ICA origin. 2.  No occlusion or high-grade stenosis of the remaining major cervical or   intracranial arterial vasculature. Please note that degrees of carotid stenosis are measured in accordance with   NASCET-like criteria. XR CHEST PORT   Final Result   No significant interval change. XR CHEST PORT   Final Result   FINDINGS: IMPRESSION: Single frontal view of the chest.   ET tube, enteric tube remain. Normal heart size. No vascular congestion or pulmonary edema. The lungs are well inflated. No focal infiltrate, pleural effusion, or   pneumothorax. Unchanged biapical pleural-parenchymal thickening. Upper lung   oligemia from emphysema. No free air under the diaphragm. XR CHEST PORT   Final Result   No acute findings.       XR CHEST PORT   Final Result      XR CHEST PORT   Final Result   The cardiomediastinal silhouette is appropriate for age, technique,   and lung expansion. Pulmonary vasculature is not congested. The lungs are   essentially clear. No effusion or pneumothorax is seen. CT HEAD WO CONT   Final Result   No evidence of an acute intracranial process. CTA CHEST ABD PELV W WO CONT   Final Result      No aneurysm or dissection of aorta. Atherosclerosis including coronary arteries. Pulmonary emphysema. No intra-abdominal inflammatory changes or bowel obstruction. Follow-up as   clinically indicated. Please see full report. XR CHEST PORT   Final Result   1. Endotracheal tube in satisfactory position. 2. Suspect nasogastric tube is coiled in the oropharynx. Its tip is entering the   stomach. 3. No focal infiltrate. No overt edema. 4. No effusion or pneumothorax. Assessment:  Acute respiratory failure with hypoxia, requiring mechanical ventilation    Acute exacerbation of COPD    Sputum positive for Moraxella catarrhalis, beta-lactamase positive    Sepsis    Episodes of recurrent syncope    Malignant hypertension, improved    Non-STEMI type II      Plan:  Continue Zosyn and vancomycin  Await final sputum culture results  Continue mechanical ventilation, wean as tolerated  Continue tube feeds and supportive care      Care Plan discussed with: Patient/Family    Disposition: Continued ICU care    Total time spent with patient: 30 minutes.     Devang Graves MD

## 2022-02-15 NOTE — PROGRESS NOTES
Progress Note    Patient: Stephenie Mariscal MRN: [id number]  SSN: [id number]    YOB: 1955  Age: 73 y.o. Sex: male      Admit Date: 2/7/2022    LOS: 8 days     Subjective:     No acute events overnight. Remains intubated. Had a temperature of 101.5  Objective:     Vitals:    02/15/22 0500 02/15/22 0600 02/15/22 0700 02/15/22 0749   BP: 118/79 116/76     Pulse: 73 74  74   Resp: 13 13  15   Temp:   100.4 °F (38 °C)    SpO2: 100% 100%  96%   Weight: 106.4 kg (234 lb 9.1 oz)      Height:            Intake and Output:  Current Shift: No intake/output data recorded. Last three shifts: 02/13 1901 - 02/15 0700  In: 4227.6 [I.V.:1612.6]  Out: 2250 [Urine:2250]    Physical Exam:   General:   Intubated. Eyes:  Conjunctivae/corneas clear. PERRL, EOMs intact. Fundi benign   Ears:  Normal TMs and external ear canals both ears. Nose: Nares normal. Septum midline. Mucosa normal. No drainage or sinus tenderness. Mouth/Throat: Lips, mucosa, and tongue normal. Teeth and gums normal.   Neck: Supple, symmetrical, trachea midline, no adenopathy, thyroid: no enlargment/tenderness/nodules, no carotid bruit and no JVD. Back:   Symmetric, no curvature. ROM normal. No CVA tenderness. Lungs:   Clear to auscultation bilaterally. Heart:  Regular rate and rhythm, S1, S2 normal, no murmur, click, rub or gallop. Abdomen:   Soft, non-tender. Bowel sounds normal. No masses,  No organomegaly. Extremities: Extremities normal, atraumatic, no cyanosis or edema. Pulses: 2+ and symmetric all extremities. Skin: Skin color, texture, turgor normal. No rashes or lesions   Lymph nodes: Cervical, supraclavicular, and axillary nodes normal.   Neurologic:  Intubated       Lab/Data Review: All lab results for the last 24 hours reviewed. Assessment:     Active Problems:    Respiratory failure ([address] (2/7/2022)    Patient is 52-year-old white male with:  1. Syncope  2. Non-STEMI  3.   CAD status post PCI of left circumflex artery in 2018  4. Hyperlipidemia  6. Vocal cord dysfunction  7. History of PE  8. Warfarin was stopped due to GI bleed  9. COPD  7. Obesity  8. Edema  9. Hypertensive emergency   10. Bilateral renal lesions, largest about 1.7 cm on the right side posteriorly. Plan:     Remains intubated. Currently in sinus rhythm. Once extubated we will consider to proceed with cardiac catheterization. Currently on dual antiplatelet therapy, atorvastatin and metoprolol.   Signed By: Jinny Sparks MD     February 15, 2022

## 2022-02-16 ENCOUNTER — APPOINTMENT (OUTPATIENT)
Dept: GENERAL RADIOLOGY | Age: 67
DRG: 207 | End: 2022-02-16
Attending: INTERNAL MEDICINE
Payer: MEDICARE

## 2022-02-16 LAB
ARTERIAL PATENCY WRIST A: ABNORMAL
BASE DEFICIT BLDA-SCNC: 0.1 MMOL/L (ref 0–2)
BDY SITE: ABNORMAL
DATE LAST DOSE: ABNORMAL
EPAP/CPAP/PEEP, PAPEEP: 5
FIO2 ON VENT: 30 %
GLUCOSE BLD STRIP.AUTO-MCNC: 128 MG/DL (ref 65–117)
GLUCOSE BLD STRIP.AUTO-MCNC: 142 MG/DL (ref 65–117)
HCO3 BLDA-SCNC: 24 MMOL/L (ref 22–26)
PCO2 BLDA: 37 MMHG (ref 35–45)
PERFORMED BY, TECHID: ABNORMAL
PERFORMED BY, TECHID: ABNORMAL
PH BLDA: 7.42 [PH] (ref 7.35–7.45)
PO2 BLDA: 85 MMHG (ref 75–100)
PRESSURE SUPPORT SETTING VENT: 15 CM[H2O]
REPORTED DOSE,DOSE: ABNORMAL UNITS
SAO2 % BLD: 95 %
SAO2% DEVICE SAO2% SENSOR NAME: ABNORMAL
VANCOMYCIN TROUGH SERPL-MCNC: 12.8 UG/ML (ref 5–10)
VENTILATION MODE VENT: ABNORMAL
VT SETTING VENT: 550 ML

## 2022-02-16 PROCEDURE — 77010033678 HC OXYGEN DAILY

## 2022-02-16 PROCEDURE — 74011250637 HC RX REV CODE- 250/637: Performed by: INTERNAL MEDICINE

## 2022-02-16 PROCEDURE — 94003 VENT MGMT INPAT SUBQ DAY: CPT

## 2022-02-16 PROCEDURE — 82803 BLOOD GASES ANY COMBINATION: CPT

## 2022-02-16 PROCEDURE — 74011000258 HC RX REV CODE- 258: Performed by: INTERNAL MEDICINE

## 2022-02-16 PROCEDURE — 74011250636 HC RX REV CODE- 250/636: Performed by: INTERNAL MEDICINE

## 2022-02-16 PROCEDURE — 36415 COLL VENOUS BLD VENIPUNCTURE: CPT

## 2022-02-16 PROCEDURE — 74011000250 HC RX REV CODE- 250: Performed by: INTERNAL MEDICINE

## 2022-02-16 PROCEDURE — 82962 GLUCOSE BLOOD TEST: CPT

## 2022-02-16 PROCEDURE — 80202 ASSAY OF VANCOMYCIN: CPT

## 2022-02-16 PROCEDURE — 74011250637 HC RX REV CODE- 250/637: Performed by: HOSPITALIST

## 2022-02-16 PROCEDURE — 71045 X-RAY EXAM CHEST 1 VIEW: CPT

## 2022-02-16 PROCEDURE — 65610000006 HC RM INTENSIVE CARE

## 2022-02-16 PROCEDURE — 36600 WITHDRAWAL OF ARTERIAL BLOOD: CPT

## 2022-02-16 PROCEDURE — 94640 AIRWAY INHALATION TREATMENT: CPT

## 2022-02-16 RX ORDER — FUROSEMIDE 10 MG/ML
40 INJECTION INTRAMUSCULAR; INTRAVENOUS ONCE
Status: COMPLETED | OUTPATIENT
Start: 2022-02-16 | End: 2022-02-16

## 2022-02-16 RX ADMIN — PROPOFOL 40 MCG/KG/MIN: 10 INJECTION, EMULSION INTRAVENOUS at 10:49

## 2022-02-16 RX ADMIN — ASPIRIN 81 MG CHEWABLE TABLET 81 MG: 81 TABLET CHEWABLE at 10:59

## 2022-02-16 RX ADMIN — PROPOFOL 40 MCG/KG/MIN: 10 INJECTION, EMULSION INTRAVENOUS at 13:54

## 2022-02-16 RX ADMIN — TAMSULOSIN HYDROCHLORIDE 0.4 MG: 0.4 CAPSULE ORAL at 10:51

## 2022-02-16 RX ADMIN — METOPROLOL SUCCINATE 25 MG: 25 TABLET, EXTENDED RELEASE ORAL at 10:51

## 2022-02-16 RX ADMIN — PIPERACILLIN AND TAZOBACTAM 3.38 G: 3; .375 INJECTION, POWDER, LYOPHILIZED, FOR SOLUTION INTRAVENOUS at 05:34

## 2022-02-16 RX ADMIN — DEXMEDETOMIDINE HYDROCHLORIDE 0.6 MCG/KG/HR: 100 INJECTION, SOLUTION, CONCENTRATE INTRAVENOUS at 23:30

## 2022-02-16 RX ADMIN — IPRATROPIUM BROMIDE AND ALBUTEROL SULFATE 3 ML: 2.5; .5 SOLUTION RESPIRATORY (INHALATION) at 09:46

## 2022-02-16 RX ADMIN — GLYCOPYRROLATE 0.1 MG: 0.2 INJECTION INTRAMUSCULAR; INTRAVENOUS at 15:31

## 2022-02-16 RX ADMIN — BETHANECHOL CHLORIDE 100 MG: 25 TABLET ORAL at 10:59

## 2022-02-16 RX ADMIN — PROPOFOL 40 MCG/KG/MIN: 10 INJECTION, EMULSION INTRAVENOUS at 05:47

## 2022-02-16 RX ADMIN — CLOPIDOGREL BISULFATE 75 MG: 75 TABLET, FILM COATED ORAL at 10:59

## 2022-02-16 RX ADMIN — ENOXAPARIN SODIUM 40 MG: 100 INJECTION SUBCUTANEOUS at 11:00

## 2022-02-16 RX ADMIN — SODIUM CHLORIDE, PRESERVATIVE FREE 10 ML: 5 INJECTION INTRAVENOUS at 14:00

## 2022-02-16 RX ADMIN — BETHANECHOL CHLORIDE 100 MG: 25 TABLET ORAL at 21:36

## 2022-02-16 RX ADMIN — SODIUM CHLORIDE, PRESERVATIVE FREE 20 MG: 5 INJECTION INTRAVENOUS at 21:36

## 2022-02-16 RX ADMIN — OXCARBAZEPINE 150 MG: 150 TABLET, FILM COATED ORAL at 21:36

## 2022-02-16 RX ADMIN — FINASTERIDE 5 MG: 5 TABLET, FILM COATED ORAL at 10:59

## 2022-02-16 RX ADMIN — OXCARBAZEPINE 150 MG: 150 TABLET, FILM COATED ORAL at 10:51

## 2022-02-16 RX ADMIN — DEXMEDETOMIDINE HYDROCHLORIDE 0.5 MCG/KG/HR: 100 INJECTION, SOLUTION, CONCENTRATE INTRAVENOUS at 05:59

## 2022-02-16 RX ADMIN — IPRATROPIUM BROMIDE AND ALBUTEROL SULFATE 3 ML: 2.5; .5 SOLUTION RESPIRATORY (INHALATION) at 21:12

## 2022-02-16 RX ADMIN — DEXMEDETOMIDINE HYDROCHLORIDE 0.6 MCG/KG/HR: 100 INJECTION, SOLUTION, CONCENTRATE INTRAVENOUS at 17:46

## 2022-02-16 RX ADMIN — FUROSEMIDE 40 MG: 10 INJECTION, SOLUTION INTRAMUSCULAR; INTRAVENOUS at 13:53

## 2022-02-16 RX ADMIN — DEXMEDETOMIDINE HYDROCHLORIDE 0.6 MCG/KG/HR: 100 INJECTION, SOLUTION, CONCENTRATE INTRAVENOUS at 19:59

## 2022-02-16 RX ADMIN — PROPOFOL 40 MCG/KG/MIN: 10 INJECTION, EMULSION INTRAVENOUS at 17:46

## 2022-02-16 RX ADMIN — ATORVASTATIN CALCIUM 80 MG: 40 TABLET, FILM COATED ORAL at 10:59

## 2022-02-16 RX ADMIN — VANCOMYCIN HYDROCHLORIDE 1250 MG: 1.25 INJECTION, POWDER, LYOPHILIZED, FOR SOLUTION INTRAVENOUS at 11:45

## 2022-02-16 RX ADMIN — BUDESONIDE 500 MCG: 0.5 SUSPENSION RESPIRATORY (INHALATION) at 09:46

## 2022-02-16 RX ADMIN — GLYCOPYRROLATE 0.1 MG: 0.2 INJECTION INTRAMUSCULAR; INTRAVENOUS at 21:36

## 2022-02-16 RX ADMIN — SODIUM CHLORIDE, PRESERVATIVE FREE 10 ML: 5 INJECTION INTRAVENOUS at 05:34

## 2022-02-16 RX ADMIN — METHYLPREDNISOLONE SODIUM SUCCINATE 40 MG: 40 INJECTION, POWDER, FOR SOLUTION INTRAMUSCULAR; INTRAVENOUS at 14:00

## 2022-02-16 RX ADMIN — BUDESONIDE 500 MCG: 0.5 SUSPENSION RESPIRATORY (INHALATION) at 21:12

## 2022-02-16 RX ADMIN — GLYCOPYRROLATE 0.1 MG: 0.2 INJECTION INTRAMUSCULAR; INTRAVENOUS at 10:59

## 2022-02-16 RX ADMIN — METHYLPREDNISOLONE SODIUM SUCCINATE 40 MG: 40 INJECTION, POWDER, FOR SOLUTION INTRAMUSCULAR; INTRAVENOUS at 05:34

## 2022-02-16 RX ADMIN — METHYLPREDNISOLONE SODIUM SUCCINATE 40 MG: 40 INJECTION, POWDER, FOR SOLUTION INTRAMUSCULAR; INTRAVENOUS at 21:36

## 2022-02-16 RX ADMIN — PROPOFOL 40 MCG/KG/MIN: 10 INJECTION, EMULSION INTRAVENOUS at 02:36

## 2022-02-16 RX ADMIN — PIPERACILLIN AND TAZOBACTAM 3.38 G: 3; .375 INJECTION, POWDER, LYOPHILIZED, FOR SOLUTION INTRAVENOUS at 21:35

## 2022-02-16 RX ADMIN — SODIUM CHLORIDE, PRESERVATIVE FREE 10 ML: 5 INJECTION INTRAVENOUS at 21:37

## 2022-02-16 RX ADMIN — AMLODIPINE BESYLATE 10 MG: 5 TABLET ORAL at 10:59

## 2022-02-16 RX ADMIN — DEXMEDETOMIDINE HYDROCHLORIDE 0.5 MCG/KG/HR: 100 INJECTION, SOLUTION, CONCENTRATE INTRAVENOUS at 15:30

## 2022-02-16 RX ADMIN — SODIUM CHLORIDE, PRESERVATIVE FREE 20 MG: 5 INJECTION INTRAVENOUS at 10:58

## 2022-02-16 RX ADMIN — IPRATROPIUM BROMIDE AND ALBUTEROL SULFATE 3 ML: 2.5; .5 SOLUTION RESPIRATORY (INHALATION) at 13:46

## 2022-02-16 RX ADMIN — PIPERACILLIN AND TAZOBACTAM 3.38 G: 3; .375 INJECTION, POWDER, LYOPHILIZED, FOR SOLUTION INTRAVENOUS at 13:54

## 2022-02-16 RX ADMIN — PROPOFOL 40 MCG/KG/MIN: 10 INJECTION, EMULSION INTRAVENOUS at 19:58

## 2022-02-16 RX ADMIN — PROPOFOL 40 MCG/KG/MIN: 10 INJECTION, EMULSION INTRAVENOUS at 23:30

## 2022-02-16 NOTE — PROGRESS NOTES
Hospitalist Progress Note               Daily Progress Note: 2/16/2022      Subjective:   Hospital course to date:  71-year-old male with history of COPD, CAD with PCI and pulmonary embolism (not on anticoagulation due to GI bleed) who was admitted on 2/7 with altered mental status and syncope complicated by hypertensive urgency and hypoxia. Initially had a blood pressure of 230/143 with oxygen saturations in the 30s. He was intubated in the ED. CT of the chest was unremarkable. Patient has a history of recurrent syncopal episodes. Per wife, patient had been having intermittent, generalized/whole body cramps that last between 15 minutes to 1 hour. Those episodes generally are  followed by loss of consciousness for about 15 seconds. He usually will have short period of confusion and slurred speech after regaining consciousness. No extremity weakness after the episodes. Wife reported no fever, chills or chest pain. Reports have shortness of breath and cough at baseline due to COPD and has not been worse than normal in the past 3 weeks. Of note, he has been worked-up in the hospital in [facility] (record not found in system), and no cause has been identified. EEG was never done. Patient was seen by neurology who does not feel the above episodes represented seizures. COVID test was negative. Hospital course was complicated by non-STEMI felt due to malignant hypertension. He  has been maintained on the ventilator and followed by pulmonology. Etiology of respiratory failure seems to be largely COPD exacerbation. There was some question about possible vocal cord dysfunction with plans for ENT evaluation    Patient was treated with IV antibiotics. Currently on Zosyn and vancomycin    Sputum culture from 2/14 was positive for heavy growth of Moraxella catarrhalis    -------    Patient seen today for follow-up. He remains intubated. On FiO2 of 30%.   His sputum culture shows Moraxella catarrhalis and Klebsiella pneumoniae the latter sensitive to everything except ampicillin        Problem List:  Problem List as of 2/16/2022 Never Reviewed          Codes Class Noted - Resolved    Respiratory failure [facility]) ICD-10-CM: J96.90  ICD-9-CM: 518.81  2/7/2022 - Present              Medications reviewed  Current Facility-Administered Medications   Medication Dose Route Frequency    piperacillin-tazobactam (ZOSYN) 3.375 g in 0.9% sodium chloride (MBP/ADV) 100 mL MBP  3.375 g IntraVENous Q8H    VANCOMYCIN INFORMATION NOTE   Other PRN    vancomycin (VANCOCIN) 1,250 mg in 0.9% sodium chloride 250 mL (VIAL-MATE)  1,250 mg IntraVENous Q12H    Vancomycin trough level to be drawn on 2/16/22 at 0900.    Other ONCE    dexmedeTOMidine (PRECEDEX) 400 mcg in 0.9% sodium chloride (MBP/ADV) 100 mL MBP  0.1-1.4 mcg/kg/hr IntraVENous TITRATE    glycopyrrolate (ROBINUL) injection 0.1 mg  0.1 mg IntraVENous TID    [Held by provider] furosemide (LASIX) injection 40 mg  40 mg IntraVENous DAILY    albuterol-ipratropium (DUO-NEB) 2.5 MG-0.5 MG/3 ML  3 mL Nebulization Q4H PRN    bethanechol (URECHOLINE) tablet 100 mg  100 mg Oral BID    finasteride (PROSCAR) tablet 5 mg  5 mg Oral DAILY    OXcarbazepine (TRILEPTAL) tablet 150 mg  150 mg Oral BID    tamsulosin (FLOMAX) capsule 0.4 mg  0.4 mg Oral DAILY    sodium chloride (NS) flush 5-40 mL  5-40 mL IntraVENous Q8H    sodium chloride (NS) flush 5-40 mL  5-40 mL IntraVENous PRN    acetaminophen (TYLENOL) tablet 650 mg  650 mg Oral Q6H PRN    Or    acetaminophen (TYLENOL) suppository 650 mg  650 mg Rectal Q6H PRN    polyethylene glycol (MIRALAX) packet 17 g  17 g Oral DAILY PRN    ondansetron (ZOFRAN ODT) tablet 4 mg  4 mg Oral Q8H PRN    Or    ondansetron (ZOFRAN) injection 4 mg  4 mg IntraVENous Q6H PRN    enoxaparin (LOVENOX) injection 40 mg  40 mg SubCUTAneous DAILY    famotidine (PF) (PEPCID) 20 mg in 0.9% sodium chloride 10 mL injection  20 mg IntraVENous Q12H    labetaloL (NORMODYNE;TRANDATE) 20 mg/4 mL (5 mg/mL) injection 10 mg  10 mg IntraVENous Q4H PRN    amLODIPine (NORVASC) tablet 10 mg  10 mg Oral DAILY    aspirin chewable tablet 81 mg  81 mg Oral DAILY    atorvastatin (LIPITOR) tablet 80 mg  80 mg Oral DAILY    clopidogreL (PLAVIX) tablet 75 mg  75 mg Oral DAILY    metoprolol succinate (TOPROL-XL) XL tablet 25 mg  25 mg Oral DAILY    methylPREDNISolone (PF) (SOLU-MEDROL) injection 40 mg  40 mg IntraVENous Q8H    albuterol-ipratropium (DUO-NEB) 2.5 MG-0.5 MG/3 ML  3 mL Nebulization Q6HWA RT    budesonide (PULMICORT) 500 mcg/2 ml nebulizer suspension  500 mcg Nebulization BID RT    midazolam in normal saline (VERSED) 1 mg/mL infusion  0-10 mg/hr IntraVENous TITRATE    propofol (DIPRIVAN) 10 mg/mL infusion  0-50 mcg/kg/min IntraVENous TITRATE       Review of Systems:   A comprehensive review of systems was negative except for that written in the HPI. Objective:   Physical Exam:     Visit Vitals  /78 (BP 1 Location: Left upper arm, BP Patient Position: At rest)   Pulse 71   Temp 99.3 °F (37.4 °C)   Resp 15   Ht 6' (1.829 m)   Wt 109.8 kg (242 lb 1 oz)   SpO2 96%   BMI 32.83 kg/m²    O2 Flow Rate (L/min): 15 l/min O2 Device: Ventilator    Temp (24hrs), Av.7 °F (37.1 °C), Min:97.5 °F (36.4 °C), Max:99.7 °F (37.6 °C)    No intake/output data recorded.  1901 -  0700  In: 3982.7 [I.V.:2332.7]  Out: 7118 [Urine:4550]    General:   Intubated and sedated   Lungs:   Clear to auscultation bilaterally. Chest wall:  No tenderness or deformity. Heart:  Regular rate and rhythm, S1, S2 normal, no murmur, click, rub or gallop. Abdomen:   Soft, non-tender. Bowel sounds normal. No masses,  No organomegaly. Extremities: Extremities normal, atraumatic, no cyanosis or edema. Pulses: 2+ and symmetric all extremities. Skin: Skin color, texture, turgor normal. No rashes or lesions   Neurologic: CNII-XII intact.    Unable to assess Data Review:       Recent Days:  Recent Labs     02/15/22  0356 02/14/22  0330   WBC 11.3* 11.5*   HGB 13.3 14.1   HCT 39.8 43.0    239     Recent Labs     02/15/22  0356 02/14/22  0330   * 142   K 3.5 2.5*    112*   CO2 24 22   * 118*   BUN 46* 34*   CREA 0.96 0.75   CA 8.5 5.8*   MG  --  1.9   PHOS  --  2.8   ALB  --  1.7*     Recent Labs     02/16/22  0425 02/15/22  0412 02/14/22  0509   PH 7.42 7.43 7.41   PCO2 37 37 44   PO2 85 88 119*   HCO3 24 25 27*   FIO2 30.0 30.0 30.0       24 Hour Results:  Recent Results (from the past 24 hour(s))   GLUCOSE, POC    Collection Time: 02/16/22 12:10 AM   Result Value Ref Range    Glucose (POC) 142 (H) 65 - 117 mg/dL    Performed by Patrick Issa    BLOOD GAS, ARTERIAL    Collection Time: 02/16/22  4:25 AM   Result Value Ref Range    pH 7.42 7.35 - 7.45      PCO2 37 35 - 45 mmHg    PO2 85 75 - 100 mmHg    O2 SAT 95 (L) >95 %    BICARBONATE 24 22 - 26 mmol/L    BASE DEFICIT 0.1 0 - 2 mmol/L    O2 METHOD VENT      FIO2 30.0 %    MODE SIMV      Tidal volume 550      PRESSURE SUPPORT 15.0      EPAP/CPAP/PEEP 5.0      SITE Right Radial      VÍCTOR'S TEST PASS     GLUCOSE, POC    Collection Time: 02/16/22  6:45 AM   Result Value Ref Range    Glucose (POC) 128 (H) 65 - 117 mg/dL    Performed by Patrick Seligman        XR CHEST PORT   Final Result      XR CHEST PORT   Final Result   No acute findings. XR CHEST PORT   Final Result   No significant interval change. CTA HEAD NECK W WO CONT   Final Result   Head CT:   No acute intracranial abnormality. Head and neck CTA:   1. Mild (30-35%) stenosis of the right ICA origin. 2.  No occlusion or high-grade stenosis of the remaining major cervical or   intracranial arterial vasculature. Please note that degrees of carotid stenosis are measured in accordance with   NASCET-like criteria. XR CHEST PORT   Final Result   No significant interval change.       XR CHEST PORT   Final Result FINDINGS: IMPRESSION: Single frontal view of the chest.   ET tube, enteric tube remain. Normal heart size. No vascular congestion or pulmonary edema. The lungs are well inflated. No focal infiltrate, pleural effusion, or   pneumothorax. Unchanged biapical pleural-parenchymal thickening. Upper lung   oligemia from emphysema. No free air under the diaphragm. XR CHEST PORT   Final Result   No acute findings. XR CHEST PORT   Final Result      XR CHEST PORT   Final Result   The cardiomediastinal silhouette is appropriate for age, technique,   and lung expansion. Pulmonary vasculature is not congested. The lungs are   essentially clear. No effusion or pneumothorax is seen. CT HEAD WO CONT   Final Result   No evidence of an acute intracranial process. CTA CHEST ABD PELV W WO CONT   Final Result      No aneurysm or dissection of aorta. Atherosclerosis including coronary arteries. Pulmonary emphysema. No intra-abdominal inflammatory changes or bowel obstruction. Follow-up as   clinically indicated. Please see full report. XR CHEST PORT   Final Result   1. Endotracheal tube in satisfactory position. 2. Suspect nasogastric tube is coiled in the oropharynx. Its tip is entering the   stomach. 3. No focal infiltrate. No overt edema. 4. No effusion or pneumothorax. XR CHEST PORT    (Results Pending)   XR CHEST PORT    (Results Pending)        Assessment:  Acute respiratory failure with hypoxia, requiring mechanical ventilation    Acute exacerbation of COPD    Sputum positive for Moraxella catarrhalis, beta-lactamase positive and Klebsiella pneumoniae    Sepsis    Episodes of recurrent syncope    Malignant hypertension, improved    Non-STEMI type II      Plan:  Continue Zosyn and vancomycin  Await final sputum culture results  Continue mechanical ventilation, wean as tolerated  Continue tube feeds and supportive care  Recheck labs in a.m.     Care Plan discussed with: Patient/Family    Disposition: Continued ICU care    Total time spent with patient: 30 minutes.     Vicky Felder MD

## 2022-02-16 NOTE — PROGRESS NOTES
Pt failed weaning with increased resp and HR. Continuous coughing with pink frothy sputum. Dr Devon Santizo in room, sedation restarted at prior rates. Propofol mcgs at 40 and Precedex at 1 mcg. Pt placed back on full vent settings.

## 2022-02-16 NOTE — PROGRESS NOTES
Neurology Progress Note    Date: 2/16/2022    Mr. Juan Pablo Moran is a 73year old man is seen in follow-up and he is still intubated on sedation and unchanged. He failed attempts to extubate. About the same at the time of my evaluation. With PMH of PE used to be on coumadin but stopped for GI bleed, vocal cord dysfunctioon who was brought in to the ER for hypoxia, \" AMS\", chest pain and recurrent episodes of syncope lasting 5-10 sec. He would awaken after sternal rub but loose consciousness shortly after again. In between the episodes he is able to wake up and is alert and oriented and able to provide the Ed staff his history. He was found to be hypoxic to 86%. It seems he passed out multiple times in the ambulance as well. From the chart he has had episodes of loss of consciousness after whole body cramping episodes at home as well. CT head reported as \"no evidence of acute intracranial process\". Ct chest/abd/pelvis WNL. Subjective  intubated and sedated. No past medical history on file. No past surgical history on file. No family history on file. Social History     Tobacco Use    Smoking status: Not on file    Smokeless tobacco: Not on file   Substance Use Topics    Alcohol use: Not on file       Current Facility-Administered Medications   Medication Dose Route Frequency Provider Last Rate Last Admin    piperacillin-tazobactam (ZOSYN) 3.375 g in 0.9% sodium chloride (MBP/ADV) 100 mL MBP  3.375 g IntraVENous Q8H Denis Lovell MD 25 mL/hr at 02/16/22 0534 3.375 g at 02/16/22 0534    VANCOMYCIN INFORMATION NOTE   Other PRN Denis Lovell MD        vancomycin (VANCOCIN) 1,250 mg in 0.9% sodium chloride 250 mL (VIAL-MATE)  1,250 mg IntraVENous Q12H Denis Lovell .7 mL/hr at 02/15/22 2215 1,250 mg at 02/15/22 2215    Vancomycin trough level to be drawn on 2/16/22 at 0900.    Other ONCE Aj Lovell MD        dexmedeTOMidine (PRECEDEX) 400 mcg in 0.9% sodium chloride (MBP/ADV) 100 mL MBP  0.1-1.4 mcg/kg/hr IntraVENous TITRATE Srikanth Juarez MD 15.5 mL/hr at 02/16/22 0559 0.5 mcg/kg/hr at 02/16/22 0559    glycopyrrolate (ROBINUL) injection 0.1 mg  0.1 mg IntraVENous TID Clari Lovell MD   0.1 mg at 02/15/22 2130    [Held by provider] furosemide (LASIX) injection 40 mg  40 mg IntraVENous DAILY Ida Dixon MD   40 mg at 02/13/22 0905    albuterol-ipratropium (DUO-NEB) 2.5 MG-0.5 MG/3 ML  3 mL Nebulization Q4H PRN Xu Maurice MD        bethanechol (URECHOLINE) tablet 100 mg  100 mg Oral BID Amparo Hirsch MD   100 mg at 02/15/22 2133    finasteride (PROSCAR) tablet 5 mg  5 mg Oral DAILY Amparo Hirsch MD   5 mg at 02/15/22 0931    OXcarbazepine (TRILEPTAL) tablet 150 mg  150 mg Oral BID Amparo Hirsch MD   150 mg at 02/15/22 2133    tamsulosin (FLOMAX) capsule 0.4 mg  0.4 mg Oral DAILY Xu Maurice MD   0.4 mg at 02/15/22 0900    sodium chloride (NS) flush 5-40 mL  5-40 mL IntraVENous Q8H Xu Maurice MD   10 mL at 02/16/22 0534    sodium chloride (NS) flush 5-40 mL  5-40 mL IntraVENous PRN Amparo Hirsch MD        acetaminophen (TYLENOL) tablet 650 mg  650 mg Oral Q6H PRN Amparo Hirsch MD   650 mg at 02/15/22 5687    Or    acetaminophen (TYLENOL) suppository 650 mg  650 mg Rectal Q6H PRN Amparo Hirsch MD   650 mg at 02/14/22 0258    polyethylene glycol (MIRALAX) packet 17 g  17 g Oral DAILY PRN Xu Maurice MD        ondansetron (ZOFRAN ODT) tablet 4 mg  4 mg Oral Q8H PRN Xu Maurice MD        Or    ondansetron (ZOFRAN) injection 4 mg  4 mg IntraVENous Q6H PRN Xu Maurice MD        enoxaparin (LOVENOX) injection 40 mg  40 mg SubCUTAneous DAILY Xu Maurice MD   40 mg at 02/15/22 0930    famotidine (PF) (PEPCID) 20 mg in 0.9% sodium chloride 10 mL injection  20 mg IntraVENous Q12H Amparo Hirsch MD   20 mg at 02/15/22 2131    labetaloL (NORMODYNE;TRANDATE) 20 mg/4 mL (5 mg/mL) injection 10 mg  10 mg IntraVENous Q4H PRN Heriberto Mulligan MD   10 mg at 02/08/22 1741    amLODIPine (NORVASC) tablet 10 mg  10 mg Oral DAILY Chu Mackay MD   10 mg at 02/15/22 1370    aspirin chewable tablet 81 mg  81 mg Oral DAILY Duyen Teixeira MD   81 mg at 02/15/22 0931    atorvastatin (LIPITOR) tablet 80 mg  80 mg Oral DAILY Duyen Teixeira MD   80 mg at 02/15/22 0931    clopidogreL (PLAVIX) tablet 75 mg  75 mg Oral DAILY Duyen Teixeira MD   75 mg at 02/15/22 0932    metoprolol succinate (TOPROL-XL) XL tablet 25 mg  25 mg Oral DAILY Duyen Teixeira MD   25 mg at 02/15/22 0931    methylPREDNISolone (PF) (SOLU-MEDROL) injection 40 mg  40 mg IntraVENous Q8H Rosey Lovell MD   40 mg at 02/16/22 0534    albuterol-ipratropium (DUO-NEB) 2.5 MG-0.5 MG/3 ML  3 mL Nebulization Q6HWA RT Rosey Lovell MD   3 mL at 02/15/22 2134    budesonide (PULMICORT) 500 mcg/2 ml nebulizer suspension  500 mcg Nebulization BID RT Rosey Lovell MD   500 mcg at 02/15/22 2134    midazolam in normal saline (VERSED) 1 mg/mL infusion  0-10 mg/hr IntraVENous TITRATE Balbir Leija MD 2 mL/hr at 02/10/22 2202 2 mg/hr at 02/10/22 2202    propofol (DIPRIVAN) 10 mg/mL infusion  0-50 mcg/kg/min IntraVENous TITRATE Balbir Leija MD 31.2 mL/hr at 02/16/22 0547 40 mcg/kg/min at 02/16/22 0547      Review of Systems   Unable to perform ROS: Intubated     Objective     Vital signs for last 24 hours:  Visit Vitals  /78 (BP 1 Location: Left upper arm, BP Patient Position: At rest)   Pulse 71   Temp 99.3 °F (37.4 °C)   Resp 15   Ht 6' (1.829 m)   Wt 109.8 kg (242 lb 1 oz)   SpO2 96%   BMI 32.83 kg/m²     Intake/Output this shift:  Current Shift: No intake/output data recorded.   Last 3 Shifts: 02/14 1901 - 02/16 0700  In: 3982.7 [I.V.:2332.7]  Out: 7267 [Urine:4550]    Data Review:   Recent Results (from the past 24 hour(s))   GLUCOSE, POC    Collection Time: 02/16/22 12:10 AM   Result Value Ref Range    Glucose (POC) 142 (H) 65 - 117 mg/dL    Performed by [address] [place] [address] GAS, ARTERIAL    Collection Time: 02/16/22  4:25 AM   Result Value Ref Range    pH 7.42 7.35 - 7.45      PCO2 37 35 - 45 mmHg    PO2 85 75 - 100 mmHg    O2 SAT 95 (L) >95 %    BICARBONATE 24 22 - 26 mmol/L    BASE DEFICIT 0.1 0 - 2 mmol/L    O2 METHOD VENT      FIO2 30.0 %    MODE SIMV      Tidal volume 550      PRESSURE SUPPORT 15.0      EPAP/CPAP/PEEP 5.0      SITE Right Radial      VÍCTOR'S TEST PASS     GLUCOSE, POC    Collection Time: 02/16/22  6:45 AM   Result Value Ref Range    Glucose (POC) 128 (H) 65 - 117 mg/dL    Performed by Darryle Coon      Neuro Physical Exam    General: Well developed, well nourished. Morbidly obese and intubated on sedation    Neurological Exam:  Mental Status: Sedated on propofol: examined Does not open eyes to sternal rub, pupils equal and sluggishly reactive. No withdrawal to pain noticed today  Babinski: mute b/l     Assessment and Plan: Mr. Maren Weaver is a 73year old man who comes in with hypoxia and recurrent episodes of syncope. The description of these events is NOT consistent with seizures. He needs to be on as minimal sedation as possible. He is still on versed. Use as minimal propofol as needed and taper off the Versed. At this time etiology of recurrent syncope is not clear. If this recurs post extubation then eeg can be considered. CTA head and neck did not reveal any stenotic disease, other than 30-35% stenosis of the origin of the right ICA, which does not explain the spells he had been having. It is possible that he may be going in and out of hypoxia contributing to these spells.     Thank you,    Elizabeth Menjivar MD

## 2022-02-16 NOTE — ANTIMICROBIAL STEWARDSHIP
The Antimicrobial Stewardship Team has reviewed current therapy. Patient is on Zosyn and Vancomycin for sputum growing Klebsiella and Moraxella. Consider d/c Vancomycin, as no MRSA has been isolated and patient has no known history or MRSA infection. Vancomycin puts the patient at increased risk for developing resistance, along with C.difficile.

## 2022-02-16 NOTE — PROGRESS NOTES
CM reviewed chart. Patient's discharge plan at admission was to return home self care. Patient may need more support at discharge. Discharge plan is pending clinical improvement. CM will continue to follow.

## 2022-02-16 NOTE — PROGRESS NOTES
Consult for Vancomycin Dosing by Pharmacy by     Consult provided for this 73y.o. year old male , for indication ofP. PNA. Day of Therapy: 3  Goal of Level(s): (AUC = 400-600)      Other Current Antibiotics: Zosyn    Significant Cultures:       Date                             Culture                                       Organism     2/13/22                             (Ex: Blood x1)                            Pending/NEG    All Micro Results       Procedure Component Value Units Date/Time    CULTURE, RESPIRATORY/SPUTUM/BRONCH Suyapa Finner STAIN [[id number]]  (Susceptibility) Collected: 02/13/22 1125    Order Status: Completed Specimen: Sputum Updated: 02/15/22 1020     Special Requests: No Special Requests        GRAM STAIN 1+ WBCs seen               Rare Epithelial cells seen                  2+ Gram Positive Cocci in clusters           Culture result: Moderate Klebsiella pneumoniae                  Heavy Moraxella catarrhalis Beta Lactamase Positive                  Few Normal respiratory guanako          CULTURE, URINE [[id number]] Collected: 02/13/22 1045    Order Status: Completed Specimen: Urine Updated: 02/14/22 1015     Special Requests: No Special Requests        Culture result: No Growth (<1000 cfu/mL)       CULTURE, RESPIRATORY/SPUTUM/BRONCH Suyapa Finner STAIN [[id number]] Collected: 02/08/22 1630    Order Status: Canceled Specimen: Sputum     MRSA SCREEN - PCR (NASAL) [[id number]] Collected: 02/08/22 0928    Order Status: Completed Specimen: Swab Updated: 02/08/22 1138     MRSA by PCR, Nasal Not Detected       COVID-19 RAPID TEST [[id number]] Collected: 02/07/22 2132    Order Status: Completed Specimen: Nasopharyngeal Updated: 02/07/22 2212     Specimen source       Please find results under separate order           COVID-19 rapid test Not Detected        Comment: Rapid Abbott ID Now   Rapid NAAT:  The specimen is NEGATIVE for SARS-CoV-2, the novel coronavirus associated with COVID-19.    Negative results should be treated as presumptive and, if inconsistent with clinical signs and symptoms or necessary for patient management, should be tested with an alternative molecular assay. Negative results do not preclude SARS-CoV-2 infection and should not be used as the sole basis for patient management decisions. This test has been authorized by the FDA under   an Emergency Use Authorization (EUA) for use by authorized laboratories. Fact sheet for Healthcare Providers: [employer] Fact sheet for Patients: [employer]   Methodology: Isothermal Nucleic Acid Amplification                 Serum Creatinine Creatinine   Date/Time Value Ref Range Status   02/15/2022 03:56 AM 0.96 0.70 - 1.30 mg/dL Final   02/14/2022 03:30 AM 0.75 0.70 - 1.30 mg/dL Final   02/10/2022 05:14 AM 0.95 0.70 - 1.30 mg/dL Final      Creatinine Clearance Estimated Creatinine Clearance: 98.2 mL/min (based on SCr of 0.96 mg/dL). BUN Lab Results   Component Value Date/Time    BUN 46 (H) 02/15/2022 03:56 AM      WBC Lab Results   Component Value Date/Time    WBC 11.3 (H) 02/15/2022 03:56 AM      Temp Temp Readings from Last 1 Encounters:   02/16/22 99.3 °F (37.4 °C)      C-Reactive Protein C-Reactive protein   Date Value Ref Range Status   02/14/2022 0.79 (H) 0.00 - 0.60 mg/dL Final      Procalcitonin Lab Results   Component Value Date/Time    Procalcitonin 0.06 (H) 02/14/2022 09:25 AM            Ht Readings from Last 1 Encounters:   02/15/22 182.9 cm (72\")        Wt Readings from Last 1 Encounters:   02/16/22 109.8 kg (242 lb 1 oz)     Ideal body weight: 77.6 kg (171 lb 1.2 oz)  Adjusted ideal body weight: 90.5 kg (199 lb 7.6 oz)         New Regimen: The trough level is at 12.8 today. And AUC~ 516  Continue  on 1250 mg IV Vancomycin q12h. A trough level has been ordered for 2/18/22 at  1100.     Pharmacy to follow daily and will make changes to dose and/or frequency based on clinical status.      _________________________________     Gretel Mejia PHARMD

## 2022-02-16 NOTE — PROGRESS NOTES
Progress Note    Patient: Jonathan Hernadez MRN: [id number]  SSN: [id number]    YOB: 1955  Age: 73 y.o. Sex: male      Admit Date: 2/7/2022    LOS: 9 days     Subjective:     No acute events overnight. Remains intubated. Objective:     Vitals:    02/16/22 0400 02/16/22 0500 02/16/22 0600 02/16/22 0700   BP: (!) 140/79 122/77 124/78    Pulse: 72 70 71    Resp: 14 14 15    Temp: 98 °F (36.7 °C)   99.3 °F (37.4 °C)   SpO2: 96% 96% 96%    Weight: 109.8 kg (242 lb 1 oz)      Height:            Intake and Output:  Current Shift: No intake/output data recorded. Last three shifts: 02/14 1901 - 02/16 0700  In: 3982.7 [I.V.:2332.7]  Out: 6899 [Urine:4550]    Physical Exam:   General:   Intubated. Eyes:  Conjunctivae/corneas clear. PERRL, EOMs intact. Fundi benign   Ears:  Normal TMs and external ear canals both ears. Nose: Nares normal. Septum midline. Mucosa normal. No drainage or sinus tenderness. Mouth/Throat: Lips, mucosa, and tongue normal. Teeth and gums normal.   Neck: Supple, symmetrical, trachea midline, no adenopathy, thyroid: no enlargment/tenderness/nodules, no carotid bruit and no JVD. Back:   Symmetric, no curvature. ROM normal. No CVA tenderness. Lungs:   Clear to auscultation bilaterally. Heart:  Regular rate and rhythm, S1, S2 normal, no murmur, click, rub or gallop. Abdomen:   Soft, non-tender. Bowel sounds normal. No masses,  No organomegaly. Extremities: Extremities normal, atraumatic, no cyanosis or edema. Pulses: 2+ and symmetric all extremities. Skin: Skin color, texture, turgor normal. No rashes or lesions   Lymph nodes: Cervical, supraclavicular, and axillary nodes normal.   Neurologic:  Intubated       Lab/Data Review: All lab results for the last 24 hours reviewed. Assessment:     Active Problems:    Respiratory failure ([address] (2/7/2022)    Patient is 28-year-old white male with:  1. Syncope  2. Non-STEMI  3.   CAD status post PCI of left circumflex artery in 2018  4. Hyperlipidemia  6. Vocal cord dysfunction  7. History of PE  8. Warfarin was stopped due to GI bleed  9. COPD  7. Obesity  8. Edema  9. Hypertensive emergency   10. Bilateral renal lesions, largest about 1.7 cm on the right side posteriorly. Plan:     Currently in sinus rhythm. Good urine output overnight. Appreciate neurology input. Being treated for Klebsiella pneumonia. He is drawing his left lower extremity motor pain rather than the right lower extremity. We will continue supportive care for now. Currently on amlodipine, aspirin, atorvastatin, Plavix. Currently on metoprolol.     Signed By: Aryan Langley MD     February 16, 2022

## 2022-02-16 NOTE — PROGRESS NOTES
IMPRESSION:   1. Acute hypoxic respiratory failure evelyn on the ventilator FiO2 down to 35%  2. Low-grade fever no obvious site chest x-ray looks clear we will send a sputum  3. Hypercapnia with metabolic alkalosis resolved  4. NSTEMI  5. Generalized Edema  6. Syncope  7. COPD   8. Hypokalemia  9. Pneumonia sputum positive for gram-positive cocci      RECOMMENDATIONS/PLAN:   1. ICU monitoring  1. Ventilator for mechanical life support and prevent respiratory arrest with protective lung strategies sedated   2. Patient is on SIMV mode 35% FiO2 PEEP of 8 rate of 15 we will try to wean again today  3. Did sedation vacation patient was put on spontaneous he had low tidal volume goes into apnea now he is back on assist control back on sedation will do sedation vacation again today  4. Patient on IV Solu-Medrol nebulizer treatment Covid negative  5. Hold trilogy inhaler continue nebulized albuterol Atrovent budesonide  6. Chest x-ray no acute infiltrate which shows emphysematous changes  7. Patient was to be followed with ENT he never had any appointment with them they were resuming patient has vocal cord dysfunction once extubated we will get ENT evaluation  8. Start patient on vancomycin and Zosyn pending cultures patient still continues to spike temperature sputum shows gram-positive cocci  9.  Potassium corrected     [x] High complexity decision making was performed  [x] See my orders for details  HPI  52-year-old male came in because of chest pain with shortness of breath he has also recurrent episodes of syncope significant past medical history of vocal cord dysfunction, pulmonary Moll[place], coronary artery disease status post stent COPD current everyday smoker no history of sleep apnea he was having swelling of the lower extremities for couple of months today came into the emergency room as previously was having chest pain or shortness of breath he passed out subsequently got intubated and now on ventilator critical care consult was called. PMH:  has no past medical history on file. PSH:   has no past surgical history on file. FHX: family history is not on file. SHX:      ALL:   Allergies   Allergen Reactions    Sulfa (Sulfonamide Antibiotics) Other (comments)     syncope          MEDS:   [x] Reviewed - As Below   [] Not reviewed    Current Facility-Administered Medications   Medication    piperacillin-tazobactam (ZOSYN) 3.375 g in 0.9% sodium chloride (MBP/ADV) 100 mL MBP    VANCOMYCIN INFORMATION NOTE    vancomycin (VANCOCIN) 1,250 mg in 0.9% sodium chloride 250 mL (VIAL-MATE)    Vancomycin trough level to be drawn on 2/16/22 at 0900.     dexmedeTOMidine (PRECEDEX) 400 mcg in 0.9% sodium chloride (MBP/ADV) 100 mL MBP    glycopyrrolate (ROBINUL) injection 0.1 mg    [Held by provider] furosemide (LASIX) injection 40 mg    albuterol-ipratropium (DUO-NEB) 2.5 MG-0.5 MG/3 ML    bethanechol (URECHOLINE) tablet 100 mg    finasteride (PROSCAR) tablet 5 mg    OXcarbazepine (TRILEPTAL) tablet 150 mg    tamsulosin (FLOMAX) capsule 0.4 mg    sodium chloride (NS) flush 5-40 mL    sodium chloride (NS) flush 5-40 mL    acetaminophen (TYLENOL) tablet 650 mg    Or    acetaminophen (TYLENOL) suppository 650 mg    polyethylene glycol (MIRALAX) packet 17 g    ondansetron (ZOFRAN ODT) tablet 4 mg    Or    ondansetron (ZOFRAN) injection 4 mg    enoxaparin (LOVENOX) injection 40 mg    famotidine (PF) (PEPCID) 20 mg in 0.9% sodium chloride 10 mL injection    labetaloL (NORMODYNE;TRANDATE) 20 mg/4 mL (5 mg/mL) injection 10 mg    amLODIPine (NORVASC) tablet 10 mg    aspirin chewable tablet 81 mg    atorvastatin (LIPITOR) tablet 80 mg    clopidogreL (PLAVIX) tablet 75 mg    metoprolol succinate (TOPROL-XL) XL tablet 25 mg    methylPREDNISolone (PF) (SOLU-MEDROL) injection 40 mg    albuterol-ipratropium (DUO-NEB) 2.5 MG-0.5 MG/3 ML    budesonide (PULMICORT) 500 mcg/2 ml nebulizer suspension    midazolam in normal saline (VERSED) 1 mg/mL infusion    propofol (DIPRIVAN) 10 mg/mL infusion      MAR reviewed and pertinent medications noted or modified as needed   Current Facility-Administered Medications   Medication    piperacillin-tazobactam (ZOSYN) 3.375 g in 0.9% sodium chloride (MBP/ADV) 100 mL MBP    VANCOMYCIN INFORMATION NOTE    vancomycin (VANCOCIN) 1,250 mg in 0.9% sodium chloride 250 mL (VIAL-MATE)    Vancomycin trough level to be drawn on 2/16/22 at 0900.  dexmedeTOMidine (PRECEDEX) 400 mcg in 0.9% sodium chloride (MBP/ADV) 100 mL MBP    glycopyrrolate (ROBINUL) injection 0.1 mg    [Held by provider] furosemide (LASIX) injection 40 mg    albuterol-ipratropium (DUO-NEB) 2.5 MG-0.5 MG/3 ML    bethanechol (URECHOLINE) tablet 100 mg    finasteride (PROSCAR) tablet 5 mg    OXcarbazepine (TRILEPTAL) tablet 150 mg    tamsulosin (FLOMAX) capsule 0.4 mg    sodium chloride (NS) flush 5-40 mL    sodium chloride (NS) flush 5-40 mL    acetaminophen (TYLENOL) tablet 650 mg    Or    acetaminophen (TYLENOL) suppository 650 mg    polyethylene glycol (MIRALAX) packet 17 g    ondansetron (ZOFRAN ODT) tablet 4 mg    Or    ondansetron (ZOFRAN) injection 4 mg    enoxaparin (LOVENOX) injection 40 mg    famotidine (PF) (PEPCID) 20 mg in 0.9% sodium chloride 10 mL injection    labetaloL (NORMODYNE;TRANDATE) 20 mg/4 mL (5 mg/mL) injection 10 mg    amLODIPine (NORVASC) tablet 10 mg    aspirin chewable tablet 81 mg    atorvastatin (LIPITOR) tablet 80 mg    clopidogreL (PLAVIX) tablet 75 mg    metoprolol succinate (TOPROL-XL) XL tablet 25 mg    methylPREDNISolone (PF) (SOLU-MEDROL) injection 40 mg    albuterol-ipratropium (DUO-NEB) 2.5 MG-0.5 MG/3 ML    budesonide (PULMICORT) 500 mcg/2 ml nebulizer suspension    midazolam in normal saline (VERSED) 1 mg/mL infusion    propofol (DIPRIVAN) 10 mg/mL infusion      PMH:  has no past medical history on file. PSH:   has no past surgical history on file.    FHX: family history is not on file. SHX:       ROS:  Unable to obtain sedated    Hemodynamics:    CO:    CI:    CVP:    SVR:   PAP Systolic:    PAP Diastolic:    PVR:    UI65:        Ventilator Settings:      Mode Rate TV Press PEEP FiO2 PIP Min. Vent   SIMV,Volume control    550 ml 15 cm H2O  5 cm H20 30 %  21 cm H2O  10.5 l/min        Vital Signs: Telemetry:    normal sinus rhythm Intake/Output:   Visit Vitals  /78 (BP 1 Location: Left upper arm, BP Patient Position: At rest)   Pulse 71   Temp 99.3 °F (37.4 °C)   Resp 15   Ht 6' (1.829 m)   Wt 109.8 kg (242 lb 1 oz)   SpO2 96%   BMI 32.83 kg/m²       Temp (24hrs), Av.9 °F (37.2 °C), Min:97.5 °F (36.4 °C), Max:100.4 °F (38 °C)        O2 Device: Ventilator O2 Flow Rate (L/min): 15 l/min       Wt Readings from Last 4 Encounters:   22 109.8 kg (242 lb 1 oz)          Intake/Output Summary (Last 24 hours) at 2022 0746  Last data filed at 2022 0600  Gross per 24 hour   Intake 3612.67 ml   Output 3300 ml   Net 312.67 ml       Last shift:      No intake/output data recorded. Last 3 shifts:  1901 -  0700  In: 3982.7 [I.V.:2332.7]  Out: 5883 [Urine:4550]       Physical Exam:     General:  intubated on vent unresponsive on propofol  HEENT: NCAT,   Eyes: anicteric; conjunctiva clear doll's eye reflex present  Neck: no nodes, no cuff leak, trach midline; no accessory MM use. Chest: no deformity,   Cardiac: IR regular; no murmur;   Lungs: distant breath sounds; there anteriorly and laterally diminished in the bases no wheezing or rales  Abd: soft, NT, hypoactive BS  Ext: no edema; no joint swelling;  No clubbing  : clear urine  Neuro: Sedated on propofol and Precedex unresponsive doll's eye reflex present flaccid extremities  Psych- unable to assess  Skin: warm, dry, no cyanosis;   Pulses: Brachial and radial pulses intact  Capillary: Normal capillary refill      DATA:    MAR reviewed and pertinent medications noted or modified as needed  MEDS:   Current Facility-Administered Medications   Medication    piperacillin-tazobactam (ZOSYN) 3.375 g in 0.9% sodium chloride (MBP/ADV) 100 mL MBP    VANCOMYCIN INFORMATION NOTE    vancomycin (VANCOCIN) 1,250 mg in 0.9% sodium chloride 250 mL (VIAL-MATE)    Vancomycin trough level to be drawn on 2/16/22 at 0900.     dexmedeTOMidine (PRECEDEX) 400 mcg in 0.9% sodium chloride (MBP/ADV) 100 mL MBP    glycopyrrolate (ROBINUL) injection 0.1 mg    [Held by provider] furosemide (LASIX) injection 40 mg    albuterol-ipratropium (DUO-NEB) 2.5 MG-0.5 MG/3 ML    bethanechol (URECHOLINE) tablet 100 mg    finasteride (PROSCAR) tablet 5 mg    OXcarbazepine (TRILEPTAL) tablet 150 mg    tamsulosin (FLOMAX) capsule 0.4 mg    sodium chloride (NS) flush 5-40 mL    sodium chloride (NS) flush 5-40 mL    acetaminophen (TYLENOL) tablet 650 mg    Or    acetaminophen (TYLENOL) suppository 650 mg    polyethylene glycol (MIRALAX) packet 17 g    ondansetron (ZOFRAN ODT) tablet 4 mg    Or    ondansetron (ZOFRAN) injection 4 mg    enoxaparin (LOVENOX) injection 40 mg    famotidine (PF) (PEPCID) 20 mg in 0.9% sodium chloride 10 mL injection    labetaloL (NORMODYNE;TRANDATE) 20 mg/4 mL (5 mg/mL) injection 10 mg    amLODIPine (NORVASC) tablet 10 mg    aspirin chewable tablet 81 mg    atorvastatin (LIPITOR) tablet 80 mg    clopidogreL (PLAVIX) tablet 75 mg    metoprolol succinate (TOPROL-XL) XL tablet 25 mg    methylPREDNISolone (PF) (SOLU-MEDROL) injection 40 mg    albuterol-ipratropium (DUO-NEB) 2.5 MG-0.5 MG/3 ML    budesonide (PULMICORT) 500 mcg/2 ml nebulizer suspension    midazolam in normal saline (VERSED) 1 mg/mL infusion    propofol (DIPRIVAN) 10 mg/mL infusion        Labs:      Recent Labs     02/16/22  0425 02/15/22  0412 02/14/22  0509   PH 7.42 7.43 7.41   PCO2 37 37 44   PO2 85 88 119*   HCO3 24 25 27*   FIO2 30.0 30.0 30.0   2/13 AC 15  PEEP 8 FiO2 35%  Lab Results   Component Value Date/Time    TSH 1.38 02/08/2022 03:53 AM        Imaging:    Results from Hospital Encounter encounter on 02/07/22    XR CHEST PORT    Narrative  Chest single view. Comparison single view chest February 14, 2022. Stable ET tube and NG tube. Unchanged appearance for the lungs. No gross interstitial or alveolar pulmonary  edema. Cardiac and mediastinal structures unchanged. No pneumothorax or sizable  pleural effusion. Results from [facility] encounter on 02/07/22    CTA HEAD NECK W WO CONT    Narrative  Study: Head and Neck CTA without and with contrast.    Clinical Indication: Altered mental status. Comparison: Head CT dated 2/7/2022. Technique: Routine unenhanced axial acquisition of the head and neck was  performed. Subsequently, contiguous thin section axial acquisition of the head  and neck was performed in the arterial phase from the thoracic inlet to the  skull vertex following the intravenous administration of 100 mL Isovue-370. Coronal, sagittal, and MIP reconstructions were generated and reviewed. Dose  reduction: All CT scans at this facility are performed using dose reduction  optimization techniques as appropriate to a performed exam including the  following-automated exposure control, adjustments of mA and/or Kv according to  patient size, or use of iterative reconstructive technique. Findings:    Head CT:  The ventricles are unchanged in size and configuration. No midline shift. The basal cisterns are patent. No acute hemorrhage, abnormal  extra-axial fluid, or evidence of large territorial ischemia. Scattered foci of  hypodensity involving the cerebral hemispheric white matter are nonspecific but  most commonly associated with chronic small vessel ischemic changes. Unremarkable orbits. Trace right maxillary sinus mucosal thickening. No  evidence of an aggressive calvarial or extracalvarial lesion.     Neck CTA:  The aortic arch and great vessel origins are unremarkable. The common carotid arteries are patent without high-grade stenosis. Outside  plaque in the carotid bulbs causing 30-35% stenosis of the right internal  carotid artery origin based on NASCET-like criteria. The left more distal right  cervical internal carotid arteries are patent without high-grade stenosis. The  external carotid arteries are unremarkable. The cervical vertebral arteries are patent without high-grade stenosis. Endotracheal and orogastric tubes. Emphysema. Multilevel degenerative changes of  the cervical spine. Fusion of the C3 and C4 vertebral bodies. Head CTA:  Scattered calcified atherosclerosis involving the bilateral intracranial  internal carotid arteries without significant stenosis. The anterior cerebral  arteries are patent without high-grade stenosis. The middle cerebral arteries  are patent without high-grade stenosis. The intracranial vertebral arteries are patent without high-grade stenosis. The  basilar artery is patent without high-grade stenosis. The posterior cerebral  arteries are patent without high-grade stenosis. No aneurysm or high flow vascular malformation. Impression  Head CT:  No acute intracranial abnormality. Head and neck CTA:  1. Mild (30-35%) stenosis of the right ICA origin. 2.  No occlusion or high-grade stenosis of the remaining major cervical or  intracranial arterial vasculature. Please note that degrees of carotid stenosis are measured in accordance with  NASCET-like criteria. · 2/12 patient condition got worse yesterday and he became cyanotic while he was on a ventilator transferred to ICU he is back on Precedex and propofol ABG acceptable  · 2/13 back on ventilator assist control. Low-grade fever 100.6. Will check urine and sputum cultures although chest x-ray looks clear. Will decrease PEEP to 5.   Repeat labs in a.m. hypercapnia today we will give Diamox  · 2/14 will decrease FiO2 sputum positive for gram-positive cocci in clusters started on vancomycin and Zosyn pending sensitivity still spiking temperature  · 2/16 remain on ventilator  will try to wean him today once extubated cardiologist suggested about cardiac cath  · Time of care 30 minutes  ·

## 2022-02-17 ENCOUNTER — APPOINTMENT (OUTPATIENT)
Dept: GENERAL RADIOLOGY | Age: 67
DRG: 207 | End: 2022-02-17
Attending: INTERNAL MEDICINE
Payer: MEDICARE

## 2022-02-17 LAB
ALBUMIN SERPL-MCNC: 2.4 G/DL (ref 3.5–5)
ANION GAP SERPL CALC-SCNC: 5 MMOL/L (ref 5–15)
ARTERIAL PATENCY WRIST A: ABNORMAL
BASE DEFICIT BLDA-SCNC: 0.2 MMOL/L (ref 0–2)
BASOPHILS # BLD: 0 K/UL (ref 0–0.1)
BASOPHILS NFR BLD: 0 % (ref 0–1)
BDY SITE: ABNORMAL
BUN SERPL-MCNC: 37 MG/DL (ref 6–20)
BUN/CREAT SERPL: 49 (ref 12–20)
CA-I BLD-MCNC: 8.3 MG/DL (ref 8.5–10.1)
CHLORIDE SERPL-SCNC: 108 MMOL/L (ref 97–108)
CHOLEST SERPL-MCNC: 140 MG/DL
CO2 SERPL-SCNC: 25 MMOL/L (ref 21–32)
CREAT SERPL-MCNC: 0.75 MG/DL (ref 0.7–1.3)
DIFFERENTIAL METHOD BLD: ABNORMAL
EOSINOPHIL # BLD: 0 K/UL (ref 0–0.4)
EOSINOPHIL NFR BLD: 0 % (ref 0–7)
EPAP/CPAP/PEEP, PAPEEP: 5
ERYTHROCYTE [DISTWIDTH] IN BLOOD BY AUTOMATED COUNT: 14 % (ref 11.5–14.5)
FIO2 ON VENT: 30 %
GAS FLOW.O2 SETTING OXYMISER: 8 L/MIN
GLUCOSE BLD STRIP.AUTO-MCNC: 130 MG/DL (ref 65–117)
GLUCOSE BLD STRIP.AUTO-MCNC: 138 MG/DL (ref 65–117)
GLUCOSE BLD STRIP.AUTO-MCNC: 182 MG/DL (ref 65–117)
GLUCOSE SERPL-MCNC: 125 MG/DL (ref 65–100)
HCO3 BLDA-SCNC: 24 MMOL/L (ref 22–26)
HCT VFR BLD AUTO: 37.7 % (ref 36.6–50.3)
HDLC SERPL-MCNC: 33 MG/DL
HDLC SERPL: 4.2 {RATIO} (ref 0–5)
HGB BLD-MCNC: 12.7 G/DL (ref 12.1–17)
IMM GRANULOCYTES # BLD AUTO: 0.1 K/UL (ref 0–0.04)
IMM GRANULOCYTES NFR BLD AUTO: 1 % (ref 0–0.5)
LDLC SERPL CALC-MCNC: 82 MG/DL (ref 0–100)
LIPID PROFILE,FLP: NORMAL
LYMPHOCYTES # BLD: 0.6 K/UL (ref 0.8–3.5)
LYMPHOCYTES NFR BLD: 9 % (ref 12–49)
MCH RBC QN AUTO: 29.3 PG (ref 26–34)
MCHC RBC AUTO-ENTMCNC: 33.7 G/DL (ref 30–36.5)
MCV RBC AUTO: 86.9 FL (ref 80–99)
MONOCYTES # BLD: 0.3 K/UL (ref 0–1)
MONOCYTES NFR BLD: 4 % (ref 5–13)
NEUTS SEG # BLD: 6.2 K/UL (ref 1.8–8)
NEUTS SEG NFR BLD: 86 % (ref 32–75)
NRBC # BLD: 0 K/UL (ref 0–0.01)
NRBC BLD-RTO: 0 PER 100 WBC
PCO2 BLDA: 38 MMHG (ref 35–45)
PERFORMED BY, TECHID: ABNORMAL
PH BLDA: 7.41 [PH] (ref 7.35–7.45)
PHOSPHATE SERPL-MCNC: 2.1 MG/DL (ref 2.6–4.7)
PLATELET # BLD AUTO: 195 K/UL (ref 150–400)
PMV BLD AUTO: 9.5 FL (ref 8.9–12.9)
PO2 BLDA: 84 MMHG (ref 75–100)
POTASSIUM SERPL-SCNC: 3.5 MMOL/L (ref 3.5–5.1)
RBC # BLD AUTO: 4.34 M/UL (ref 4.1–5.7)
SAO2 % BLD: 95 %
SAO2% DEVICE SAO2% SENSOR NAME: ABNORMAL
SODIUM SERPL-SCNC: 138 MMOL/L (ref 136–145)
TRIGL SERPL-MCNC: 125 MG/DL (ref ?–150)
VENTILATION MODE VENT: ABNORMAL
VLDLC SERPL CALC-MCNC: 25 MG/DL
VT SETTING VENT: 550 ML
WBC # BLD AUTO: 7.2 K/UL (ref 4.1–11.1)

## 2022-02-17 PROCEDURE — 74011000250 HC RX REV CODE- 250: Performed by: INTERNAL MEDICINE

## 2022-02-17 PROCEDURE — 36415 COLL VENOUS BLD VENIPUNCTURE: CPT

## 2022-02-17 PROCEDURE — 74011250636 HC RX REV CODE- 250/636: Performed by: INTERNAL MEDICINE

## 2022-02-17 PROCEDURE — 82962 GLUCOSE BLOOD TEST: CPT

## 2022-02-17 PROCEDURE — 74011250637 HC RX REV CODE- 250/637: Performed by: INTERNAL MEDICINE

## 2022-02-17 PROCEDURE — 94003 VENT MGMT INPAT SUBQ DAY: CPT

## 2022-02-17 PROCEDURE — 74011000258 HC RX REV CODE- 258: Performed by: INTERNAL MEDICINE

## 2022-02-17 PROCEDURE — 74011250637 HC RX REV CODE- 250/637: Performed by: HOSPITALIST

## 2022-02-17 PROCEDURE — 80061 LIPID PANEL: CPT

## 2022-02-17 PROCEDURE — 36600 WITHDRAWAL OF ARTERIAL BLOOD: CPT

## 2022-02-17 PROCEDURE — 65610000006 HC RM INTENSIVE CARE

## 2022-02-17 PROCEDURE — 85025 COMPLETE CBC W/AUTO DIFF WBC: CPT

## 2022-02-17 PROCEDURE — 71045 X-RAY EXAM CHEST 1 VIEW: CPT

## 2022-02-17 PROCEDURE — 82803 BLOOD GASES ANY COMBINATION: CPT

## 2022-02-17 PROCEDURE — 80069 RENAL FUNCTION PANEL: CPT

## 2022-02-17 PROCEDURE — 94640 AIRWAY INHALATION TREATMENT: CPT

## 2022-02-17 RX ORDER — NYSTATIN 100000 [USP'U]/ML
500000 SUSPENSION ORAL 4 TIMES DAILY
Status: DISCONTINUED | OUTPATIENT
Start: 2022-02-17 | End: 2022-02-25 | Stop reason: HOSPADM

## 2022-02-17 RX ORDER — MORPHINE SULFATE 2 MG/ML
1 INJECTION, SOLUTION INTRAMUSCULAR; INTRAVENOUS
Status: DISPENSED | OUTPATIENT
Start: 2022-02-17 | End: 2022-02-20

## 2022-02-17 RX ORDER — POTASSIUM CHLORIDE 1.5 G/1.77G
20 POWDER, FOR SOLUTION ORAL 2 TIMES DAILY WITH MEALS
Status: DISPENSED | OUTPATIENT
Start: 2022-02-17 | End: 2022-02-18

## 2022-02-17 RX ADMIN — SODIUM CHLORIDE, PRESERVATIVE FREE 10 ML: 5 INJECTION INTRAVENOUS at 17:06

## 2022-02-17 RX ADMIN — POTASSIUM CHLORIDE 20 MEQ: 1.5 POWDER, FOR SOLUTION ORAL at 08:29

## 2022-02-17 RX ADMIN — SODIUM CHLORIDE, PRESERVATIVE FREE 10 ML: 5 INJECTION INTRAVENOUS at 23:11

## 2022-02-17 RX ADMIN — OXCARBAZEPINE 150 MG: 150 TABLET, FILM COATED ORAL at 08:28

## 2022-02-17 RX ADMIN — METHYLPREDNISOLONE SODIUM SUCCINATE 40 MG: 40 INJECTION, POWDER, FOR SOLUTION INTRAMUSCULAR; INTRAVENOUS at 14:17

## 2022-02-17 RX ADMIN — ASPIRIN 81 MG CHEWABLE TABLET 81 MG: 81 TABLET CHEWABLE at 08:29

## 2022-02-17 RX ADMIN — SODIUM CHLORIDE, PRESERVATIVE FREE 20 MG: 5 INJECTION INTRAVENOUS at 08:28

## 2022-02-17 RX ADMIN — IPRATROPIUM BROMIDE AND ALBUTEROL SULFATE 3 ML: 2.5; .5 SOLUTION RESPIRATORY (INHALATION) at 14:21

## 2022-02-17 RX ADMIN — BUDESONIDE 500 MCG: 0.5 SUSPENSION RESPIRATORY (INHALATION) at 20:51

## 2022-02-17 RX ADMIN — NYSTATIN 500000 UNITS: 100000 SUSPENSION ORAL at 17:04

## 2022-02-17 RX ADMIN — NYSTATIN 500000 UNITS: 100000 SUSPENSION ORAL at 23:09

## 2022-02-17 RX ADMIN — DEXMEDETOMIDINE HYDROCHLORIDE 0.6 MCG/KG/HR: 100 INJECTION, SOLUTION, CONCENTRATE INTRAVENOUS at 05:50

## 2022-02-17 RX ADMIN — ENOXAPARIN SODIUM 40 MG: 100 INJECTION SUBCUTANEOUS at 08:28

## 2022-02-17 RX ADMIN — MORPHINE SULFATE 1 MG: 2 INJECTION, SOLUTION INTRAMUSCULAR; INTRAVENOUS at 17:05

## 2022-02-17 RX ADMIN — BETHANECHOL CHLORIDE 100 MG: 25 TABLET ORAL at 08:28

## 2022-02-17 RX ADMIN — METHYLPREDNISOLONE SODIUM SUCCINATE 40 MG: 40 INJECTION, POWDER, FOR SOLUTION INTRAMUSCULAR; INTRAVENOUS at 23:09

## 2022-02-17 RX ADMIN — PROPOFOL 40 MCG/KG/MIN: 10 INJECTION, EMULSION INTRAVENOUS at 05:50

## 2022-02-17 RX ADMIN — PIPERACILLIN AND TAZOBACTAM 3.38 G: 3; .375 INJECTION, POWDER, LYOPHILIZED, FOR SOLUTION INTRAVENOUS at 14:17

## 2022-02-17 RX ADMIN — TAMSULOSIN HYDROCHLORIDE 0.4 MG: 0.4 CAPSULE ORAL at 08:29

## 2022-02-17 RX ADMIN — SODIUM CHLORIDE, PRESERVATIVE FREE 20 MG: 5 INJECTION INTRAVENOUS at 23:09

## 2022-02-17 RX ADMIN — IPRATROPIUM BROMIDE AND ALBUTEROL SULFATE 3 ML: 2.5; .5 SOLUTION RESPIRATORY (INHALATION) at 20:51

## 2022-02-17 RX ADMIN — AMLODIPINE BESYLATE 10 MG: 5 TABLET ORAL at 08:28

## 2022-02-17 RX ADMIN — CLOPIDOGREL BISULFATE 75 MG: 75 TABLET, FILM COATED ORAL at 08:29

## 2022-02-17 RX ADMIN — IPRATROPIUM BROMIDE AND ALBUTEROL SULFATE 3 ML: 2.5; .5 SOLUTION RESPIRATORY (INHALATION) at 08:45

## 2022-02-17 RX ADMIN — VANCOMYCIN HYDROCHLORIDE 1250 MG: 1.25 INJECTION, POWDER, LYOPHILIZED, FOR SOLUTION INTRAVENOUS at 01:36

## 2022-02-17 RX ADMIN — METOPROLOL SUCCINATE 25 MG: 25 TABLET, EXTENDED RELEASE ORAL at 08:29

## 2022-02-17 RX ADMIN — GLYCOPYRROLATE 0.1 MG: 0.2 INJECTION INTRAMUSCULAR; INTRAVENOUS at 23:09

## 2022-02-17 RX ADMIN — METHYLPREDNISOLONE SODIUM SUCCINATE 40 MG: 40 INJECTION, POWDER, FOR SOLUTION INTRAMUSCULAR; INTRAVENOUS at 05:14

## 2022-02-17 RX ADMIN — GLYCOPYRROLATE 0.1 MG: 0.2 INJECTION INTRAMUSCULAR; INTRAVENOUS at 08:50

## 2022-02-17 RX ADMIN — PIPERACILLIN AND TAZOBACTAM 3.38 G: 3; .375 INJECTION, POWDER, LYOPHILIZED, FOR SOLUTION INTRAVENOUS at 23:09

## 2022-02-17 RX ADMIN — BUDESONIDE 500 MCG: 0.5 SUSPENSION RESPIRATORY (INHALATION) at 08:45

## 2022-02-17 RX ADMIN — SODIUM CHLORIDE, PRESERVATIVE FREE 10 ML: 5 INJECTION INTRAVENOUS at 05:14

## 2022-02-17 RX ADMIN — PROPOFOL 40 MCG/KG/MIN: 10 INJECTION, EMULSION INTRAVENOUS at 03:01

## 2022-02-17 RX ADMIN — PIPERACILLIN AND TAZOBACTAM 3.38 G: 3; .375 INJECTION, POWDER, LYOPHILIZED, FOR SOLUTION INTRAVENOUS at 05:13

## 2022-02-17 RX ADMIN — FINASTERIDE 5 MG: 5 TABLET, FILM COATED ORAL at 08:29

## 2022-02-17 RX ADMIN — ATORVASTATIN CALCIUM 80 MG: 40 TABLET, FILM COATED ORAL at 08:28

## 2022-02-17 NOTE — PROGRESS NOTES
Neurology Progress Note    Date: 2/17/2022    Mr. Osbaldo Madison is a 73year old man is seen in follow-up and he is still intubated on sedation and unchanged. He failed attempts to extubate. About the same at the time of my evaluation. With PMH of PE used to be on coumadin but stopped for GI bleed, vocal cord dysfunctioon who was brought in to the ER for hypoxia, \" AMS\", chest pain and recurrent episodes of syncope lasting 5-10 sec. He would awaken after sternal rub but loose consciousness shortly after again. In between the episodes he is able to wake up and is alert and oriented and able to provide the Ed staff his history. He was found to be hypoxic to 86%. It seems he passed out multiple times in the ambulance as well. From the chart he has had episodes of loss of consciousness after whole body cramping episodes at home as well. CT head reported as \"no evidence of acute intracranial process\". Ct chest/abd/pelvis WNL. Subjective  intubated and sedated. No past medical history on file. No past surgical history on file. No family history on file.    Social History     Tobacco Use    Smoking status: Not on file    Smokeless tobacco: Not on file   Substance Use Topics    Alcohol use: Not on file       Current Facility-Administered Medications   Medication Dose Route Frequency Provider Last Rate Last Admin    potassium chloride (KLOR-CON) packet for solution 20 mEq  20 mEq Oral BID WITH MEALS Alicia Raya MD        piperacillin-tazobactam (ZOSYN) 3.375 g in 0.9% sodium chloride (MBP/ADV) 100 mL MBP  3.375 g IntraVENous Q8H Aj Lovell MD 25 mL/hr at 02/17/22 0513 3.375 g at 02/17/22 0513    dexmedeTOMidine (PRECEDEX) 400 mcg in 0.9% sodium chloride (MBP/ADV) 100 mL MBP  0.1-1.4 mcg/kg/hr IntraVENous TITRATE Myra Lee MD 18.6 mL/hr at 02/17/22 0550 0.6 mcg/kg/hr at 02/17/22 0550    glycopyrrolate (ROBINUL) injection 0.1 mg  0.1 mg IntraVENous TID Sury Lovell MD   0.1 mg at 02/16/22 2136    albuterol-ipratropium (DUO-NEB) 2.5 MG-0.5 MG/3 ML  3 mL Nebulization Q4H PRN Riri Maurice MD        bethanechol (URECHOLINE) tablet 100 mg  100 mg Oral BID Toya Cobb MD   100 mg at 02/16/22 2136    finasteride (PROSCAR) tablet 5 mg  5 mg Oral DAILY Riri Maurice MD   5 mg at 02/16/22 1059    OXcarbazepine (TRILEPTAL) tablet 150 mg  150 mg Oral BID Toya Cobb MD   150 mg at 02/16/22 2136    tamsulosin (FLOMAX) capsule 0.4 mg  0.4 mg Oral DAILY Riri Maurice MD   0.4 mg at 02/16/22 1051    sodium chloride (NS) flush 5-40 mL  5-40 mL IntraVENous Q8H Riri Maurice MD   10 mL at 02/17/22 0514    sodium chloride (NS) flush 5-40 mL  5-40 mL IntraVENous PRN Riri Maurice MD        acetaminophen (TYLENOL) tablet 650 mg  650 mg Oral Q6H PRN Toya Cobb MD   650 mg at 02/15/22 4171    Or    acetaminophen (TYLENOL) suppository 650 mg  650 mg Rectal Q6H PRN Toya Cobb MD   650 mg at 02/14/22 0258    polyethylene glycol (MIRALAX) packet 17 g  17 g Oral DAILY PRN Riri Maurice MD        ondansetron (ZOFRAN ODT) tablet 4 mg  4 mg Oral Q8H PRN Riri Maurice MD        Or    ondansetron (ZOFRAN) injection 4 mg  4 mg IntraVENous Q6H PRN Riri Maurice MD        enoxaparin (LOVENOX) injection 40 mg  40 mg SubCUTAneous DAILY Riri Maurice MD   40 mg at 02/16/22 1100    famotidine (PF) (PEPCID) 20 mg in 0.9% sodium chloride 10 mL injection  20 mg IntraVENous Q12H Toya Cobb MD   20 mg at 02/16/22 2136    labetaloL (NORMODYNE;TRANDATE) 20 mg/4 mL (5 mg/mL) injection 10 mg  10 mg IntraVENous Q4H PRN Dia New MD   10 mg at 02/08/22 1741    amLODIPine (NORVASC) tablet 10 mg  10 mg Oral DAILY Dia New MD   10 mg at 02/16/22 1059    aspirin chewable tablet 81 mg  81 mg Oral DAILY Duyen Teixeira MD   81 mg at 02/16/22 1059    atorvastatin (LIPITOR) tablet 80 mg  80 mg Oral DAILY Duyen Teixeira MD   80 mg at 02/16/22 1059    clopidogreL (PLAVIX) tablet 75 mg  75 mg Oral DAILY Duyen Teixeira MD   75 mg at 02/16/22 1059    metoprolol succinate (TOPROL-XL) XL tablet 25 mg  25 mg Oral DAILY Duyen Teixeira MD   25 mg at 02/16/22 1051    methylPREDNISolone (PF) (SOLU-MEDROL) injection 40 mg  40 mg IntraVENous Q8H Tila Lovell MD   40 mg at 02/17/22 0514    albuterol-ipratropium (DUO-NEB) 2.5 MG-0.5 MG/3 ML  3 mL Nebulization Q6HWA RT Tila Lovell MD   3 mL at 02/16/22 2112    budesonide (PULMICORT) 500 mcg/2 ml nebulizer suspension  500 mcg Nebulization BID RT Tila Lovell MD   500 mcg at 02/16/22 2112    midazolam in normal saline (VERSED) 1 mg/mL infusion  0-10 mg/hr IntraVENous TITRATE Dayan Morrow MD 2 mL/hr at 02/10/22 2202 2 mg/hr at 02/10/22 2202    propofol (DIPRIVAN) 10 mg/mL infusion  0-50 mcg/kg/min IntraVENous TITRATE Dayan Morrow MD 31.2 mL/hr at 02/17/22 0550 40 mcg/kg/min at 02/17/22 0550      Review of Systems   Unable to perform ROS: Intubated     Objective     Vital signs for last 24 hours:  Visit Vitals  /89 (BP 1 Location: Left upper arm, BP Patient Position: At rest)   Pulse 69   Temp 99.5 °F (37.5 °C)   Resp 18   Ht 6' (1.829 m)   Wt 109.3 kg (240 lb 15.4 oz)   SpO2 98%   BMI 32.68 kg/m²     Intake/Output this shift:  Current Shift: No intake/output data recorded.   Last 3 Shifts: 02/15 1901 - 02/17 0700  In: 5089.6 [I.V.:1909.6]  Out: 4450 [Urine:4450]    Data Review:   Recent Results (from the past 24 hour(s))   VANCOMYCIN, TROUGH    Collection Time: 02/16/22 11:20 AM   Result Value Ref Range    Vancomycin,trough 12.8 (H) 5.0 - 10.0 ug/mL    Reported dose date Not provided      Reported dose: Not provided Units   GLUCOSE, POC    Collection Time: 02/17/22 12:20 AM   Result Value Ref Range    Glucose (POC) 138 (H) 65 - 117 mg/dL    Performed by Dany Lino    GLUCOSE, POC    Collection Time: 02/17/22  4:48 AM   Result Value Ref Range    Glucose (POC) 182 (H) 65 - 117 mg/dL    Performed by Dany Lino    CBC WITH AUTOMATED DIFF    Collection Time: 02/17/22  4:50 AM   Result Value Ref Range    WBC 7.2 4.1 - 11.1 K/uL    RBC 4.34 4.10 - 5.70 M/uL    HGB 12.7 12.1 - 17.0 g/dL    HCT 37.7 36.6 - 50.3 %    MCV 86.9 80.0 - 99.0 FL    MCH 29.3 26.0 - 34.0 PG    MCHC 33.7 30.0 - 36.5 g/dL    RDW 14.0 11.5 - 14.5 %    PLATELET 167 189 - 433 K/uL    MPV 9.5 8.9 - 12.9 FL    NRBC 0.0 0.0  WBC    ABSOLUTE NRBC 0.00 0.00 - 0.01 K/uL    NEUTROPHILS 86 (H) 32 - 75 %    LYMPHOCYTES 9 (L) 12 - 49 %    MONOCYTES 4 (L) 5 - 13 %    EOSINOPHILS 0 0 - 7 %    BASOPHILS 0 0 - 1 %    IMMATURE GRANULOCYTES 1 (H) 0 - 0.5 %    ABS. NEUTROPHILS 6.2 1.8 - 8.0 K/UL    ABS. LYMPHOCYTES 0.6 (L) 0.8 - 3.5 K/UL    ABS. MONOCYTES 0.3 0.0 - 1.0 K/UL    ABS. EOSINOPHILS 0.0 0.0 - 0.4 K/UL    ABS. BASOPHILS 0.0 0.0 - 0.1 K/UL    ABS. IMM. GRANS. 0.1 (H) 0.00 - 0.04 K/UL    DF AUTOMATED     BLOOD GAS, ARTERIAL    Collection Time: 02/17/22  4:55 AM   Result Value Ref Range    pH 7.41 7.35 - 7.45      PCO2 38 35 - 45 mmHg    PO2 84 75 - 100 mmHg    O2 SAT 95 (L) >95 %    BICARBONATE 24 22 - 26 mmol/L    BASE DEFICIT 0.2 0 - 2 mmol/L    O2 METHOD VENT      FIO2 30.0 %    MODE Assist Control/Volume Control      Tidal volume 550      SET RATE 8      EPAP/CPAP/PEEP 5.0      SITE Right Radial      VÍCTOR'S TEST PASS       Neuro Physical Exam    General: Well developed, well nourished. Morbidly obese and intubated on sedation    Neurological Exam:  Mental Status: Sedated on propofol: examined Does not open eyes to sternal rub, pupils equal and sluggishly reactive. No withdrawal to pain noticed today  Babinski: mute b/l     Assessment and Plan: Mr. Darius Wilkinson is a 73year old man who comes in with hypoxia and recurrent episodes of syncope. The description of these events is NOT consistent with seizures. He needs to be on as minimal sedation as possible. He is still on versed. Use as minimal propofol as needed and taper off the Versed.    At this time etiology of recurrent syncope is not clear. If this recurs post extubation then eeg can be considered. CTA head and neck did not reveal any stenotic disease, other than 30-35% stenosis of the origin of the right ICA, which does not explain the spells he had been having. It is possible that he may be going in and out of hypoxia contributing to these spells.     Thank you,    Aziza Arora MD

## 2022-02-17 NOTE — PROGRESS NOTES
Hospitalist Progress Note               Daily Progress Note: 2/17/2022      Subjective:   Hospital course to date:  28-year-old male with history of COPD, CAD with PCI and pulmonary embolism (not on anticoagulation due to GI bleed) who was admitted on 2/7 with altered mental status and syncope complicated by hypertensive urgency and hypoxia. Initially had a blood pressure of 230/143 with oxygen saturations in the 30s. He was intubated in the ED. CT of the chest was unremarkable. Patient has a history of recurrent syncopal episodes. Per wife, patient had been having intermittent, generalized/whole body cramps that last between 15 minutes to 1 hour. Those episodes generally are  followed by loss of consciousness for about 15 seconds. He usually will have short period of confusion and slurred speech after regaining consciousness. No extremity weakness after the episodes. Wife reported no fever, chills or chest pain. Reports have shortness of breath and cough at baseline due to COPD and has not been worse than normal in the past 3 weeks. Of note, he has been worked-up in the hospital in [facility] (record not found in system), and no cause has been identified. EEG was never done. Patient was seen by neurology who does not feel the above episodes represented seizures. COVID test was negative. Hospital course was complicated by non-STEMI felt due to malignant hypertension. He  has been maintained on the ventilator and followed by pulmonology. Etiology of respiratory failure seems to be largely COPD exacerbation. There was some question about possible vocal cord dysfunction with plans for ENT evaluation    Patient was treated with IV antibiotics. Currently on Zosyn and vancomycin    Sputum culture from 2/14 was positive for heavy growth of Moraxella catarrhalis    -------    Patient seen today for follow-up. He remains intubated. On FiO2 of 30%.    No changes overnight    Attempts at ventilator weaning yesterday were unsuccessful. Sputum culture does not show any SANDEEP for the Moraxella catarrhalis although Zosyn should cover this      Problem List:  Problem List as of 2/17/2022 Never Reviewed          Codes Class Noted - Resolved    Respiratory failure [facility]) ICD-10-CM: J96.90  ICD-9-CM: 518.81  2/7/2022 - Present              Medications reviewed  Current Facility-Administered Medications   Medication Dose Route Frequency    vancomycin (VANCOCIN) 1,250 mg in 0.9% sodium chloride 250 mL (VIAL-MATE)  1,250 mg IntraVENous Q12H    [START ON 2/18/2022] Vancomycin trough level to be drawn in 2/18/22 at 1100.    Other ONCE    piperacillin-tazobactam (ZOSYN) 3.375 g in 0.9% sodium chloride (MBP/ADV) 100 mL MBP  3.375 g IntraVENous Q8H    VANCOMYCIN INFORMATION NOTE   Other PRN    dexmedeTOMidine (PRECEDEX) 400 mcg in 0.9% sodium chloride (MBP/ADV) 100 mL MBP  0.1-1.4 mcg/kg/hr IntraVENous TITRATE    glycopyrrolate (ROBINUL) injection 0.1 mg  0.1 mg IntraVENous TID    albuterol-ipratropium (DUO-NEB) 2.5 MG-0.5 MG/3 ML  3 mL Nebulization Q4H PRN    bethanechol (URECHOLINE) tablet 100 mg  100 mg Oral BID    finasteride (PROSCAR) tablet 5 mg  5 mg Oral DAILY    OXcarbazepine (TRILEPTAL) tablet 150 mg  150 mg Oral BID    tamsulosin (FLOMAX) capsule 0.4 mg  0.4 mg Oral DAILY    sodium chloride (NS) flush 5-40 mL  5-40 mL IntraVENous Q8H    sodium chloride (NS) flush 5-40 mL  5-40 mL IntraVENous PRN    acetaminophen (TYLENOL) tablet 650 mg  650 mg Oral Q6H PRN    Or    acetaminophen (TYLENOL) suppository 650 mg  650 mg Rectal Q6H PRN    polyethylene glycol (MIRALAX) packet 17 g  17 g Oral DAILY PRN    ondansetron (ZOFRAN ODT) tablet 4 mg  4 mg Oral Q8H PRN    Or    ondansetron (ZOFRAN) injection 4 mg  4 mg IntraVENous Q6H PRN    enoxaparin (LOVENOX) injection 40 mg  40 mg SubCUTAneous DAILY    famotidine (PF) (PEPCID) 20 mg in 0.9% sodium chloride 10 mL injection  20 mg IntraVENous Q12H  labetaloL (NORMODYNE;TRANDATE) 20 mg/4 mL (5 mg/mL) injection 10 mg  10 mg IntraVENous Q4H PRN    amLODIPine (NORVASC) tablet 10 mg  10 mg Oral DAILY    aspirin chewable tablet 81 mg  81 mg Oral DAILY    atorvastatin (LIPITOR) tablet 80 mg  80 mg Oral DAILY    clopidogreL (PLAVIX) tablet 75 mg  75 mg Oral DAILY    metoprolol succinate (TOPROL-XL) XL tablet 25 mg  25 mg Oral DAILY    methylPREDNISolone (PF) (SOLU-MEDROL) injection 40 mg  40 mg IntraVENous Q8H    albuterol-ipratropium (DUO-NEB) 2.5 MG-0.5 MG/3 ML  3 mL Nebulization Q6HWA RT    budesonide (PULMICORT) 500 mcg/2 ml nebulizer suspension  500 mcg Nebulization BID RT    midazolam in normal saline (VERSED) 1 mg/mL infusion  0-10 mg/hr IntraVENous TITRATE    propofol (DIPRIVAN) 10 mg/mL infusion  0-50 mcg/kg/min IntraVENous TITRATE       Review of Systems:   A comprehensive review of systems was negative except for that written in the HPI. Objective:   Physical Exam:     Visit Vitals  /89 (BP 1 Location: Left upper arm, BP Patient Position: At rest)   Pulse 69   Temp 99.5 °F (37.5 °C)   Resp 18   Ht 6' (1.829 m)   Wt 109.3 kg (240 lb 15.4 oz)   SpO2 98%   BMI 32.68 kg/m²    O2 Flow Rate (L/min): 15 l/min O2 Device: Ventilator    Temp (24hrs), Av.2 °F (37.3 °C), Min:98.8 °F (37.1 °C), Max:99.5 °F (37.5 °C)    No intake/output data recorded. 02/15 1901 -  0700  In: 5089.6 [I.V.:1909.6]  Out: 4450 [Urine:4450]    General:   Intubated and sedated   Lungs:   Clear to auscultation bilaterally. Chest wall:  No tenderness or deformity. Heart:  Regular rate and rhythm, S1, S2 normal, no murmur, click, rub or gallop. Abdomen:   Soft, non-tender. Bowel sounds normal. No masses,  No organomegaly. Extremities: Extremities normal, atraumatic, no cyanosis or edema. Pulses: 2+ and symmetric all extremities. Skin: Skin color, texture, turgor normal. No rashes or lesions   Neurologic: CNII-XII intact.    Unable to assess Data Review:       Recent Days:  Recent Labs     02/17/22  0450 02/15/22  0356   WBC 7.2 11.3*   HGB 12.7 13.3   HCT 37.7 39.8    205     Recent Labs     02/15/22  0356   *   K 3.5      CO2 24   *   BUN 46*   CREA 0.96   CA 8.5     Recent Labs     02/17/22  0455 02/16/22  0425 02/15/22  0412   PH 7.41 7.42 7.43   PCO2 38 37 37   PO2 84 85 88   HCO3 24 24 25   FIO2 30.0 30.0 30.0       24 Hour Results:  Recent Results (from the past 24 hour(s))   VANCOMYCIN, TROUGH    Collection Time: 02/16/22 11:20 AM   Result Value Ref Range    Vancomycin,trough 12.8 (H) 5.0 - 10.0 ug/mL    Reported dose date Not provided      Reported dose: Not provided Units   GLUCOSE, POC    Collection Time: 02/17/22 12:20 AM   Result Value Ref Range    Glucose (POC) 138 (H) 65 - 117 mg/dL    Performed by Rocael Robbins, POC    Collection Time: 02/17/22  4:48 AM   Result Value Ref Range    Glucose (POC) 182 (H) 65 - 117 mg/dL    Performed by Eloy Garcia    CBC WITH AUTOMATED DIFF    Collection Time: 02/17/22  4:50 AM   Result Value Ref Range    WBC 7.2 4.1 - 11.1 K/uL    RBC 4.34 4.10 - 5.70 M/uL    HGB 12.7 12.1 - 17.0 g/dL    HCT 37.7 36.6 - 50.3 %    MCV 86.9 80.0 - 99.0 FL    MCH 29.3 26.0 - 34.0 PG    MCHC 33.7 30.0 - 36.5 g/dL    RDW 14.0 11.5 - 14.5 %    PLATELET 871 966 - 488 K/uL    MPV 9.5 8.9 - 12.9 FL    NRBC 0.0 0.0  WBC    ABSOLUTE NRBC 0.00 0.00 - 0.01 K/uL    NEUTROPHILS 86 (H) 32 - 75 %    LYMPHOCYTES 9 (L) 12 - 49 %    MONOCYTES 4 (L) 5 - 13 %    EOSINOPHILS 0 0 - 7 %    BASOPHILS 0 0 - 1 %    IMMATURE GRANULOCYTES 1 (H) 0 - 0.5 %    ABS. NEUTROPHILS 6.2 1.8 - 8.0 K/UL    ABS. LYMPHOCYTES 0.6 (L) 0.8 - 3.5 K/UL    ABS. MONOCYTES 0.3 0.0 - 1.0 K/UL    ABS. EOSINOPHILS 0.0 0.0 - 0.4 K/UL    ABS. BASOPHILS 0.0 0.0 - 0.1 K/UL    ABS. IMM.  GRANS. 0.1 (H) 0.00 - 0.04 K/UL    DF AUTOMATED     BLOOD GAS, ARTERIAL    Collection Time: 02/17/22  4:55 AM   Result Value Ref Range    pH 7.41 7.35 - 7.45      PCO2 38 35 - 45 mmHg    PO2 84 75 - 100 mmHg    O2 SAT 95 (L) >95 %    BICARBONATE 24 22 - 26 mmol/L    BASE DEFICIT 0.2 0 - 2 mmol/L    O2 METHOD VENT      FIO2 30.0 %    MODE Assist Control/Volume Control      Tidal volume 550      SET RATE 8      EPAP/CPAP/PEEP 5.0      SITE Right Radial      VÍCTOR'S TEST PASS         XR CHEST PORT   Final Result   No significant interval change. XR CHEST PORT   Final Result      XR CHEST PORT   Final Result   No acute findings. XR CHEST PORT   Final Result   No significant interval change. CTA HEAD NECK W WO CONT   Final Result   Head CT:   No acute intracranial abnormality. Head and neck CTA:   1. Mild (30-35%) stenosis of the right ICA origin. 2.  No occlusion or high-grade stenosis of the remaining major cervical or   intracranial arterial vasculature. Please note that degrees of carotid stenosis are measured in accordance with   NASCET-like criteria. XR CHEST PORT   Final Result   No significant interval change. XR CHEST PORT   Final Result   FINDINGS: IMPRESSION: Single frontal view of the chest.   ET tube, enteric tube remain. Normal heart size. No vascular congestion or pulmonary edema. The lungs are well inflated. No focal infiltrate, pleural effusion, or   pneumothorax. Unchanged biapical pleural-parenchymal thickening. Upper lung   oligemia from emphysema. No free air under the diaphragm. XR CHEST PORT   Final Result   No acute findings. XR CHEST PORT   Final Result      XR CHEST PORT   Final Result   The cardiomediastinal silhouette is appropriate for age, technique,   and lung expansion. Pulmonary vasculature is not congested. The lungs are   essentially clear. No effusion or pneumothorax is seen. CT HEAD WO CONT   Final Result   No evidence of an acute intracranial process. CTA CHEST ABD PELV W WO CONT   Final Result      No aneurysm or dissection of aorta. Atherosclerosis including coronary arteries. Pulmonary emphysema. No intra-abdominal inflammatory changes or bowel obstruction. Follow-up as   clinically indicated. Please see full report. XR CHEST PORT   Final Result   1. Endotracheal tube in satisfactory position. 2. Suspect nasogastric tube is coiled in the oropharynx. Its tip is entering the   stomach. 3. No focal infiltrate. No overt edema. 4. No effusion or pneumothorax. XR CHEST PORT    (Results Pending)        Assessment:  Acute respiratory failure with hypoxia, requiring mechanical ventilation    Acute exacerbation of COPD    Sputum positive for Moraxella catarrhalis, beta-lactamase positive and Klebsiella pneumoniae    Sepsis    Episodes of recurrent syncope    Malignant hypertension, improved    Non-STEMI type II      Plan:  Continue Zosyn, stop vancomycin  Continue to attempt to wean from mechanical ventilation  Continue tube feeds and supportive care  Recheck labs in a.m. I updated his wife at the bedside yesterday afternoon    Care Plan discussed with: Patient/Family    Disposition: Continued ICU care    Total time spent with patient: 30 minutes.     Leopold Fetters, MD

## 2022-02-17 NOTE — PROGRESS NOTES
Progress Note    Patient: Mert Vasquez MRN: [id number]  SSN: [id number]    YOB: 1955  Age: 73 y.o. Sex: male      Admit Date: 2/7/2022    LOS: 10 days     Subjective:     No acute events overnight. Remains intubated. Objective:     Vitals:    02/17/22 0452 02/17/22 0500 02/17/22 0600 02/17/22 0700   BP:  133/82 132/89    Pulse: 72 71 69    Resp: 20 19 18    Temp:    99.5 °F (37.5 °C)   SpO2: 97% 97% 98%    Weight:   109.3 kg (240 lb 15.4 oz)    Height:            Intake and Output:  Current Shift: No intake/output data recorded. Last three shifts: 02/15 1901 - 02/17 0700  In: 5089.6 [I.V.:1909.6]  Out: 4450 [Urine:4450]    Physical Exam:   General:   Intubated. Eyes:  Conjunctivae/corneas clear. PERRL, EOMs intact. Fundi benign   Ears:  Normal TMs and external ear canals both ears. Nose: Nares normal. Septum midline. Mucosa normal. No drainage or sinus tenderness. Mouth/Throat: Lips, mucosa, and tongue normal. Teeth and gums normal.   Neck: Supple, symmetrical, trachea midline, no adenopathy, thyroid: no enlargment/tenderness/nodules, no carotid bruit and no JVD. Back:   Symmetric, no curvature. ROM normal. No CVA tenderness. Lungs:   Clear to auscultation bilaterally. Heart:  Regular rate and rhythm, S1, S2 normal, no murmur, click, rub or gallop. Abdomen:   Soft, non-tender. Bowel sounds normal. No masses,  No organomegaly. Extremities: Extremities normal, atraumatic, no cyanosis or edema. Pulses: 2+ and symmetric all extremities. Skin: Skin color, texture, turgor normal. No rashes or lesions   Lymph nodes: Cervical, supraclavicular, and axillary nodes normal.   Neurologic:  Intubated       Lab/Data Review: All lab results for the last 24 hours reviewed. Assessment:     Active Problems:    Respiratory failure ([address] (2/7/2022)    Patient is 59-year-old white male with:  1. Syncope  2. Non-STEMI  3. CAD status post PCI of left circumflex artery in 2018  4. Hyperlipidemia  6. Vocal cord dysfunction  7. History of PE  8. Warfarin was stopped due to GI bleed  9. COPD  7. Obesity  8. Edema  9. Hypertensive emergency   10. Bilateral renal lesions, largest about 1.7 cm on the right side posteriorly. Plan:     Remains in sinus rhythm. Plans for possible extubation today. Currently on amlodipine, aspirin, atorvastatin, Plavix, metoprolol. We will follow.     Signed By: William Jamison MD     February 17, 2022

## 2022-02-17 NOTE — PROGRESS NOTES
IMPRESSION:   1. Acute hypoxic respiratory failure evelyn on the ventilator FiO2 down to 35%  2. Low-grade fever no obvious site chest x-ray looks clear we will send a sputum  3. Hypercapnia with metabolic alkalosis resolved  4. NSTEMI  5. Generalized Edema  6. Syncope  7. COPD   8. Hypokalemia  9. Pneumonia sputum positive for gram-positive cocci      RECOMMENDATIONS/PLAN:   1. ICU monitoring  1. Ventilator for mechanical life support and prevent respiratory arrest with protective lung strategies sedated   2. Patient is on AC mode 35% FiO2 PEEP of 8 rate of 15 we will try to wean again today  3. Did sedation vacation patient was put on spontaneous he had low tidal volume goes into apnea now he is back on assist control back on sedation will do sedation vacation again today  4. Patient on IV Solu-Medrol nebulizer treatment Covid negative  5. Hold trilogy inhaler continue nebulized albuterol Atrovent budesonide  6. Chest x-ray no acute infiltrate which shows emphysematous changes  7. Patient was to be followed with ENT he never had any appointment with them they were resuming patient has vocal cord dysfunction once extubated we will get ENT evaluation  8. Start patient on vancomycin and Zosyn pending cultures patient still continues to spike temperature sputum shows gram-positive cocci  9.  Potassium corrected     [x] High complexity decision making was performed  [x] See my orders for details  HPI  81-year-old male came in because of chest pain with shortness of breath he has also recurrent episodes of syncope significant past medical history of vocal cord dysfunction, pulmonary [place], coronary artery disease status post stent COPD current everyday smoker no history of sleep apnea he was having swelling of the lower extremities for couple of months today came into the emergency room as previously was having chest pain or shortness of breath he passed out subsequently got intubated and now on ventilator critical care consult was called. PMH:  has no past medical history on file. PSH:   has no past surgical history on file. FHX: family history is not on file.      SHX:      ALL:   Allergies   Allergen Reactions    Sulfa (Sulfonamide Antibiotics) Other (comments)     syncope          MEDS:   [x] Reviewed - As Below   [] Not reviewed    Current Facility-Administered Medications   Medication    potassium chloride (KLOR-CON) packet for solution 20 mEq    piperacillin-tazobactam (ZOSYN) 3.375 g in 0.9% sodium chloride (MBP/ADV) 100 mL MBP    dexmedeTOMidine (PRECEDEX) 400 mcg in 0.9% sodium chloride (MBP/ADV) 100 mL MBP    glycopyrrolate (ROBINUL) injection 0.1 mg    albuterol-ipratropium (DUO-NEB) 2.5 MG-0.5 MG/3 ML    bethanechol (URECHOLINE) tablet 100 mg    finasteride (PROSCAR) tablet 5 mg    OXcarbazepine (TRILEPTAL) tablet 150 mg    tamsulosin (FLOMAX) capsule 0.4 mg    sodium chloride (NS) flush 5-40 mL    sodium chloride (NS) flush 5-40 mL    acetaminophen (TYLENOL) tablet 650 mg    Or    acetaminophen (TYLENOL) suppository 650 mg    polyethylene glycol (MIRALAX) packet 17 g    ondansetron (ZOFRAN ODT) tablet 4 mg    Or    ondansetron (ZOFRAN) injection 4 mg    enoxaparin (LOVENOX) injection 40 mg    famotidine (PF) (PEPCID) 20 mg in 0.9% sodium chloride 10 mL injection    labetaloL (NORMODYNE;TRANDATE) 20 mg/4 mL (5 mg/mL) injection 10 mg    amLODIPine (NORVASC) tablet 10 mg    aspirin chewable tablet 81 mg    atorvastatin (LIPITOR) tablet 80 mg    clopidogreL (PLAVIX) tablet 75 mg    metoprolol succinate (TOPROL-XL) XL tablet 25 mg    methylPREDNISolone (PF) (SOLU-MEDROL) injection 40 mg    albuterol-ipratropium (DUO-NEB) 2.5 MG-0.5 MG/3 ML    budesonide (PULMICORT) 500 mcg/2 ml nebulizer suspension    midazolam in normal saline (VERSED) 1 mg/mL infusion    propofol (DIPRIVAN) 10 mg/mL infusion      MAR reviewed and pertinent medications noted or modified as needed   Current Facility-Administered Medications   Medication    potassium chloride (KLOR-CON) packet for solution 20 mEq    piperacillin-tazobactam (ZOSYN) 3.375 g in 0.9% sodium chloride (MBP/ADV) 100 mL MBP    dexmedeTOMidine (PRECEDEX) 400 mcg in 0.9% sodium chloride (MBP/ADV) 100 mL MBP    glycopyrrolate (ROBINUL) injection 0.1 mg    albuterol-ipratropium (DUO-NEB) 2.5 MG-0.5 MG/3 ML    bethanechol (URECHOLINE) tablet 100 mg    finasteride (PROSCAR) tablet 5 mg    OXcarbazepine (TRILEPTAL) tablet 150 mg    tamsulosin (FLOMAX) capsule 0.4 mg    sodium chloride (NS) flush 5-40 mL    sodium chloride (NS) flush 5-40 mL    acetaminophen (TYLENOL) tablet 650 mg    Or    acetaminophen (TYLENOL) suppository 650 mg    polyethylene glycol (MIRALAX) packet 17 g    ondansetron (ZOFRAN ODT) tablet 4 mg    Or    ondansetron (ZOFRAN) injection 4 mg    enoxaparin (LOVENOX) injection 40 mg    famotidine (PF) (PEPCID) 20 mg in 0.9% sodium chloride 10 mL injection    labetaloL (NORMODYNE;TRANDATE) 20 mg/4 mL (5 mg/mL) injection 10 mg    amLODIPine (NORVASC) tablet 10 mg    aspirin chewable tablet 81 mg    atorvastatin (LIPITOR) tablet 80 mg    clopidogreL (PLAVIX) tablet 75 mg    metoprolol succinate (TOPROL-XL) XL tablet 25 mg    methylPREDNISolone (PF) (SOLU-MEDROL) injection 40 mg    albuterol-ipratropium (DUO-NEB) 2.5 MG-0.5 MG/3 ML    budesonide (PULMICORT) 500 mcg/2 ml nebulizer suspension    midazolam in normal saline (VERSED) 1 mg/mL infusion    propofol (DIPRIVAN) 10 mg/mL infusion      PMH:  has no past medical history on file. PSH:   has no past surgical history on file. FHX: family history is not on file. SHX:       ROS:  Unable to obtain sedated    Hemodynamics:    CO:    CI:    CVP:    SVR:   PAP Systolic:    PAP Diastolic:    PVR:    UC30:        Ventilator Settings:      Mode Rate TV Press PEEP FiO2 PIP Min.  Vent   Assist control,Volume control    550 ml 15 cm H2O  5 cm H20 30 %  18 cm H2O  10.7 l/min        Vital Signs: Telemetry:    normal sinus rhythm Intake/Output:   Visit Vitals  /89 (BP Patient Position: At rest)   Pulse 68   Temp 99.5 °F (37.5 °C)   Resp 18   Ht 6' (1.829 m)   Wt 109.3 kg (240 lb 15.4 oz)   SpO2 96%   BMI 32.68 kg/m²       Temp (24hrs), Av.2 °F (37.3 °C), Min:98.8 °F (37.1 °C), Max:99.5 °F (37.5 °C)        O2 Device: Ventilator O2 Flow Rate (L/min): 15 l/min       Wt Readings from Last 4 Encounters:   22 109.3 kg (240 lb 15.4 oz)          Intake/Output Summary (Last 24 hours) at 2022 0847  Last data filed at 2022 0400  Gross per 24 hour   Intake 2802.4 ml   Output 3350 ml   Net -547.6 ml       Last shift:      No intake/output data recorded. Last 3 shifts: 02/15 1901 -  0700  In: 5089.6 [I.V.:1909.6]  Out: 4450 [Urine:4450]       Physical Exam:     General:  intubated on vent unresponsive on propofol  HEENT: NCAT,   Eyes: anicteric; conjunctiva clear doll's eye reflex present  Neck: no nodes, no cuff leak, trach midline; no accessory MM use. Chest: no deformity,   Cardiac: IR regular; no murmur;   Lungs: distant breath sounds; there anteriorly and laterally diminished in the bases no wheezing or rales  Abd: soft, NT, hypoactive BS  Ext: no edema; no joint swelling;  No clubbing  : clear urine  Neuro: Sedated on propofol and Precedex unresponsive doll's eye reflex present flaccid extremities  Psych- unable to assess  Skin: warm, dry, no cyanosis;   Pulses: Brachial and radial pulses intact  Capillary: Normal capillary refill      DATA:    MAR reviewed and pertinent medications noted or modified as needed  MEDS:   Current Facility-Administered Medications   Medication    potassium chloride (KLOR-CON) packet for solution 20 mEq    piperacillin-tazobactam (ZOSYN) 3.375 g in 0.9% sodium chloride (MBP/ADV) 100 mL MBP    dexmedeTOMidine (PRECEDEX) 400 mcg in 0.9% sodium chloride (MBP/ADV) 100 mL MBP    glycopyrrolate (ROBINUL) injection 0.1 mg    albuterol-ipratropium (DUO-NEB) 2.5 MG-0.5 MG/3 ML    bethanechol (URECHOLINE) tablet 100 mg    finasteride (PROSCAR) tablet 5 mg    OXcarbazepine (TRILEPTAL) tablet 150 mg    tamsulosin (FLOMAX) capsule 0.4 mg    sodium chloride (NS) flush 5-40 mL    sodium chloride (NS) flush 5-40 mL    acetaminophen (TYLENOL) tablet 650 mg    Or    acetaminophen (TYLENOL) suppository 650 mg    polyethylene glycol (MIRALAX) packet 17 g    ondansetron (ZOFRAN ODT) tablet 4 mg    Or    ondansetron (ZOFRAN) injection 4 mg    enoxaparin (LOVENOX) injection 40 mg    famotidine (PF) (PEPCID) 20 mg in 0.9% sodium chloride 10 mL injection    labetaloL (NORMODYNE;TRANDATE) 20 mg/4 mL (5 mg/mL) injection 10 mg    amLODIPine (NORVASC) tablet 10 mg    aspirin chewable tablet 81 mg    atorvastatin (LIPITOR) tablet 80 mg    clopidogreL (PLAVIX) tablet 75 mg    metoprolol succinate (TOPROL-XL) XL tablet 25 mg    methylPREDNISolone (PF) (SOLU-MEDROL) injection 40 mg    albuterol-ipratropium (DUO-NEB) 2.5 MG-0.5 MG/3 ML    budesonide (PULMICORT) 500 mcg/2 ml nebulizer suspension    midazolam in normal saline (VERSED) 1 mg/mL infusion    propofol (DIPRIVAN) 10 mg/mL infusion        Labs:      Recent Labs     02/17/22  0455 02/16/22  0425 02/15/22  0412   PH 7.41 7.42 7.43   PCO2 38 37 37   PO2 84 85 88   HCO3 24 24 25   FIO2 30.0 30.0 30.0   2/13 AC 15  PEEP 8 FiO2 35%  Lab Results   Component Value Date/Time    TSH 1.38 02/08/2022 03:53 AM        Imaging:    Results from Hospital Encounter encounter on 02/07/22    XR CHEST PORT    Narrative  1 view comparison yesterday    ET and NG tube remain    Impression  The cardiomediastinal silhouette is appropriate for age, technique,  and lung expansion. Pulmonary vasculature is not congested. The lungs are  essentially clear. No effusion or pneumothorax is seen.       Results from [facility] encounter on 02/07/22    CTA HEAD NECK W WO CONT    Narrative  Study: Head and Neck CTA without and with contrast.    Clinical Indication: Altered mental status. Comparison: Head CT dated 2/7/2022. Technique: Routine unenhanced axial acquisition of the head and neck was  performed. Subsequently, contiguous thin section axial acquisition of the head  and neck was performed in the arterial phase from the thoracic inlet to the  skull vertex following the intravenous administration of 100 mL Isovue-370. Coronal, sagittal, and MIP reconstructions were generated and reviewed. Dose  reduction: All CT scans at this facility are performed using dose reduction  optimization techniques as appropriate to a performed exam including the  following-automated exposure control, adjustments of mA and/or Kv according to  patient size, or use of iterative reconstructive technique. Findings:    Head CT:  The ventricles are unchanged in size and configuration. No midline shift. The basal cisterns are patent. No acute hemorrhage, abnormal  extra-axial fluid, or evidence of large territorial ischemia. Scattered foci of  hypodensity involving the cerebral hemispheric white matter are nonspecific but  most commonly associated with chronic small vessel ischemic changes. Unremarkable orbits. Trace right maxillary sinus mucosal thickening. No  evidence of an aggressive calvarial or extracalvarial lesion. Neck CTA:  The aortic arch and great vessel origins are unremarkable. The common carotid arteries are patent without high-grade stenosis. Outside  plaque in the carotid bulbs causing 30-35% stenosis of the right internal  carotid artery origin based on NASCET-like criteria. The left more distal right  cervical internal carotid arteries are patent without high-grade stenosis. The  external carotid arteries are unremarkable. The cervical vertebral arteries are patent without high-grade stenosis. Endotracheal and orogastric tubes. Emphysema. Multilevel degenerative changes of  the cervical spine. Fusion of the C3 and C4 vertebral bodies. Head CTA:  Scattered calcified atherosclerosis involving the bilateral intracranial  internal carotid arteries without significant stenosis. The anterior cerebral  arteries are patent without high-grade stenosis. The middle cerebral arteries  are patent without high-grade stenosis. The intracranial vertebral arteries are patent without high-grade stenosis. The  basilar artery is patent without high-grade stenosis. The posterior cerebral  arteries are patent without high-grade stenosis. No aneurysm or high flow vascular malformation. Impression  Head CT:  No acute intracranial abnormality. Head and neck CTA:  1. Mild (30-35%) stenosis of the right ICA origin. 2.  No occlusion or high-grade stenosis of the remaining major cervical or  intracranial arterial vasculature. Please note that degrees of carotid stenosis are measured in accordance with  NASCET-like criteria. · 2/12 patient condition got worse yesterday and he became cyanotic while he was on a ventilator transferred to ICU he is back on Precedex and propofol ABG acceptable  · 2/13 back on ventilator assist control. Low-grade fever 100.6. Will check urine and sputum cultures although chest x-ray looks clear. Will decrease PEEP to 5.   Repeat labs in a.m. hypercapnia today we will give Diamox  · 2/14 will decrease FiO2 sputum positive for gram-positive cocci in clusters started on vancomycin and Zosyn pending sensitivity still spiking temperature  · 2/16 remain on ventilator  will try to wean him today once extubated cardiologist suggested about cardiac cath  · 2/17 on assist control mode will try to wean him again today  · Time of care 30 minutes  ·

## 2022-02-17 NOTE — PROGRESS NOTES
Clinical chart reviewed by CM. Discharge plans are pending patient's clinical improvement. CM will continue to follow.

## 2022-02-18 ENCOUNTER — APPOINTMENT (OUTPATIENT)
Dept: GENERAL RADIOLOGY | Age: 67
DRG: 207 | End: 2022-02-18
Attending: INTERNAL MEDICINE
Payer: MEDICARE

## 2022-02-18 ENCOUNTER — APPOINTMENT (OUTPATIENT)
Dept: NON INVASIVE DIAGNOSTICS | Age: 67
DRG: 207 | End: 2022-02-18
Attending: INTERNAL MEDICINE
Payer: MEDICARE

## 2022-02-18 LAB
ALBUMIN SERPL-MCNC: 2.4 G/DL (ref 3.5–5)
ALBUMIN/GLOB SERPL: 0.6 {RATIO} (ref 1.1–2.2)
ALP SERPL-CCNC: 75 U/L (ref 45–117)
ALT SERPL-CCNC: 60 U/L (ref 12–78)
ANION GAP SERPL CALC-SCNC: 5 MMOL/L (ref 5–15)
ARTERIAL PATENCY WRIST A: ABNORMAL
AST SERPL W P-5'-P-CCNC: 40 U/L (ref 15–37)
BASE EXCESS BLDA CALC-SCNC: 1.3 MMOL/L (ref 0–2)
BASOPHILS # BLD: 0 K/UL (ref 0–0.1)
BASOPHILS NFR BLD: 0 % (ref 0–1)
BDY SITE: ABNORMAL
BILIRUB SERPL-MCNC: 0.9 MG/DL (ref 0.2–1)
BUN SERPL-MCNC: 37 MG/DL (ref 6–20)
BUN/CREAT SERPL: 49 (ref 12–20)
CA-I BLD-MCNC: 8.5 MG/DL (ref 8.5–10.1)
CHLORIDE SERPL-SCNC: 110 MMOL/L (ref 97–108)
CO2 SERPL-SCNC: 24 MMOL/L (ref 21–32)
CREAT SERPL-MCNC: 0.76 MG/DL (ref 0.7–1.3)
DIFFERENTIAL METHOD BLD: ABNORMAL
EOSINOPHIL # BLD: 0 K/UL (ref 0–0.4)
EOSINOPHIL NFR BLD: 0 % (ref 0–7)
ERYTHROCYTE [DISTWIDTH] IN BLOOD BY AUTOMATED COUNT: 14 % (ref 11.5–14.5)
GAS FLOW.O2 O2 DELIVERY SYS: 2 L/MIN
GLOBULIN SER CALC-MCNC: 3.7 G/DL (ref 2–4)
GLUCOSE SERPL-MCNC: 133 MG/DL (ref 65–100)
HCO3 BLDA-SCNC: 26 MMOL/L (ref 22–26)
HCT VFR BLD AUTO: 37.6 % (ref 36.6–50.3)
HGB BLD-MCNC: 12.8 G/DL (ref 12.1–17)
IMM GRANULOCYTES # BLD AUTO: 0.1 K/UL (ref 0–0.04)
IMM GRANULOCYTES NFR BLD AUTO: 1 % (ref 0–0.5)
LYMPHOCYTES # BLD: 1 K/UL (ref 0.8–3.5)
LYMPHOCYTES NFR BLD: 9 % (ref 12–49)
MCH RBC QN AUTO: 29.5 PG (ref 26–34)
MCHC RBC AUTO-ENTMCNC: 34 G/DL (ref 30–36.5)
MCV RBC AUTO: 86.6 FL (ref 80–99)
MONOCYTES # BLD: 0.5 K/UL (ref 0–1)
MONOCYTES NFR BLD: 4 % (ref 5–13)
NEUTS SEG # BLD: 10.5 K/UL (ref 1.8–8)
NEUTS SEG NFR BLD: 86 % (ref 32–75)
NRBC # BLD: 0 K/UL (ref 0–0.01)
NRBC BLD-RTO: 0 PER 100 WBC
PCO2 BLDA: 37 MMHG (ref 35–45)
PH BLDA: 7.44 [PH] (ref 7.35–7.45)
PLATELET # BLD AUTO: 214 K/UL (ref 150–400)
PMV BLD AUTO: 9.7 FL (ref 8.9–12.9)
PO2 BLDA: 83 MMHG (ref 75–100)
POTASSIUM SERPL-SCNC: 3.6 MMOL/L (ref 3.5–5.1)
PROT SERPL-MCNC: 6.1 G/DL (ref 6.4–8.2)
RBC # BLD AUTO: 4.34 M/UL (ref 4.1–5.7)
SAO2 % BLD: 95 %
SAO2% DEVICE SAO2% SENSOR NAME: ABNORMAL
SODIUM SERPL-SCNC: 139 MMOL/L (ref 136–145)
WBC # BLD AUTO: 12.1 K/UL (ref 4.1–11.1)

## 2022-02-18 PROCEDURE — 77010033678 HC OXYGEN DAILY

## 2022-02-18 PROCEDURE — 65270000029 HC RM PRIVATE

## 2022-02-18 PROCEDURE — 92610 EVALUATE SWALLOWING FUNCTION: CPT

## 2022-02-18 PROCEDURE — 74011250637 HC RX REV CODE- 250/637: Performed by: INTERNAL MEDICINE

## 2022-02-18 PROCEDURE — 74011250637 HC RX REV CODE- 250/637: Performed by: HOSPITALIST

## 2022-02-18 PROCEDURE — 74011250636 HC RX REV CODE- 250/636: Performed by: INTERNAL MEDICINE

## 2022-02-18 PROCEDURE — 80053 COMPREHEN METABOLIC PANEL: CPT

## 2022-02-18 PROCEDURE — 71045 X-RAY EXAM CHEST 1 VIEW: CPT

## 2022-02-18 PROCEDURE — 82803 BLOOD GASES ANY COMBINATION: CPT

## 2022-02-18 PROCEDURE — 94640 AIRWAY INHALATION TREATMENT: CPT

## 2022-02-18 PROCEDURE — 74011000250 HC RX REV CODE- 250: Performed by: INTERNAL MEDICINE

## 2022-02-18 PROCEDURE — 85025 COMPLETE CBC W/AUTO DIFF WBC: CPT

## 2022-02-18 PROCEDURE — 36600 WITHDRAWAL OF ARTERIAL BLOOD: CPT

## 2022-02-18 PROCEDURE — 74011000258 HC RX REV CODE- 258: Performed by: INTERNAL MEDICINE

## 2022-02-18 PROCEDURE — 36415 COLL VENOUS BLD VENIPUNCTURE: CPT

## 2022-02-18 PROCEDURE — 93971 EXTREMITY STUDY: CPT

## 2022-02-18 RX ADMIN — METHYLPREDNISOLONE SODIUM SUCCINATE 40 MG: 40 INJECTION, POWDER, FOR SOLUTION INTRAMUSCULAR; INTRAVENOUS at 14:16

## 2022-02-18 RX ADMIN — BUDESONIDE 500 MCG: 0.5 SUSPENSION RESPIRATORY (INHALATION) at 07:31

## 2022-02-18 RX ADMIN — IPRATROPIUM BROMIDE AND ALBUTEROL SULFATE 3 ML: 2.5; .5 SOLUTION RESPIRATORY (INHALATION) at 07:30

## 2022-02-18 RX ADMIN — PIPERACILLIN AND TAZOBACTAM 3.38 G: 3; .375 INJECTION, POWDER, LYOPHILIZED, FOR SOLUTION INTRAVENOUS at 05:12

## 2022-02-18 RX ADMIN — BETHANECHOL CHLORIDE 100 MG: 25 TABLET ORAL at 21:41

## 2022-02-18 RX ADMIN — PIPERACILLIN AND TAZOBACTAM 3.38 G: 3; .375 INJECTION, POWDER, LYOPHILIZED, FOR SOLUTION INTRAVENOUS at 14:18

## 2022-02-18 RX ADMIN — PIPERACILLIN AND TAZOBACTAM 3.38 G: 3; .375 INJECTION, POWDER, LYOPHILIZED, FOR SOLUTION INTRAVENOUS at 21:41

## 2022-02-18 RX ADMIN — SODIUM CHLORIDE, PRESERVATIVE FREE 20 MG: 5 INJECTION INTRAVENOUS at 09:34

## 2022-02-18 RX ADMIN — METHYLPREDNISOLONE SODIUM SUCCINATE 40 MG: 40 INJECTION, POWDER, FOR SOLUTION INTRAMUSCULAR; INTRAVENOUS at 21:41

## 2022-02-18 RX ADMIN — GLYCOPYRROLATE 0.1 MG: 0.2 INJECTION INTRAMUSCULAR; INTRAVENOUS at 21:41

## 2022-02-18 RX ADMIN — SODIUM CHLORIDE, PRESERVATIVE FREE 10 ML: 5 INJECTION INTRAVENOUS at 05:12

## 2022-02-18 RX ADMIN — SODIUM CHLORIDE, PRESERVATIVE FREE 20 MG: 5 INJECTION INTRAVENOUS at 21:41

## 2022-02-18 RX ADMIN — MORPHINE SULFATE 1 MG: 2 INJECTION, SOLUTION INTRAMUSCULAR; INTRAVENOUS at 21:59

## 2022-02-18 RX ADMIN — POTASSIUM CHLORIDE 20 MEQ: 1.5 POWDER, FOR SOLUTION ORAL at 09:35

## 2022-02-18 RX ADMIN — ASPIRIN 81 MG CHEWABLE TABLET 81 MG: 81 TABLET CHEWABLE at 09:35

## 2022-02-18 RX ADMIN — ENOXAPARIN SODIUM 40 MG: 100 INJECTION SUBCUTANEOUS at 09:34

## 2022-02-18 RX ADMIN — TAMSULOSIN HYDROCHLORIDE 0.4 MG: 0.4 CAPSULE ORAL at 09:35

## 2022-02-18 RX ADMIN — GLYCOPYRROLATE 0.1 MG: 0.2 INJECTION INTRAMUSCULAR; INTRAVENOUS at 09:35

## 2022-02-18 RX ADMIN — BETHANECHOL CHLORIDE 100 MG: 25 TABLET ORAL at 09:34

## 2022-02-18 RX ADMIN — AMLODIPINE BESYLATE 10 MG: 5 TABLET ORAL at 09:35

## 2022-02-18 RX ADMIN — METHYLPREDNISOLONE SODIUM SUCCINATE 40 MG: 40 INJECTION, POWDER, FOR SOLUTION INTRAMUSCULAR; INTRAVENOUS at 05:12

## 2022-02-18 RX ADMIN — ATORVASTATIN CALCIUM 80 MG: 40 TABLET, FILM COATED ORAL at 09:35

## 2022-02-18 RX ADMIN — OXCARBAZEPINE 150 MG: 150 TABLET, FILM COATED ORAL at 21:59

## 2022-02-18 RX ADMIN — NYSTATIN 500000 UNITS: 100000 SUSPENSION ORAL at 14:29

## 2022-02-18 RX ADMIN — IPRATROPIUM BROMIDE AND ALBUTEROL SULFATE 3 ML: 2.5; .5 SOLUTION RESPIRATORY (INHALATION) at 12:44

## 2022-02-18 RX ADMIN — METOPROLOL SUCCINATE 25 MG: 25 TABLET, EXTENDED RELEASE ORAL at 09:35

## 2022-02-18 RX ADMIN — IPRATROPIUM BROMIDE AND ALBUTEROL SULFATE 3 ML: 2.5; .5 SOLUTION RESPIRATORY (INHALATION) at 20:01

## 2022-02-18 RX ADMIN — GLYCOPYRROLATE 0.1 MG: 0.2 INJECTION INTRAMUSCULAR; INTRAVENOUS at 14:30

## 2022-02-18 RX ADMIN — CLOPIDOGREL BISULFATE 75 MG: 75 TABLET, FILM COATED ORAL at 09:40

## 2022-02-18 RX ADMIN — FINASTERIDE 5 MG: 5 TABLET, FILM COATED ORAL at 09:35

## 2022-02-18 RX ADMIN — OXCARBAZEPINE 150 MG: 150 TABLET, FILM COATED ORAL at 09:34

## 2022-02-18 RX ADMIN — NYSTATIN 500000 UNITS: 100000 SUSPENSION ORAL at 21:41

## 2022-02-18 RX ADMIN — BUDESONIDE 500 MCG: 0.5 SUSPENSION RESPIRATORY (INHALATION) at 20:01

## 2022-02-18 RX ADMIN — NYSTATIN 500000 UNITS: 100000 SUSPENSION ORAL at 09:34

## 2022-02-18 NOTE — PROGRESS NOTES
Hospitalist Progress Note               Daily Progress Note: 2/18/2022      Subjective:   Hospital course to date:  71-year-old male with history of COPD, CAD with PCI and pulmonary embolism (not on anticoagulation due to GI bleed) who was admitted on 2/7 with altered mental status and syncope complicated by hypertensive urgency and hypoxia. Initially had a blood pressure of 230/143 with oxygen saturations in the 30s. He was intubated in the ED. CT of the chest was unremarkable. Patient has a history of recurrent syncopal episodes. Per wife, patient had been having intermittent, generalized/whole body cramps that last between 15 minutes to 1 hour. Those episodes generally are  followed by loss of consciousness for about 15 seconds. He usually will have short period of confusion and slurred speech after regaining consciousness. No extremity weakness after the episodes. Wife reported no fever, chills or chest pain. Reports have shortness of breath and cough at baseline due to COPD and has not been worse than normal in the past 3 weeks. Of note, he has been worked-up in the hospital in [place] (record not found in system), and no cause has been identified. EEG was never done. Patient was seen by neurology who does not feel the above episodes represented seizures. COVID test was negative. Hospital course was complicated by non-STEMI felt due to malignant hypertension. He  has been maintained on the ventilator and followed by pulmonology. Etiology of respiratory failure seems to be largely COPD exacerbation. There was some question about possible vocal cord dysfunction with plans for ENT evaluation    Patient was treated with IV antibiotics. Currently on Zosyn and vancomycin    Sputum culture from 2/14 was positive for heavy growth of Moraxella catarrhalis    -------    Patient seen today for follow-up.   He was successfully extubated yesterday and is now breathing comfortably on a nasal cannula. He is alert and oriented.   Left arm noted to be swollen      Problem List:  Problem List as of 2/18/2022 Never Reviewed          Codes Class Noted - Resolved    Respiratory failure [facility]) ICD-10-CM: J96.90  ICD-9-CM: 518.81  2/7/2022 - Present              Medications reviewed  Current Facility-Administered Medications   Medication Dose Route Frequency    potassium chloride (KLOR-CON) packet for solution 20 mEq  20 mEq Oral BID WITH MEALS    nystatin (MYCOSTATIN) 100,000 unit/mL oral suspension 500,000 Units  500,000 Units Oral QID    morphine injection 1 mg  1 mg IntraVENous Q8H PRN    piperacillin-tazobactam (ZOSYN) 3.375 g in 0.9% sodium chloride (MBP/ADV) 100 mL MBP  3.375 g IntraVENous Q8H    dexmedeTOMidine (PRECEDEX) 400 mcg in 0.9% sodium chloride (MBP/ADV) 100 mL MBP  0.1-1.4 mcg/kg/hr IntraVENous TITRATE    glycopyrrolate (ROBINUL) injection 0.1 mg  0.1 mg IntraVENous TID    albuterol-ipratropium (DUO-NEB) 2.5 MG-0.5 MG/3 ML  3 mL Nebulization Q4H PRN    bethanechol (URECHOLINE) tablet 100 mg  100 mg Oral BID    finasteride (PROSCAR) tablet 5 mg  5 mg Oral DAILY    OXcarbazepine (TRILEPTAL) tablet 150 mg  150 mg Oral BID    tamsulosin (FLOMAX) capsule 0.4 mg  0.4 mg Oral DAILY    sodium chloride (NS) flush 5-40 mL  5-40 mL IntraVENous Q8H    sodium chloride (NS) flush 5-40 mL  5-40 mL IntraVENous PRN    acetaminophen (TYLENOL) tablet 650 mg  650 mg Oral Q6H PRN    Or    acetaminophen (TYLENOL) suppository 650 mg  650 mg Rectal Q6H PRN    polyethylene glycol (MIRALAX) packet 17 g  17 g Oral DAILY PRN    ondansetron (ZOFRAN ODT) tablet 4 mg  4 mg Oral Q8H PRN    Or    ondansetron (ZOFRAN) injection 4 mg  4 mg IntraVENous Q6H PRN    enoxaparin (LOVENOX) injection 40 mg  40 mg SubCUTAneous DAILY    famotidine (PF) (PEPCID) 20 mg in 0.9% sodium chloride 10 mL injection  20 mg IntraVENous Q12H    labetaloL (NORMODYNE;TRANDATE) 20 mg/4 mL (5 mg/mL) injection 10 mg  10 mg IntraVENous Q4H PRN    amLODIPine (NORVASC) tablet 10 mg  10 mg Oral DAILY    aspirin chewable tablet 81 mg  81 mg Oral DAILY    atorvastatin (LIPITOR) tablet 80 mg  80 mg Oral DAILY    clopidogreL (PLAVIX) tablet 75 mg  75 mg Oral DAILY    metoprolol succinate (TOPROL-XL) XL tablet 25 mg  25 mg Oral DAILY    methylPREDNISolone (PF) (SOLU-MEDROL) injection 40 mg  40 mg IntraVENous Q8H    albuterol-ipratropium (DUO-NEB) 2.5 MG-0.5 MG/3 ML  3 mL Nebulization Q6HWA RT    budesonide (PULMICORT) 500 mcg/2 ml nebulizer suspension  500 mcg Nebulization BID RT    midazolam in normal saline (VERSED) 1 mg/mL infusion  0-10 mg/hr IntraVENous TITRATE    propofol (DIPRIVAN) 10 mg/mL infusion  0-50 mcg/kg/min IntraVENous TITRATE       Review of Systems:   A comprehensive review of systems was negative except for that written in the HPI. Objective:   Physical Exam:     Visit Vitals  BP (!) 144/93 (BP 1 Location: Left upper arm, BP Patient Position: At rest)   Pulse 99   Temp 98.8 °F (37.1 °C)   Resp 18   Ht 6' (1.829 m)   Wt 111.5 kg (245 lb 13 oz)   SpO2 94%   BMI 33.34 kg/m²    O2 Flow Rate (L/min): 2 l/min O2 Device: Nasal cannula    Temp (24hrs), Av.2 °F (37.3 °C), Min:98.8 °F (37.1 °C), Max:99.5 °F (37.5 °C)    No intake/output data recorded.  1901 -  0700  In: 1346.7 [I.V.:146.7]  Out: 3150 [Urine:3150]    General:   Awake and alert   Lungs:   Clear to auscultation bilaterally. Chest wall:  No tenderness or deformity. Heart:  Regular rate and rhythm, S1, S2 normal, no murmur, click, rub or gallop. Abdomen:   Soft, non-tender. Bowel sounds normal. No masses,  No organomegaly. Extremities: Extremities show generalized swelling of left arm   Pulses: 2+ and symmetric all extremities. Skin: Skin color, texture, turgor normal. No rashes or lesions   Neurologic: CNII-XII intact.    Unable to assess         Data Review:       Recent Days:  Recent Labs     22  0521 22  0450   WBC 12.1* 7.2   HGB 12.8 12.7   HCT 37.6 37.7    195     Recent Labs     02/18/22  0521 02/17/22  1400    138   K 3.6 3.5   * 108   CO2 24 25   * 125*   BUN 37* 37*   CREA 0.76 0.75   CA 8.5 8.3*   PHOS  --  2.1*   ALB 2.4* 2.4*   TBILI 0.9  --    ALT 60  --      Recent Labs     02/18/22  0520 02/17/22  0455 02/16/22  0425   PH 7.44 7.41 7.42   PCO2 37 38 37   PO2 83 84 85   HCO3 26 24 24   FIO2  --  30.0 30.0       24 Hour Results:  Recent Results (from the past 24 hour(s))   GLUCOSE, POC    Collection Time: 02/17/22 11:52 AM   Result Value Ref Range    Glucose (POC) 130 (H) 65 - 117 mg/dL    Performed by Byron Stallings    RENAL FUNCTION PANEL    Collection Time: 02/17/22  2:00 PM   Result Value Ref Range    Sodium 138 136 - 145 mmol/L    Potassium 3.5 3.5 - 5.1 mmol/L    Chloride 108 97 - 108 mmol/L    CO2 25 21 - 32 mmol/L    Anion gap 5 5 - 15 mmol/L    Glucose 125 (H) 65 - 100 mg/dL    BUN 37 (H) 6 - 20 mg/dL    Creatinine 0.75 0.70 - 1.30 mg/dL    BUN/Creatinine ratio 49 (H) 12 - 20      GFR est AA >60 >60 ml/min/1.73m2    GFR est non-AA >60 >60 ml/min/1.73m2    Calcium 8.3 (L) 8.5 - 10.1 mg/dL    Phosphorus 2.1 (L) 2.6 - 4.7 mg/dL    Albumin 2.4 (L) 3.5 - 5.0 g/dL   BLOOD GAS, ARTERIAL    Collection Time: 02/18/22  5:20 AM   Result Value Ref Range    pH 7.44 7.35 - 7.45      PCO2 37 35 - 45 mmHg    PO2 83 75 - 100 mmHg    O2 SAT 95 (L) >95 %    BICARBONATE 26 22 - 26 mmol/L    BASE EXCESS 1.3 0 - 2 mmol/L    O2 METHOD Nasal Cannula      O2 FLOW RATE 2.0 L/min    SITE Right Radial      VÍCTOR'S TEST PASS     CBC WITH AUTOMATED DIFF    Collection Time: 02/18/22  5:21 AM   Result Value Ref Range    WBC 12.1 (H) 4.1 - 11.1 K/uL    RBC 4.34 4.10 - 5.70 M/uL    HGB 12.8 12.1 - 17.0 g/dL    HCT 37.6 36.6 - 50.3 %    MCV 86.6 80.0 - 99.0 FL    MCH 29.5 26.0 - 34.0 PG    MCHC 34.0 30.0 - 36.5 g/dL    RDW 14.0 11.5 - 14.5 %    PLATELET 991 872 - 504 K/uL    MPV 9.7 8.9 - 12.9 FL    NRBC 0.0 0.0 PER 100 WBC    ABSOLUTE NRBC 0.00 0.00 - 0.01 K/uL    NEUTROPHILS 86 (H) 32 - 75 %    LYMPHOCYTES 9 (L) 12 - 49 %    MONOCYTES 4 (L) 5 - 13 %    EOSINOPHILS 0 0 - 7 %    BASOPHILS 0 0 - 1 %    IMMATURE GRANULOCYTES 1 (H) 0 - 0.5 %    ABS. NEUTROPHILS 10.5 (H) 1.8 - 8.0 K/UL    ABS. LYMPHOCYTES 1.0 0.8 - 3.5 K/UL    ABS. MONOCYTES 0.5 0.0 - 1.0 K/UL    ABS. EOSINOPHILS 0.0 0.0 - 0.4 K/UL    ABS. BASOPHILS 0.0 0.0 - 0.1 K/UL    ABS. IMM. GRANS. 0.1 (H) 0.00 - 0.04 K/UL    DF AUTOMATED     METABOLIC PANEL, COMPREHENSIVE    Collection Time: 02/18/22  5:21 AM   Result Value Ref Range    Sodium 139 136 - 145 mmol/L    Potassium 3.6 3.5 - 5.1 mmol/L    Chloride 110 (H) 97 - 108 mmol/L    CO2 24 21 - 32 mmol/L    Anion gap 5 5 - 15 mmol/L    Glucose 133 (H) 65 - 100 mg/dL    BUN 37 (H) 6 - 20 mg/dL    Creatinine 0.76 0.70 - 1.30 mg/dL    BUN/Creatinine ratio 49 (H) 12 - 20      GFR est AA >60 >60 ml/min/1.73m2    GFR est non-AA >60 >60 ml/min/1.73m2    Calcium 8.5 8.5 - 10.1 mg/dL    Bilirubin, total 0.9 0.2 - 1.0 mg/dL    AST (SGOT) 40 (H) 15 - 37 U/L    ALT (SGPT) 60 12 - 78 U/L    Alk. phosphatase 75 45 - 117 U/L    Protein, total 6.1 (L) 6.4 - 8.2 g/dL    Albumin 2.4 (L) 3.5 - 5.0 g/dL    Globulin 3.7 2.0 - 4.0 g/dL    A-G Ratio 0.6 (L) 1.1 - 2.2         XR CHEST PORT   Final Result   The cardiomediastinal silhouette is appropriate for age, technique,   and lung expansion. Pulmonary vasculature is not congested. The lungs are   essentially clear. No effusion or pneumothorax is seen. XR CHEST PORT   Final Result   No significant interval change. XR CHEST PORT   Final Result      XR CHEST PORT   Final Result   No acute findings. XR CHEST PORT   Final Result   No significant interval change. CTA HEAD NECK W WO CONT   Final Result   Head CT:   No acute intracranial abnormality. Head and neck CTA:   1. Mild (30-35%) stenosis of the right ICA origin.    2.  No occlusion or high-grade stenosis of the remaining major cervical or   intracranial arterial vasculature. Please note that degrees of carotid stenosis are measured in accordance with   NASCET-like criteria. XR CHEST PORT   Final Result   No significant interval change. XR CHEST PORT   Final Result   FINDINGS: IMPRESSION: Single frontal view of the chest.   ET tube, enteric tube remain. Normal heart size. No vascular congestion or pulmonary edema. The lungs are well inflated. No focal infiltrate, pleural effusion, or   pneumothorax. Unchanged biapical pleural-parenchymal thickening. Upper lung   oligemia from emphysema. No free air under the diaphragm. XR CHEST PORT   Final Result   No acute findings. XR CHEST PORT   Final Result      XR CHEST PORT   Final Result   The cardiomediastinal silhouette is appropriate for age, technique,   and lung expansion. Pulmonary vasculature is not congested. The lungs are   essentially clear. No effusion or pneumothorax is seen. CT HEAD WO CONT   Final Result   No evidence of an acute intracranial process. CTA CHEST ABD PELV W WO CONT   Final Result      No aneurysm or dissection of aorta. Atherosclerosis including coronary arteries. Pulmonary emphysema. No intra-abdominal inflammatory changes or bowel obstruction. Follow-up as   clinically indicated. Please see full report. XR CHEST PORT   Final Result   1. Endotracheal tube in satisfactory position. 2. Suspect nasogastric tube is coiled in the oropharynx. Its tip is entering the   stomach. 3. No focal infiltrate. No overt edema. 4. No effusion or pneumothorax.       XR CHEST PORT    (Results Pending)        Assessment:  Acute respiratory failure with hypoxia, requiring mechanical ventilation    Acute exacerbation of COPD    Sputum positive for Moraxella catarrhalis, beta-lactamase positive and Klebsiella pneumoniae    Sepsis    Episodes of recurrent syncope    Malignant hypertension, improved    Non-STEMI type II    Swelling left arm      Plan:  Continue Zosyn   Speech evaluation, start diet  Obtain venous duplex left arm  Okay for transfer to telemetry  PT/OT evaluation    Care Plan discussed with: Patient/Family    Disposition: Transfer to telemetry    Total time spent with patient: 30 minutes.     Jennifer Araujo MD

## 2022-02-18 NOTE — PROGRESS NOTES
SPEECH LANGUAGE PATHOLOGY BEDSIDE SWALLOW EVALUATIONS  Patient: Getachew Farmer  (23 y.o. )  Date: 2/18/2022  Primary Diagnosis: Respiratory failure   Precautions:  Fall    ASSESSMENT :  Patient is A&Ox4, follows basic commands and makes appropriate speech. Vocal quality is wfl, per chart review patient w/ hx of vocal cord dysfunction. Patient presents w/ mild oropharyngeal dysphagia. Oral phase c/b mild reduction in bolus control w/ thin liquids, prolonged mastication of solids w/ mild bolus holding of solids clears w/ verbal cue and reduced A-P transit. Pharyngeal phase c/b mild swallow delay and HLE is reduced upon palpation. Overt s/s of pen/asp w/ thin x2 c/b wet vocal quality. Patient will benefit from skilled intervention to address the above impairments. Patients rehabilitation potential is considered to be Good     PLAN :  Recommendations and Planned Interventions:  Rec diet initiation of full liquid w/ mildly thick and strict asp/GERD precautions. ST to follow up for diet upgrade trials. Frequency/Duration: Patient will be followed by speech-language pathology 5 times a week to address goals. Discharge Recommendations: To Be Determined     SUBJECTIVE:   Patient reports hx of GERD w/ increased GERD days leading up to admission. OBJECTIVE:     Patient admitted w/ SOB, chest pain, and syncope. Intubated on 2/7/22 for airway protection and extubated on 2/17/22. PMH for COPD, CAD, vocal cord dysfunction, and PE. CXR Results  (Last 48 hours)                 02/18/22 0223  XR CHEST PORT Final result    Narrative:  Chest single view. Comparison single view chest February 17, 2022. ET tube and NG tube have been removed. Unchanged appearance for the lungs; no gross interstitial or alveolar pulmonary   edema. Cardiac and mediastinal structures unchanged. Apical pleural thickening   unchanged. No pneumothorax or sizable pleural effusion.        02/17/22 0232  XR CHEST PORT Final result    Impression:  The cardiomediastinal silhouette is appropriate for age, technique,   and lung expansion. Pulmonary vasculature is not congested. The lungs are   essentially clear. No effusion or pneumothorax is seen. Narrative:  1 view comparison yesterday       ET and NG tube remain                   Diet prior to admission: Regular  Current Diet:  DIET ADULT TUBE FEEDING  DIET ADULT     Cognitive and Communication Status:  Neurologic State: Alert  Orientation Level: Oriented X4  Cognition: Follows commands  Perception: Appears intact  Perseveration: No perseveration noted  Safety/Judgement: Awareness of environment  Swallowing Evaluation:   Oral Assessment:  Oral Assessment  Labial: No impairment  Dentition: Intact  Oral Hygiene: caries  Lingual: No impairment  Velum: No impairment  Mandible: No impairment  P.O. Trials:  Patient Position: upright in bed  Vocal quality prior to P.O.: No impairment  Consistency Presented: Ice chips; Nectar thick liquid;Puree; Solid; Thin liquid  How Presented: Self-fed/presented;SLP-fed/presented;Cup/sip;Spoon;Straw;Successive swallows     Bolus Acceptance: No impairment  Bolus Formation/Control: Impaired  Type of Impairment: Delayed;Mastication  Propulsion: Delayed (# of seconds)  Oral Residue: Less than 10% of bolus  Initiation of Swallow: Delayed (# of seconds)  Laryngeal Elevation: Decreased  Aspiration Signs/Symptoms: Weak cough  Pharyngeal Phase Characteristics: No impairment, issues, or problems              Oral Phase Severity: Mild  Pharyngeal Phase Severity : Mild  Voice:       Vocal Quality: No impairment         Pain:  0      After treatment:   Patient left in no apparent distress in bed, Call bell within reach, Nursing notified, and Bed / chair alarm activated    COMMUNICATION/EDUCATION:   Patient was educated regarding diet recs, s/s aspiration, aspiration precautions, and ST POC. He demonstrated Fair understanding as evidenced by appropriate questions. .    The patient's plan of care including recommendations, planned interventions, and recommended diet changes were discussed with: Registered nurse and Physician. Patient/family agree to work toward stated goals and plan of care.     Thank you for this referral.  Lucía Pack M.S., M.Ed., CCC-SLP  Time Calculation: 15 mins

## 2022-02-18 NOTE — PROGRESS NOTES
IMPRESSION:   1. Acute hypoxic respiratory failure   2. Klebsiella pneumonia and moraxella catarrhalis  3. NSTEMI  4. Generalized Edema  5. Syncope  6. COPD   7. Hypokalemia  8. Pneumonia sputum positive for gram-positive cocci      RECOMMENDATIONS/PLAN:   1. ICU monitoring  1. Extubated on 2/17 alert awake talking on nasal cannula  2. Patient on IV Solu-Medrol nebulizer treatment Covid negative  3. Hold trilogy inhaler continue nebulized albuterol Atrovent budesonide  4. Chest x-ray no acute infiltrate which shows emphysematous changes  5. Start patient on vancomycin and Zosyn sputum shows gram-positive cocci, mozzarella catarrhalis Klebsiella pneumonia  6. Potassium corrected     [x] High complexity decision making was performed  [x] See my orders for details  HPI  28-year-old male came in because of chest pain with shortness of breath he has also recurrent episodes of syncope significant past medical history of vocal cord dysfunction, pulmonary [place], coronary artery disease status post stent COPD current everyday smoker no history of sleep apnea he was having swelling of the lower extremities for couple of months today came into the emergency room as previously was having chest pain or shortness of breath he passed out subsequently got intubated and now on ventilator critical care consult was called. PMH:  has no past medical history on file. PSH:   has no past surgical history on file. FHX: family history is not on file.      SHX:      ALL:   Allergies   Allergen Reactions    Sulfa (Sulfonamide Antibiotics) Other (comments)     syncope          MEDS:   [x] Reviewed - As Below   [] Not reviewed    Current Facility-Administered Medications   Medication    potassium chloride (KLOR-CON) packet for solution 20 mEq    nystatin (MYCOSTATIN) 100,000 unit/mL oral suspension 500,000 Units    morphine injection 1 mg    piperacillin-tazobactam (ZOSYN) 3.375 g in 0.9% sodium chloride (MBP/ADV) 100 mL MBP  dexmedeTOMidine (PRECEDEX) 400 mcg in 0.9% sodium chloride (MBP/ADV) 100 mL MBP    glycopyrrolate (ROBINUL) injection 0.1 mg    albuterol-ipratropium (DUO-NEB) 2.5 MG-0.5 MG/3 ML    bethanechol (URECHOLINE) tablet 100 mg    finasteride (PROSCAR) tablet 5 mg    OXcarbazepine (TRILEPTAL) tablet 150 mg    tamsulosin (FLOMAX) capsule 0.4 mg    sodium chloride (NS) flush 5-40 mL    sodium chloride (NS) flush 5-40 mL    acetaminophen (TYLENOL) tablet 650 mg    Or    acetaminophen (TYLENOL) suppository 650 mg    polyethylene glycol (MIRALAX) packet 17 g    ondansetron (ZOFRAN ODT) tablet 4 mg    Or    ondansetron (ZOFRAN) injection 4 mg    enoxaparin (LOVENOX) injection 40 mg    famotidine (PF) (PEPCID) 20 mg in 0.9% sodium chloride 10 mL injection    labetaloL (NORMODYNE;TRANDATE) 20 mg/4 mL (5 mg/mL) injection 10 mg    amLODIPine (NORVASC) tablet 10 mg    aspirin chewable tablet 81 mg    atorvastatin (LIPITOR) tablet 80 mg    clopidogreL (PLAVIX) tablet 75 mg    metoprolol succinate (TOPROL-XL) XL tablet 25 mg    methylPREDNISolone (PF) (SOLU-MEDROL) injection 40 mg    albuterol-ipratropium (DUO-NEB) 2.5 MG-0.5 MG/3 ML    budesonide (PULMICORT) 500 mcg/2 ml nebulizer suspension    midazolam in normal saline (VERSED) 1 mg/mL infusion    propofol (DIPRIVAN) 10 mg/mL infusion      MAR reviewed and pertinent medications noted or modified as needed   Current Facility-Administered Medications   Medication    potassium chloride (KLOR-CON) packet for solution 20 mEq    nystatin (MYCOSTATIN) 100,000 unit/mL oral suspension 500,000 Units    morphine injection 1 mg    piperacillin-tazobactam (ZOSYN) 3.375 g in 0.9% sodium chloride (MBP/ADV) 100 mL MBP    dexmedeTOMidine (PRECEDEX) 400 mcg in 0.9% sodium chloride (MBP/ADV) 100 mL MBP    glycopyrrolate (ROBINUL) injection 0.1 mg    albuterol-ipratropium (DUO-NEB) 2.5 MG-0.5 MG/3 ML    bethanechol (URECHOLINE) tablet 100 mg    finasteride (PROSCAR) tablet 5 mg    OXcarbazepine (TRILEPTAL) tablet 150 mg    tamsulosin (FLOMAX) capsule 0.4 mg    sodium chloride (NS) flush 5-40 mL    sodium chloride (NS) flush 5-40 mL    acetaminophen (TYLENOL) tablet 650 mg    Or    acetaminophen (TYLENOL) suppository 650 mg    polyethylene glycol (MIRALAX) packet 17 g    ondansetron (ZOFRAN ODT) tablet 4 mg    Or    ondansetron (ZOFRAN) injection 4 mg    enoxaparin (LOVENOX) injection 40 mg    famotidine (PF) (PEPCID) 20 mg in 0.9% sodium chloride 10 mL injection    labetaloL (NORMODYNE;TRANDATE) 20 mg/4 mL (5 mg/mL) injection 10 mg    amLODIPine (NORVASC) tablet 10 mg    aspirin chewable tablet 81 mg    atorvastatin (LIPITOR) tablet 80 mg    clopidogreL (PLAVIX) tablet 75 mg    metoprolol succinate (TOPROL-XL) XL tablet 25 mg    methylPREDNISolone (PF) (SOLU-MEDROL) injection 40 mg    albuterol-ipratropium (DUO-NEB) 2.5 MG-0.5 MG/3 ML    budesonide (PULMICORT) 500 mcg/2 ml nebulizer suspension    midazolam in normal saline (VERSED) 1 mg/mL infusion    propofol (DIPRIVAN) 10 mg/mL infusion      PMH:  has no past medical history on file. PSH:   has no past surgical history on file. FHX: family history is not on file. SHX:       ROS:  Patient alert awake talking review of systems as mentioned in HPI and physical exam    Hemodynamics:    CO:    CI:    CVP:    SVR:   PAP Systolic:    PAP Diastolic:    PVR:    PA30:        Ventilator Settings:      Mode Rate TV Press PEEP FiO2 PIP Min.  Vent   Assist control,Volume control    550 ml 15 cm H2O  5 cm H20 30 %  18 cm H2O  10.7 l/min        Vital Signs: Telemetry:    normal sinus rhythm Intake/Output:   Visit Vitals  BP (!) 144/93 (BP 1 Location: Left upper arm, BP Patient Position: At rest)   Pulse 99   Temp 98.8 °F (37.1 °C)   Resp 18   Ht 6' (1.829 m)   Wt 111.5 kg (245 lb 13 oz)   SpO2 94%   BMI 33.34 kg/m²       Temp (24hrs), Av.2 °F (37.3 °C), Min:98.8 °F (37.1 °C), Max:99.5 °F (37.5 °C) O2 Device: Nasal cannula O2 Flow Rate (L/min): 2 l/min       Wt Readings from Last 4 Encounters:   02/18/22 111.5 kg (245 lb 13 oz)          Intake/Output Summary (Last 24 hours) at 2/18/2022 0932  Last data filed at 2/18/2022 0300  Gross per 24 hour   Intake 100 ml   Output 2000 ml   Net -1900 ml       Last shift:      No intake/output data recorded. Last 3 shifts: 02/16 1901 - 02/18 0700  In: 1346.7 [I.V.:146.7]  Out: 3150 [Urine:3150]       Physical Exam:     General: Alert awake  HEENT: NCAT,   Eyes: anicteric; conjunctiva clear   Neck: no nodes, no cuff leak, trach midline; no accessory MM use. Chest: no deformity,   Cardiac: IR regular; no murmur;   Lungs: distant breath sounds; no wheeze  Abd: soft, NT, hypoactive BS  Ext: no edema; no joint swelling;  No clubbing  : clear urine  Neuro: Moving extremities  Psych-no issues  Skin: warm, dry, no cyanosis;   Pulses: Brachial and radial pulses intact  Capillary: Normal capillary refill      DATA:    MAR reviewed and pertinent medications noted or modified as needed  MEDS:   Current Facility-Administered Medications   Medication    potassium chloride (KLOR-CON) packet for solution 20 mEq    nystatin (MYCOSTATIN) 100,000 unit/mL oral suspension 500,000 Units    morphine injection 1 mg    piperacillin-tazobactam (ZOSYN) 3.375 g in 0.9% sodium chloride (MBP/ADV) 100 mL MBP    dexmedeTOMidine (PRECEDEX) 400 mcg in 0.9% sodium chloride (MBP/ADV) 100 mL MBP    glycopyrrolate (ROBINUL) injection 0.1 mg    albuterol-ipratropium (DUO-NEB) 2.5 MG-0.5 MG/3 ML    bethanechol (URECHOLINE) tablet 100 mg    finasteride (PROSCAR) tablet 5 mg    OXcarbazepine (TRILEPTAL) tablet 150 mg    tamsulosin (FLOMAX) capsule 0.4 mg    sodium chloride (NS) flush 5-40 mL    sodium chloride (NS) flush 5-40 mL    acetaminophen (TYLENOL) tablet 650 mg    Or    acetaminophen (TYLENOL) suppository 650 mg    polyethylene glycol (MIRALAX) packet 17 g    ondansetron (ZOFRAN ODT) tablet 4 mg    Or    ondansetron (ZOFRAN) injection 4 mg    enoxaparin (LOVENOX) injection 40 mg    famotidine (PF) (PEPCID) 20 mg in 0.9% sodium chloride 10 mL injection    labetaloL (NORMODYNE;TRANDATE) 20 mg/4 mL (5 mg/mL) injection 10 mg    amLODIPine (NORVASC) tablet 10 mg    aspirin chewable tablet 81 mg    atorvastatin (LIPITOR) tablet 80 mg    clopidogreL (PLAVIX) tablet 75 mg    metoprolol succinate (TOPROL-XL) XL tablet 25 mg    methylPREDNISolone (PF) (SOLU-MEDROL) injection 40 mg    albuterol-ipratropium (DUO-NEB) 2.5 MG-0.5 MG/3 ML    budesonide (PULMICORT) 500 mcg/2 ml nebulizer suspension    midazolam in normal saline (VERSED) 1 mg/mL infusion    propofol (DIPRIVAN) 10 mg/mL infusion        Labs:      Recent Labs     02/18/22  0520 02/17/22  0455 02/16/22  0425   PH 7.44 7.41 7.42   PCO2 37 38 37   PO2 83 84 85   HCO3 26 24 24   FIO2  --  30.0 30.0   2/13 AC 15  PEEP 8 FiO2 35%  Lab Results   Component Value Date/Time    TSH 1.38 02/08/2022 03:53 AM        Imaging:    Results from Hospital Encounter encounter on 02/07/22    XR CHEST PORT    Narrative  Chest single view. Comparison single view chest February 17, 2022. ET tube and NG tube have been removed. Unchanged appearance for the lungs; no gross interstitial or alveolar pulmonary  edema. Cardiac and mediastinal structures unchanged. Apical pleural thickening  unchanged. No pneumothorax or sizable pleural effusion. Results from [place] encounter on 02/07/22    CTA HEAD NECK W WO CONT    Narrative  Study: Head and Neck CTA without and with contrast.    Clinical Indication: Altered mental status. Comparison: Head CT dated 2/7/2022. Technique: Routine unenhanced axial acquisition of the head and neck was  performed.  Subsequently, contiguous thin section axial acquisition of the head  and neck was performed in the arterial phase from the thoracic inlet to the  skull vertex following the intravenous administration of 100 mL Isovue-370. Coronal, sagittal, and MIP reconstructions were generated and reviewed. Dose  reduction: All CT scans at this facility are performed using dose reduction  optimization techniques as appropriate to a performed exam including the  following-automated exposure control, adjustments of mA and/or Kv according to  patient size, or use of iterative reconstructive technique. Findings:    Head CT:  The ventricles are unchanged in size and configuration. No midline shift. The basal cisterns are patent. No acute hemorrhage, abnormal  extra-axial fluid, or evidence of large territorial ischemia. Scattered foci of  hypodensity involving the cerebral hemispheric white matter are nonspecific but  most commonly associated with chronic small vessel ischemic changes. Unremarkable orbits. Trace right maxillary sinus mucosal thickening. No  evidence of an aggressive calvarial or extracalvarial lesion. Neck CTA:  The aortic arch and great vessel origins are unremarkable. The common carotid arteries are patent without high-grade stenosis. Outside  plaque in the carotid bulbs causing 30-35% stenosis of the right internal  carotid artery origin based on NASCET-like criteria. The left more distal right  cervical internal carotid arteries are patent without high-grade stenosis. The  external carotid arteries are unremarkable. The cervical vertebral arteries are patent without high-grade stenosis. Endotracheal and orogastric tubes. Emphysema. Multilevel degenerative changes of  the cervical spine. Fusion of the C3 and C4 vertebral bodies. Head CTA:  Scattered calcified atherosclerosis involving the bilateral intracranial  internal carotid arteries without significant stenosis. The anterior cerebral  arteries are patent without high-grade stenosis. The middle cerebral arteries  are patent without high-grade stenosis.     The intracranial vertebral arteries are patent without high-grade stenosis. The  basilar artery is patent without high-grade stenosis. The posterior cerebral  arteries are patent without high-grade stenosis. No aneurysm or high flow vascular malformation. Impression  Head CT:  No acute intracranial abnormality. Head and neck CTA:  1. Mild (30-35%) stenosis of the right ICA origin. 2.  No occlusion or high-grade stenosis of the remaining major cervical or  intracranial arterial vasculature. Please note that degrees of carotid stenosis are measured in accordance with  NASCET-like criteria. · 2/12 patient condition got worse yesterday and he became cyanotic while he was on a ventilator transferred to ICU he is back on Precedex and propofol ABG acceptable  · 2/13 back on ventilator assist control. Low-grade fever 100.6. Will check urine and sputum cultures although chest x-ray looks clear. Will decrease PEEP to 5.   Repeat labs in a.m. hypercapnia today we will give Diamox  · 2/14 will decrease FiO2 sputum positive for gram-positive cocci in clusters started on vancomycin and Zosyn pending sensitivity still spiking temperature  · 2/16 remain on ventilator  will try to wean him today once extubated cardiologist suggested about cardiac cath  · 2/17 on assist control mode will try to wean him again today  · 2/18 alert awake nasal cannula talking weak  · Time of care 30 minutes  ·

## 2022-02-18 NOTE — PROGRESS NOTES
CM reviewed clinical chart. Patient's discharge plan at admission was to return home. Patient was recently extubated and will have PT/OT consults. CM will continue to follow for recommendations from PT/OT and any discharge needs.

## 2022-02-18 NOTE — PROGRESS NOTES
Patient transferred to [facility] room 588D. Report called to LIOR Presley. No questions nor concerns voiced at this time. Patient is alert and oriented, VSS at this time. Patients wife called and made aware of the transfer, I provided her with the unit phone number. All of patients belongings were packed up and moved as well. Pt. Transferred by myself and Mary Tyler RN. Moved over onto floor bed with no issues, repositioned, and made comfortable. Patients bed alarm turned on, call bell within reach. Unit secretary was given patients chart and made aware that patient was in room. Telemetry box in place.

## 2022-02-18 NOTE — PROGRESS NOTES
Progress Note    Patient: Jonathan Hernadez MRN: [id number]  SSN: [id number]    YOB: 1955  Age: 73 y.o. Sex: male      Admit Date: 2/7/2022    LOS: 11 days     Subjective:   Extubated. Objective:     Vitals:    02/18/22 0400 02/18/22 0500 02/18/22 0600 02/18/22 0729   BP: 136/83 (!) 144/93 (!) 144/93    Pulse: (!) 102 100 99    Resp: 17 18 18    Temp:       SpO2: 95% 94% 94% 94%   Weight:   111.5 kg (245 lb 13 oz)    Height:            Intake and Output:  Current Shift: No intake/output data recorded. Last three shifts: 02/16 1901 - 02/18 0700  In: 1346.7 [I.V.:146.7]  Out: 3150 [Urine:3150]    Physical Exam:   General:   Extubated resting in bed. Eyes:  Conjunctivae/corneas clear. PERRL, EOMs intact. Fundi benign   Ears:  Normal TMs and external ear canals both ears. Nose: Nares normal. Septum midline. Mucosa normal. No drainage or sinus tenderness. Mouth/Throat: Lips, mucosa, and tongue normal. Teeth and gums normal.   Neck: Supple, symmetrical, trachea midline, no adenopathy, thyroid: no enlargment/tenderness/nodules, no carotid bruit and no JVD. Back:   Symmetric, no curvature. ROM normal. No CVA tenderness. Lungs:   Clear to auscultation bilaterally. Heart:  Regular rate and rhythm, S1, S2 normal, no murmur, click, rub or gallop. Abdomen:   Soft, non-tender. Bowel sounds normal. No masses,  No organomegaly. Extremities:  Left upper extremity swelling. Pulses: 2+ and symmetric all extremities. Skin: Skin color, texture, turgor normal. No rashes or lesions   Lymph nodes: Cervical, supraclavicular, and axillary nodes normal.   Neurologic:  Oriented to self and time       Lab/Data Review: All lab results for the last 24 hours reviewed. Assessment:     Active Problems:    Respiratory failure ([address] (2/7/2022)    Patient is 71-year-old white male with:  1. Syncope  2. Non-STEMI  3. CAD status post PCI of left circumflex artery in 2018  4. Hyperlipidemia  6.   Vocal cord dysfunction  7. History of PE  8. Warfarin was stopped due to GI bleed  9. COPD  7. Obesity  8. Edema  9. Hypertensive emergency   10. Bilateral renal lesions, largest about 1.7 cm on the right side posteriorly. Plan:     Currently in sinus rhythm. Did not have any chest pain. He think he had a heart attack and weeks ago and he received a stent. Anyhow this was done 2018. Left upper extremity swelling. Hemoglobin and kidney function stable. We will consider ischemic evaluation based on clinical progression over the next few days. Thrombus noted within the left cephalic upper arm and cephalic forearm vein(s). Cephalic upper arm thrombus is occlusive. Cephalic forearm thrombus is occlusive.   Signed By: Gisela Santos MD     February 18, 2022

## 2022-02-19 ENCOUNTER — APPOINTMENT (OUTPATIENT)
Dept: CT IMAGING | Age: 67
DRG: 207 | End: 2022-02-19
Attending: PSYCHIATRY & NEUROLOGY
Payer: MEDICARE

## 2022-02-19 LAB
ATRIAL RATE: 112 BPM
CALCULATED P AXIS, ECG09: 40 DEGREES
CALCULATED R AXIS, ECG10: 18 DEGREES
CALCULATED T AXIS, ECG11: 65 DEGREES
DIAGNOSIS, 93000: NORMAL
P-R INTERVAL, ECG05: 150 MS
Q-T INTERVAL, ECG07: 346 MS
QRS DURATION, ECG06: 86 MS
QTC CALCULATION (BEZET), ECG08: 472 MS
VENTRICULAR RATE, ECG03: 112 BPM

## 2022-02-19 PROCEDURE — 93041 RHYTHM ECG TRACING: CPT

## 2022-02-19 PROCEDURE — 94640 AIRWAY INHALATION TREATMENT: CPT

## 2022-02-19 PROCEDURE — 74011250637 HC RX REV CODE- 250/637: Performed by: HOSPITALIST

## 2022-02-19 PROCEDURE — 74011000250 HC RX REV CODE- 250: Performed by: INTERNAL MEDICINE

## 2022-02-19 PROCEDURE — 94760 N-INVAS EAR/PLS OXIMETRY 1: CPT

## 2022-02-19 PROCEDURE — 97530 THERAPEUTIC ACTIVITIES: CPT

## 2022-02-19 PROCEDURE — 93005 ELECTROCARDIOGRAM TRACING: CPT

## 2022-02-19 PROCEDURE — 70450 CT HEAD/BRAIN W/O DYE: CPT

## 2022-02-19 PROCEDURE — 51798 US URINE CAPACITY MEASURE: CPT

## 2022-02-19 PROCEDURE — 74011250637 HC RX REV CODE- 250/637: Performed by: INTERNAL MEDICINE

## 2022-02-19 PROCEDURE — 74011000636 HC RX REV CODE- 636: Performed by: INTERNAL MEDICINE

## 2022-02-19 PROCEDURE — 97162 PT EVAL MOD COMPLEX 30 MIN: CPT

## 2022-02-19 PROCEDURE — 65270000029 HC RM PRIVATE

## 2022-02-19 PROCEDURE — 70496 CT ANGIOGRAPHY HEAD: CPT

## 2022-02-19 PROCEDURE — 77010033678 HC OXYGEN DAILY

## 2022-02-19 PROCEDURE — 74011250636 HC RX REV CODE- 250/636: Performed by: INTERNAL MEDICINE

## 2022-02-19 PROCEDURE — 74011000258 HC RX REV CODE- 258: Performed by: INTERNAL MEDICINE

## 2022-02-19 RX ADMIN — METHYLPREDNISOLONE SODIUM SUCCINATE 40 MG: 40 INJECTION, POWDER, FOR SOLUTION INTRAMUSCULAR; INTRAVENOUS at 05:05

## 2022-02-19 RX ADMIN — FINASTERIDE 5 MG: 5 TABLET, FILM COATED ORAL at 10:16

## 2022-02-19 RX ADMIN — OXCARBAZEPINE 150 MG: 150 TABLET, FILM COATED ORAL at 23:36

## 2022-02-19 RX ADMIN — BETHANECHOL CHLORIDE 100 MG: 25 TABLET ORAL at 23:37

## 2022-02-19 RX ADMIN — GLYCOPYRROLATE 0.1 MG: 0.2 INJECTION INTRAMUSCULAR; INTRAVENOUS at 10:15

## 2022-02-19 RX ADMIN — OXCARBAZEPINE 150 MG: 150 TABLET, FILM COATED ORAL at 10:33

## 2022-02-19 RX ADMIN — SODIUM CHLORIDE, PRESERVATIVE FREE 20 MG: 5 INJECTION INTRAVENOUS at 10:15

## 2022-02-19 RX ADMIN — PIPERACILLIN AND TAZOBACTAM 3.38 G: 3; .375 INJECTION, POWDER, LYOPHILIZED, FOR SOLUTION INTRAVENOUS at 14:28

## 2022-02-19 RX ADMIN — IOPAMIDOL 100 ML: 755 INJECTION, SOLUTION INTRAVENOUS at 17:00

## 2022-02-19 RX ADMIN — IPRATROPIUM BROMIDE AND ALBUTEROL SULFATE 3 ML: 2.5; .5 SOLUTION RESPIRATORY (INHALATION) at 20:19

## 2022-02-19 RX ADMIN — BUDESONIDE 500 MCG: 0.5 SUSPENSION RESPIRATORY (INHALATION) at 07:57

## 2022-02-19 RX ADMIN — BETHANECHOL CHLORIDE 100 MG: 25 TABLET ORAL at 10:17

## 2022-02-19 RX ADMIN — ENOXAPARIN SODIUM 40 MG: 100 INJECTION SUBCUTANEOUS at 10:17

## 2022-02-19 RX ADMIN — BUDESONIDE 500 MCG: 0.5 SUSPENSION RESPIRATORY (INHALATION) at 20:19

## 2022-02-19 RX ADMIN — NYSTATIN 500000 UNITS: 100000 SUSPENSION ORAL at 18:00

## 2022-02-19 RX ADMIN — GLYCOPYRROLATE 0.1 MG: 0.2 INJECTION INTRAMUSCULAR; INTRAVENOUS at 16:53

## 2022-02-19 RX ADMIN — IPRATROPIUM BROMIDE AND ALBUTEROL SULFATE 3 ML: 2.5; .5 SOLUTION RESPIRATORY (INHALATION) at 07:57

## 2022-02-19 RX ADMIN — ATORVASTATIN CALCIUM 80 MG: 40 TABLET, FILM COATED ORAL at 10:17

## 2022-02-19 RX ADMIN — NYSTATIN 500000 UNITS: 100000 SUSPENSION ORAL at 23:37

## 2022-02-19 RX ADMIN — PIPERACILLIN AND TAZOBACTAM 3.38 G: 3; .375 INJECTION, POWDER, LYOPHILIZED, FOR SOLUTION INTRAVENOUS at 05:05

## 2022-02-19 RX ADMIN — GLYCOPYRROLATE 0.1 MG: 0.2 INJECTION INTRAMUSCULAR; INTRAVENOUS at 23:37

## 2022-02-19 RX ADMIN — NYSTATIN 500000 UNITS: 100000 SUSPENSION ORAL at 10:16

## 2022-02-19 RX ADMIN — NYSTATIN 500000 UNITS: 100000 SUSPENSION ORAL at 14:28

## 2022-02-19 RX ADMIN — ASPIRIN 81 MG CHEWABLE TABLET 81 MG: 81 TABLET CHEWABLE at 10:16

## 2022-02-19 RX ADMIN — IPRATROPIUM BROMIDE AND ALBUTEROL SULFATE 3 ML: 2.5; .5 SOLUTION RESPIRATORY (INHALATION) at 14:00

## 2022-02-19 RX ADMIN — METHYLPREDNISOLONE SODIUM SUCCINATE 40 MG: 40 INJECTION, POWDER, FOR SOLUTION INTRAMUSCULAR; INTRAVENOUS at 23:37

## 2022-02-19 RX ADMIN — METOPROLOL SUCCINATE 25 MG: 25 TABLET, EXTENDED RELEASE ORAL at 10:17

## 2022-02-19 RX ADMIN — PIPERACILLIN AND TAZOBACTAM 3.38 G: 3; .375 INJECTION, POWDER, LYOPHILIZED, FOR SOLUTION INTRAVENOUS at 23:37

## 2022-02-19 RX ADMIN — SODIUM CHLORIDE, PRESERVATIVE FREE 20 MG: 5 INJECTION INTRAVENOUS at 23:37

## 2022-02-19 RX ADMIN — AMLODIPINE BESYLATE 10 MG: 5 TABLET ORAL at 10:16

## 2022-02-19 RX ADMIN — TAMSULOSIN HYDROCHLORIDE 0.4 MG: 0.4 CAPSULE ORAL at 10:33

## 2022-02-19 RX ADMIN — METHYLPREDNISOLONE SODIUM SUCCINATE 40 MG: 40 INJECTION, POWDER, FOR SOLUTION INTRAMUSCULAR; INTRAVENOUS at 14:28

## 2022-02-19 RX ADMIN — CLOPIDOGREL BISULFATE 75 MG: 75 TABLET, FILM COATED ORAL at 10:16

## 2022-02-19 NOTE — PROGRESS NOTES
Patient had not voiding during my shift. Bladder scanner showed 500mLs. MD on call notified and gave a one time order for straight cath.  500mL of wilver colored urine out with straight cath

## 2022-02-19 NOTE — PROGRESS NOTES
Neurology Progress Note    Date: 2/18/2022    Mr. Jacky Foley is a 73year old man is seen in follow-up and he was successfully extubated on 02- and tolerating it very well. Answering questions appropriately. With PMH of PE used to be on coumadin but stopped for GI bleed, vocal cord dysfunctioon who was brought in to the ER for hypoxia, \" AMS\", chest pain and recurrent episodes of syncope lasting 5-10 sec. He would awaken after sternal rub but loose consciousness shortly after again. In between the episodes he is able to wake up and is alert and oriented and able to provide the Ed staff his history. He was found to be hypoxic to 86%. It seems he passed out multiple times in the ambulance as well. From the chart he has had episodes of loss of consciousness after whole body cramping episodes at home as well. CT head reported as \"no evidence of acute intracranial process\". Ct chest/abd/pelvis WNL. Subjective  Doing very well except for generalized weakness. No past medical history on file. No past surgical history on file. No family history on file.    Social History     Tobacco Use    Smoking status: Not on file    Smokeless tobacco: Not on file   Substance Use Topics    Alcohol use: Not on file       Current Facility-Administered Medications   Medication Dose Route Frequency Provider Last Rate Last Admin    nystatin (MYCOSTATIN) 100,000 unit/mL oral suspension 500,000 Units  500,000 Units Oral QID Joanna Lovell MD   500,000 Units at 02/18/22 1429    morphine injection 1 mg  1 mg IntraVENous Q8H PRN Joanna Lovell MD   1 mg at 02/17/22 1705    piperacillin-tazobactam (ZOSYN) 3.375 g in 0.9% sodium chloride (MBP/ADV) 100 mL MBP  3.375 g IntraVENous Q8H Joanna Lovell MD 25 mL/hr at 02/18/22 1418 3.375 g at 02/18/22 1418    glycopyrrolate (ROBINUL) injection 0.1 mg  0.1 mg IntraVENous TID Joanna Lovell MD   0.1 mg at 02/18/22 1430    albuterol-ipratropium (DUO-NEB) 2.5 MG-0.5 MG/3 ML 3 mL Nebulization Q4H PRN Flavio Maurice MD        bethanechol (URECHOLINE) tablet 100 mg  100 mg Oral BID Hayde Armenta MD   100 mg at 02/18/22 0934    finasteride (PROSCAR) tablet 5 mg  5 mg Oral DAILY Flavio Maurice MD   5 mg at 02/18/22 0935    OXcarbazepine (TRILEPTAL) tablet 150 mg  150 mg Oral BID Hayde Armenta MD   150 mg at 02/18/22 0934    tamsulosin (FLOMAX) capsule 0.4 mg  0.4 mg Oral DAILY Hayde Armenta MD   0.4 mg at 02/18/22 0935    acetaminophen (TYLENOL) tablet 650 mg  650 mg Oral Q6H PRN Hayde Armenta MD   650 mg at 02/15/22 1907    Or    acetaminophen (TYLENOL) suppository 650 mg  650 mg Rectal Q6H PRN Hayde Armenta MD   650 mg at 02/14/22 0258    polyethylene glycol (MIRALAX) packet 17 g  17 g Oral DAILY PRN Flavio Maurice MD        ondansetron (ZOFRAN ODT) tablet 4 mg  4 mg Oral Q8H PRN Flavio Maurice MD        Or    ondansetron (ZOFRAN) injection 4 mg  4 mg IntraVENous Q6H PRN Flavio Maurice MD        enoxaparin (LOVENOX) injection 40 mg  40 mg SubCUTAneous DAILY Flavio Maurice MD   40 mg at 02/18/22 0934    famotidine (PF) (PEPCID) 20 mg in 0.9% sodium chloride 10 mL injection  20 mg IntraVENous Q12H Hayde Armenta MD   20 mg at 02/18/22 0934    labetaloL (NORMODYNE;TRANDATE) 20 mg/4 mL (5 mg/mL) injection 10 mg  10 mg IntraVENous Q4H PRN Chau Ceja MD   10 mg at 02/08/22 1741    amLODIPine (NORVASC) tablet 10 mg  10 mg Oral DAILY Chau Ceja MD   10 mg at 02/18/22 0935    aspirin chewable tablet 81 mg  81 mg Oral DAILY Duyen Teixeira MD   81 mg at 02/18/22 0935    atorvastatin (LIPITOR) tablet 80 mg  80 mg Oral DAILY Duyen Teixeira MD   80 mg at 02/18/22 0935    clopidogreL (PLAVIX) tablet 75 mg  75 mg Oral DAILY Duyen Teixeira MD   75 mg at 02/18/22 0940    metoprolol succinate (TOPROL-XL) XL tablet 25 mg  25 mg Oral DAILY Duyen Teixeira MD   25 mg at 02/18/22 0935    methylPREDNISolone (PF) (SOLU-MEDROL) injection 40 mg  40 mg IntraVENous Q8H Minnie Lovell MD   40 mg at 02/18/22 1416    albuterol-ipratropium (DUO-NEB) 2.5 MG-0.5 MG/3 ML  3 mL Nebulization Q6HWA RT Aj Lovell MD   3 mL at 02/18/22 1244    budesonide (PULMICORT) 500 mcg/2 ml nebulizer suspension  500 mcg Nebulization BID RT Minnie Lovell MD   500 mcg at 02/18/22 0185      Review of Systems   Unable to perform ROS: Intubated     Objective     Vital signs for last 24 hours:  Visit Vitals  BP (!) 151/88 (BP 1 Location: Left upper arm, BP Patient Position: At rest)   Pulse (!) 113   Temp 98.8 °F (37.1 °C)   Resp 20   Ht 6' (1.829 m)   Wt 111.5 kg (245 lb 13 oz)   SpO2 94%   BMI 33.34 kg/m²     Intake/Output this shift:  Current Shift: No intake/output data recorded. Last 3 Shifts: 02/17 0701 - 02/18 1900  In: 100 [I.V.:100]  Out: 2400 [Urine:2400]    Data Review:   Recent Results (from the past 24 hour(s))   BLOOD GAS, ARTERIAL    Collection Time: 02/18/22  5:20 AM   Result Value Ref Range    pH 7.44 7.35 - 7.45      PCO2 37 35 - 45 mmHg    PO2 83 75 - 100 mmHg    O2 SAT 95 (L) >95 %    BICARBONATE 26 22 - 26 mmol/L    BASE EXCESS 1.3 0 - 2 mmol/L    O2 METHOD Nasal Cannula      O2 FLOW RATE 2.0 L/min    SITE Right Radial      VÍCTOR'S TEST PASS     CBC WITH AUTOMATED DIFF    Collection Time: 02/18/22  5:21 AM   Result Value Ref Range    WBC 12.1 (H) 4.1 - 11.1 K/uL    RBC 4.34 4.10 - 5.70 M/uL    HGB 12.8 12.1 - 17.0 g/dL    HCT 37.6 36.6 - 50.3 %    MCV 86.6 80.0 - 99.0 FL    MCH 29.5 26.0 - 34.0 PG    MCHC 34.0 30.0 - 36.5 g/dL    RDW 14.0 11.5 - 14.5 %    PLATELET 291 233 - 148 K/uL    MPV 9.7 8.9 - 12.9 FL    NRBC 0.0 0.0  WBC    ABSOLUTE NRBC 0.00 0.00 - 0.01 K/uL    NEUTROPHILS 86 (H) 32 - 75 %    LYMPHOCYTES 9 (L) 12 - 49 %    MONOCYTES 4 (L) 5 - 13 %    EOSINOPHILS 0 0 - 7 %    BASOPHILS 0 0 - 1 %    IMMATURE GRANULOCYTES 1 (H) 0 - 0.5 %    ABS. NEUTROPHILS 10.5 (H) 1.8 - 8.0 K/UL    ABS. LYMPHOCYTES 1.0 0.8 - 3.5 K/UL    ABS.  MONOCYTES 0.5 0.0 - 1.0 K/UL    ABS. EOSINOPHILS 0.0 0.0 - 0.4 K/UL    ABS. BASOPHILS 0.0 0.0 - 0.1 K/UL    ABS. IMM. GRANS. 0.1 (H) 0.00 - 0.04 K/UL    DF AUTOMATED     METABOLIC PANEL, COMPREHENSIVE    Collection Time: 02/18/22  5:21 AM   Result Value Ref Range    Sodium 139 136 - 145 mmol/L    Potassium 3.6 3.5 - 5.1 mmol/L    Chloride 110 (H) 97 - 108 mmol/L    CO2 24 21 - 32 mmol/L    Anion gap 5 5 - 15 mmol/L    Glucose 133 (H) 65 - 100 mg/dL    BUN 37 (H) 6 - 20 mg/dL    Creatinine 0.76 0.70 - 1.30 mg/dL    BUN/Creatinine ratio 49 (H) 12 - 20      GFR est AA >60 >60 ml/min/1.73m2    GFR est non-AA >60 >60 ml/min/1.73m2    Calcium 8.5 8.5 - 10.1 mg/dL    Bilirubin, total 0.9 0.2 - 1.0 mg/dL    AST (SGOT) 40 (H) 15 - 37 U/L    ALT (SGPT) 60 12 - 78 U/L    Alk. phosphatase 75 45 - 117 U/L    Protein, total 6.1 (L) 6.4 - 8.2 g/dL    Albumin 2.4 (L) 3.5 - 5.0 g/dL    Globulin 3.7 2.0 - 4.0 g/dL    A-G Ratio 0.6 (L) 1.1 - 2.2       Neuro Physical Exam    General: Well developed, well nourished. Morbidly obese and now extubated and tolerating it well    Neurological Exam:  Mental Status: The patient is awake and alert and oriented to person place and time partially. No aphasia or dysarthria noted  His extraocular movements are intact and full with no obvious nystagmus noted, visual fields are intact by confrontation, face is symmetric. He is weak in all extremities, more so in the upper extremities, ranging 23/5 and in the lower extremities 4/5       Assessment and Plan: Mr. Chepe Pires is a 73year old man who comes in with hypoxia and recurrent episodes of syncope. The description of these events is NOT consistent with seizures. He is extubated and tolerating it very well. At this time etiology of recurrent syncope is not clear. If this recurs post extubation then eeg can be considered.  CTA head and neck did not reveal any stenotic disease, other than 30-35% stenosis of the origin of the right ICA, which does not explain the spells he had been having. It is possible that he may be going in and out of hypoxia contributing to these spells. Will observe clinically and if he starts having syncopal episodes again we will consider doing an EEG.     Thank you,    Ernestine eZe MD

## 2022-02-19 NOTE — PROGRESS NOTES
PHYSICAL THERAPY EVALUATION  Patient: Kalina Celis (72 y.o. male)  Date: 2/19/2022  Primary Diagnosis: Respiratory failure ([facility] [address] [J96.90]  Procedure(s) (LRB):  LEFT HEART CATH / CORONARY ANGIOGRAPHY (N/A) * Surgery Date in Future *   Precautions: Fall, NG tube placement    ASSESSMENT  Pt is a 72 yo male admitted on 2/7/2022 for respiratory failure and complaints of AMS, SOB, and chest pain; pt was intubated in the ED and extubated on 2/17/22. Now on 2L nasal cannula. PMH: PE, possible vocal cord dysfunction, CAD, COPD, recurrent episodes of syncope. Pt A&O x 4, oriented to month but not year. Per pt pt resides with wife in a 2 story home, with 5 WILIAN, ayush handrails; pt states he tries to avoid going to the second floor when he can which has 14 steps. Per pt pt was requiring A from wife for ADLS/IADLS such as bathing, states he was driving and using the bathroom and dressing himself independently, no AD with mobility prior to admission. States he does not own any other DME, only a nebulizer; states he did not use home O2. Based on the objective data described below, the patient presents with generalized weakness, impaired functional mobility, and reduced activity tolerance. Pt semi-supine upon PT arrival, agreeable to evaluation. Pt required max A for semi-supine to just sit up straight in bed (not EOB) for bed mobility due to NG tube placement. Pt did poor with session today with requiring max A and demos SOB during subjective conversation but vitals remained stable. Pt will benefit from continued skilled PT to address above deficits and return to PLOF. Current PT DC recommendation SNF.     Current Level of Function Impacting Discharge (mobility/balance): decreased strength, reduced ROM, impaired mobility, reduced activity tolerance    Other factors to consider for discharge: safety of home environment, assistance at home, any DME needs      PLAN :  Recommendations and Planned Interventions: bed mobility training, transfer training, gait training, therapeutic exercises, patient and family training/education, and therapeutic activities      Recommend with staff: max A    Frequency/Duration: Patient will be followed by physical therapy:  3-5x/week to address goals. Recommendation for discharge: (in order for the patient to meet his/her long term goals)  Woodrow Mayank    This discharge recommendation:  Has not yet been discussed the attending provider and/or case management    IF patient discharges home will need the following DME: to be determined (TBD)         SUBJECTIVE:   Patient stated I just don't feel right.     OBJECTIVE DATA SUMMARY:   HISTORY:    No past medical history on file. No past surgical history on file. Home Situation  Home Environment: Private residence  # Steps to Enter: 5  Rails to Enter: Yes  Hand Rails : Bilateral  One/Two Story Residence: Two story, live on 1st floor  # of Interior Steps: 14  Living Alone: No  Support Systems: Spouse/Significant Other (wife)  Patient Expects to be Discharged to[de-identified] Home  Current DME Used/Available at Home: None (pt not best historian)    EXAMINATION/PRESENTATION/DECISION MAKING:   Critical Behavior:  Neurologic State: Drowsy,Lethargic  Orientation Level: Oriented X4 (oriented to month but not year)  Cognition: Follows commands  Safety/Judgement: Awareness of environment  Hearing:   Auditory  Auditory Impairment: None    Range Of Motion:  AROM: Grossly decreased, non-functional           PROM: Grossly decreased, non-functional. Pt reports discomfort with PROM assessment of knee flexion           Strength:    Strength: Grossly decreased, non-functional       Functional Mobility:  Bed Mobility:     Supine to Sit: Maximum assistance (from semi-supine to sit due to NG tube)    Therapeutic Exercises:   Bilateral gentle quad set x10 each    Functional Measure:  [employer] AM-PAC 6 Clicks         Basic Mobility Inpatient Short Form  How much difficulty does the patient currently have. .. Unable A Lot A Little None   1. Turning over in bed (including adjusting bedclothes, sheets and blankets)? [] 1   [x] 2   [] 3   [] 4   2. Sitting down on and standing up from a chair with arms ( e.g., wheelchair, bedside commode, etc.)   [x] 1   [] 2   [] 3   [] 4   3. Moving from lying on back to sitting on the side of the bed? [x] 1   [] 2   [] 3   [] 4          How much help from another person does the patient currently need. .. Total A Lot A Little None   4. Moving to and from a bed to a chair (including a wheelchair)? [x] 1   [] 2   [] 3   [] 4   5. Need to walk in hospital room? [x] 1   [] 2   [] 3   [] 4   6. Climbing 3-5 steps with a railing? [x] 1   [] 2   [] 3   [] 4   © , Trustees of [address] [place] [address], under license to [employer]. All rights reserved     Score:  Initial:  Most Recent:  (Date: 2022 )   Interpretation of Tool:  Represents activities that are increasingly more difficult (i.e. Bed mobility, Transfers, Gait).   Score 24 23 22-20 19-15 14-10 9-7 6   Modifier CH CI CJ CK CL CM CN         Physical Therapy Evaluation Charge Determination   History Examination Presentation Decision-Making   HIGH Complexity :3+ comorbidities / personal factors will impact the outcome/ POC  HIGH Complexity : 4+ Standardized tests and measures addressing body structure, function, activity limitation and / or participation in recreation  HIGH Complexity : Unstable and unpredictable characteristics  Other outcome measures [facility] 6   initial score      Based on the above components, the patient evaluation is determined to be of the following complexity level: HIGH     Pain Ratin/10 Pt denies pain, only reports feeling shaky all over    Activity Tolerance:   Poor and observed SOB with activity    After treatment patient left in no apparent distress:   Heels elevated for pressure relief, Call bell within reach, Bed / chair alarm activated, and semi-supine due to NG tube placement  and NSG updated. GOALS:    Problem: Mobility Impaired (Adult and Pediatric)  Goal: *Acute Goals and Plan of Care (Insert Text)  Description: Pt will be I with LE HEP in 7 days. Pt will perform bed mobility with mod A x1-2 in 7 days. Pt will independently verbalize and perform energy conservation techniques such as pursed lip breathing in 7 days. Outcome: Not Met       COMMUNICATION/EDUCATION:   The patients plan of care was discussed with: Registered nurse. Patient/family agree to work toward stated goals and plan of care.     Thank you for this referral.  Daniel Ceja, PT, PT, DPT   Time Calculation: 25 mins

## 2022-02-19 NOTE — PROGRESS NOTES
Hospitalist Progress Note               Daily Progress Note: 2/19/2022      Subjective:   Hospital course to date:  71-year-old male with history of COPD, CAD with PCI and pulmonary embolism (not on anticoagulation due to GI bleed) who was admitted on 2/7 with altered mental status and syncope complicated by hypertensive urgency and hypoxia. Initially had a blood pressure of 230/143 with oxygen saturations in the 30s. He was intubated in the ED. CT of the chest was unremarkable. Patient has a history of recurrent syncopal episodes. Per wife, patient had been having intermittent, generalized/whole body cramps that last between 15 minutes to 1 hour. Those episodes generally are  followed by loss of consciousness for about 15 seconds. He usually will have short period of confusion and slurred speech after regaining consciousness. No extremity weakness after the episodes. Wife reported no fever, chills or chest pain. Reports have shortness of breath and cough at baseline due to COPD and has not been worse than normal in the past 3 weeks. Of note, he has been worked-up in the hospital in [place] (record not found in system), and no cause has been identified. EEG was never done. Patient was seen by neurology who does not feel the above episodes represented seizures. COVID test was negative. Hospital course was complicated by non-STEMI felt due to malignant hypertension. He  has been maintained on the ventilator and followed by pulmonology. Etiology of respiratory failure seems to be largely COPD exacerbation. There was some question about possible vocal cord dysfunction with plans for ENT evaluation    Patient was treated with IV antibiotics.   Currently on Zosyn and vancomycin    Sputum culture from 2/14 was positive for heavy growth of Moraxella catarrhalis    -------    Patient seen and examined at bedside, currently doing well on 2 L nasal cannula, complains of generalized weakness however no other complaints, discussed with RN      Problem List:  Problem List as of 2/19/2022 Never Reviewed          Codes Class Noted - Resolved    Respiratory failure [facility]) ICD-10-CM: J96.90  ICD-9-CM: 518.81  2/7/2022 - Present              Medications reviewed  Current Facility-Administered Medications   Medication Dose Route Frequency    nystatin (MYCOSTATIN) 100,000 unit/mL oral suspension 500,000 Units  500,000 Units Oral QID    morphine injection 1 mg  1 mg IntraVENous Q8H PRN    piperacillin-tazobactam (ZOSYN) 3.375 g in 0.9% sodium chloride (MBP/ADV) 100 mL MBP  3.375 g IntraVENous Q8H    glycopyrrolate (ROBINUL) injection 0.1 mg  0.1 mg IntraVENous TID    albuterol-ipratropium (DUO-NEB) 2.5 MG-0.5 MG/3 ML  3 mL Nebulization Q4H PRN    bethanechol (URECHOLINE) tablet 100 mg  100 mg Oral BID    finasteride (PROSCAR) tablet 5 mg  5 mg Oral DAILY    OXcarbazepine (TRILEPTAL) tablet 150 mg  150 mg Oral BID    tamsulosin (FLOMAX) capsule 0.4 mg  0.4 mg Oral DAILY    acetaminophen (TYLENOL) tablet 650 mg  650 mg Oral Q6H PRN    Or    acetaminophen (TYLENOL) suppository 650 mg  650 mg Rectal Q6H PRN    polyethylene glycol (MIRALAX) packet 17 g  17 g Oral DAILY PRN    ondansetron (ZOFRAN ODT) tablet 4 mg  4 mg Oral Q8H PRN    Or    ondansetron (ZOFRAN) injection 4 mg  4 mg IntraVENous Q6H PRN    enoxaparin (LOVENOX) injection 40 mg  40 mg SubCUTAneous DAILY    famotidine (PF) (PEPCID) 20 mg in 0.9% sodium chloride 10 mL injection  20 mg IntraVENous Q12H    labetaloL (NORMODYNE;TRANDATE) 20 mg/4 mL (5 mg/mL) injection 10 mg  10 mg IntraVENous Q4H PRN    amLODIPine (NORVASC) tablet 10 mg  10 mg Oral DAILY    aspirin chewable tablet 81 mg  81 mg Oral DAILY    atorvastatin (LIPITOR) tablet 80 mg  80 mg Oral DAILY    clopidogreL (PLAVIX) tablet 75 mg  75 mg Oral DAILY    metoprolol succinate (TOPROL-XL) XL tablet 25 mg  25 mg Oral DAILY    methylPREDNISolone (PF) (SOLU-MEDROL) injection 40 mg 40 mg IntraVENous Q8H    albuterol-ipratropium (DUO-NEB) 2.5 MG-0.5 MG/3 ML  3 mL Nebulization Q6HWA RT    budesonide (PULMICORT) 500 mcg/2 ml nebulizer suspension  500 mcg Nebulization BID RT       Review of Systems:   A comprehensive review of systems was negative except for that written in the HPI. Objective:   Physical Exam:     Visit Vitals  BP (!) 175/87 (BP 1 Location: Right upper arm, BP Patient Position: At rest)   Pulse 96   Temp 97.5 °F (36.4 °C)   Resp 20   Ht 6' (1.829 m)   Wt 111.5 kg (245 lb 13 oz)   SpO2 97%   BMI 33.34 kg/m²    O2 Flow Rate (L/min): 2 l/min O2 Device: Nasal cannula    Temp (24hrs), Av.4 °F (36.3 °C), Min:97.2 °F (36.2 °C), Max:97.5 °F (36.4 °C)    No intake/output data recorded.  1901 -  0700  In: -   Out: 1750 [facility]    General:   Awake and alert   Lungs:   Clear to auscultation bilaterally. Chest wall:  No tenderness or deformity. Heart:  Regular rate and rhythm, S1, S2 normal, no murmur, click, rub or gallop. Abdomen:   Soft, non-tender. Bowel sounds normal. No masses,  No organomegaly. Extremities: Extremities show generalized swelling of left arm   Pulses: 2+ and symmetric all extremities. Skin: Skin color, texture, turgor normal. No rashes or lesions   Neurologic: CNII-XII intact. Unable to assess         Data Review:       Recent Days:  Recent Labs     22  0521 22  0450   WBC 12.1* 7.2   HGB 12.8 12.7   HCT 37.6 37.7    195     Recent Labs     22  0521 22  1400    138   K 3.6 3.5   * 108   CO2 24 25   * 125*   BUN 37* 37*   CREA 0.76 0.75   CA 8.5 8.3*   PHOS  --  2.1*   ALB 2.4* 2.4*   TBILI 0.9  --    ALT 60  --      Recent Labs     22  0520 22  0455   PH 7.44 7.41   PCO2 37 38   PO2 83 84   HCO3 26 24   FIO2  --  30.0       24 Hour Results:  No results found for this or any previous visit (from the past 24 hour(s)).     DUPLEX UPPER EXT VENOUS LEFT   Final Result      XR CHEST PORT   Final Result      XR CHEST PORT   Final Result   The cardiomediastinal silhouette is appropriate for age, technique,   and lung expansion. Pulmonary vasculature is not congested. The lungs are   essentially clear. No effusion or pneumothorax is seen. XR CHEST PORT   Final Result   No significant interval change. XR CHEST PORT   Final Result      XR CHEST PORT   Final Result   No acute findings. XR CHEST PORT   Final Result   No significant interval change. CTA HEAD NECK W WO CONT   Final Result   Head CT:   No acute intracranial abnormality. Head and neck CTA:   1. Mild (30-35%) stenosis of the right ICA origin. 2.  No occlusion or high-grade stenosis of the remaining major cervical or   intracranial arterial vasculature. Please note that degrees of carotid stenosis are measured in accordance with   NASCET-like criteria. XR CHEST PORT   Final Result   No significant interval change. XR CHEST PORT   Final Result   FINDINGS: IMPRESSION: Single frontal view of the chest.   ET tube, enteric tube remain. Normal heart size. No vascular congestion or pulmonary edema. The lungs are well inflated. No focal infiltrate, pleural effusion, or   pneumothorax. Unchanged biapical pleural-parenchymal thickening. Upper lung   oligemia from emphysema. No free air under the diaphragm. XR CHEST PORT   Final Result   No acute findings. XR CHEST PORT   Final Result      XR CHEST PORT   Final Result   The cardiomediastinal silhouette is appropriate for age, technique,   and lung expansion. Pulmonary vasculature is not congested. The lungs are   essentially clear. No effusion or pneumothorax is seen. CT HEAD WO CONT   Final Result   No evidence of an acute intracranial process. CTA CHEST ABD PELV W WO CONT   Final Result      No aneurysm or dissection of aorta. Atherosclerosis including coronary arteries. Pulmonary emphysema.       No intra-abdominal inflammatory changes or bowel obstruction. Follow-up as   clinically indicated. Please see full report. XR CHEST PORT   Final Result   1. Endotracheal tube in satisfactory position. 2. Suspect nasogastric tube is coiled in the oropharynx. Its tip is entering the   stomach. 3. No focal infiltrate. No overt edema. 4. No effusion or pneumothorax.            Assessment:  Acute respiratory failure with hypoxia, requiring mechanical ventilation    Acute exacerbation of COPD    Sputum positive for Moraxella catarrhalis, beta-lactamase positive and Klebsiella pneumoniae    Sepsis    Episodes of recurrent syncope    Malignant hypertension, improved    Non-STEMI type II    Swelling left arm      Plan:    Acute respiratory failure with hypoxia secondary to COPD exacerbation/pneumoniapatient presents with acute respiratory failure with hypoxia, successfully extubated, doing well on 2 L nasal cannula, sputum culture consistent with Klebsiella pneumonia and Moraxella catarrhalis  Continue telemetry monitoring  Attempt to wean oxygen  Follow blood cultures  Continue methylprednisone 40 mg IV every 8  Continue Zosyn for antibiotic coverage  Continue to monitor respiratory status  Pulmonology consult appreciated, continue to follow recommendations    Type II non-ST elevation MIof note patient did develop a type II non-ST elevation MI, unclear whether secondary to demand  Continue aspirin once daily  Continue telemetry monitoring  Cardiology consult appreciated, continue to follow recommendations    Hypertensioncontinue current antihypertensive medications    Left upper extremity swellingnegative for DVT, does show evidence of superficial thrombosis which does not need anticoagulation  Continue to monitor    ProphylaxisLovenox  FENcardiac diet, replete potassium and magnesium  Full code, will clarify about surrogate decision maker  Dispositionpending clinical improvement/PT OT evaluation      Care Plan discussed with: Patient/Family/Specialist/Case Management    Total noncritical care time spent 35    Marlo Snellen, MD

## 2022-02-19 NOTE — CONSULTS
Neurology Progress Note    Date: 2/19/2022    Mr. Caren Roblero is a 73year old man is seen in follow-up and he was successfully extubated on 02- and tolerating it very well. Answering questions appropriately. With PMH of PE used to be on coumadin but stopped for GI bleed, vocal cord dysfunctioon who was brought in to the ER for hypoxia, \" AMS\", chest pain and recurrent episodes of syncope lasting 5-10 sec. He would awaken after sternal rub but loose consciousness shortly after again. In between the episodes he is able to wake up and is alert and oriented and able to provide the Ed staff his history. He was found to be hypoxic to 86%. It seems he passed out multiple times in the ambulance as well. From the chart he has had episodes of loss of consciousness after whole body cramping episodes at home as well. CT head reported as \"no evidence of acute intracranial process\". Ct chest/abd/pelvis WNL. Subjective  awake and talking but disoriented. No past medical history on file. No past surgical history on file. No family history on file.    Social History     Tobacco Use    Smoking status: Not on file    Smokeless tobacco: Not on file   Substance Use Topics    Alcohol use: Not on file       Current Facility-Administered Medications   Medication Dose Route Frequency Provider Last Rate Last Admin    nystatin (MYCOSTATIN) 100,000 unit/mL oral suspension 500,000 Units  500,000 Units Oral QID Xu Lovell MD   500,000 Units at 02/19/22 1016    morphine injection 1 mg  1 mg IntraVENous Q8H PRN Xu Lovell MD   1 mg at 02/18/22 2159    piperacillin-tazobactam (ZOSYN) 3.375 g in 0.9% sodium chloride (MBP/ADV) 100 mL MBP  3.375 g IntraVENous Q8H Xu Lovell MD 25 mL/hr at 02/19/22 0505 3.375 g at 02/19/22 0505    glycopyrrolate (ROBINUL) injection 0.1 mg  0.1 mg IntraVENous TID Xu Lovell MD   0.1 mg at 02/19/22 1015    albuterol-ipratropium (DUO-NEB) 2.5 MG-0.5 MG/3 ML  3 mL Nebulization Q4H PRN Raheem Maurice MD        bethanechol (URECHOLINE) tablet 100 mg  100 mg Oral BID Derrell Smith MD   100 mg at 02/19/22 1017    finasteride (PROSCAR) tablet 5 mg  5 mg Oral DAILY Raheem Maurice MD   5 mg at 02/19/22 1016    OXcarbazepine (TRILEPTAL) tablet 150 mg  150 mg Oral BID Derrell Smith MD   150 mg at 02/19/22 1033    tamsulosin (FLOMAX) capsule 0.4 mg  0.4 mg Oral DAILY Derrell Smith MD   0.4 mg at 02/19/22 1033    acetaminophen (TYLENOL) tablet 650 mg  650 mg Oral Q6H PRN Derrell Smith MD   650 mg at 02/15/22 7157    Or    acetaminophen (TYLENOL) suppository 650 mg  650 mg Rectal Q6H PRN Derrell Smith MD   650 mg at 02/14/22 0258    polyethylene glycol (MIRALAX) packet 17 g  17 g Oral DAILY PRN Raheem Maurice MD        ondansetron (ZOFRAN ODT) tablet 4 mg  4 mg Oral Q8H PRN Raheem Maurice MD        Or    ondansetron (ZOFRAN) injection 4 mg  4 mg IntraVENous Q6H PRN Raheem Maurice MD        enoxaparin (LOVENOX) injection 40 mg  40 mg SubCUTAneous DAILY Raheem Maurice MD   40 mg at 02/19/22 1017    famotidine (PF) (PEPCID) 20 mg in 0.9% sodium chloride 10 mL injection  20 mg IntraVENous Q12H Raheem Maurice MD   20 mg at 02/19/22 1015    labetaloL (NORMODYNE;TRANDATE) 20 mg/4 mL (5 mg/mL) injection 10 mg  10 mg IntraVENous Q4H PRN Iam Matthews MD   10 mg at 02/08/22 1741    amLODIPine (NORVASC) tablet 10 mg  10 mg Oral DAILY Iam Matthews MD   10 mg at 02/19/22 1016    aspirin chewable tablet 81 mg  81 mg Oral DAILY Duyen Teixeira MD   81 mg at 02/19/22 1016    atorvastatin (LIPITOR) tablet 80 mg  80 mg Oral DAILY Duyen Teixeira MD   80 mg at 02/19/22 1017    clopidogreL (PLAVIX) tablet 75 mg  75 mg Oral DAILY Duyen Teixeira MD   75 mg at 02/19/22 1016    metoprolol succinate (TOPROL-XL) XL tablet 25 mg  25 mg Oral DAILY Duyen Teixeira MD   25 mg at 02/19/22 1017    methylPREDNISolone (PF) (SOLU-MEDROL) injection 40 mg  40 mg IntraVENous Q8H Simone Villatoro MD   40 mg at 02/19/22 0505    albuterol-ipratropium (DUO-NEB) 2.5 MG-0.5 MG/3 ML  3 mL Nebulization Q6HWA RT Aj Lovell MD   3 mL at 02/19/22 0757    budesonide (PULMICORT) 500 mcg/2 ml nebulizer suspension  500 mcg Nebulization BID RT Annalisa Lovell MD   500 mcg at 02/19/22 0757      Review of Systems   Unable to perform ROS: Intubated     Objective     Vital signs for last 24 hours:  Visit Vitals  BP (!) 175/87 (BP 1 Location: Right upper arm, BP Patient Position: At rest)   Pulse 96   Temp 97.5 °F (36.4 °C)   Resp 20   Ht 6' (1.829 m)   Wt 111.5 kg (245 lb 13 oz)   SpO2 97%   BMI 33.34 kg/m²     Intake/Output this shift:  Current Shift: No intake/output data recorded. Last 3 Shifts: 02/17 1901 - 02/19 0700  In: -   Out: 1750 [Urine:1750]    Data Review:   No results found for this or any previous visit (from the past 24 hour(s)). Neuro Physical Exam    General: Well developed, well nourished. Morbidly obese and now extubated and tolerating it well    Neurological Exam:  Mental Status: The patient is awake and alert and oriented to person place and time partially. No aphasia or dysarthria noted  His extraocular movements are intact with right esotropia which he says is old, blinks to visual threat b/l   He is not moving his right arm , moves left arm against gravity. Will move both legs on bed but no antigravity movement in legs      NIH SS = 10        Assessment and Plan: Mr. Sherron Goldberg is a 73year old man who comes in with hypoxia and recurrent episodes of syncope. The description of these events is NOT consistent with seizures. He is extubated and tolerating it very well. At this time etiology of recurrent syncope is not clear. If this recurs post extubation then eeg can be considered. CTA head and neck did not reveal any stenotic disease, other than 30-35% stenosis of the origin of the right ICA, which does not explain the spells he had been having.   It is possible that he may be going in and out of hypoxia contributing to these spells. Will observe clinically and if he starts having syncopal episodes again we will consider doing an EEG. Not moving extremities spontaneously: affect is strange in that he does not seem concerned and I am not sure he is making an effort to move it. Will check Ct head/ CTa head and neck STAT. Not tpa candidate because time of onset of right arm weakness unknown. Has not been documented anywhere.      Thank you,

## 2022-02-20 LAB
ANION GAP SERPL CALC-SCNC: 6 MMOL/L (ref 5–15)
BUN SERPL-MCNC: 40 MG/DL (ref 6–20)
BUN/CREAT SERPL: 60 (ref 12–20)
CA-I BLD-MCNC: 8.7 MG/DL (ref 8.5–10.1)
CHLORIDE SERPL-SCNC: 115 MMOL/L (ref 97–108)
CO2 SERPL-SCNC: 24 MMOL/L (ref 21–32)
CREAT SERPL-MCNC: 0.67 MG/DL (ref 0.7–1.3)
ERYTHROCYTE [DISTWIDTH] IN BLOOD BY AUTOMATED COUNT: 14.6 % (ref 11.5–14.5)
GLUCOSE SERPL-MCNC: 163 MG/DL (ref 65–100)
HCT VFR BLD AUTO: 38.7 % (ref 36.6–50.3)
HGB BLD-MCNC: 12.8 G/DL (ref 12.1–17)
MAGNESIUM SERPL-MCNC: 2.4 MG/DL (ref 1.6–2.4)
MCH RBC QN AUTO: 29.2 PG (ref 26–34)
MCHC RBC AUTO-ENTMCNC: 33.1 G/DL (ref 30–36.5)
MCV RBC AUTO: 88.2 FL (ref 80–99)
NRBC # BLD: 0 K/UL (ref 0–0.01)
NRBC BLD-RTO: 0 PER 100 WBC
PLATELET # BLD AUTO: 248 K/UL (ref 150–400)
PMV BLD AUTO: 9.4 FL (ref 8.9–12.9)
POTASSIUM SERPL-SCNC: 3.8 MMOL/L (ref 3.5–5.1)
RBC # BLD AUTO: 4.39 M/UL (ref 4.1–5.7)
SODIUM SERPL-SCNC: 145 MMOL/L (ref 136–145)
WBC # BLD AUTO: 13.1 K/UL (ref 4.1–11.1)

## 2022-02-20 PROCEDURE — 74011250637 HC RX REV CODE- 250/637: Performed by: HOSPITALIST

## 2022-02-20 PROCEDURE — 74011250637 HC RX REV CODE- 250/637: Performed by: INTERNAL MEDICINE

## 2022-02-20 PROCEDURE — 36415 COLL VENOUS BLD VENIPUNCTURE: CPT

## 2022-02-20 PROCEDURE — 74011000250 HC RX REV CODE- 250: Performed by: INTERNAL MEDICINE

## 2022-02-20 PROCEDURE — 65270000029 HC RM PRIVATE

## 2022-02-20 PROCEDURE — 74011250636 HC RX REV CODE- 250/636: Performed by: INTERNAL MEDICINE

## 2022-02-20 PROCEDURE — 74011000258 HC RX REV CODE- 258: Performed by: INTERNAL MEDICINE

## 2022-02-20 PROCEDURE — 97530 THERAPEUTIC ACTIVITIES: CPT

## 2022-02-20 PROCEDURE — 74011250636 HC RX REV CODE- 250/636: Performed by: HOSPITALIST

## 2022-02-20 PROCEDURE — 77010033678 HC OXYGEN DAILY

## 2022-02-20 PROCEDURE — 80048 BASIC METABOLIC PNL TOTAL CA: CPT

## 2022-02-20 PROCEDURE — 83735 ASSAY OF MAGNESIUM: CPT

## 2022-02-20 PROCEDURE — 51798 US URINE CAPACITY MEASURE: CPT

## 2022-02-20 PROCEDURE — 94640 AIRWAY INHALATION TREATMENT: CPT

## 2022-02-20 PROCEDURE — 85027 COMPLETE CBC AUTOMATED: CPT

## 2022-02-20 RX ORDER — HYDROXYZINE HYDROCHLORIDE 25 MG/ML
25 INJECTION, SOLUTION INTRAMUSCULAR
Status: DISCONTINUED | OUTPATIENT
Start: 2022-02-20 | End: 2022-02-25 | Stop reason: HOSPADM

## 2022-02-20 RX ORDER — POTASSIUM CHLORIDE 20 MEQ/1
20 TABLET, EXTENDED RELEASE ORAL
Status: COMPLETED | OUTPATIENT
Start: 2022-02-20 | End: 2022-02-20

## 2022-02-20 RX ORDER — HYDROXYZINE HYDROCHLORIDE 25 MG/ML
12.5 INJECTION, SOLUTION INTRAMUSCULAR
Status: DISCONTINUED | OUTPATIENT
Start: 2022-02-20 | End: 2022-02-20

## 2022-02-20 RX ADMIN — IPRATROPIUM BROMIDE AND ALBUTEROL SULFATE 3 ML: 2.5; .5 SOLUTION RESPIRATORY (INHALATION) at 08:09

## 2022-02-20 RX ADMIN — BETHANECHOL CHLORIDE 100 MG: 25 TABLET ORAL at 09:52

## 2022-02-20 RX ADMIN — IPRATROPIUM BROMIDE AND ALBUTEROL SULFATE 3 ML: 2.5; .5 SOLUTION RESPIRATORY (INHALATION) at 20:00

## 2022-02-20 RX ADMIN — SODIUM CHLORIDE 500 ML: 9 INJECTION, SOLUTION INTRAVENOUS at 01:44

## 2022-02-20 RX ADMIN — PIPERACILLIN AND TAZOBACTAM 3.38 G: 3; .375 INJECTION, POWDER, LYOPHILIZED, FOR SOLUTION INTRAVENOUS at 14:23

## 2022-02-20 RX ADMIN — SODIUM CHLORIDE, PRESERVATIVE FREE 20 MG: 5 INJECTION INTRAVENOUS at 20:07

## 2022-02-20 RX ADMIN — GLYCOPYRROLATE 0.1 MG: 0.2 INJECTION INTRAMUSCULAR; INTRAVENOUS at 20:10

## 2022-02-20 RX ADMIN — GLYCOPYRROLATE 0.1 MG: 0.2 INJECTION INTRAMUSCULAR; INTRAVENOUS at 09:51

## 2022-02-20 RX ADMIN — PIPERACILLIN AND TAZOBACTAM 3.38 G: 3; .375 INJECTION, POWDER, LYOPHILIZED, FOR SOLUTION INTRAVENOUS at 05:14

## 2022-02-20 RX ADMIN — GLYCOPYRROLATE 0.1 MG: 0.2 INJECTION INTRAMUSCULAR; INTRAVENOUS at 18:23

## 2022-02-20 RX ADMIN — ATORVASTATIN CALCIUM 80 MG: 40 TABLET, FILM COATED ORAL at 09:53

## 2022-02-20 RX ADMIN — NYSTATIN 500000 UNITS: 100000 SUSPENSION ORAL at 14:24

## 2022-02-20 RX ADMIN — SODIUM CHLORIDE, PRESERVATIVE FREE 20 MG: 5 INJECTION INTRAVENOUS at 09:53

## 2022-02-20 RX ADMIN — FINASTERIDE 5 MG: 5 TABLET, FILM COATED ORAL at 09:52

## 2022-02-20 RX ADMIN — HYDROXYZINE HYDROCHLORIDE 25 MG: 25 INJECTION, SOLUTION INTRAMUSCULAR at 19:53

## 2022-02-20 RX ADMIN — OXCARBAZEPINE 150 MG: 150 TABLET, FILM COATED ORAL at 20:19

## 2022-02-20 RX ADMIN — POTASSIUM CHLORIDE 20 MEQ: 1500 TABLET, EXTENDED RELEASE ORAL at 14:24

## 2022-02-20 RX ADMIN — METHYLPREDNISOLONE SODIUM SUCCINATE 40 MG: 40 INJECTION, POWDER, FOR SOLUTION INTRAMUSCULAR; INTRAVENOUS at 20:09

## 2022-02-20 RX ADMIN — ENOXAPARIN SODIUM 40 MG: 100 INJECTION SUBCUTANEOUS at 09:51

## 2022-02-20 RX ADMIN — BUDESONIDE 500 MCG: 0.5 SUSPENSION RESPIRATORY (INHALATION) at 08:09

## 2022-02-20 RX ADMIN — BUDESONIDE 500 MCG: 0.5 SUSPENSION RESPIRATORY (INHALATION) at 20:00

## 2022-02-20 RX ADMIN — AMLODIPINE BESYLATE 10 MG: 5 TABLET ORAL at 09:53

## 2022-02-20 RX ADMIN — IPRATROPIUM BROMIDE AND ALBUTEROL SULFATE 3 ML: 2.5; .5 SOLUTION RESPIRATORY (INHALATION) at 14:00

## 2022-02-20 RX ADMIN — METHYLPREDNISOLONE SODIUM SUCCINATE 40 MG: 40 INJECTION, POWDER, FOR SOLUTION INTRAMUSCULAR; INTRAVENOUS at 05:14

## 2022-02-20 RX ADMIN — METHYLPREDNISOLONE SODIUM SUCCINATE 40 MG: 40 INJECTION, POWDER, FOR SOLUTION INTRAMUSCULAR; INTRAVENOUS at 14:24

## 2022-02-20 RX ADMIN — METOPROLOL SUCCINATE 25 MG: 25 TABLET, EXTENDED RELEASE ORAL at 09:52

## 2022-02-20 RX ADMIN — NYSTATIN 500000 UNITS: 100000 SUSPENSION ORAL at 18:23

## 2022-02-20 RX ADMIN — TAMSULOSIN HYDROCHLORIDE 0.4 MG: 0.4 CAPSULE ORAL at 09:51

## 2022-02-20 RX ADMIN — NYSTATIN 500000 UNITS: 100000 SUSPENSION ORAL at 20:10

## 2022-02-20 RX ADMIN — PIPERACILLIN AND TAZOBACTAM 3.38 G: 3; .375 INJECTION, POWDER, LYOPHILIZED, FOR SOLUTION INTRAVENOUS at 20:10

## 2022-02-20 RX ADMIN — BETHANECHOL CHLORIDE 100 MG: 25 TABLET ORAL at 20:07

## 2022-02-20 RX ADMIN — OXCARBAZEPINE 150 MG: 150 TABLET, FILM COATED ORAL at 09:53

## 2022-02-20 RX ADMIN — CLOPIDOGREL BISULFATE 75 MG: 75 TABLET, FILM COATED ORAL at 09:53

## 2022-02-20 RX ADMIN — ASPIRIN 81 MG CHEWABLE TABLET 81 MG: 81 TABLET CHEWABLE at 09:53

## 2022-02-20 RX ADMIN — NYSTATIN 500000 UNITS: 100000 SUSPENSION ORAL at 09:52

## 2022-02-20 NOTE — PROGRESS NOTES
Cardiology Progress Note      2/20/2022 2:58 PM    Admit Date: 2/7/2022    Admit Diagnosis: Respiratory failure ([facility] [address] [J96.90]      Subjective:   Patient seen and examined. Remains confused.  Denies chest pain    Visit Vitals  BP (!) 147/90 (BP 1 Location: Right upper arm, BP Patient Position: At rest)   Pulse (!) 108   Temp 98.2 °F (36.8 °C)   Resp 18   Ht 6' (1.829 m)   Wt 111.5 kg (245 lb 13 oz)   SpO2 91%   BMI 33.34 kg/m²     Current Facility-Administered Medications   Medication Dose Route Frequency    nystatin (MYCOSTATIN) 100,000 unit/mL oral suspension 500,000 Units  500,000 Units Oral QID    morphine injection 1 mg  1 mg IntraVENous Q8H PRN    piperacillin-tazobactam (ZOSYN) 3.375 g in 0.9% sodium chloride (MBP/ADV) 100 mL MBP  3.375 g IntraVENous Q8H    glycopyrrolate (ROBINUL) injection 0.1 mg  0.1 mg IntraVENous TID    albuterol-ipratropium (DUO-NEB) 2.5 MG-0.5 MG/3 ML  3 mL Nebulization Q4H PRN    bethanechol (URECHOLINE) tablet 100 mg  100 mg Oral BID    finasteride (PROSCAR) tablet 5 mg  5 mg Oral DAILY    OXcarbazepine (TRILEPTAL) tablet 150 mg  150 mg Oral BID    tamsulosin (FLOMAX) capsule 0.4 mg  0.4 mg Oral DAILY    acetaminophen (TYLENOL) tablet 650 mg  650 mg Oral Q6H PRN    Or    acetaminophen (TYLENOL) suppository 650 mg  650 mg Rectal Q6H PRN    polyethylene glycol (MIRALAX) packet 17 g  17 g Oral DAILY PRN    ondansetron (ZOFRAN ODT) tablet 4 mg  4 mg Oral Q8H PRN    Or    ondansetron (ZOFRAN) injection 4 mg  4 mg IntraVENous Q6H PRN    enoxaparin (LOVENOX) injection 40 mg  40 mg SubCUTAneous DAILY    famotidine (PF) (PEPCID) 20 mg in 0.9% sodium chloride 10 mL injection  20 mg IntraVENous Q12H    labetaloL (NORMODYNE;TRANDATE) 20 mg/4 mL (5 mg/mL) injection 10 mg  10 mg IntraVENous Q4H PRN    amLODIPine (NORVASC) tablet 10 mg  10 mg Oral DAILY    aspirin chewable tablet 81 mg  81 mg Oral DAILY    atorvastatin (LIPITOR) tablet 80 mg  80 mg Oral DAILY    clopidogreL (PLAVIX) tablet 75 mg  75 mg Oral DAILY    metoprolol succinate (TOPROL-XL) XL tablet 25 mg  25 mg Oral DAILY    methylPREDNISolone (PF) (SOLU-MEDROL) injection 40 mg  40 mg IntraVENous Q8H    albuterol-ipratropium (DUO-NEB) 2.5 MG-0.5 MG/3 ML  3 mL Nebulization Q6HWA RT    budesonide (PULMICORT) 500 mcg/2 ml nebulizer suspension  500 mcg Nebulization BID RT         Objective:      Physical Exam:  Visit Vitals  BP (!) 147/90 (BP 1 Location: Right upper arm, BP Patient Position: At rest)   Pulse (!) 108   Temp 98.2 °F (36.8 °C)   Resp 18   Ht 6' (1.829 m)   Wt 111.5 kg (245 lb 13 oz)   SpO2 91%   BMI 33.34 kg/m²     General Appearance:  Well developed, well nourished,alert and oriented x 3, and individual in no acute distress. Ears/Nose/Mouth/Throat:   Hearing grossly normal.         Neck: Supple. Chest:   Lungs clear to auscultation bilaterally. Cardiovascular:  Regular rate and rhythm, S1, S2 normal, no murmur. Abdomen:   Soft, non-tender, bowel sounds are active. Extremities: No edema bilaterally. Skin: Warm and dry. Data Review:   Labs:    Recent Results (from the past 24 hour(s))   CBC W/O DIFF    Collection Time: 02/20/22  8:33 AM   Result Value Ref Range    WBC 13.1 (H) 4.1 - 11.1 K/uL    RBC 4.39 4. 10 - 5.70 M/uL    HGB 12.8 12.1 - 17.0 g/dL    HCT 38.7 36.6 - 50.3 %    MCV 88.2 80.0 - 99.0 FL    MCH 29.2 26.0 - 34.0 PG    MCHC 33.1 30.0 - 36.5 g/dL    RDW 14.6 (H) 11.5 - 14.5 %    PLATELET 863 827 - 731 K/uL    MPV 9.4 8.9 - 12.9 FL    NRBC 0.0 0.0  WBC    ABSOLUTE NRBC 0.00 0.00 - 1.35 K/uL   METABOLIC PANEL, BASIC    Collection Time: 02/20/22  8:33 AM   Result Value Ref Range    Sodium 145 136 - 145 mmol/L    Potassium 3.8 3.5 - 5.1 mmol/L    Chloride 115 (H) 97 - 108 mmol/L    CO2 24 21 - 32 mmol/L    Anion gap 6 5 - 15 mmol/L    Glucose 163 (H) 65 - 100 mg/dL    BUN 40 (H) 6 - 20 mg/dL    Creatinine 0.67 (L) 0.70 - 1.30 mg/dL    BUN/Creatinine ratio 60 (H) 12 - 20 GFR est AA >60 >60 ml/min/1.73m2    GFR est non-AA >60 >60 ml/min/1.73m2    Calcium 8.7 8.5 - 10.1 mg/dL   MAGNESIUM    Collection Time: 02/20/22  8:33 AM   Result Value Ref Range    Magnesium 2.4 1.6 - 2.4 mg/dL       Telemetry: normal sinus rhythm      Assessment:   Syncope  NSTEMI  Coronary artery disease status post PCI  Hypertension  Hyperlipidemia    Plan:   -Patient remains chest pain-free.  He continues to be confused  -Renal function is stable  -Continue antiplatelet therapy with aspirin, Plavix  -Continue high intensity statin  -He will need ischemia evaluation based on his clinical progression unfortunately he remains confused as of right now  -We will follow

## 2022-02-20 NOTE — PROGRESS NOTES
Hospitalist Progress Note               Daily Progress Note: 2/20/2022      Subjective:   Hospital course to date:  21-year-old male with history of COPD, CAD with PCI and pulmonary embolism (not on anticoagulation due to GI bleed) who was admitted on 2/7 with altered mental status and syncope complicated by hypertensive urgency and hypoxia. Initially had a blood pressure of 230/143 with oxygen saturations in the 30s. He was intubated in the ED. CT of the chest was unremarkable. Patient has a history of recurrent syncopal episodes. Per wife, patient had been having intermittent, generalized/whole body cramps that last between 15 minutes to 1 hour. Those episodes generally are  followed by loss of consciousness for about 15 seconds. He usually will have short period of confusion and slurred speech after regaining consciousness. No extremity weakness after the episodes. Wife reported no fever, chills or chest pain. Reports have shortness of breath and cough at baseline due to COPD and has not been worse than normal in the past 3 weeks. Of note, he has been worked-up in the hospital in [place] (record not found in system), and no cause has been identified. EEG was never done. Patient was seen by neurology who does not feel the above episodes represented seizures. COVID test was negative. Hospital course was complicated by non-STEMI felt due to malignant hypertension. He  has been maintained on the ventilator and followed by pulmonology. Etiology of respiratory failure seems to be largely COPD exacerbation. There was some question about possible vocal cord dysfunction with plans for ENT evaluation    Patient was treated with IV antibiotics.   Currently on Zosyn and vancomycin    Sputum culture from 2/14 was positive for heavy growth of Moraxella catarrhalis    -------    Patient seen and examined at bedside, currently doing well on 2 L nasal cannula, complains of generalized weakness however no other complaints, discussed with RN      Problem List:  Problem List as of 2/20/2022 Never Reviewed          Codes Class Noted - Resolved    Respiratory failure [facility]) ICD-10-CM: J96.90  ICD-9-CM: 518.81  2/7/2022 - Present              Medications reviewed  Current Facility-Administered Medications   Medication Dose Route Frequency    nystatin (MYCOSTATIN) 100,000 unit/mL oral suspension 500,000 Units  500,000 Units Oral QID    morphine injection 1 mg  1 mg IntraVENous Q8H PRN    piperacillin-tazobactam (ZOSYN) 3.375 g in 0.9% sodium chloride (MBP/ADV) 100 mL MBP  3.375 g IntraVENous Q8H    glycopyrrolate (ROBINUL) injection 0.1 mg  0.1 mg IntraVENous TID    albuterol-ipratropium (DUO-NEB) 2.5 MG-0.5 MG/3 ML  3 mL Nebulization Q4H PRN    bethanechol (URECHOLINE) tablet 100 mg  100 mg Oral BID    finasteride (PROSCAR) tablet 5 mg  5 mg Oral DAILY    OXcarbazepine (TRILEPTAL) tablet 150 mg  150 mg Oral BID    tamsulosin (FLOMAX) capsule 0.4 mg  0.4 mg Oral DAILY    acetaminophen (TYLENOL) tablet 650 mg  650 mg Oral Q6H PRN    Or    acetaminophen (TYLENOL) suppository 650 mg  650 mg Rectal Q6H PRN    polyethylene glycol (MIRALAX) packet 17 g  17 g Oral DAILY PRN    ondansetron (ZOFRAN ODT) tablet 4 mg  4 mg Oral Q8H PRN    Or    ondansetron (ZOFRAN) injection 4 mg  4 mg IntraVENous Q6H PRN    enoxaparin (LOVENOX) injection 40 mg  40 mg SubCUTAneous DAILY    famotidine (PF) (PEPCID) 20 mg in 0.9% sodium chloride 10 mL injection  20 mg IntraVENous Q12H    labetaloL (NORMODYNE;TRANDATE) 20 mg/4 mL (5 mg/mL) injection 10 mg  10 mg IntraVENous Q4H PRN    amLODIPine (NORVASC) tablet 10 mg  10 mg Oral DAILY    aspirin chewable tablet 81 mg  81 mg Oral DAILY    atorvastatin (LIPITOR) tablet 80 mg  80 mg Oral DAILY    clopidogreL (PLAVIX) tablet 75 mg  75 mg Oral DAILY    metoprolol succinate (TOPROL-XL) XL tablet 25 mg  25 mg Oral DAILY    methylPREDNISolone (PF) (SOLU-MEDROL) injection 40 mg 40 mg IntraVENous Q8H    albuterol-ipratropium (DUO-NEB) 2.5 MG-0.5 MG/3 ML  3 mL Nebulization Q6HWA RT    budesonide (PULMICORT) 500 mcg/2 ml nebulizer suspension  500 mcg Nebulization BID RT       Review of Systems:   A comprehensive review of systems was negative except for that written in the HPI. Objective:   Physical Exam:     Visit Vitals  BP (!) 147/90 (BP 1 Location: Right upper arm, BP Patient Position: At rest)   Pulse (!) 108   Temp 98.2 °F (36.8 °C)   Resp 18   Ht 6' (1.829 m)   Wt 111.5 kg (245 lb 13 oz)   SpO2 90%   BMI 33.34 kg/m²    O2 Flow Rate (L/min): 1 l/min O2 Device: Nasal cannula    Temp (24hrs), Av.9 °F (36.6 °C), Min:97.3 °F (36.3 °C), Max:98.2 °F (36.8 °C)    No intake/output data recorded.  1901 -  0700  In: -   Out: 1800 [Urine:1800]    General:   Awake and alert   Lungs:   Clear to auscultation bilaterally. Chest wall:  No tenderness or deformity. Heart:  Regular rate and rhythm, S1, S2 normal, no murmur, click, rub or gallop. Abdomen:   Soft, non-tender. Bowel sounds normal. No masses,  No organomegaly. Extremities: Extremities show generalized swelling of left arm   Pulses: 2+ and symmetric all extremities. Skin: Skin color, texture, turgor normal. No rashes or lesions   Neurologic: CNII-XII intact.    Unable to assess         Data Review:       Recent Days:  Recent Labs     22  0833 22  0521   WBC 13.1* 12.1*   HGB 12.8 12.8   HCT 38.7 37.6    214     Recent Labs     22  0833 22  0521 22  1400    139 138   K 3.8 3.6 3.5   * 110* 108   CO2 24 24 25   * 133* 125*   BUN 40* 37* 37*   CREA 0.67* 0.76 0.75   CA 8.7 8.5 8.3*   MG 2.4  --   --    PHOS  --   --  2.1*   ALB  --  2.4* 2.4*   TBILI  --  0.9  --    ALT  --  60  --      Recent Labs     22  0520   PH 7.44   PCO2 37   PO2 83   HCO3 26       24 Hour Results:  Recent Results (from the past 24 hour(s))   CBC W/O DIFF    Collection Time: 02/20/22  8:33 AM   Result Value Ref Range    WBC 13.1 (H) 4.1 - 11.1 K/uL    RBC 4.39 4. 10 - 5.70 M/uL    HGB 12.8 12.1 - 17.0 g/dL    HCT 38.7 36.6 - 50.3 %    MCV 88.2 80.0 - 99.0 FL    MCH 29.2 26.0 - 34.0 PG    MCHC 33.1 30.0 - 36.5 g/dL    RDW 14.6 (H) 11.5 - 14.5 %    PLATELET 425 684 - 835 K/uL    MPV 9.4 8.9 - 12.9 FL    NRBC 0.0 0.0  WBC    ABSOLUTE NRBC 0.00 0.00 - 3.70 K/uL   METABOLIC PANEL, BASIC    Collection Time: 02/20/22  8:33 AM   Result Value Ref Range    Sodium 145 136 - 145 mmol/L    Potassium 3.8 3.5 - 5.1 mmol/L    Chloride 115 (H) 97 - 108 mmol/L    CO2 24 21 - 32 mmol/L    Anion gap 6 5 - 15 mmol/L    Glucose 163 (H) 65 - 100 mg/dL    BUN 40 (H) 6 - 20 mg/dL    Creatinine 0.67 (L) 0.70 - 1.30 mg/dL    BUN/Creatinine ratio 60 (H) 12 - 20      GFR est AA >60 >60 ml/min/1.73m2    GFR est non-AA >60 >60 ml/min/1.73m2    Calcium 8.7 8.5 - 10.1 mg/dL   MAGNESIUM    Collection Time: 02/20/22  8:33 AM   Result Value Ref Range    Magnesium 2.4 1.6 - 2.4 mg/dL       CT HEAD WO CONT   Final Result   1. No evidence of acute intracranial hemorrhage or mass effect. 2.  Mild chronic small vessel ischemic changes. CTA HEAD NECK W WO CONT   Final Result   No large vessel occlusion or hemodynamically significant stenosis seen   intracranially. No hemodynamically significant luminal stenosis of the carotid   bifurcations by NASCET-like criteria. DUPLEX UPPER EXT VENOUS LEFT   Final Result      XR CHEST PORT   Final Result      XR CHEST PORT   Final Result   The cardiomediastinal silhouette is appropriate for age, technique,   and lung expansion. Pulmonary vasculature is not congested. The lungs are   essentially clear. No effusion or pneumothorax is seen. XR CHEST PORT   Final Result   No significant interval change. XR CHEST PORT   Final Result      XR CHEST PORT   Final Result   No acute findings. XR CHEST PORT   Final Result   No significant interval change.       CTA HEAD NECK W WO CONT   Final Result   Head CT:   No acute intracranial abnormality. Head and neck CTA:   1. Mild (30-35%) stenosis of the right ICA origin. 2.  No occlusion or high-grade stenosis of the remaining major cervical or   intracranial arterial vasculature. Please note that degrees of carotid stenosis are measured in accordance with   NASCET-like criteria. XR CHEST PORT   Final Result   No significant interval change. XR CHEST PORT   Final Result   FINDINGS: IMPRESSION: Single frontal view of the chest.   ET tube, enteric tube remain. Normal heart size. No vascular congestion or pulmonary edema. The lungs are well inflated. No focal infiltrate, pleural effusion, or   pneumothorax. Unchanged biapical pleural-parenchymal thickening. Upper lung   oligemia from emphysema. No free air under the diaphragm. XR CHEST PORT   Final Result   No acute findings. XR CHEST PORT   Final Result      XR CHEST PORT   Final Result   The cardiomediastinal silhouette is appropriate for age, technique,   and lung expansion. Pulmonary vasculature is not congested. The lungs are   essentially clear. No effusion or pneumothorax is seen. CT HEAD WO CONT   Final Result   No evidence of an acute intracranial process. CTA CHEST ABD PELV W WO CONT   Final Result      No aneurysm or dissection of aorta. Atherosclerosis including coronary arteries. Pulmonary emphysema. No intra-abdominal inflammatory changes or bowel obstruction. Follow-up as   clinically indicated. Please see full report. XR CHEST PORT   Final Result   1. Endotracheal tube in satisfactory position. 2. Suspect nasogastric tube is coiled in the oropharynx. Its tip is entering the   stomach. 3. No focal infiltrate. No overt edema. 4. No effusion or pneumothorax.            Assessment:  Acute respiratory failure with hypoxia, requiring mechanical ventilation    Acute exacerbation of COPD    Sputum positive for Moraxella catarrhalis, beta-lactamase positive and Klebsiella pneumoniae    Sepsis    Episodes of recurrent syncope    Malignant hypertension, improved    Non-STEMI type II    Swelling left arm      Plan:    Acute respiratory failure with hypoxia secondary to COPD exacerbation/pneumoniapatient presents with acute respiratory failure with hypoxia, successfully extubated, doing well on 2 L nasal cannula, sputum culture consistent with Klebsiella pneumonia and Moraxella catarrhalis  Continue telemetry monitoring  Attempt to wean oxygen  Follow blood cultures  Continue methylprednisone 40 mg IV every 8  Continue Zosyn for antibiotic coverage  Continue to monitor respiratory status  Pulmonology consult appreciated, continue to follow recommendations    Type II non-ST elevation MIof note patient did develop a type II non-ST elevation MI, unclear whether secondary to demand  Continue aspirin once daily  Continue telemetry monitoring  Cardiology consult appreciated, continue to follow recommendations    Hypertensioncontinue current antihypertensive medications    Left upper extremity swellingnegative for DVT, does show evidence of superficial thrombosis which does not need anticoagulation  Continue to monitor    ProphylaxisLovenox  FENcardiac diet, replete potassium and magnesium  Full code, will clarify about surrogate decision maker  Dispositionpending clinical improvement/PT OT evaluation      Care Plan discussed with: Patient/Family/Specialist/Case Management    Total noncritical care time spent 35    Tanesha Lee MD

## 2022-02-20 NOTE — PROGRESS NOTES
Telemetry called to notify me that the patients heart rate got up to 140bpm. It is now 125bmp while he is at rest. MD on call notified and gave an order for a 500mL bolus at this time

## 2022-02-20 NOTE — PROGRESS NOTES
Problem: Mobility Impaired (Adult and Pediatric)  Goal: *Acute Goals and Plan of Care (Insert Text)  Description: Pt will be I with LE HEP in 7 days. Pt will perform bed mobility with mod A x1-2 in 7 days. Pt will independently verbalize and perform energy conservation techniques such as pursed lip breathing in 7 days. Outcome: Progressing Towards Goal  PHYSICAL THERAPY TREATMENT  Patient: Ellen Severe (73 y.o. male)  Date: 2/20/2022  Diagnosis: Respiratory failure ([address] [J96.90] <principal problem not specified>  Procedure(s) (LRB):  LEFT HEART CATH / CORONARY ANGIOGRAPHY (N/A) * Surgery Date in Future *  Precautions: Fall  Chart, physical therapy assessment, plan of care and goals were reviewed. ASSESSMENT  Patient continues with skilled PT services and is slowly progressing towards goals. Pt received in L sidelying, agreeable to Tx. Pt rolled to R and transferred from supine to sitting with max A. Pt sat on EOB needing max A at first to maintain trunk stability, pt with LOB to L and posteriorly. After ~1-2 min, pt able to maintain balance with BUE support with SBA to CGA. Pt completed LAQ on EOB having difficulty following commands. He kept asking to stand and amb. Explained to pt that his HR was rising and that he needed assist x 2, which was not available at that time. Pt's HR was 112 bpm at start of session but increased with activities to 136 bpm. Pt also expressed anger re: being in the hospital for 126 days and wife not visiting. Pt also mumbling words appearing hostile. RN arrived in room and assisted with putting pt back in bed. Pt needed Total A x 2 for sit>supine and for scooting to HOB. Pt was left semi supine in bed in NAD, call bell within reach, all needs addressed.      Current Level of Function Impacting Discharge (mobility/balance): assist x 2, balance deficit    Other factors to consider for discharge: severity of deficits, decreased strength and activity tolerance         PLAN :  Patient continues to benefit from skilled intervention to address the above impairments. Continue treatment per established plan of care. to address goals. Recommendation for discharge: (in order for the patient to meet his/her long term goals)  Woodrow Talley    This discharge recommendation:  Has been made in collaboration with the attending provider and/or case management    IF patient discharges home will need the following DME: to be determined (TBD)       SUBJECTIVE:   Patient stated I just want to call my wife.     OBJECTIVE DATA SUMMARY:   Critical Behavior:  Neurologic State: Confused  Orientation Level: Oriented to place,Oriented to person,Disoriented to time,Disoriented to situation  Cognition: Decreased command following  Safety/Judgement: Awareness of environment  Functional Mobility Training:  Bed Mobility:  Rolling: Maximum assistance  Supine to Sit: Maximum assistance  Sit to Supine: Total assistance;Assist x2  Scooting: Total assistance     Balance:  Sitting: Impaired  Sitting - Static: Fair (occasional)  Sitting - Dynamic: Poor (constant support)    Therapeutic Exercises:   -LAQ x 5 each LE    Pain Ratin/10    Activity Tolerance:   Poor  Please refer to the flowsheet for vital signs taken during this treatment. After treatment patient left in no apparent distress:   Supine in bed, Call bell within reach, Bed / chair alarm activated, and Side rails x 3    COMMUNICATION/COLLABORATION:   The patients plan of care was discussed with: Registered nurse.      Rico Becker PTA   Time Calculation: 30 mins

## 2022-02-20 NOTE — PROGRESS NOTES
Patient has not been voiding on his own since his rubin cath was removed. He was straight cath'd again this morning with 800mL of wilver urine output.

## 2022-02-20 NOTE — PROGRESS NOTES
IMPRESSION:   1. Acute hypoxic respiratory failure   2. Klebsiella pneumonia and moraxella catarrhalis  3. NSTEMI  4. Generalized Edema  5. Syncope  6. COPD   7. Hypokalemia  8. Pneumonia sputum positive for gram-positive cocci  9. Urinary retention      RECOMMENDATIONS/PLAN:   1. Transferred to   2. Extubated on 2/17 alert awake talking on 2 L nasal cannula, some mild confusion  3. Patient on IV Solu-Medrol nebulizer treatment Covid negative  4. Hold trilogy inhaler continue nebulized albuterol Atrovent budesonide  5. Chest x-ray no acute infiltrate which shows emphysematous changes, CXR 2/18/22 no acute changes  6. Start patient on vancomycin and Zosyn sputum shows gram-positive cocci, mozzarella catarrhalis Klebsiella pneumonia  7. CTA head negative  8. Superficial thrombus in LUE  9. 2/18/22 ABG WNL  10. Potassium corrected  11. Consult to urology  12. Start melatonin for sleep     [x] High complexity decision making was performed  [x] See my orders for details  HPI  27-year-old male came in because of chest pain with shortness of breath he has also recurrent episodes of syncope significant past medical history of vocal cord dysfunction, pulmonary [place], coronary artery disease status post stent COPD current everyday smoker no history of sleep apnea he was having swelling of the lower extremities for couple of months today came into the emergency room as previously was having chest pain or shortness of breath he passed out subsequently got intubated and now on ventilator critical care consult was called. Patient transferred to floor, no longer intubated, on NC O2 and satting appropriately. Clinically much improved from pulmonary standpoint. PMH:  has no past medical history on file. PSH:   has no past surgical history on file. FHX: family history is not on file.      SHX:      ALL:   Allergies   Allergen Reactions    Sulfa (Sulfonamide Antibiotics) Other (comments)     syncope          MEDS: [x] Reviewed - As Below   [] Not reviewed    Current Facility-Administered Medications   Medication    nystatin (MYCOSTATIN) 100,000 unit/mL oral suspension 500,000 Units    morphine injection 1 mg    piperacillin-tazobactam (ZOSYN) 3.375 g in 0.9% sodium chloride (MBP/ADV) 100 mL MBP    glycopyrrolate (ROBINUL) injection 0.1 mg    albuterol-ipratropium (DUO-NEB) 2.5 MG-0.5 MG/3 ML    bethanechol (URECHOLINE) tablet 100 mg    finasteride (PROSCAR) tablet 5 mg    OXcarbazepine (TRILEPTAL) tablet 150 mg    tamsulosin (FLOMAX) capsule 0.4 mg    acetaminophen (TYLENOL) tablet 650 mg    Or    acetaminophen (TYLENOL) suppository 650 mg    polyethylene glycol (MIRALAX) packet 17 g    ondansetron (ZOFRAN ODT) tablet 4 mg    Or    ondansetron (ZOFRAN) injection 4 mg    enoxaparin (LOVENOX) injection 40 mg    famotidine (PF) (PEPCID) 20 mg in 0.9% sodium chloride 10 mL injection    labetaloL (NORMODYNE;TRANDATE) 20 mg/4 mL (5 mg/mL) injection 10 mg    amLODIPine (NORVASC) tablet 10 mg    aspirin chewable tablet 81 mg    atorvastatin (LIPITOR) tablet 80 mg    clopidogreL (PLAVIX) tablet 75 mg    metoprolol succinate (TOPROL-XL) XL tablet 25 mg    methylPREDNISolone (PF) (SOLU-MEDROL) injection 40 mg    albuterol-ipratropium (DUO-NEB) 2.5 MG-0.5 MG/3 ML    budesonide (PULMICORT) 500 mcg/2 ml nebulizer suspension      MAR reviewed and pertinent medications noted or modified as needed   Current Facility-Administered Medications   Medication    nystatin (MYCOSTATIN) 100,000 unit/mL oral suspension 500,000 Units    morphine injection 1 mg    piperacillin-tazobactam (ZOSYN) 3.375 g in 0.9% sodium chloride (MBP/ADV) 100 mL MBP    glycopyrrolate (ROBINUL) injection 0.1 mg    albuterol-ipratropium (DUO-NEB) 2.5 MG-0.5 MG/3 ML    bethanechol (URECHOLINE) tablet 100 mg    finasteride (PROSCAR) tablet 5 mg    OXcarbazepine (TRILEPTAL) tablet 150 mg    tamsulosin (FLOMAX) capsule 0.4 mg    acetaminophen (TYLENOL) tablet 650 mg    Or    acetaminophen (TYLENOL) suppository 650 mg    polyethylene glycol (MIRALAX) packet 17 g    ondansetron (ZOFRAN ODT) tablet 4 mg    Or    ondansetron (ZOFRAN) injection 4 mg    enoxaparin (LOVENOX) injection 40 mg    famotidine (PF) (PEPCID) 20 mg in 0.9% sodium chloride 10 mL injection    labetaloL (NORMODYNE;TRANDATE) 20 mg/4 mL (5 mg/mL) injection 10 mg    amLODIPine (NORVASC) tablet 10 mg    aspirin chewable tablet 81 mg    atorvastatin (LIPITOR) tablet 80 mg    clopidogreL (PLAVIX) tablet 75 mg    metoprolol succinate (TOPROL-XL) XL tablet 25 mg    methylPREDNISolone (PF) (SOLU-MEDROL) injection 40 mg    albuterol-ipratropium (DUO-NEB) 2.5 MG-0.5 MG/3 ML    budesonide (PULMICORT) 500 mcg/2 ml nebulizer suspension      PMH:  has no past medical history on file. PSH:   has no past surgical history on file. FHX: family history is not on file. SHX:       ROS:  Patient alert awake talking review of systems as mentioned in HPI and physical exam    Hemodynamics:    CO:    CI:    CVP:    SVR:   PAP Systolic:    PAP Diastolic:    PVR:    VK56:        Ventilator Settings:      Mode Rate TV Press PEEP FiO2 PIP Min.  Vent   Assist control,Volume control    550 ml 15 cm H2O  5 cm H20 30 %  18 cm H2O  10.7 l/min        Vital Signs: Telemetry:    normal sinus rhythm Intake/Output:   Visit Vitals  BP (!) 147/90 (BP 1 Location: Right upper arm, BP Patient Position: At rest)   Pulse (!) 108   Temp 98.2 °F (36.8 °C)   Resp 18   Ht 6' (1.829 m)   Wt 245 lb 13 oz (111.5 kg)   SpO2 90%   BMI 33.34 kg/m²       Temp (24hrs), Av.9 °F (36.6 °C), Min:97.3 °F (36.3 °C), Max:98.2 °F (36.8 °C)        O2 Device: Nasal cannula O2 Flow Rate (L/min): 1 l/min       Wt Readings from Last 4 Encounters:   22 245 lb 13 oz (111.5 kg)          Intake/Output Summary (Last 24 hours) at 2022 1124  Last data filed at 2022 0526  Gross per 24 hour   Intake    Output 1300 ml   Net -1300 ml       Last shift:      No intake/output data recorded. Last 3 shifts: 02/18 1901 - 02/20 0700  In: -   Out: 1800 [Urine:1800]       Physical Exam:     General: Alert awake  HEENT: NCAT,   Eyes: anicteric; conjunctiva clear   Neck: no nodes,no accessory MM use. Chest: no deformity,   Cardiac: IR regular; no murmur;   Lungs: distant breath sounds; no wheeze  Abd: soft, NT, hypoactive BS  Ext: no edema; no joint swelling;  No clubbing  : clear urine  Neuro: Moving extremities  Psych-no issues  Skin: warm, dry, no cyanosis;   Pulses: Brachial and radial pulses intact  Capillary: Normal capillary refill      DATA:    MAR reviewed and pertinent medications noted or modified as needed  MEDS:   Current Facility-Administered Medications   Medication    nystatin (MYCOSTATIN) 100,000 unit/mL oral suspension 500,000 Units    morphine injection 1 mg    piperacillin-tazobactam (ZOSYN) 3.375 g in 0.9% sodium chloride (MBP/ADV) 100 mL MBP    glycopyrrolate (ROBINUL) injection 0.1 mg    albuterol-ipratropium (DUO-NEB) 2.5 MG-0.5 MG/3 ML    bethanechol (URECHOLINE) tablet 100 mg    finasteride (PROSCAR) tablet 5 mg    OXcarbazepine (TRILEPTAL) tablet 150 mg    tamsulosin (FLOMAX) capsule 0.4 mg    acetaminophen (TYLENOL) tablet 650 mg    Or    acetaminophen (TYLENOL) suppository 650 mg    polyethylene glycol (MIRALAX) packet 17 g    ondansetron (ZOFRAN ODT) tablet 4 mg    Or    ondansetron (ZOFRAN) injection 4 mg    enoxaparin (LOVENOX) injection 40 mg    famotidine (PF) (PEPCID) 20 mg in 0.9% sodium chloride 10 mL injection    labetaloL (NORMODYNE;TRANDATE) 20 mg/4 mL (5 mg/mL) injection 10 mg    amLODIPine (NORVASC) tablet 10 mg    aspirin chewable tablet 81 mg    atorvastatin (LIPITOR) tablet 80 mg    clopidogreL (PLAVIX) tablet 75 mg    metoprolol succinate (TOPROL-XL) XL tablet 25 mg    methylPREDNISolone (PF) (SOLU-MEDROL) injection 40 mg    albuterol-ipratropium (DUO-NEB) 2.5 MG-0.5 MG/3 ML    budesonide (PULMICORT) 500 mcg/2 ml nebulizer suspension        Labs:      Recent Labs     02/18/22  0520   PH 7.44   PCO2 37   PO2 83   HCO3 26   2/13 AC 15  PEEP 8 FiO2 35%  Lab Results   Component Value Date/Time    TSH 1.38 02/08/2022 03:53 AM        Imaging:    Results from [place] encounter on 02/07/22    XR CHEST PORT    Narrative  Chest single view. Comparison single view chest February 17, 2022. ET tube and NG tube have been removed. Unchanged appearance for the lungs; no gross interstitial or alveolar pulmonary  edema. Cardiac and mediastinal structures unchanged. Apical pleural thickening  unchanged. No pneumothorax or sizable pleural effusion. Results from Hospital Encounter encounter on 02/07/22    CTA HEAD NECK W WO CONT    Narrative  CTA NECK WITHOUT AND WITH INTRAVENOUS CONTRAST  CTA HEAD WITH INTRAVENOUS CONTRAST    INDICATION: Right arm weakness    COMPARISON: CTA head and neck from 2/12/2022    TECHNIQUE: Noncontrast neck CT. Transaxial CTA of the head and neck was  performed after administration of 100 mL of Isovue-370 intravenous contrast.  Multiplanar MIP and standard reconstructions performed and evaluated. Dose  reduction: Per department policy, all CT scans at this facility are performed  using dose reduction optimization techniques as appropriate to a performed  examination including the following: Automated exposure control, adjustments of  the mA and/or KV according to patient size, or use of iterative reconstruction  technique. FINDINGS:  Contrast injection through the right upper extremity causes streak artifact  which obscures evaluation of the surrounding structures. No focal  hemodynamically significant luminal narrowing of the visualized aortic arch,  innominate artery, or included/visualized subclavian and axillary arteries. Right common carotid artery is widely patent.  Mild calcified atherosclerosis of  the right carotid bifurcation without hemodynamically significant luminal  stenosis by NASCET-like criteria. Calcified plaque of the proximal right  internal carotid artery without hemodynamically significant luminal stenosis. Right ICA is patent in the neck. Cavernous carotid circumferential calcified  atherosclerosis. Right ophthalmic artery is patent. Right anterior cerebral  artery and middle cerebral artery are patent. Left common carotid artery is widely patent. Mild calcified atherosclerosis  without hemodynamically significant luminal stenosis by NASCET-like criteria. Left internal carotid artery is widely patent. Mild calcified plaque of the left  cavernous ICA. Left ophthalmic artery is patent. Left anterior cerebral artery  is patent. No definite large vessel occlusion of the left middle cerebral  artery. Calcified atherosclerosis of bilateral vertebral arteries. Intradural vertebral  arteries also show calcified atherosclerosis. Basilar artery is patent. Bilateral posterior cerebral arteries are patent. Emphysema of the included lung apices. Visualized osseous structures show  chronic fusion of the C3-4 vertebral bodies. Impression  No large vessel occlusion or hemodynamically significant stenosis seen  intracranially. No hemodynamically significant luminal stenosis of the carotid  bifurcations by NASCET-like criteria. · 2/12 patient condition got worse yesterday and he became cyanotic while he was on a ventilator transferred to ICU he is back on Precedex and propofol ABG acceptable  · 2/13 back on ventilator assist control. Low-grade fever 100.6. Will check urine and sputum cultures although chest x-ray looks clear. Will decrease PEEP to 5.   Repeat labs in a.m. hypercapnia today we will give Diamox  · 2/14 will decrease FiO2 sputum positive for gram-positive cocci in clusters started on vancomycin and Zosyn pending sensitivity still spiking temperature  · 2/16 remain on ventilator  will try to wean him today once extubated cardiologist suggested about cardiac cath  · 2/17 on assist control mode will try to wean him again today  · 2/18 alert awake nasal cannula talking weak  · 2/20/22 Patient transferred to floor on 2 L O2 NC satting appropriately talking and no short of breath. Patient has been getting straight cathed for the past few days, will consult urology. Patient also not sleeping, will add melatonin for now.   · Time of care 30 minutes  ·

## 2022-02-20 NOTE — CONSULTS
Neurology Progress Note    Date: 2/20/2022    Mr. Isis Henry is a 73year old man is seen in follow-up and he was successfully extubated on 02- and tolerating it very well. Answering questions appropriately. With PMH of PE used to be on coumadin but stopped for GI bleed, vocal cord dysfunctioon who was brought in to the ER for hypoxia, \" AMS\", chest pain and recurrent episodes of syncope lasting 5-10 sec. He would awaken after sternal rub but loose consciousness shortly after again. In between the episodes he is able to wake up and is alert and oriented and able to provide the Ed staff his history. He was found to be hypoxic to 86%. It seems he passed out multiple times in the ambulance as well. From the chart he has had episodes of loss of consciousness after whole body cramping episodes at home as well. CT head reported as \"no evidence of acute intracranial process\". Ct chest/abd/pelvis WNL. Subjective  awake and talking but disoriented. New right arm weakness and repeat ct/cta yesterday showed no LVO     No past medical history on file. No past surgical history on file. No family history on file.    Social History     Tobacco Use    Smoking status: Not on file    Smokeless tobacco: Not on file   Substance Use Topics    Alcohol use: Not on file       Current Facility-Administered Medications   Medication Dose Route Frequency Provider Last Rate Last Admin    potassium chloride (K-DUR, KLOR-CON M20) SR tablet 20 mEq  20 mEq Oral NOW Liz Peace MD        nystatin (MYCOSTATIN) 100,000 unit/mL oral suspension 500,000 Units  500,000 Units Oral QID Edward Lovell MD   500,000 Units at 02/20/22 9720    morphine injection 1 mg  1 mg IntraVENous Q8H PRN Edward Lovell MD   1 mg at 02/18/22 6684    piperacillin-tazobactam (ZOSYN) 3.375 g in 0.9% sodium chloride (MBP/ADV) 100 mL MBP  3.375 g IntraVENous Q8H Edward Lovell MD 25 mL/hr at 02/20/22 0514 3.375 g at 02/20/22 0514    glycopyrrolate (ROBINUL) injection 0.1 mg  0.1 mg IntraVENous TID Sury Lovell MD   0.1 mg at 02/20/22 0951    albuterol-ipratropium (DUO-NEB) 2.5 MG-0.5 MG/3 ML  3 mL Nebulization Q4H PRN Riri Maurice MD        bethanechol (URECHOLINE) tablet 100 mg  100 mg Oral BID Toya Cobb MD   100 mg at 02/20/22 7809    finasteride (PROSCAR) tablet 5 mg  5 mg Oral DAILY Riri Maurice MD   5 mg at 02/20/22 9311    OXcarbazepine (TRILEPTAL) tablet 150 mg  150 mg Oral BID Toya Cobb MD   150 mg at 02/20/22 6293    tamsulosin (FLOMAX) capsule 0.4 mg  0.4 mg Oral DAILY Toya Cobb MD   0.4 mg at 02/20/22 1097    acetaminophen (TYLENOL) tablet 650 mg  650 mg Oral Q6H PRN Toya Cobb MD   650 mg at 02/15/22 4530    Or    acetaminophen (TYLENOL) suppository 650 mg  650 mg Rectal Q6H PRN Toay Cobb MD   650 mg at 02/14/22 0258    polyethylene glycol (MIRALAX) packet 17 g  17 g Oral DAILY PRN Riri Maurice MD        ondansetron (ZOFRAN ODT) tablet 4 mg  4 mg Oral Q8H PRN Riri Maurice MD        Or    ondansetron (ZOFRAN) injection 4 mg  4 mg IntraVENous Q6H PRN Riri Maurice MD        enoxaparin (LOVENOX) injection 40 mg  40 mg SubCUTAneous DAILY Riri Maurice MD   40 mg at 02/20/22 0951    famotidine (PF) (PEPCID) 20 mg in 0.9% sodium chloride 10 mL injection  20 mg IntraVENous Q12H Toya Cobb MD   20 mg at 02/20/22 0953    labetaloL (NORMODYNE;TRANDATE) 20 mg/4 mL (5 mg/mL) injection 10 mg  10 mg IntraVENous Q4H PRN Dia New MD   10 mg at 02/08/22 1741    amLODIPine (NORVASC) tablet 10 mg  10 mg Oral DAILY iDa New MD   10 mg at 02/20/22 8186    aspirin chewable tablet 81 mg  81 mg Oral DAILY Duyen Teixeira MD   81 mg at 02/20/22 0953    atorvastatin (LIPITOR) tablet 80 mg  80 mg Oral DAILY Duyen Teixeira MD   80 mg at 02/20/22 0953    clopidogreL (PLAVIX) tablet 75 mg  75 mg Oral DAILY Duyen Teixeira MD   75 mg at 02/20/22 0953    metoprolol succinate (TOPROL-XL) XL tablet 25 mg  25 mg Oral DAILY Duyen Teixeira MD   25 mg at 02/20/22 0952    methylPREDNISolone (PF) (SOLU-MEDROL) injection 40 mg  40 mg IntraVENous Q8H Sheldon Lovell MD   40 mg at 02/20/22 0514    albuterol-ipratropium (DUO-NEB) 2.5 MG-0.5 MG/3 ML  3 mL Nebulization Q6HWA RT Sheldon Lovell MD   3 mL at 02/20/22 0809    budesonide (PULMICORT) 500 mcg/2 ml nebulizer suspension  500 mcg Nebulization BID RT Sheldon Lovell MD   500 mcg at 02/20/22 0809      Review of Systems   Unable to perform ROS: Intubated     Objective     Vital signs for last 24 hours:  Visit Vitals  BP (!) 147/90 (BP 1 Location: Right upper arm, BP Patient Position: At rest)   Pulse (!) 108   Temp 98.2 °F (36.8 °C)   Resp 18   Ht 6' (1.829 m)   Wt 111.5 kg (245 lb 13 oz)   SpO2 90%   BMI 33.34 kg/m²     Intake/Output this shift:  Current Shift: No intake/output data recorded. Last 3 Shifts: 02/18 1901 - 02/20 0700  In: -   Out: 1800 [Urine:1800]    Data Review:   Recent Results (from the past 24 hour(s))   CBC W/O DIFF    Collection Time: 02/20/22  8:33 AM   Result Value Ref Range    WBC 13.1 (H) 4.1 - 11.1 K/uL    RBC 4.39 4. 10 - 5.70 M/uL    HGB 12.8 12.1 - 17.0 g/dL    HCT 38.7 36.6 - 50.3 %    MCV 88.2 80.0 - 99.0 FL    MCH 29.2 26.0 - 34.0 PG    MCHC 33.1 30.0 - 36.5 g/dL    RDW 14.6 (H) 11.5 - 14.5 %    PLATELET 343 262 - 979 K/uL    MPV 9.4 8.9 - 12.9 FL    NRBC 0.0 0.0  WBC    ABSOLUTE NRBC 0.00 0.00 - 6.90 K/uL   METABOLIC PANEL, BASIC    Collection Time: 02/20/22  8:33 AM   Result Value Ref Range    Sodium 145 136 - 145 mmol/L    Potassium 3.8 3.5 - 5.1 mmol/L    Chloride 115 (H) 97 - 108 mmol/L    CO2 24 21 - 32 mmol/L    Anion gap 6 5 - 15 mmol/L    Glucose 163 (H) 65 - 100 mg/dL    BUN 40 (H) 6 - 20 mg/dL    Creatinine 0.67 (L) 0.70 - 1.30 mg/dL    BUN/Creatinine ratio 60 (H) 12 - 20      GFR est AA >60 >60 ml/min/1.73m2    GFR est non-AA >60 >60 ml/min/1.73m2    Calcium 8.7 8.5 - 10.1 mg/dL MAGNESIUM    Collection Time: 02/20/22  8:33 AM   Result Value Ref Range    Magnesium 2.4 1.6 - 2.4 mg/dL     Neuro Physical Exam    General: Well developed, well nourished. Morbidly obese and now extubated and tolerating it well    Neurological Exam:  Mental Status: The patient is awake and alert and oriented to person place and time partially. No aphasia or dysarthria noted  His extraocular movements are intact with right esotropia which he says is old, blinks to visual threat b/l   He is not moving his right arm , moves left arm against gravity. Will move both legs on bed but no antigravity movement in legs           Assessment and Plan: Mr. Faisal Castorena is a 73year old man who comes in with hypoxia and recurrent episodes of syncope. The description of these events is NOT consistent with seizures. He is extubated and tolerating it very well. At this time etiology of recurrent syncope is not clear. If this recurs post extubation then eeg can be considered. CTA head and neck did not reveal any stenotic disease, other than 30-35% stenosis of the origin of the right ICA, which does not explain the spells he had been having. It is possible that he may be going in and out of hypoxia contributing to these spells. Will observe clinically and if he starts having syncopal episodes again we will consider doing an EEG. Not moving extremities spontaneously: affect is strange in that he does not seem concerned and I am not sure he is making an effort to move it. Will do brain mri to rule out stroke.  On Asa 81 mg and atorvastain 80 mg.   - BP goal < 150/90    Thank you,

## 2022-02-20 NOTE — PROGRESS NOTES
Cardiology Progress Note      2/19/2022 2:58 PM    Admit Date: 2/7/2022    Admit Diagnosis: Respiratory failure ([address] [J96.90]      Subjective:   Patient seen and examined. He is awake and alert however appears a little confused.  Denies chest pain    Visit Vitals  BP (!) 147/90 (BP 1 Location: Right upper arm, BP Patient Position: At rest)   Pulse (!) 108   Temp 98.2 °F (36.8 °C)   Resp 18   Ht 6' (1.829 m)   Wt 111.5 kg (245 lb 13 oz)   SpO2 91%   BMI 33.34 kg/m²     Current Facility-Administered Medications   Medication Dose Route Frequency    nystatin (MYCOSTATIN) 100,000 unit/mL oral suspension 500,000 Units  500,000 Units Oral QID    morphine injection 1 mg  1 mg IntraVENous Q8H PRN    piperacillin-tazobactam (ZOSYN) 3.375 g in 0.9% sodium chloride (MBP/ADV) 100 mL MBP  3.375 g IntraVENous Q8H    glycopyrrolate (ROBINUL) injection 0.1 mg  0.1 mg IntraVENous TID    albuterol-ipratropium (DUO-NEB) 2.5 MG-0.5 MG/3 ML  3 mL Nebulization Q4H PRN    bethanechol (URECHOLINE) tablet 100 mg  100 mg Oral BID    finasteride (PROSCAR) tablet 5 mg  5 mg Oral DAILY    OXcarbazepine (TRILEPTAL) tablet 150 mg  150 mg Oral BID    tamsulosin (FLOMAX) capsule 0.4 mg  0.4 mg Oral DAILY    acetaminophen (TYLENOL) tablet 650 mg  650 mg Oral Q6H PRN    Or    acetaminophen (TYLENOL) suppository 650 mg  650 mg Rectal Q6H PRN    polyethylene glycol (MIRALAX) packet 17 g  17 g Oral DAILY PRN    ondansetron (ZOFRAN ODT) tablet 4 mg  4 mg Oral Q8H PRN    Or    ondansetron (ZOFRAN) injection 4 mg  4 mg IntraVENous Q6H PRN    enoxaparin (LOVENOX) injection 40 mg  40 mg SubCUTAneous DAILY    famotidine (PF) (PEPCID) 20 mg in 0.9% sodium chloride 10 mL injection  20 mg IntraVENous Q12H    labetaloL (NORMODYNE;TRANDATE) 20 mg/4 mL (5 mg/mL) injection 10 mg  10 mg IntraVENous Q4H PRN    amLODIPine (NORVASC) tablet 10 mg  10 mg Oral DAILY    aspirin chewable tablet 81 mg  81 mg Oral DAILY    atorvastatin (LIPITOR) tablet 80 mg 80 mg Oral DAILY    clopidogreL (PLAVIX) tablet 75 mg  75 mg Oral DAILY    metoprolol succinate (TOPROL-XL) XL tablet 25 mg  25 mg Oral DAILY    methylPREDNISolone (PF) (SOLU-MEDROL) injection 40 mg  40 mg IntraVENous Q8H    albuterol-ipratropium (DUO-NEB) 2.5 MG-0.5 MG/3 ML  3 mL Nebulization Q6HWA RT    budesonide (PULMICORT) 500 mcg/2 ml nebulizer suspension  500 mcg Nebulization BID RT         Objective:      Physical Exam:  Visit Vitals  BP (!) 147/90 (BP 1 Location: Right upper arm, BP Patient Position: At rest)   Pulse (!) 108   Temp 98.2 °F (36.8 °C)   Resp 18   Ht 6' (1.829 m)   Wt 111.5 kg (245 lb 13 oz)   SpO2 91%   BMI 33.34 kg/m²     General Appearance:  Well developed, well nourished,alert and oriented x 3, and individual in no acute distress. Ears/Nose/Mouth/Throat:   Hearing grossly normal.         Neck: Supple. Chest:   Lungs clear to auscultation bilaterally. Cardiovascular:  Regular rate and rhythm, S1, S2 normal, no murmur. Abdomen:   Soft, non-tender, bowel sounds are active. Extremities: No edema bilaterally. Skin: Warm and dry. Data Review:   Labs:    Recent Results (from the past 24 hour(s))   CBC W/O DIFF    Collection Time: 02/20/22  8:33 AM   Result Value Ref Range    WBC 13.1 (H) 4.1 - 11.1 K/uL    RBC 4.39 4. 10 - 5.70 M/uL    HGB 12.8 12.1 - 17.0 g/dL    HCT 38.7 36.6 - 50.3 %    MCV 88.2 80.0 - 99.0 FL    MCH 29.2 26.0 - 34.0 PG    MCHC 33.1 30.0 - 36.5 g/dL    RDW 14.6 (H) 11.5 - 14.5 %    PLATELET 333 833 - 848 K/uL    MPV 9.4 8.9 - 12.9 FL    NRBC 0.0 0.0  WBC    ABSOLUTE NRBC 0.00 0.00 - 0.33 K/uL   METABOLIC PANEL, BASIC    Collection Time: 02/20/22  8:33 AM   Result Value Ref Range    Sodium 145 136 - 145 mmol/L    Potassium 3.8 3.5 - 5.1 mmol/L    Chloride 115 (H) 97 - 108 mmol/L    CO2 24 21 - 32 mmol/L    Anion gap 6 5 - 15 mmol/L    Glucose 163 (H) 65 - 100 mg/dL    BUN 40 (H) 6 - 20 mg/dL    Creatinine 0.67 (L) 0.70 - 1.30 mg/dL BUN/Creatinine ratio 60 (H) 12 - 20      GFR est AA >60 >60 ml/min/1.73m2    GFR est non-AA >60 >60 ml/min/1.73m2    Calcium 8.7 8.5 - 10.1 mg/dL   MAGNESIUM    Collection Time: 02/20/22  8:33 AM   Result Value Ref Range    Magnesium 2.4 1.6 - 2.4 mg/dL       Telemetry: normal sinus rhythm      Assessment:   Syncope  NSTEMI  Coronary artery disease status post PCI  Hypertension  Hyperlipidemia    Plan:   -Patient remains chest pain-free.  He continues to be confused  -Renal function is stable  -Continue antiplatelet therapy with aspirin, Plavix  -Continue high intensity statin  -He will need ischemia evaluation based on his clinical progression unfortunately he remains confused as of right now  -We will follow

## 2022-02-21 ENCOUNTER — APPOINTMENT (OUTPATIENT)
Dept: MRI IMAGING | Age: 67
DRG: 207 | End: 2022-02-21
Attending: PSYCHIATRY & NEUROLOGY
Payer: MEDICARE

## 2022-02-21 LAB
ANION GAP SERPL CALC-SCNC: 6 MMOL/L (ref 5–15)
ARTERIAL PATENCY WRIST A: ABNORMAL
BASE EXCESS BLDA CALC-SCNC: 2.4 MMOL/L (ref 0–2)
BDY SITE: ABNORMAL
BUN SERPL-MCNC: 43 MG/DL (ref 6–20)
BUN/CREAT SERPL: 48 (ref 12–20)
CA-I BLD-MCNC: 8.8 MG/DL (ref 8.5–10.1)
CHLORIDE SERPL-SCNC: 117 MMOL/L (ref 97–108)
CO2 SERPL-SCNC: 26 MMOL/L (ref 21–32)
CREAT SERPL-MCNC: 0.89 MG/DL (ref 0.7–1.3)
ERYTHROCYTE [DISTWIDTH] IN BLOOD BY AUTOMATED COUNT: 15.2 % (ref 11.5–14.5)
GAS FLOW.O2 O2 DELIVERY SYS: 2 L/MIN
GLUCOSE SERPL-MCNC: 153 MG/DL (ref 65–100)
HCO3 BLDA-SCNC: 27 MMOL/L (ref 22–26)
HCT VFR BLD AUTO: 39.4 % (ref 36.6–50.3)
HGB BLD-MCNC: 12.9 G/DL (ref 12.1–17)
MAGNESIUM SERPL-MCNC: 2.6 MG/DL (ref 1.6–2.4)
MCH RBC QN AUTO: 29.3 PG (ref 26–34)
MCHC RBC AUTO-ENTMCNC: 32.7 G/DL (ref 30–36.5)
MCV RBC AUTO: 89.3 FL (ref 80–99)
NRBC # BLD: 0 K/UL (ref 0–0.01)
NRBC BLD-RTO: 0 PER 100 WBC
PCO2 BLDA: 37 MMHG (ref 35–45)
PH BLDA: 7.46 [PH] (ref 7.35–7.45)
PLATELET # BLD AUTO: 234 K/UL (ref 150–400)
PMV BLD AUTO: 9.4 FL (ref 8.9–12.9)
PO2 BLDA: 80 MMHG (ref 75–100)
POTASSIUM SERPL-SCNC: 4 MMOL/L (ref 3.5–5.1)
RBC # BLD AUTO: 4.41 M/UL (ref 4.1–5.7)
SAO2 % BLD: 95 %
SAO2% DEVICE SAO2% SENSOR NAME: ABNORMAL
SODIUM SERPL-SCNC: 149 MMOL/L (ref 136–145)
SPECIMEN SITE: ABNORMAL
WBC # BLD AUTO: 11.5 K/UL (ref 4.1–11.1)

## 2022-02-21 PROCEDURE — 36415 COLL VENOUS BLD VENIPUNCTURE: CPT

## 2022-02-21 PROCEDURE — 94760 N-INVAS EAR/PLS OXIMETRY 1: CPT

## 2022-02-21 PROCEDURE — 74011000250 HC RX REV CODE- 250: Performed by: INTERNAL MEDICINE

## 2022-02-21 PROCEDURE — 80048 BASIC METABOLIC PNL TOTAL CA: CPT

## 2022-02-21 PROCEDURE — 85027 COMPLETE CBC AUTOMATED: CPT

## 2022-02-21 PROCEDURE — 74011250637 HC RX REV CODE- 250/637: Performed by: INTERNAL MEDICINE

## 2022-02-21 PROCEDURE — 82803 BLOOD GASES ANY COMBINATION: CPT

## 2022-02-21 PROCEDURE — 70551 MRI BRAIN STEM W/O DYE: CPT

## 2022-02-21 PROCEDURE — 77010033678 HC OXYGEN DAILY

## 2022-02-21 PROCEDURE — 74011250636 HC RX REV CODE- 250/636: Performed by: INTERNAL MEDICINE

## 2022-02-21 PROCEDURE — 74011000258 HC RX REV CODE- 258: Performed by: INTERNAL MEDICINE

## 2022-02-21 PROCEDURE — 36600 WITHDRAWAL OF ARTERIAL BLOOD: CPT

## 2022-02-21 PROCEDURE — 94640 AIRWAY INHALATION TREATMENT: CPT

## 2022-02-21 PROCEDURE — 65270000029 HC RM PRIVATE

## 2022-02-21 PROCEDURE — 83735 ASSAY OF MAGNESIUM: CPT

## 2022-02-21 PROCEDURE — 74011250637 HC RX REV CODE- 250/637: Performed by: HOSPITALIST

## 2022-02-21 RX ORDER — AMLODIPINE BESYLATE 10 MG/1
10 TABLET ORAL DAILY
Qty: 30 TABLET | Refills: 0 | Status: SHIPPED | OUTPATIENT
Start: 2022-02-22

## 2022-02-21 RX ORDER — ACETAMINOPHEN 325 MG/1
650 TABLET ORAL
Qty: 60 TABLET | Refills: 0 | Status: SHIPPED | OUTPATIENT
Start: 2022-02-21 | End: 2022-03-12

## 2022-02-21 RX ORDER — NYSTATIN 100000 [USP'U]/ML
500000 SUSPENSION ORAL 4 TIMES DAILY
Qty: 473 ML | Refills: 0 | Status: SHIPPED
Start: 2022-02-21 | End: 2022-03-12

## 2022-02-21 RX ORDER — AMOXICILLIN AND CLAVULANATE POTASSIUM 875; 125 MG/1; MG/1
1 TABLET, FILM COATED ORAL 2 TIMES DAILY
Qty: 14 TABLET | Refills: 0 | Status: SHIPPED
Start: 2022-02-21 | End: 2022-03-04

## 2022-02-21 RX ORDER — LORAZEPAM 2 MG/ML
1 INJECTION INTRAMUSCULAR ONCE
Status: COMPLETED | OUTPATIENT
Start: 2022-02-21 | End: 2022-02-21

## 2022-02-21 RX ORDER — PREDNISONE 20 MG/1
TABLET ORAL
Qty: 21 TABLET | Refills: 0 | Status: SHIPPED
Start: 2022-02-21 | End: 2022-03-04

## 2022-02-21 RX ORDER — GLYCOPYRROLATE 0.2 MG/ML
0.1 INJECTION INTRAMUSCULAR; INTRAVENOUS 3 TIMES DAILY
Qty: 20 ML | Refills: 0 | Status: SHIPPED
Start: 2022-02-21 | End: 2022-03-04

## 2022-02-21 RX ORDER — BUDESONIDE 0.5 MG/2ML
500 INHALANT ORAL 2 TIMES DAILY
Qty: 200 ML | Refills: 0 | Status: SHIPPED
Start: 2022-02-21 | End: 2022-03-04

## 2022-02-21 RX ADMIN — ASPIRIN 81 MG CHEWABLE TABLET 81 MG: 81 TABLET CHEWABLE at 09:29

## 2022-02-21 RX ADMIN — AMLODIPINE BESYLATE 10 MG: 5 TABLET ORAL at 09:30

## 2022-02-21 RX ADMIN — IPRATROPIUM BROMIDE AND ALBUTEROL SULFATE 3 ML: 2.5; .5 SOLUTION RESPIRATORY (INHALATION) at 20:01

## 2022-02-21 RX ADMIN — METOPROLOL SUCCINATE 25 MG: 25 TABLET, EXTENDED RELEASE ORAL at 09:52

## 2022-02-21 RX ADMIN — PIPERACILLIN AND TAZOBACTAM 3.38 G: 3; .375 INJECTION, POWDER, LYOPHILIZED, FOR SOLUTION INTRAVENOUS at 22:37

## 2022-02-21 RX ADMIN — BETHANECHOL CHLORIDE 100 MG: 25 TABLET ORAL at 09:29

## 2022-02-21 RX ADMIN — PIPERACILLIN AND TAZOBACTAM 3.38 G: 3; .375 INJECTION, POWDER, LYOPHILIZED, FOR SOLUTION INTRAVENOUS at 05:14

## 2022-02-21 RX ADMIN — NYSTATIN 500000 UNITS: 100000 SUSPENSION ORAL at 18:37

## 2022-02-21 RX ADMIN — FINASTERIDE 5 MG: 5 TABLET, FILM COATED ORAL at 09:30

## 2022-02-21 RX ADMIN — BUDESONIDE 500 MCG: 0.5 SUSPENSION RESPIRATORY (INHALATION) at 07:42

## 2022-02-21 RX ADMIN — OXCARBAZEPINE 150 MG: 150 TABLET, FILM COATED ORAL at 09:52

## 2022-02-21 RX ADMIN — LORAZEPAM 1 MG: 2 INJECTION INTRAMUSCULAR; INTRAVENOUS at 15:34

## 2022-02-21 RX ADMIN — IPRATROPIUM BROMIDE AND ALBUTEROL SULFATE 3 ML: 2.5; .5 SOLUTION RESPIRATORY (INHALATION) at 07:42

## 2022-02-21 RX ADMIN — OXCARBAZEPINE 150 MG: 150 TABLET, FILM COATED ORAL at 22:37

## 2022-02-21 RX ADMIN — PIPERACILLIN AND TAZOBACTAM 3.38 G: 3; .375 INJECTION, POWDER, LYOPHILIZED, FOR SOLUTION INTRAVENOUS at 15:26

## 2022-02-21 RX ADMIN — TAMSULOSIN HYDROCHLORIDE 0.4 MG: 0.4 CAPSULE ORAL at 09:52

## 2022-02-21 RX ADMIN — NYSTATIN 500000 UNITS: 100000 SUSPENSION ORAL at 09:51

## 2022-02-21 RX ADMIN — ATORVASTATIN CALCIUM 80 MG: 40 TABLET, FILM COATED ORAL at 09:29

## 2022-02-21 RX ADMIN — GLYCOPYRROLATE 0.1 MG: 0.2 INJECTION INTRAMUSCULAR; INTRAVENOUS at 09:30

## 2022-02-21 RX ADMIN — METHYLPREDNISOLONE SODIUM SUCCINATE 40 MG: 40 INJECTION, POWDER, FOR SOLUTION INTRAMUSCULAR; INTRAVENOUS at 15:25

## 2022-02-21 RX ADMIN — BUDESONIDE 500 MCG: 0.5 SUSPENSION RESPIRATORY (INHALATION) at 20:01

## 2022-02-21 RX ADMIN — NYSTATIN 500000 UNITS: 100000 SUSPENSION ORAL at 22:38

## 2022-02-21 RX ADMIN — IPRATROPIUM BROMIDE AND ALBUTEROL SULFATE 3 ML: 2.5; .5 SOLUTION RESPIRATORY (INHALATION) at 13:50

## 2022-02-21 RX ADMIN — METHYLPREDNISOLONE SODIUM SUCCINATE 40 MG: 40 INJECTION, POWDER, FOR SOLUTION INTRAMUSCULAR; INTRAVENOUS at 05:14

## 2022-02-21 RX ADMIN — SODIUM CHLORIDE, PRESERVATIVE FREE 20 MG: 5 INJECTION INTRAVENOUS at 09:30

## 2022-02-21 RX ADMIN — SODIUM CHLORIDE, PRESERVATIVE FREE 20 MG: 5 INJECTION INTRAVENOUS at 22:37

## 2022-02-21 RX ADMIN — CLOPIDOGREL BISULFATE 75 MG: 75 TABLET, FILM COATED ORAL at 09:28

## 2022-02-21 RX ADMIN — BETHANECHOL CHLORIDE 100 MG: 25 TABLET ORAL at 22:37

## 2022-02-21 RX ADMIN — NYSTATIN 500000 UNITS: 100000 SUSPENSION ORAL at 15:25

## 2022-02-21 RX ADMIN — ENOXAPARIN SODIUM 40 MG: 100 INJECTION SUBCUTANEOUS at 09:31

## 2022-02-21 NOTE — PROGRESS NOTES
IMPRESSION:   1. Acute hypoxic respiratory failure corrected on 2 L nasal oxygen  2. Klebsiella pneumonia and moraxella catarrhalis afebrile on Zosyn  3. Confusion disorientation questionable cause  4. NSTEMI  5. Generalized Edema  6. Syncope  7. COPD   8. Hypokalemia resolved  9.   10. Urinary retention      RECOMMENDATIONS/PLAN:   1. Transferred to   2. Extubated on 2/17 alert awake talking on 2 L nasal cannula, some mild confusion marked confusion agitation today MRI no definite abnormality serum CO2 is normal but will check a blood gas to make sure CO2 retention is not playing a role  3. Patient on IV Solu-Medrol no wheezing will decrease Solu-Medrol  4. Hold trilogy inhaler continue nebulized albuterol Atrovent budesonide  5. Chest x-ray no acute infiltrate which shows emphysematous changes, CXR 2/18/22 no acute changes  6.   7. CTA head negative  8. Superficial thrombus in LUE  9. 2/18/22 ABG WNL we will repeat  10. Potassium corrected       [x] High complexity decision making was performed  [x] See my orders for details  HPI  43-year-old male came in because of chest pain with shortness of breath he has also recurrent episodes of syncope significant past medical history of vocal cord dysfunction, pulmonary [place], coronary artery disease status post stent COPD current everyday smoker no history of sleep apnea he was having swelling of the lower extremities for couple of months today came into the emergency room as previously was having chest pain or shortness of breath he passed out subsequently got intubated and now on ventilator critical care consult was called. Patient transferred to floor, no longer intubated, on NC O2 and satting appropriately. Clinically much improved from pulmonary standpoint. PMH:  has no past medical history on file. PSH:   has no past surgical history on file. FHX: family history is not on file.      SHX:      ALL:   Allergies   Allergen Reactions    Sulfa (Sulfonamide Antibiotics) Other (comments)     syncope          MEDS:   [x] Reviewed - As Below   [] Not reviewed    Current Facility-Administered Medications   Medication    hydrOXYzine (VISTARIL) 25 mg/mL injection 25 mg    nystatin (MYCOSTATIN) 100,000 unit/mL oral suspension 500,000 Units    piperacillin-tazobactam (ZOSYN) 3.375 g in 0.9% sodium chloride (MBP/ADV) 100 mL MBP    glycopyrrolate (ROBINUL) injection 0.1 mg    albuterol-ipratropium (DUO-NEB) 2.5 MG-0.5 MG/3 ML    bethanechol (URECHOLINE) tablet 100 mg    finasteride (PROSCAR) tablet 5 mg    OXcarbazepine (TRILEPTAL) tablet 150 mg    tamsulosin (FLOMAX) capsule 0.4 mg    acetaminophen (TYLENOL) tablet 650 mg    Or    acetaminophen (TYLENOL) suppository 650 mg    polyethylene glycol (MIRALAX) packet 17 g    ondansetron (ZOFRAN ODT) tablet 4 mg    Or    ondansetron (ZOFRAN) injection 4 mg    enoxaparin (LOVENOX) injection 40 mg    famotidine (PF) (PEPCID) 20 mg in 0.9% sodium chloride 10 mL injection    labetaloL (NORMODYNE;TRANDATE) 20 mg/4 mL (5 mg/mL) injection 10 mg    amLODIPine (NORVASC) tablet 10 mg    aspirin chewable tablet 81 mg    atorvastatin (LIPITOR) tablet 80 mg    clopidogreL (PLAVIX) tablet 75 mg    metoprolol succinate (TOPROL-XL) XL tablet 25 mg    methylPREDNISolone (PF) (SOLU-MEDROL) injection 40 mg    albuterol-ipratropium (DUO-NEB) 2.5 MG-0.5 MG/3 ML    budesonide (PULMICORT) 500 mcg/2 ml nebulizer suspension      MAR reviewed and pertinent medications noted or modified as needed   Current Facility-Administered Medications   Medication    hydrOXYzine (VISTARIL) 25 mg/mL injection 25 mg    nystatin (MYCOSTATIN) 100,000 unit/mL oral suspension 500,000 Units    piperacillin-tazobactam (ZOSYN) 3.375 g in 0.9% sodium chloride (MBP/ADV) 100 mL MBP    glycopyrrolate (ROBINUL) injection 0.1 mg    albuterol-ipratropium (DUO-NEB) 2.5 MG-0.5 MG/3 ML    bethanechol (URECHOLINE) tablet 100 mg    finasteride (PROSCAR) tablet 5 mg    OXcarbazepine (TRILEPTAL) tablet 150 mg    tamsulosin (FLOMAX) capsule 0.4 mg    acetaminophen (TYLENOL) tablet 650 mg    Or    acetaminophen (TYLENOL) suppository 650 mg    polyethylene glycol (MIRALAX) packet 17 g    ondansetron (ZOFRAN ODT) tablet 4 mg    Or    ondansetron (ZOFRAN) injection 4 mg    enoxaparin (LOVENOX) injection 40 mg    famotidine (PF) (PEPCID) 20 mg in 0.9% sodium chloride 10 mL injection    labetaloL (NORMODYNE;TRANDATE) 20 mg/4 mL (5 mg/mL) injection 10 mg    amLODIPine (NORVASC) tablet 10 mg    aspirin chewable tablet 81 mg    atorvastatin (LIPITOR) tablet 80 mg    clopidogreL (PLAVIX) tablet 75 mg    metoprolol succinate (TOPROL-XL) XL tablet 25 mg    methylPREDNISolone (PF) (SOLU-MEDROL) injection 40 mg    albuterol-ipratropium (DUO-NEB) 2.5 MG-0.5 MG/3 ML    budesonide (PULMICORT) 500 mcg/2 ml nebulizer suspension      PMH:  has no past medical history on file. PSH:   has no past surgical history on file. FHX: family history is not on file. SHX:       ROS:  Patient alert awake talking review of systems as mentioned in HPI and physical exam    Hemodynamics:    CO:    CI:    CVP:    SVR:   PAP Systolic:    PAP Diastolic:    PVR:    VE94:        Ventilator Settings:      Mode Rate TV Press PEEP FiO2 PIP Min.  Vent   Assist control,Volume control    550 ml 15 cm H2O  5 cm H20 30 %  18 cm H2O  10.7 l/min        Vital Signs: Telemetry:    normal sinus rhythm Intake/Output:   Visit Vitals  BP (!) 159/96 (BP Patient Position: Supine)   Pulse (!) 103   Temp 97.9 °F (36.6 °C)   Resp 19   Ht 6' (1.829 m)   Wt 111.5 kg (245 lb 13 oz)   SpO2 94%   BMI 33.34 kg/m²       Temp (24hrs), Av.1 °F (36.7 °C), Min:97.9 °F (36.6 °C), Max:98.2 °F (36.8 °C)        O2 Device: None (Room air) O2 Flow Rate (L/min): 1 l/min       Wt Readings from Last 4 Encounters:   22 111.5 kg (245 lb 13 oz)          Intake/Output Summary (Last 24 hours) at [address] filed at 2/21/2022 0121  Gross per 24 hour   Intake    Output 1000 ml   Net -1000 ml       Last shift:      No intake/output data recorded. Last 3 shifts: 02/19 1901 - 02/21 0700  In: -   Out: 1800 [Urine:1800]       Physical Exam:     General: Alert awake very confused some periods of agitation  HEENT: NCAT,   Eyes: anicteric; conjunctiva clear   Neck: no nodes,no accessory MM use. Chest: no deformity,   Cardiac: IR regular; no murmur;   Lungs: distant breath sounds; no wheeze  Abd: soft, NT, hypoactive BS  Ext: no edema; no joint swelling;  No clubbing  : clear urine  Neuro: Moving extremities  Psych-no issues  Skin: warm, dry, no cyanosis;   Pulses: Brachial and radial pulses intact  Capillary: Normal capillary refill      DATA:    MAR reviewed and pertinent medications noted or modified as needed  MEDS:   Current Facility-Administered Medications   Medication    hydrOXYzine (VISTARIL) 25 mg/mL injection 25 mg    nystatin (MYCOSTATIN) 100,000 unit/mL oral suspension 500,000 Units    piperacillin-tazobactam (ZOSYN) 3.375 g in 0.9% sodium chloride (MBP/ADV) 100 mL MBP    glycopyrrolate (ROBINUL) injection 0.1 mg    albuterol-ipratropium (DUO-NEB) 2.5 MG-0.5 MG/3 ML    bethanechol (URECHOLINE) tablet 100 mg    finasteride (PROSCAR) tablet 5 mg    OXcarbazepine (TRILEPTAL) tablet 150 mg    tamsulosin (FLOMAX) capsule 0.4 mg    acetaminophen (TYLENOL) tablet 650 mg    Or    acetaminophen (TYLENOL) suppository 650 mg    polyethylene glycol (MIRALAX) packet 17 g    ondansetron (ZOFRAN ODT) tablet 4 mg    Or    ondansetron (ZOFRAN) injection 4 mg    enoxaparin (LOVENOX) injection 40 mg    famotidine (PF) (PEPCID) 20 mg in 0.9% sodium chloride 10 mL injection    labetaloL (NORMODYNE;TRANDATE) 20 mg/4 mL (5 mg/mL) injection 10 mg    amLODIPine (NORVASC) tablet 10 mg    aspirin chewable tablet 81 mg    atorvastatin (LIPITOR) tablet 80 mg    clopidogreL (PLAVIX) tablet 75 mg    metoprolol succinate (TOPROL-XL) XL tablet 25 mg    methylPREDNISolone (PF) (SOLU-MEDROL) injection 40 mg    albuterol-ipratropium (DUO-NEB) 2.5 MG-0.5 MG/3 ML    budesonide (PULMICORT) 500 mcg/2 ml nebulizer suspension        Labs:      No results for input(s): PH, PCO2, PO2, HCO3, FIO2 in the last 72 hours. 2/13 AC 15  PEEP 8 FiO2 35%  Lab Results   Component Value Date/Time    TSH 1.38 02/08/2022 03:53 AM        Imaging:    Results from [place] encounter on 02/07/22    XR CHEST PORT    Narrative  Chest single view. Comparison single view chest February 17, 2022. ET tube and NG tube have been removed. Unchanged appearance for the lungs; no gross interstitial or alveolar pulmonary  edema. Cardiac and mediastinal structures unchanged. Apical pleural thickening  unchanged. No pneumothorax or sizable pleural effusion. Results from Hospital Encounter encounter on 02/07/22    CTA HEAD NECK W WO CONT    Narrative  CTA NECK WITHOUT AND WITH INTRAVENOUS CONTRAST  CTA HEAD WITH INTRAVENOUS CONTRAST    INDICATION: Right arm weakness    COMPARISON: CTA head and neck from 2/12/2022    TECHNIQUE: Noncontrast neck CT. Transaxial CTA of the head and neck was  performed after administration of 100 mL of Isovue-370 intravenous contrast.  Multiplanar MIP and standard reconstructions performed and evaluated. Dose  reduction: Per department policy, all CT scans at this facility are performed  using dose reduction optimization techniques as appropriate to a performed  examination including the following: Automated exposure control, adjustments of  the mA and/or KV according to patient size, or use of iterative reconstruction  technique. FINDINGS:  Contrast injection through the right upper extremity causes streak artifact  which obscures evaluation of the surrounding structures.  No focal  hemodynamically significant luminal narrowing of the visualized aortic arch,  innominate artery, or included/visualized subclavian and axillary arteries. Right common carotid artery is widely patent. Mild calcified atherosclerosis of  the right carotid bifurcation without hemodynamically significant luminal  stenosis by NASCET-like criteria. Calcified plaque of the proximal right  internal carotid artery without hemodynamically significant luminal stenosis. Right ICA is patent in the neck. Cavernous carotid circumferential calcified  atherosclerosis. Right ophthalmic artery is patent. Right anterior cerebral  artery and middle cerebral artery are patent. Left common carotid artery is widely patent. Mild calcified atherosclerosis  without hemodynamically significant luminal stenosis by NASCET-like criteria. Left internal carotid artery is widely patent. Mild calcified plaque of the left  cavernous ICA. Left ophthalmic artery is patent. Left anterior cerebral artery  is patent. No definite large vessel occlusion of the left middle cerebral  artery. Calcified atherosclerosis of bilateral vertebral arteries. Intradural vertebral  arteries also show calcified atherosclerosis. Basilar artery is patent. Bilateral posterior cerebral arteries are patent. Emphysema of the included lung apices. Visualized osseous structures show  chronic fusion of the C3-4 vertebral bodies. Impression  No large vessel occlusion or hemodynamically significant stenosis seen  intracranially. No hemodynamically significant luminal stenosis of the carotid  bifurcations by NASCET-like criteria. · 2/12 patient condition got worse yesterday and he became cyanotic while he was on a ventilator transferred to ICU he is back on Precedex and propofol ABG acceptable  · 2/13 back on ventilator assist control. Low-grade fever 100.6. Will check urine and sputum cultures although chest x-ray looks clear. Will decrease PEEP to 5.   Repeat labs in a.m. hypercapnia today we will give Diamox  · 2/14 will decrease FiO2 sputum positive for gram-positive cocci in clusters started on vancomycin and Zosyn pending sensitivity still spiking temperature  · 2/16 remain on ventilator  will try to wean him today once extubated cardiologist suggested about cardiac cath  · 2/17 on assist control mode will try to wean him again today  · 2/18 alert awake nasal cannula talking weak  · 2/20/22 Patient transferred to floor on 2 L O2 NC satting appropriately talking and no short of breath. Patient has been getting straight cathed for the past few days, will consult urology. Patient also not sleeping, will add melatonin for now. · 2/21 patient confused according to the family he is talking out of his head started yesterday. Last blood gas no CO2 retention serum CO2 normal despite this we will check another blood gas.   No wheezing will start decreasing Solu-Medrol  ·   · Amanda Castellanos MD  ·

## 2022-02-21 NOTE — PROGRESS NOTES
Attempted to see pt previously this date, pt off the floor for MRI. Attempted again to see pt; however, pt is receiving Ativan for second MRI attempt. Per MD, d/c has been cancelled this date. Will plan to follow-up with pt 2- if pt able to participate.

## 2022-02-21 NOTE — PROGRESS NOTES
Patient has not been voiding on his own since his rubin was d/c'd in the ICU before he transferred to [place] [address] on 2/18. The patient has been straight cathed several times over the last 3 days. MD on call notified and gave the order to place a rubin catheter for urinary retention. 16FR rubin placed at this time. Patient tolerated well. Draining appropriately.

## 2022-02-21 NOTE — PROGRESS NOTES
IMPRESSION:   1. Acute hypoxic respiratory failure   2. Klebsiella pneumonia and moraxella catarrhalis  3. NSTEMI  4. Generalized Edema  5. Syncope  6. COPD   7. Hypokalemia  8. Pneumonia sputum positive for gram-positive cocci  9. Urinary retention      RECOMMENDATIONS/PLAN:   1. Transferred to   2. Extubated on 2/17 alert awake talking on 2 L nasal cannula, some mild confusion  3. Patient on IV Solu-Medrol nebulizer treatment Covid negative  4. Hold trilogy inhaler continue nebulized albuterol Atrovent budesonide  5. Chest x-ray no acute infiltrate which shows emphysematous changes, CXR 2/18/22 no acute changes  6. Start patient on vancomycin and Zosyn sputum shows gram-positive cocci, mozzarella catarrhalis Klebsiella pneumonia  7. CTA head negative  8. Superficial thrombus in LUE  9. 2/18/22 ABG WNL  10. Potassium corrected  11. Consult to urology  12. Start melatonin for sleep     [x] High complexity decision making was performed  [x] See my orders for details  HPI  18-year-old male came in because of chest pain with shortness of breath he has also recurrent episodes of syncope significant past medical history of vocal cord dysfunction, pulmonary [place], coronary artery disease status post stent COPD current everyday smoker no history of sleep apnea he was having swelling of the lower extremities for couple of months today came into the emergency room as previously was having chest pain or shortness of breath he passed out subsequently got intubated and now on ventilator critical care consult was called. Patient transferred to floor, no longer intubated, on NC O2 and satting appropriately. Clinically much improved from pulmonary standpoint. PMH:  has no past medical history on file. PSH:   has no past surgical history on file. FHX: family history is not on file.      SHX:      ALL:   Allergies   Allergen Reactions    Sulfa (Sulfonamide Antibiotics) Other (comments)     syncope          MEDS: [x] Reviewed - As Below   [] Not reviewed    Current Facility-Administered Medications   Medication    hydrOXYzine (VISTARIL) 25 mg/mL injection 25 mg    nystatin (MYCOSTATIN) 100,000 unit/mL oral suspension 500,000 Units    piperacillin-tazobactam (ZOSYN) 3.375 g in 0.9% sodium chloride (MBP/ADV) 100 mL MBP    glycopyrrolate (ROBINUL) injection 0.1 mg    albuterol-ipratropium (DUO-NEB) 2.5 MG-0.5 MG/3 ML    bethanechol (URECHOLINE) tablet 100 mg    finasteride (PROSCAR) tablet 5 mg    OXcarbazepine (TRILEPTAL) tablet 150 mg    tamsulosin (FLOMAX) capsule 0.4 mg    acetaminophen (TYLENOL) tablet 650 mg    Or    acetaminophen (TYLENOL) suppository 650 mg    polyethylene glycol (MIRALAX) packet 17 g    ondansetron (ZOFRAN ODT) tablet 4 mg    Or    ondansetron (ZOFRAN) injection 4 mg    enoxaparin (LOVENOX) injection 40 mg    famotidine (PF) (PEPCID) 20 mg in 0.9% sodium chloride 10 mL injection    labetaloL (NORMODYNE;TRANDATE) 20 mg/4 mL (5 mg/mL) injection 10 mg    amLODIPine (NORVASC) tablet 10 mg    aspirin chewable tablet 81 mg    atorvastatin (LIPITOR) tablet 80 mg    clopidogreL (PLAVIX) tablet 75 mg    metoprolol succinate (TOPROL-XL) XL tablet 25 mg    methylPREDNISolone (PF) (SOLU-MEDROL) injection 40 mg    albuterol-ipratropium (DUO-NEB) 2.5 MG-0.5 MG/3 ML    budesonide (PULMICORT) 500 mcg/2 ml nebulizer suspension      MAR reviewed and pertinent medications noted or modified as needed   Current Facility-Administered Medications   Medication    hydrOXYzine (VISTARIL) 25 mg/mL injection 25 mg    nystatin (MYCOSTATIN) 100,000 unit/mL oral suspension 500,000 Units    piperacillin-tazobactam (ZOSYN) 3.375 g in 0.9% sodium chloride (MBP/ADV) 100 mL MBP    glycopyrrolate (ROBINUL) injection 0.1 mg    albuterol-ipratropium (DUO-NEB) 2.5 MG-0.5 MG/3 ML    bethanechol (URECHOLINE) tablet 100 mg    finasteride (PROSCAR) tablet 5 mg    OXcarbazepine (TRILEPTAL) tablet 150 mg    tamsulosin (FLOMAX) capsule 0.4 mg    acetaminophen (TYLENOL) tablet 650 mg    Or    acetaminophen (TYLENOL) suppository 650 mg    polyethylene glycol (MIRALAX) packet 17 g    ondansetron (ZOFRAN ODT) tablet 4 mg    Or    ondansetron (ZOFRAN) injection 4 mg    enoxaparin (LOVENOX) injection 40 mg    famotidine (PF) (PEPCID) 20 mg in 0.9% sodium chloride 10 mL injection    labetaloL (NORMODYNE;TRANDATE) 20 mg/4 mL (5 mg/mL) injection 10 mg    amLODIPine (NORVASC) tablet 10 mg    aspirin chewable tablet 81 mg    atorvastatin (LIPITOR) tablet 80 mg    clopidogreL (PLAVIX) tablet 75 mg    metoprolol succinate (TOPROL-XL) XL tablet 25 mg    methylPREDNISolone (PF) (SOLU-MEDROL) injection 40 mg    albuterol-ipratropium (DUO-NEB) 2.5 MG-0.5 MG/3 ML    budesonide (PULMICORT) 500 mcg/2 ml nebulizer suspension      PMH:  has no past medical history on file. PSH:   has no past surgical history on file. FHX: family history is not on file. SHX:       ROS:  Patient alert awake talking review of systems as mentioned in HPI and physical exam    Hemodynamics:    CO:    CI:    CVP:    SVR:   PAP Systolic:    PAP Diastolic:    PVR:    TD84:        Ventilator Settings:      Mode Rate TV Press PEEP FiO2 PIP Min.  Vent   Assist control,Volume control    550 ml 15 cm H2O  5 cm H20 30 %  18 cm H2O  10.7 l/min        Vital Signs: Telemetry:    normal sinus rhythm Intake/Output:   Visit Vitals  BP (!) 147/90 (BP 1 Location: Right upper arm, BP Patient Position: At rest)   Pulse (!) 108   Temp 98.2 °F (36.8 °C)   Resp 18   Ht 6' (1.829 m)   Wt 111.5 kg (245 lb 13 oz)   SpO2 91%   BMI 33.34 kg/m²       Temp (24hrs), Av.8 °F (36.6 °C), Min:97.3 °F (36.3 °C), Max:98.2 °F (36.8 °C)        O2 Device: Nasal cannula O2 Flow Rate (L/min): 1 l/min       Wt Readings from Last 4 Encounters:   22 111.5 kg (245 lb 13 oz)          Intake/Output Summary (Last 24 hours) at 2022  Last data filed at 2022 9631  Gross per 24 hour   Intake    Output 800 ml   Net -800 ml       Last shift:      No intake/output data recorded. Last 3 shifts: 02/19 0701 - 02/20 1900  In: -   Out: 1300 [Urine:1300]       Physical Exam:     General: Alert awake  HEENT: NCAT,   Eyes: anicteric; conjunctiva clear   Neck: no nodes,no accessory MM use. Chest: no deformity,   Cardiac: IR regular; no murmur;   Lungs: distant breath sounds; no wheeze  Abd: soft, NT, hypoactive BS  Ext: no edema; no joint swelling;  No clubbing  : clear urine  Neuro: Moving extremities  Psych-no issues  Skin: warm, dry, no cyanosis;   Pulses: Brachial and radial pulses intact  Capillary: Normal capillary refill      DATA:    MAR reviewed and pertinent medications noted or modified as needed  MEDS:   Current Facility-Administered Medications   Medication    hydrOXYzine (VISTARIL) 25 mg/mL injection 25 mg    nystatin (MYCOSTATIN) 100,000 unit/mL oral suspension 500,000 Units    piperacillin-tazobactam (ZOSYN) 3.375 g in 0.9% sodium chloride (MBP/ADV) 100 mL MBP    glycopyrrolate (ROBINUL) injection 0.1 mg    albuterol-ipratropium (DUO-NEB) 2.5 MG-0.5 MG/3 ML    bethanechol (URECHOLINE) tablet 100 mg    finasteride (PROSCAR) tablet 5 mg    OXcarbazepine (TRILEPTAL) tablet 150 mg    tamsulosin (FLOMAX) capsule 0.4 mg    acetaminophen (TYLENOL) tablet 650 mg    Or    acetaminophen (TYLENOL) suppository 650 mg    polyethylene glycol (MIRALAX) packet 17 g    ondansetron (ZOFRAN ODT) tablet 4 mg    Or    ondansetron (ZOFRAN) injection 4 mg    enoxaparin (LOVENOX) injection 40 mg    famotidine (PF) (PEPCID) 20 mg in 0.9% sodium chloride 10 mL injection    labetaloL (NORMODYNE;TRANDATE) 20 mg/4 mL (5 mg/mL) injection 10 mg    amLODIPine (NORVASC) tablet 10 mg    aspirin chewable tablet 81 mg    atorvastatin (LIPITOR) tablet 80 mg    clopidogreL (PLAVIX) tablet 75 mg    metoprolol succinate (TOPROL-XL) XL tablet 25 mg    methylPREDNISolone (PF) (SOLU-MEDROL) injection 40 mg    albuterol-ipratropium (DUO-NEB) 2.5 MG-0.5 MG/3 ML    budesonide (PULMICORT) 500 mcg/2 ml nebulizer suspension        Labs:      Recent Labs     02/18/22  0520   PH 7.44   PCO2 37   PO2 83   HCO3 26   2/13 AC 15  PEEP 8 FiO2 35%  Lab Results   Component Value Date/Time    TSH 1.38 02/08/2022 03:53 AM        Imaging:    Results from [place] encounter on 02/07/22    XR CHEST PORT    Narrative  Chest single view. Comparison single view chest February 17, 2022. ET tube and NG tube have been removed. Unchanged appearance for the lungs; no gross interstitial or alveolar pulmonary  edema. Cardiac and mediastinal structures unchanged. Apical pleural thickening  unchanged. No pneumothorax or sizable pleural effusion. Results from Hospital Encounter encounter on 02/07/22    CTA HEAD NECK W WO CONT    Narrative  CTA NECK WITHOUT AND WITH INTRAVENOUS CONTRAST  CTA HEAD WITH INTRAVENOUS CONTRAST    INDICATION: Right arm weakness    COMPARISON: CTA head and neck from 2/12/2022    TECHNIQUE: Noncontrast neck CT. Transaxial CTA of the head and neck was  performed after administration of 100 mL of Isovue-370 intravenous contrast.  Multiplanar MIP and standard reconstructions performed and evaluated. Dose  reduction: Per department policy, all CT scans at this facility are performed  using dose reduction optimization techniques as appropriate to a performed  examination including the following: Automated exposure control, adjustments of  the mA and/or KV according to patient size, or use of iterative reconstruction  technique. FINDINGS:  Contrast injection through the right upper extremity causes streak artifact  which obscures evaluation of the surrounding structures. No focal  hemodynamically significant luminal narrowing of the visualized aortic arch,  innominate artery, or included/visualized subclavian and axillary arteries. Right common carotid artery is widely patent. Mild calcified atherosclerosis of  the right carotid bifurcation without hemodynamically significant luminal  stenosis by NASCET-like criteria. Calcified plaque of the proximal right  internal carotid artery without hemodynamically significant luminal stenosis. Right ICA is patent in the neck. Cavernous carotid circumferential calcified  atherosclerosis. Right ophthalmic artery is patent. Right anterior cerebral  artery and middle cerebral artery are patent. Left common carotid artery is widely patent. Mild calcified atherosclerosis  without hemodynamically significant luminal stenosis by NASCET-like criteria. Left internal carotid artery is widely patent. Mild calcified plaque of the left  cavernous ICA. Left ophthalmic artery is patent. Left anterior cerebral artery  is patent. No definite large vessel occlusion of the left middle cerebral  artery. Calcified atherosclerosis of bilateral vertebral arteries. Intradural vertebral  arteries also show calcified atherosclerosis. Basilar artery is patent. Bilateral posterior cerebral arteries are patent. Emphysema of the included lung apices. Visualized osseous structures show  chronic fusion of the C3-4 vertebral bodies. Impression  No large vessel occlusion or hemodynamically significant stenosis seen  intracranially. No hemodynamically significant luminal stenosis of the carotid  bifurcations by NASCET-like criteria. · 2/12 patient condition got worse yesterday and he became cyanotic while he was on a ventilator transferred to ICU he is back on Precedex and propofol ABG acceptable  · 2/13 back on ventilator assist control. Low-grade fever 100.6. Will check urine and sputum cultures although chest x-ray looks clear. Will decrease PEEP to 5.   Repeat labs in a.m. hypercapnia today we will give Diamox  · 2/14 will decrease FiO2 sputum positive for gram-positive cocci in clusters started on vancomycin and Zosyn pending sensitivity still spiking temperature  · 2/16 remain on ventilator  will try to wean him today once extubated cardiologist suggested about cardiac cath  · 2/17 on assist control mode will try to wean him again today  · 2/18 alert awake nasal cannula talking weak  · 2/20/22 Patient transferred to floor on 2 L O2 NC satting appropriately talking and no short of breath. Patient has been getting straight cathed for the past few days, will consult urology. Patient also not sleeping, will add melatonin for now.   ·

## 2022-02-21 NOTE — DISCHARGE SUMMARY
Hospitalist Discharge Summary     Patient ID:  Heriberto Russ  [id number]  73 y.o.  1955 2/7/2022    PCP on record: Omaira Singh MD    Admit date: 2/7/2022  Discharge date and time: 2/21/2022    DISCHARGE DIAGNOSIS:    Acute respiratory failure with hypoxia/COPD exacerbation/pneumonia/sepsis/recurrent syncope/hypertension/type II non-ST elevation MI/left arm    CONSULTATIONS:  IP CONSULT TO NEUROLOGY  IP CONSULT TO NEPHROLOGY  IP CONSULT TO CARDIOLOGY  IP CONSULT TO PULMONOLOGY  IP CONSULT TO UROLOGY    Excerpted HPI from H&P of Chrissy Carbajal MD:  Limited history was obtained as patient was intubated and sedated. [de-identified] of history obtained from wife at bedside and ED physician.  Davidson Burks is a 73 y.o. male possible history of vocal cord dysfunction, history of pulmonary embolism (on warfarin due to GI bleed), CAD s/p stent (6 years ago), and COPD. He presented to the ED today with chief complaint of chest pain, shortness of breath, syncope and altered mental status. He initially went to outside ED for chest pain shortness of breath, however noted to have altered mental status by EMS crew. Recurrent, back-to-back episodes of syncope that lasted 5 to 10 seconds, every 1 minutes. He was noted to have normal pulses, normal breathing and no arrhythmia.      In the ED, he was initially hypertensive (230/143) and hypoxic, SPO2 in 30s. He continued to have episodes of syncope, but wakes up after sternal rub. No postictal phase of confusion noted per ED physician, and was able to provide history to ED physician. As patient was unable to protect his airway and continue to be hypoxic, patient was intubated.     Per wife, patient has been having intermittent, generalized/whole body cramps that last between 15 minutes to 1 hour. Those episodes generally followed by loss of consciousness for about 15 seconds.   He usually will have short period of confusion and slurred speech after regaining consciousness. No extremity weakness after the episodes. Wife reports no fever, chills or chest pain. Reports have shortness of breath and cough at baseline due to COPD and has not been worse than normal in the past 3 weeks. Of note, he has been worked-up in the hospital in [place] (record not found in system), and no causes has been identified. EEG was never done.     Initial work-up showed no leukocytosis, no significant electrolytes abnormalities, no elevated troponin or BNP. UA not indicated of of UTI, tox screen negative. CT head and chest x-ray showed no acute findings. CT PE pending.    ______________________________________________________________________  DISCHARGE SUMMARY/HOSPITAL COURSE:  for full details see H&P, daily progress notes, labs, consult notes. 61-year-old male with history of COPD, CAD with PCI and pulmonary embolism (not on anticoagulation due to GI bleed) who was admitted on 2/7 with altered mental status and syncope complicated by hypertensive urgency and hypoxia. Initially had a blood pressure of 230/143 with oxygen saturations in the 30s. He was intubated in the ED. CT of the chest was unremarkable. Patient has a history of recurrent syncopal episodes. Per wife, patient had been having intermittent, generalized/whole body cramps that last between 15 minutes to 1 hour.  Those episodes generally are  followed by loss of consciousness for about 15 seconds. Krishan Pry usually will have short period of confusion and slurred speech after regaining consciousness.  No extremity weakness after the episodes.  Wife reported no fever, chills or chest pain.  Reports have shortness of breath and cough at baseline due to COPD and has not been worse than normal in the past 3 weeks.  Of note, he has been worked-up in the hospital in [place] (record not found in system), and no cause has been identified.   EEG was never done.     Patient was seen by neurology who does not feel the above episodes represented seizures.     COVID test was negative.     Hospital course was complicated by non-STEMI felt due to malignant hypertension.     He  has been maintained on the ventilator and followed by pulmonology. Etiology of respiratory failure seems to be largely COPD exacerbation. Patient was successfully extubated, transferred out of ICU, remained on Zosyn as well as Solu-Medrol, wean down on oxygen, patient was evaluated by neurology, received MRI brain, evaluated by physical therapy as well as Occupational Therapy, overall patient's clinical status improved, patient needed cardiac catheterization which can be done as an outpatient, patient's was subsequently transitioned to oral steroids as well as oral antibiotics and deemed stable for discharge to skilled nursing facility with close outpatient follow-up with primary care physician as well as neurology as well as cardiology as well as nephrology. _______________________________________________________________________  Patient seen and examined by me on discharge day. Pertinent Findings:  Gen:    Not in distress  Chest: Decreased air entry bilaterally in bilateral lower lung zones  CVS:   Regular rhythm, s1/s2 no m/r/g  No edema  Abd:  Soft, not distended, not tender  Neuro:  Alert, at  baseline  _______________________________________________________________________  DISCHARGE MEDICATIONS:   Current Discharge Medication List      START taking these medications    Details   acetaminophen (TYLENOL) 325 mg tablet Take 2 Tablets by mouth every six (6) hours as needed for Pain or Fever. Qty: 60 Tablet, Refills: 0  Start date: 2/21/2022      amLODIPine (NORVASC) 10 mg tablet Take 1 Tablet by mouth daily. Qty: 30 Tablet, Refills: 0  Start date: 2/22/2022      budesonide (PULMICORT) 0.5 mg/2 mL nbsp 2 mL by Nebulization route two (2) times a day.   Qty: 200 mL, Refills: 0  Start date: 2/21/2022      glycopyrrolate (ROBINUL) 0.2 mg/mL injection 0.5 mL by IntraVENous route three (3) times daily. Qty: 20 mL, Refills: 0  Start date: 2/21/2022      nystatin (MYCOSTATIN) 100,000 unit/mL suspension Take 5 mL by mouth four (4) times daily. swish and spit  Qty: 473 mL, Refills: 0  Start date: 2/21/2022      predniSONE (DELTASONE) 20 mg tablet Take 1 tablet twice daily for 7 days, then take 1 tablet once daily for 7 days  Qty: 21 Tablet, Refills: 0  Start date: 2/21/2022      amoxicillin-clavulanate (Augmentin) 875-125 mg per tablet Take 1 Tablet by mouth two (2) times a day. Qty: 14 Tablet, Refills: 0  Start date: 2/21/2022         CONTINUE these medications which have NOT CHANGED    Details   albuterol (ProAir HFA) 90 mcg/actuation inhaler Take 1 Puff by inhalation four (4) times daily as needed. aspirin 81 mg chewable tablet Take 81 mg by mouth daily. atorvastatin (Lipitor) 40 mg tablet Take 40 mg by mouth daily. bethanechol chloride (URECHOLINE) 50 mg tablet Take 200 mg by mouth daily. clopidogreL (PLAVIX) 75 mg tab Take 75 mg by mouth daily. esomeprazole (NexIUM) 40 mg capsule Take 40 mg by mouth daily. finasteride (PROSCAR) 5 mg tablet Take 5 mg by mouth daily. fluticasone-umeclidinium-vilanterol (TRELEGY ELLIPTA) 100-62.5-25 mcg inhaler Take 1 Puff by inhalation daily. ipratropium-albuteroL (Combivent Respimat)  mcg/actuation inhaler Take 1 Puff by inhalation two (2) times daily as needed. omeprazole (PRILOSEC) 20 mg capsule Take 20 mg by mouth daily. OXcarbazepine (Oxtellar XR) 300 mg Tb24 Take 300 mg by mouth daily. tamsulosin (Flomax) 0.4 mg capsule Take 0.4 mg by mouth daily. metoprolol succinate (TOPROL-XL) 25 mg XL tablet Take 25 mg by mouth daily.          STOP taking these medications       bumetanide (BUMEX) 1 mg tablet Comments:   Reason for Stopping:         furosemide (LASIX) 20 mg tablet Comments:   Reason for Stopping:         gabapentin (NEURONTIN) 300 mg capsule Comments:   Reason for Stopping:         isosorbide dinitrate (ISORDIL) 30 mg tablet Comments:   Reason for Stopping:         losartan (COZAAR) 25 mg tablet Comments:   Reason for Stopping:         QUEtiapine (SEROqueL) 400 mg tablet Comments:   Reason for Stopping:         sertraline (Zoloft) 100 mg tablet Comments:   Reason for Stopping:         ticagrelor (Brilinta) 90 mg tablet Comments:   Reason for Stopping:                 Patient Follow Up Instructions: Activity: PT/OT Eval and Treat  Diet: Full liquid diet moderately thickened  Wound Care: As directed    Follow-up with PCP/Pulmonology/Neurology/Cardiology/Nephrology in 2 weeks. Follow-up tests/labs As per above physcians  Follow-up Information     Follow up With Specialties Details Why Contact Info    Dayday Stone MD Family Medicine In 1 week  [address] [facility] [address]      Cary Galindo MD Pulmonary Disease In 1 week  2020 [address] [address] Yusuf Olivia Taylor MD Neurology In 1 week  [address] [facility] [address]   [phone]      Ruby Barker MD Cardiology In 1 week  [facility] [address] [facility]      Kam Joaquin MD Nephrology In 1 week  [address] [place] [address]  [phone]          ________________________________________________________________    Risk of deterioration: Low    Condition at Discharge:  Stable  __________________________________________________________________    Disposition  SNF/LTC    ____________________________________________________________________    Code Status: Full Code  ___________________________________________________________________      Total time in minutes spent coordinating this discharge (includes going over instructions, follow-up, prescriptions, and preparing report for sign off to her PCP) :  45 minutes    Signed:   Lia Estrada MD

## 2022-02-21 NOTE — PROGRESS NOTES
SUZANNE received call from patient Keyur Urban [phone]. She does not want patient to discharge today. She feels that he is not medically stable for discharge. She requested a call from MD. SUZANNE placed call to Dr. Farrah Monsalve who will give her a call.

## 2022-02-21 NOTE — PROGRESS NOTES
Problem: Ventilator Management  Goal: *Adequate oxygenation and ventilation  Outcome: Progressing Towards Goal  Goal: *Patient maintains clear airway/free of aspiration  Outcome: Progressing Towards Goal  Goal: *Absence of infection signs and symptoms  Outcome: Progressing Towards Goal  Goal: *Normal spontaneous ventilation  Outcome: Progressing Towards Goal     Problem: Patient Education: Go to Patient Education Activity  Goal: Patient/Family Education  Outcome: Progressing Towards Goal     Problem: Pressure Injury - Risk of  Goal: *Prevention of pressure injury  Description: Document Storm Scale and appropriate interventions in the flowsheet. Outcome: Progressing Towards Goal  Note: Pressure Injury Interventions:  Sensory Interventions: Assess changes in LOC    Moisture Interventions: Apply protective barrier, creams and emollients    Activity Interventions: PT/OT evaluation    Mobility Interventions: HOB 30 degrees or less    Nutrition Interventions: Document food/fluid/supplement intake    Friction and Shear Interventions: HOB 30 degrees or less                Problem: Patient Education: Go to Patient Education Activity  Goal: Patient/Family Education  Outcome: Progressing Towards Goal     Problem: Falls - Risk of  Goal: *Absence of Falls  Description: Document Raymundo Fall Risk and appropriate interventions in the flowsheet.   Outcome: Progressing Towards Goal  Note: Fall Risk Interventions:  Mobility Interventions: Bed/chair exit alarm    Mentation Interventions: Adequate sleep, hydration, pain control,Bed/chair exit alarm    Medication Interventions: Assess postural VS orthostatic hypotension,Evaluate medications/consider consulting pharmacy,Bed/chair exit alarm    Elimination Interventions: Bed/chair exit alarm,Call light in reach    History of Falls Interventions: Bed/chair exit alarm         Problem: Patient Education: Go to Patient Education Activity  Goal: Patient/Family Education  Outcome: Progressing Towards Goal     Problem: Discharge Planning  Goal: *Discharge to safe environment  Outcome: Progressing Towards Goal  Goal: *Knowledge of medication management  Outcome: Progressing Towards Goal  Goal: *Knowledge of discharge instructions  Outcome: Progressing Towards Goal     Problem: Patient Education: Go to Patient Education Activity  Goal: Patient/Family Education  Outcome: Progressing Towards Goal     Problem: Airway Clearance - Ineffective  Goal: *Patent airway  Outcome: Progressing Towards Goal  Goal: *Absence of airway secretions  Outcome: Progressing Towards Goal  Goal: *Able to cough effectively  Outcome: Progressing Towards Goal     Problem: Breathing Pattern - Ineffective  Goal: *Absence of hypoxia  Outcome: Progressing Towards Goal  Goal: *Use of effective breathing techniques  Outcome: Progressing Towards Goal     Problem: Patient Education: Go to Patient Education Activity  Goal: Patient/Family Education  Outcome: Progressing Towards Goal     Problem: Patient Education: Go to Patient Education Activity  Goal: Patient/Family Education  Outcome: Progressing Towards Goal

## 2022-02-21 NOTE — PROGRESS NOTES
Hospitalist Progress Note               Daily Progress Note: 2/21/2022      Subjective:   Hospital course to date:  73-year-old male with history of COPD, CAD with PCI and pulmonary embolism (not on anticoagulation due to GI bleed) who was admitted on 2/7 with altered mental status and syncope complicated by hypertensive urgency and hypoxia. Initially had a blood pressure of 230/143 with oxygen saturations in the 30s. He was intubated in the ED. CT of the chest was unremarkable. Patient has a history of recurrent syncopal episodes. Per wife, patient had been having intermittent, generalized/whole body cramps that last between 15 minutes to 1 hour. Those episodes generally are  followed by loss of consciousness for about 15 seconds. He usually will have short period of confusion and slurred speech after regaining consciousness. No extremity weakness after the episodes. Wife reported no fever, chills or chest pain. Reports have shortness of breath and cough at baseline due to COPD and has not been worse than normal in the past 3 weeks. Of note, he has been worked-up in the hospital in [place] (record not found in system), and no cause has been identified. EEG was never done. Patient was seen by neurology who does not feel the above episodes represented seizures. COVID test was negative. Hospital course was complicated by non-STEMI felt due to malignant hypertension. He  has been maintained on the ventilator and followed by pulmonology. Etiology of respiratory failure seems to be largely COPD exacerbation. There was some question about possible vocal cord dysfunction with plans for ENT evaluation    Patient was treated with IV antibiotics.   Currently on Zosyn and vancomycin    Sputum culture from 2/14 was positive for heavy growth of Moraxella catarrhalis    -------    Patient seen and examined at bedside, currently doing well on 1 L nasal cannula, complains of generalized weakness however no other complaints, discussed with RN      Problem List:  Problem List as of 2/21/2022 Never Reviewed          Codes Class Noted - Resolved    Respiratory failure [facility]) ICD-10-CM: J96.90  ICD-9-CM: 518.81  2/7/2022 - Present              Medications reviewed  Current Facility-Administered Medications   Medication Dose Route Frequency    hydrOXYzine (VISTARIL) 25 mg/mL injection 25 mg  25 mg IntraMUSCular Q6H PRN    nystatin (MYCOSTATIN) 100,000 unit/mL oral suspension 500,000 Units  500,000 Units Oral QID    piperacillin-tazobactam (ZOSYN) 3.375 g in 0.9% sodium chloride (MBP/ADV) 100 mL MBP  3.375 g IntraVENous Q8H    glycopyrrolate (ROBINUL) injection 0.1 mg  0.1 mg IntraVENous TID    albuterol-ipratropium (DUO-NEB) 2.5 MG-0.5 MG/3 ML  3 mL Nebulization Q4H PRN    bethanechol (URECHOLINE) tablet 100 mg  100 mg Oral BID    finasteride (PROSCAR) tablet 5 mg  5 mg Oral DAILY    OXcarbazepine (TRILEPTAL) tablet 150 mg  150 mg Oral BID    tamsulosin (FLOMAX) capsule 0.4 mg  0.4 mg Oral DAILY    acetaminophen (TYLENOL) tablet 650 mg  650 mg Oral Q6H PRN    Or    acetaminophen (TYLENOL) suppository 650 mg  650 mg Rectal Q6H PRN    polyethylene glycol (MIRALAX) packet 17 g  17 g Oral DAILY PRN    ondansetron (ZOFRAN ODT) tablet 4 mg  4 mg Oral Q8H PRN    Or    ondansetron (ZOFRAN) injection 4 mg  4 mg IntraVENous Q6H PRN    enoxaparin (LOVENOX) injection 40 mg  40 mg SubCUTAneous DAILY    famotidine (PF) (PEPCID) 20 mg in 0.9% sodium chloride 10 mL injection  20 mg IntraVENous Q12H    labetaloL (NORMODYNE;TRANDATE) 20 mg/4 mL (5 mg/mL) injection 10 mg  10 mg IntraVENous Q4H PRN    amLODIPine (NORVASC) tablet 10 mg  10 mg Oral DAILY    aspirin chewable tablet 81 mg  81 mg Oral DAILY    atorvastatin (LIPITOR) tablet 80 mg  80 mg Oral DAILY    clopidogreL (PLAVIX) tablet 75 mg  75 mg Oral DAILY    metoprolol succinate (TOPROL-XL) XL tablet 25 mg  25 mg Oral DAILY    methylPREDNISolone (PF) (SOLU-MEDROL) injection 40 mg  40 mg IntraVENous Q8H    albuterol-ipratropium (DUO-NEB) 2.5 MG-0.5 MG/3 ML  3 mL Nebulization Q6HWA RT    budesonide (PULMICORT) 500 mcg/2 ml nebulizer suspension  500 mcg Nebulization BID RT       Review of Systems:   A comprehensive review of systems was negative except for that written in the HPI. Objective:   Physical Exam:     Visit Vitals  BP (!) 138/91 (BP Patient Position: Supine)   Pulse (!) 109   Temp 98.2 °F (36.8 °C)   Resp 19   Ht 6' (1.829 m)   Wt 111.5 kg (245 lb 13 oz)   SpO2 94%   BMI 33.34 kg/m²    O2 Flow Rate (L/min): 1 l/min O2 Device: Nasal cannula    Temp (24hrs), Av.2 °F (36.8 °C), Min:98.2 °F (36.8 °C), Max:98.2 °F (36.8 °C)    No intake/output data recorded.  1901 -  0700  In: -   Out: 1800 [Urine:1800]    General:   Awake and alert   Lungs:   Clear to auscultation bilaterally. Chest wall:  No tenderness or deformity. Heart:  Regular rate and rhythm, S1, S2 normal, no murmur, click, rub or gallop. Abdomen:   Soft, non-tender. Bowel sounds normal. No masses,  No organomegaly. Extremities: Extremities show generalized swelling of left arm   Pulses: 2+ and symmetric all extremities. Skin: Skin color, texture, turgor normal. No rashes or lesions   Neurologic: CNII-XII intact. Unable to assess         Data Review:       Recent Days:  Recent Labs     22  0801 22  0833   WBC 11.5* 13.1*   HGB 12.9 12.8   HCT 39.4 38.7    248     Recent Labs     22  0801 22  0833   * 145   K 4.0 3.8   * 115*   CO2 26 24   * 163*   BUN 43* 40*   CREA 0.89 0.67*   CA 8.8 8.7   MG 2.6* 2.4     No results for input(s): PH, PCO2, PO2, HCO3, FIO2 in the last 72 hours.     24 Hour Results:  Recent Results (from the past 24 hour(s))   CBC W/O DIFF    Collection Time: 22  8:01 AM   Result Value Ref Range    WBC 11.5 (H) 4.1 - 11.1 K/uL    RBC 4.41 4.10 - 5.70 M/uL    HGB 12.9 12.1 - 17.0 g/dL    HCT 39.4 36.6 - 50.3 %    MCV 89.3 80.0 - 99.0 FL    MCH 29.3 26.0 - 34.0 PG    MCHC 32.7 30.0 - 36.5 g/dL    RDW 15.2 (H) 11.5 - 14.5 %    PLATELET 324 509 - 274 K/uL    MPV 9.4 8.9 - 12.9 FL    NRBC 0.0 0.0  WBC    ABSOLUTE NRBC 0.00 0.00 - 9.12 K/uL   METABOLIC PANEL, BASIC    Collection Time: 02/21/22  8:01 AM   Result Value Ref Range    Sodium 149 (H) 136 - 145 mmol/L    Potassium 4.0 3.5 - 5.1 mmol/L    Chloride 117 (H) 97 - 108 mmol/L    CO2 26 21 - 32 mmol/L    Anion gap 6 5 - 15 mmol/L    Glucose 153 (H) 65 - 100 mg/dL    BUN 43 (H) 6 - 20 mg/dL    Creatinine 0.89 0.70 - 1.30 mg/dL    BUN/Creatinine ratio 48 (H) 12 - 20      GFR est AA >60 >60 ml/min/1.73m2    GFR est non-AA >60 >60 ml/min/1.73m2    Calcium 8.8 8.5 - 10.1 mg/dL   MAGNESIUM    Collection Time: 02/21/22  8:01 AM   Result Value Ref Range    Magnesium 2.6 (H) 1.6 - 2.4 mg/dL       CT HEAD WO CONT   Final Result   1. No evidence of acute intracranial hemorrhage or mass effect. 2.  Mild chronic small vessel ischemic changes. CTA HEAD NECK W WO CONT   Final Result   No large vessel occlusion or hemodynamically significant stenosis seen   intracranially. No hemodynamically significant luminal stenosis of the carotid   bifurcations by NASCET-like criteria. DUPLEX UPPER EXT VENOUS LEFT   Final Result      XR CHEST PORT   Final Result      XR CHEST PORT   Final Result   The cardiomediastinal silhouette is appropriate for age, technique,   and lung expansion. Pulmonary vasculature is not congested. The lungs are   essentially clear. No effusion or pneumothorax is seen. XR CHEST PORT   Final Result   No significant interval change. XR CHEST PORT   Final Result      XR CHEST PORT   Final Result   No acute findings. XR CHEST PORT   Final Result   No significant interval change. CTA HEAD NECK W WO CONT   Final Result   Head CT:   No acute intracranial abnormality. Head and neck CTA:   1.   Mild (30-35%) stenosis of the right ICA origin. 2.  No occlusion or high-grade stenosis of the remaining major cervical or   intracranial arterial vasculature. Please note that degrees of carotid stenosis are measured in accordance with   NASCET-like criteria. XR CHEST PORT   Final Result   No significant interval change. XR CHEST PORT   Final Result   FINDINGS: IMPRESSION: Single frontal view of the chest.   ET tube, enteric tube remain. Normal heart size. No vascular congestion or pulmonary edema. The lungs are well inflated. No focal infiltrate, pleural effusion, or   pneumothorax. Unchanged biapical pleural-parenchymal thickening. Upper lung   oligemia from emphysema. No free air under the diaphragm. XR CHEST PORT   Final Result   No acute findings. XR CHEST PORT   Final Result      XR CHEST PORT   Final Result   The cardiomediastinal silhouette is appropriate for age, technique,   and lung expansion. Pulmonary vasculature is not congested. The lungs are   essentially clear. No effusion or pneumothorax is seen. CT HEAD WO CONT   Final Result   No evidence of an acute intracranial process. CTA CHEST ABD PELV W WO CONT   Final Result      No aneurysm or dissection of aorta. Atherosclerosis including coronary arteries. Pulmonary emphysema. No intra-abdominal inflammatory changes or bowel obstruction. Follow-up as   clinically indicated. Please see full report. XR CHEST PORT   Final Result   1. Endotracheal tube in satisfactory position. 2. Suspect nasogastric tube is coiled in the oropharynx. Its tip is entering the   stomach. 3. No focal infiltrate. No overt edema. 4. No effusion or pneumothorax.       MRI BRAIN WO CONT    (Results Pending)        Assessment:  Acute respiratory failure with hypoxia, requiring mechanical ventilation    Acute exacerbation of COPD    Sputum positive for Moraxella catarrhalis, beta-lactamase positive and Klebsiella pneumoniae    Sepsis    Episodes of recurrent syncope    Malignant hypertension, improved    Non-STEMI type II    Swelling left arm      Plan:    Acute respiratory failure with hypoxia secondary to COPD exacerbation/pneumoniapatient presents with acute respiratory failure with hypoxia, successfully extubated, doing well on 2 L nasal cannula, sputum culture consistent with Klebsiella pneumonia and Moraxella catarrhalis  Continue telemetry monitoring  Attempt to wean oxygen  Follow blood cultures  Continue methylprednisone 40 mg IV every 8  Continue Zosyn for antibiotic coverage  Continue to monitor respiratory status  Pulmonology consult appreciated, continue to follow recommendations    Type II non-ST elevation MIof note patient did develop a type II non-ST elevation MI, unclear whether secondary to demand  Continue aspirin once daily  Continue telemetry monitoring  Cardiology consult appreciated, continue to follow recommendations, patient will need cardiac catheterization, if patient is discharged can be done as an outpatient, discussed with cardiology attending    Hypertensioncontinue current antihypertensive medications    Left upper extremity swellingnegative for DVT, does show evidence of superficial thrombosis which does not need anticoagulation  Continue to monitor    ProphylaxisLovenox  FENcardiac diet, replete potassium and magnesium  Full code, will clarify about surrogate decision maker  Dispositionpatient is stable for discharge to skilled nursing facility, discussed with case management, could be later today      Care Plan discussed with: Patient/Family/Specialist/Case Management    Total noncritical care time spent 35    Gala Garcia MD

## 2022-02-21 NOTE — PROGRESS NOTES
Progress Note    Patient: Jonathan Hernadez MRN: [id number]  SSN: [id number]    YOB: 1955  Age: 73 y.o. Sex: male      Admit Date: 2/7/2022    LOS: 14 days     Subjective:   Patient was transferred out of the ICU. He is confused. Objective:     Vitals:    02/21/22 0742 02/21/22 0824 02/21/22 0922 02/21/22 1354   BP:   (!) 138/91    Pulse:  (!) 110 (!) 109    Resp:   19    Temp:   98.2 °F (36.8 °C)    SpO2: 94%   93%   Weight:       Height:            Intake and Output:  Current Shift: No intake/output data recorded. Last three shifts: 02/19 1901 - 02/21 0700  In: -   Out: 1800 [Urine:1800]    Physical Exam:   General:   Confused and weak   Eyes:  Conjunctivae/corneas clear. PERRL, EOMs intact. Fundi benign   Ears:  Normal TMs and external ear canals both ears. Nose: Nares normal. Septum midline. Mucosa normal. No drainage or sinus tenderness. Mouth/Throat: Lips, mucosa, and tongue normal. Teeth and gums normal.   Neck: Supple, symmetrical, trachea midline, no adenopathy, thyroid: no enlargment/tenderness/nodules, no carotid bruit and no JVD. Back:   Symmetric, no curvature. ROM normal. No CVA tenderness. Lungs:   Clear to auscultation bilaterally. Heart:  Regular rate and rhythm, S1, S2 normal, no murmur, click, rub or gallop. Abdomen:   Soft, non-tender. Bowel sounds normal. No masses,  No organomegaly. Extremities:  Left upper extremity swelling. Pulses: 2+ and symmetric all extremities. Skin: Skin color, texture, turgor normal. No rashes or lesions   Lymph nodes: Cervical, supraclavicular, and axillary nodes normal.   Neurologic:  Confused       Lab/Data Review: All lab results for the last 24 hours reviewed. Assessment:     Active Problems:    Respiratory failure ([address] (2/7/2022)    Patient is 59-year-old white male with:  1. Syncope  2. Non-STEMI  3. CAD status post PCI of left circumflex artery in 2018  4. Hyperlipidemia  6. Vocal cord dysfunction  7. History of PE  8. Warfarin was stopped due to GI bleed  9. COPD  7. Obesity  8. Edema  9. Hypertensive emergency   10. Bilateral renal lesions, largest about 1.7 cm on the right side posteriorly. Plan:     Patient is confused. MRI has been ordered to rule out recent stroke. Denied having any chest pain. Given his neurological status we will continue conservative management. Once he is more stable clinically then we will consider ischemic evaluation of the heart. Sodium is up to 149. Creatinine is okay while his BUN is worse. LDL was 82.       Signed By: Alondra Russell MD     February 21, 2022

## 2022-02-21 NOTE — PROGRESS NOTES
I was giving the patient a bed bath and when I rolled him over I discovered a large softball size mass right below his right shoulder blade. Unable to confirm with the patient if this is new or old as he is confused. Spoke to patients wife this morning, she confirmed that he has had this for years.

## 2022-02-22 LAB
ANION GAP SERPL CALC-SCNC: 5 MMOL/L (ref 5–15)
BUN SERPL-MCNC: 43 MG/DL (ref 6–20)
BUN/CREAT SERPL: 41 (ref 12–20)
CA-I BLD-MCNC: 8.8 MG/DL (ref 8.5–10.1)
CHLORIDE SERPL-SCNC: 116 MMOL/L (ref 97–108)
CO2 SERPL-SCNC: 28 MMOL/L (ref 21–32)
CREAT SERPL-MCNC: 1.04 MG/DL (ref 0.7–1.3)
ERYTHROCYTE [DISTWIDTH] IN BLOOD BY AUTOMATED COUNT: 15.4 % (ref 11.5–14.5)
GLUCOSE SERPL-MCNC: 182 MG/DL (ref 65–100)
HCT VFR BLD AUTO: 38.8 % (ref 36.6–50.3)
HGB BLD-MCNC: 12.5 G/DL (ref 12.1–17)
MAGNESIUM SERPL-MCNC: 2.6 MG/DL (ref 1.6–2.4)
MCH RBC QN AUTO: 29.3 PG (ref 26–34)
MCHC RBC AUTO-ENTMCNC: 32.2 G/DL (ref 30–36.5)
MCV RBC AUTO: 90.9 FL (ref 80–99)
NRBC # BLD: 0 K/UL (ref 0–0.01)
NRBC BLD-RTO: 0 PER 100 WBC
PLATELET # BLD AUTO: 222 K/UL (ref 150–400)
PMV BLD AUTO: 9.5 FL (ref 8.9–12.9)
POTASSIUM SERPL-SCNC: 3.6 MMOL/L (ref 3.5–5.1)
RBC # BLD AUTO: 4.27 M/UL (ref 4.1–5.7)
SODIUM SERPL-SCNC: 149 MMOL/L (ref 136–145)
WBC # BLD AUTO: 11.9 K/UL (ref 4.1–11.1)

## 2022-02-22 PROCEDURE — 74011000250 HC RX REV CODE- 250: Performed by: INTERNAL MEDICINE

## 2022-02-22 PROCEDURE — 94640 AIRWAY INHALATION TREATMENT: CPT

## 2022-02-22 PROCEDURE — 74011250637 HC RX REV CODE- 250/637: Performed by: INTERNAL MEDICINE

## 2022-02-22 PROCEDURE — 74011250636 HC RX REV CODE- 250/636: Performed by: INTERNAL MEDICINE

## 2022-02-22 PROCEDURE — 65270000029 HC RM PRIVATE

## 2022-02-22 PROCEDURE — 97530 THERAPEUTIC ACTIVITIES: CPT

## 2022-02-22 PROCEDURE — 74011636637 HC RX REV CODE- 636/637: Performed by: INTERNAL MEDICINE

## 2022-02-22 PROCEDURE — 80048 BASIC METABOLIC PNL TOTAL CA: CPT

## 2022-02-22 PROCEDURE — 92526 ORAL FUNCTION THERAPY: CPT

## 2022-02-22 PROCEDURE — 74011250637 HC RX REV CODE- 250/637: Performed by: HOSPITALIST

## 2022-02-22 PROCEDURE — 97165 OT EVAL LOW COMPLEX 30 MIN: CPT

## 2022-02-22 PROCEDURE — 94760 N-INVAS EAR/PLS OXIMETRY 1: CPT

## 2022-02-22 PROCEDURE — 74011000258 HC RX REV CODE- 258: Performed by: INTERNAL MEDICINE

## 2022-02-22 PROCEDURE — 83735 ASSAY OF MAGNESIUM: CPT

## 2022-02-22 PROCEDURE — 85027 COMPLETE CBC AUTOMATED: CPT

## 2022-02-22 PROCEDURE — 36415 COLL VENOUS BLD VENIPUNCTURE: CPT

## 2022-02-22 RX ORDER — LANOLIN ALCOHOL/MO/W.PET/CERES
3 CREAM (GRAM) TOPICAL
Status: DISCONTINUED | OUTPATIENT
Start: 2022-02-22 | End: 2022-02-25 | Stop reason: HOSPADM

## 2022-02-22 RX ORDER — PREDNISONE 20 MG/1
20 TABLET ORAL
Status: DISCONTINUED | OUTPATIENT
Start: 2022-02-22 | End: 2022-02-24

## 2022-02-22 RX ADMIN — PIPERACILLIN AND TAZOBACTAM 3.38 G: 3; .375 INJECTION, POWDER, LYOPHILIZED, FOR SOLUTION INTRAVENOUS at 05:17

## 2022-02-22 RX ADMIN — MELATONIN TAB 3 MG 3 MG: 3 TAB at 21:31

## 2022-02-22 RX ADMIN — PIPERACILLIN AND TAZOBACTAM 3.38 G: 3; .375 INJECTION, POWDER, LYOPHILIZED, FOR SOLUTION INTRAVENOUS at 15:35

## 2022-02-22 RX ADMIN — IPRATROPIUM BROMIDE AND ALBUTEROL SULFATE 3 ML: 2.5; .5 SOLUTION RESPIRATORY (INHALATION) at 21:41

## 2022-02-22 RX ADMIN — NYSTATIN 500000 UNITS: 100000 SUSPENSION ORAL at 09:37

## 2022-02-22 RX ADMIN — IPRATROPIUM BROMIDE AND ALBUTEROL SULFATE 3 ML: 2.5; .5 SOLUTION RESPIRATORY (INHALATION) at 07:40

## 2022-02-22 RX ADMIN — OXCARBAZEPINE 150 MG: 150 TABLET, FILM COATED ORAL at 21:31

## 2022-02-22 RX ADMIN — CLOPIDOGREL BISULFATE 75 MG: 75 TABLET, FILM COATED ORAL at 10:03

## 2022-02-22 RX ADMIN — PREDNISONE 20 MG: 20 TABLET ORAL at 10:12

## 2022-02-22 RX ADMIN — POLYETHYLENE GLYCOL 3350 17 G: 17 POWDER, FOR SOLUTION ORAL at 15:48

## 2022-02-22 RX ADMIN — NYSTATIN 500000 UNITS: 100000 SUSPENSION ORAL at 21:31

## 2022-02-22 RX ADMIN — BETHANECHOL CHLORIDE 100 MG: 25 TABLET ORAL at 09:38

## 2022-02-22 RX ADMIN — TAMSULOSIN HYDROCHLORIDE 0.4 MG: 0.4 CAPSULE ORAL at 09:38

## 2022-02-22 RX ADMIN — ENOXAPARIN SODIUM 40 MG: 100 INJECTION SUBCUTANEOUS at 09:37

## 2022-02-22 RX ADMIN — AMLODIPINE BESYLATE 10 MG: 5 TABLET ORAL at 09:37

## 2022-02-22 RX ADMIN — HYDROXYZINE HYDROCHLORIDE 25 MG: 25 INJECTION, SOLUTION INTRAMUSCULAR at 18:01

## 2022-02-22 RX ADMIN — SODIUM CHLORIDE, PRESERVATIVE FREE 20 MG: 5 INJECTION INTRAVENOUS at 21:31

## 2022-02-22 RX ADMIN — SODIUM CHLORIDE, PRESERVATIVE FREE 20 MG: 5 INJECTION INTRAVENOUS at 09:39

## 2022-02-22 RX ADMIN — FINASTERIDE 5 MG: 5 TABLET, FILM COATED ORAL at 10:03

## 2022-02-22 RX ADMIN — BETHANECHOL CHLORIDE 100 MG: 25 TABLET ORAL at 21:31

## 2022-02-22 RX ADMIN — PIPERACILLIN AND TAZOBACTAM 3.38 G: 3; .375 INJECTION, POWDER, LYOPHILIZED, FOR SOLUTION INTRAVENOUS at 21:31

## 2022-02-22 RX ADMIN — BUDESONIDE 500 MCG: 0.5 SUSPENSION RESPIRATORY (INHALATION) at 21:41

## 2022-02-22 RX ADMIN — BUDESONIDE 500 MCG: 0.5 SUSPENSION RESPIRATORY (INHALATION) at 07:40

## 2022-02-22 RX ADMIN — METOPROLOL SUCCINATE 25 MG: 25 TABLET, EXTENDED RELEASE ORAL at 10:03

## 2022-02-22 RX ADMIN — ATORVASTATIN CALCIUM 80 MG: 40 TABLET, FILM COATED ORAL at 09:39

## 2022-02-22 RX ADMIN — NYSTATIN 500000 UNITS: 100000 SUSPENSION ORAL at 15:35

## 2022-02-22 RX ADMIN — ASPIRIN 81 MG CHEWABLE TABLET 81 MG: 81 TABLET CHEWABLE at 09:39

## 2022-02-22 RX ADMIN — IPRATROPIUM BROMIDE AND ALBUTEROL SULFATE 3 ML: 2.5; .5 SOLUTION RESPIRATORY (INHALATION) at 13:35

## 2022-02-22 RX ADMIN — OXCARBAZEPINE 150 MG: 150 TABLET, FILM COATED ORAL at 09:39

## 2022-02-22 NOTE — DISCHARGE SUMMARY
Hospitalist Discharge Summary     Patient ID:  Stephenie Mariscal  [id number]  73 y.o.  1955 2/7/2022    PCP on record: Tonie Varner MD    Admit date: 2/7/2022  Discharge date and time: 2/22/2022    DISCHARGE DIAGNOSIS:    Acute respiratory failure with hypoxia/COPD exacerbation/pneumonia/sepsis/recurrent syncope/hypertension/type II non-ST elevation MI/left arm    CONSULTATIONS:  IP CONSULT TO NEUROLOGY  IP CONSULT TO NEPHROLOGY  IP CONSULT TO UROLOGY  IP CONSULT TO CARDIOLOGY  IP CONSULT TO PULMONOLOGY    Excerpted HPI from H&P of Álvaro Rosado MD:  Limited history was obtained as patient was intubated and sedated. [de-identified] of history obtained from wife at bedside and ED physician.  Luis Chinchilla is a 73 y.o. male possible history of vocal cord dysfunction, history of pulmonary embolism (on warfarin due to GI bleed), CAD s/p stent (6 years ago), and COPD. He presented to the ED today with chief complaint of chest pain, shortness of breath, syncope and altered mental status. He initially went to outside ED for chest pain shortness of breath, however noted to have altered mental status by EMS crew. Recurrent, back-to-back episodes of syncope that lasted 5 to 10 seconds, every 1 minutes. He was noted to have normal pulses, normal breathing and no arrhythmia.      In the ED, he was initially hypertensive (230/143) and hypoxic, SPO2 in 30s. He continued to have episodes of syncope, but wakes up after sternal rub. No postictal phase of confusion noted per ED physician, and was able to provide history to ED physician. As patient was unable to protect his airway and continue to be hypoxic, patient was intubated.     Per wife, patient has been having intermittent, generalized/whole body cramps that last between 15 minutes to 1 hour. Those episodes generally followed by loss of consciousness for about 15 seconds.   He usually will have short period of confusion and slurred speech after regaining consciousness. No extremity weakness after the episodes. Wife reports no fever, chills or chest pain. Reports have shortness of breath and cough at baseline due to COPD and has not been worse than normal in the past 3 weeks. Of note, he has been worked-up in the hospital in [place] (record not found in system), and no causes has been identified. EEG was never done.     Initial work-up showed no leukocytosis, no significant electrolytes abnormalities, no elevated troponin or BNP. UA not indicated of of UTI, tox screen negative. CT head and chest x-ray showed no acute findings. CT PE pending.    ______________________________________________________________________  DISCHARGE SUMMARY/HOSPITAL COURSE:  for full details see H&P, daily progress notes, labs, consult notes. 61-year-old male with history of COPD, CAD with PCI and pulmonary embolism (not on anticoagulation due to GI bleed) who was admitted on 2/7 with altered mental status and syncope complicated by hypertensive urgency and hypoxia. Initially had a blood pressure of 230/143 with oxygen saturations in the 30s. He was intubated in the ED. CT of the chest was unremarkable. Patient has a history of recurrent syncopal episodes. Per wife, patient had been having intermittent, generalized/whole body cramps that last between 15 minutes to 1 hour.  Those episodes generally are  followed by loss of consciousness for about 15 seconds. Madie Lizama usually will have short period of confusion and slurred speech after regaining consciousness.  No extremity weakness after the episodes.  Wife reported no fever, chills or chest pain.  Reports have shortness of breath and cough at baseline due to COPD and has not been worse than normal in the past 3 weeks.  Of note, he has been worked-up in the hospital in [place] (record not found in system), and no cause has been identified.   EEG was never done.     Patient was seen by neurology who does not feel the above episodes represented seizures.     COVID test was negative.     Hospital course was complicated by non-STEMI felt due to malignant hypertension.     He  has been maintained on the ventilator and followed by pulmonology. Etiology of respiratory failure seems to be largely COPD exacerbation. Patient was successfully extubated, transferred out of ICU, remained on Zosyn as well as Solu-Medrol, wean down on oxygen, patient was evaluated by neurology, received MRI brain, evaluated by physical therapy as well as Occupational Therapy, overall patient's clinical status improved, patient needed cardiac catheterization which can be done as an outpatient, patient's was subsequently transitioned to oral steroids as well as oral antibiotics and deemed stable for discharge to skilled nursing facility with close outpatient follow-up with primary care physician as well as neurology as well as cardiology as well as nephrology. _______________________________________________________________________  Patient seen and examined by me on discharge day. Pertinent Findings:  Gen:    Not in distress  Chest: Decreased air entry bilaterally in bilateral lower lung zones  CVS:   Regular rhythm, s1/s2 no m/r/g  No edema  Abd:  Soft, not distended, not tender  Neuro:  Alert, at  baseline  _______________________________________________________________________  DISCHARGE MEDICATIONS:   Current Discharge Medication List      START taking these medications    Details   acetaminophen (TYLENOL) 325 mg tablet Take 2 Tablets by mouth every six (6) hours as needed for Pain or Fever. Qty: 60 Tablet, Refills: 0  Start date: 2/21/2022      amLODIPine (NORVASC) 10 mg tablet Take 1 Tablet by mouth daily. Qty: 30 Tablet, Refills: 0  Start date: 2/22/2022      budesonide (PULMICORT) 0.5 mg/2 mL nbsp 2 mL by Nebulization route two (2) times a day.   Qty: 200 mL, Refills: 0  Start date: 2/21/2022      glycopyrrolate (ROBINUL) 0.2 mg/mL injection 0.5 mL by IntraVENous route three (3) times daily. Qty: 20 mL, Refills: 0  Start date: 2/21/2022      nystatin (MYCOSTATIN) 100,000 unit/mL suspension Take 5 mL by mouth four (4) times daily. swish and spit  Qty: 473 mL, Refills: 0  Start date: 2/21/2022      predniSONE (DELTASONE) 20 mg tablet Take 1 tablet twice daily for 7 days, then take 1 tablet once daily for 7 days  Qty: 21 Tablet, Refills: 0  Start date: 2/21/2022      amoxicillin-clavulanate (Augmentin) 875-125 mg per tablet Take 1 Tablet by mouth two (2) times a day. Qty: 14 Tablet, Refills: 0  Start date: 2/21/2022         CONTINUE these medications which have NOT CHANGED    Details   albuterol (ProAir HFA) 90 mcg/actuation inhaler Take 1 Puff by inhalation four (4) times daily as needed. aspirin 81 mg chewable tablet Take 81 mg by mouth daily. atorvastatin (Lipitor) 40 mg tablet Take 40 mg by mouth daily. bethanechol chloride (URECHOLINE) 50 mg tablet Take 200 mg by mouth daily. clopidogreL (PLAVIX) 75 mg tab Take 75 mg by mouth daily. esomeprazole (NexIUM) 40 mg capsule Take 40 mg by mouth daily. finasteride (PROSCAR) 5 mg tablet Take 5 mg by mouth daily. fluticasone-umeclidinium-vilanterol (TRELEGY ELLIPTA) 100-62.5-25 mcg inhaler Take 1 Puff by inhalation daily. ipratropium-albuteroL (Combivent Respimat)  mcg/actuation inhaler Take 1 Puff by inhalation two (2) times daily as needed. omeprazole (PRILOSEC) 20 mg capsule Take 20 mg by mouth daily. OXcarbazepine (Oxtellar XR) 300 mg Tb24 Take 300 mg by mouth daily. tamsulosin (Flomax) 0.4 mg capsule Take 0.4 mg by mouth daily. metoprolol succinate (TOPROL-XL) 25 mg XL tablet Take 25 mg by mouth daily.          STOP taking these medications       bumetanide (BUMEX) 1 mg tablet Comments:   Reason for Stopping:         furosemide (LASIX) 20 mg tablet Comments:   Reason for Stopping:         gabapentin (NEURONTIN) 300 mg capsule Comments:   Reason for Stopping:         isosorbide dinitrate (ISORDIL) 30 mg tablet Comments:   Reason for Stopping:         losartan (COZAAR) 25 mg tablet Comments:   Reason for Stopping:         QUEtiapine (SEROqueL) 400 mg tablet Comments:   Reason for Stopping:         sertraline (Zoloft) 100 mg tablet Comments:   Reason for Stopping:         ticagrelor (Brilinta) 90 mg tablet Comments:   Reason for Stopping:                 Patient Follow Up Instructions: Activity: PT/OT Eval and Treat  Diet: Full liquid diet moderately thickened  Wound Care: As directed    Follow-up with PCP/Pulmonology/Neurology/Cardiology/Nephrology in 2 weeks.   Follow-up tests/labs As per above physcians  Follow-up Information     Follow up With Specialties Details Why Contact Info    Lis Mcguire MD Family Medicine In 1 week  [address] [address] [address]      Max Ayers MD Pulmonary Disease In 1 week  2019 [address] [address] [facility]      Rosy Saeed MD Neurology In 1 week  [address] [facility] [place] [address]  [phone]      Claribel Samuels MD Cardiology In 1 week  [facility] [address] [facility]      Cristobal Amezquita MD Nephrology In 1 week  [facility] [facility]  [phone]          ________________________________________________________________    Risk of deterioration: Low    Condition at Discharge:  Stable  __________________________________________________________________    Disposition  SNF/LTC    ____________________________________________________________________    Code Status: Full Code  ___________________________________________________________________      Total time in minutes spent coordinating this discharge (includes going over instructions, follow-up, prescriptions, and preparing report for sign off to her PCP) :  45 minutes    Signed:  Arely Hernandez MD

## 2022-02-22 NOTE — PROGRESS NOTES
IMPRESSION:   1. Acute hypoxic respiratory failure corrected on 2 L nasal oxygen  2. Klebsiella pneumonia and moraxella catarrhalis afebrile on Zosyn  3. Confusion disorientation questionable cause  4. NSTEMI  5. Generalized Edema  6. Syncope  7. COPD   8. Hypokalemia resolved  9. Delirium   10. Urinary retention      RECOMMENDATIONS/PLAN:   1. Transferred to   2. Extubated on 2/17 alert awake talking on 2 L nasal cannula, some mild confusion marked confusion agitation today MRI no definite abnormality serum CO2 is normal but will check a blood gas to make sure CO2 retention is not playing a role  3. Patient on IV Solu-Medrol no wheezing will decrease Solu-Medrol  4. Hold trilogy inhaler continue nebulized albuterol Atrovent budesonide  5. Chest x-ray no acute infiltrate which shows emphysematous changes, CXR 2/18/22 no acute changes  6. Altered mental status yesterday increased confusion, MRI negative, CT head negative, neuro signed off diagnosed with delirium  7. CTA head negative  8. Superficial thrombus in LUE  9. 2/18/22 ABG WNL we will repeat  10. Potassium corrected  11. Ambulatory pulse ox for discharge     [x] High complexity decision making was performed  [x] See my orders for details  HPI  26-year-old male came in because of chest pain with shortness of breath he has also recurrent episodes of syncope significant past medical history of vocal cord dysfunction, pulmonary [place], coronary artery disease status post stent COPD current everyday smoker no history of sleep apnea he was having swelling of the lower extremities for couple of months today came into the emergency room as previously was having chest pain or shortness of breath he passed out subsequently got intubated and now on ventilator critical care consult was called. Patient transferred to floor, no longer intubated, on NC O2 and satting appropriately. Clinically much improved from pulmonary standpoint.     PMH:  has no past medical history on file. PSH:   has no past surgical history on file. FHX: family history is not on file.      SHX:      ALL:   Allergies   Allergen Reactions    Sulfa (Sulfonamide Antibiotics) Other (comments)     syncope          MEDS:   [x] Reviewed - As Below   [] Not reviewed    Current Facility-Administered Medications   Medication    methylPREDNISolone (PF) (SOLU-MEDROL) injection 20 mg    hydrOXYzine (VISTARIL) 25 mg/mL injection 25 mg    nystatin (MYCOSTATIN) 100,000 unit/mL oral suspension 500,000 Units    piperacillin-tazobactam (ZOSYN) 3.375 g in 0.9% sodium chloride (MBP/ADV) 100 mL MBP    albuterol-ipratropium (DUO-NEB) 2.5 MG-0.5 MG/3 ML    bethanechol (URECHOLINE) tablet 100 mg    finasteride (PROSCAR) tablet 5 mg    OXcarbazepine (TRILEPTAL) tablet 150 mg    tamsulosin (FLOMAX) capsule 0.4 mg    acetaminophen (TYLENOL) tablet 650 mg    Or    acetaminophen (TYLENOL) suppository 650 mg    polyethylene glycol (MIRALAX) packet 17 g    ondansetron (ZOFRAN ODT) tablet 4 mg    Or    ondansetron (ZOFRAN) injection 4 mg    enoxaparin (LOVENOX) injection 40 mg    famotidine (PF) (PEPCID) 20 mg in 0.9% sodium chloride 10 mL injection    labetaloL (NORMODYNE;TRANDATE) 20 mg/4 mL (5 mg/mL) injection 10 mg    amLODIPine (NORVASC) tablet 10 mg    aspirin chewable tablet 81 mg    atorvastatin (LIPITOR) tablet 80 mg    clopidogreL (PLAVIX) tablet 75 mg    metoprolol succinate (TOPROL-XL) XL tablet 25 mg    albuterol-ipratropium (DUO-NEB) 2.5 MG-0.5 MG/3 ML    budesonide (PULMICORT) 500 mcg/2 ml nebulizer suspension      MAR reviewed and pertinent medications noted or modified as needed   Current Facility-Administered Medications   Medication    methylPREDNISolone (PF) (SOLU-MEDROL) injection 20 mg    hydrOXYzine (VISTARIL) 25 mg/mL injection 25 mg    nystatin (MYCOSTATIN) 100,000 unit/mL oral suspension 500,000 Units    piperacillin-tazobactam (ZOSYN) 3.375 g in 0.9% sodium chloride (MBP/ADV) 100 mL MBP    albuterol-ipratropium (DUO-NEB) 2.5 MG-0.5 MG/3 ML    bethanechol (URECHOLINE) tablet 100 mg    finasteride (PROSCAR) tablet 5 mg    OXcarbazepine (TRILEPTAL) tablet 150 mg    tamsulosin (FLOMAX) capsule 0.4 mg    acetaminophen (TYLENOL) tablet 650 mg    Or    acetaminophen (TYLENOL) suppository 650 mg    polyethylene glycol (MIRALAX) packet 17 g    ondansetron (ZOFRAN ODT) tablet 4 mg    Or    ondansetron (ZOFRAN) injection 4 mg    enoxaparin (LOVENOX) injection 40 mg    famotidine (PF) (PEPCID) 20 mg in 0.9% sodium chloride 10 mL injection    labetaloL (NORMODYNE;TRANDATE) 20 mg/4 mL (5 mg/mL) injection 10 mg    amLODIPine (NORVASC) tablet 10 mg    aspirin chewable tablet 81 mg    atorvastatin (LIPITOR) tablet 80 mg    clopidogreL (PLAVIX) tablet 75 mg    metoprolol succinate (TOPROL-XL) XL tablet 25 mg    albuterol-ipratropium (DUO-NEB) 2.5 MG-0.5 MG/3 ML    budesonide (PULMICORT) 500 mcg/2 ml nebulizer suspension      PMH:  has no past medical history on file. PSH:   has no past surgical history on file. FHX: family history is not on file. SHX:       ROS:  Patient alert awake talking review of systems as mentioned in HPI and physical exam    Hemodynamics:    CO:    CI:    CVP:    SVR:   PAP Systolic:    PAP Diastolic:    PVR:    CY13:        Ventilator Settings:      Mode Rate TV Press PEEP FiO2 PIP Min.  Vent   Assist control,Volume control    550 ml 15 cm H2O  5 cm H20 30 %  18 cm H2O  10.7 l/min        Vital Signs: Telemetry:    normal sinus rhythm Intake/Output:   Visit Vitals  BP (!) 152/91 (BP 1 Location: Left upper arm, BP Patient Position: At rest)   Pulse (!) 102   Temp 98 °F (36.7 °C)   Resp 18   Ht 6' (1.829 m)   Wt 111.5 kg (245 lb 13 oz)   SpO2 94%   BMI 33.34 kg/m²       Temp (24hrs), Av.7 °F (36.5 °C), Min:97.4 °F (36.3 °C), Max:98 °F (36.7 °C)        O2 Device: None (Room air) O2 Flow Rate (L/min): 2 l/min       Wt Readings from Last 4 Encounters:   02/18/22 111.5 kg (245 lb 13 oz)          Intake/Output Summary (Last 24 hours) at 2/22/2022 0936  Last data filed at 2/22/2022 0054  Gross per 24 hour   Intake    Output 1100 ml   Net -1100 ml       Last shift:      No intake/output data recorded. Last 3 shifts: 02/20 1901 - 02/22 0700  In: -   Out: 2100 [Urine:2100]       Physical Exam:     General: Alert awake very confused some periods of agitation  HEENT: NCAT,   Eyes: anicteric; conjunctiva clear   Neck: no nodes,no accessory MM use. Chest: no deformity,   Cardiac: IR regular; no murmur;   Lungs: distant breath sounds; no wheeze  Abd: soft, NT, hypoactive BS  Ext: no edema; no joint swelling;  No clubbing  : clear urine  Neuro: Moving extremities  Psych-no issues  Skin: warm, dry, no cyanosis;   Pulses: Brachial and radial pulses intact  Capillary: Normal capillary refill      DATA:    MAR reviewed and pertinent medications noted or modified as needed  MEDS:   Current Facility-Administered Medications   Medication    methylPREDNISolone (PF) (SOLU-MEDROL) injection 20 mg    hydrOXYzine (VISTARIL) 25 mg/mL injection 25 mg    nystatin (MYCOSTATIN) 100,000 unit/mL oral suspension 500,000 Units    piperacillin-tazobactam (ZOSYN) 3.375 g in 0.9% sodium chloride (MBP/ADV) 100 mL MBP    albuterol-ipratropium (DUO-NEB) 2.5 MG-0.5 MG/3 ML    bethanechol (URECHOLINE) tablet 100 mg    finasteride (PROSCAR) tablet 5 mg    OXcarbazepine (TRILEPTAL) tablet 150 mg    tamsulosin (FLOMAX) capsule 0.4 mg    acetaminophen (TYLENOL) tablet 650 mg    Or    acetaminophen (TYLENOL) suppository 650 mg    polyethylene glycol (MIRALAX) packet 17 g    ondansetron (ZOFRAN ODT) tablet 4 mg    Or    ondansetron (ZOFRAN) injection 4 mg    enoxaparin (LOVENOX) injection 40 mg    famotidine (PF) (PEPCID) 20 mg in 0.9% sodium chloride 10 mL injection    labetaloL (NORMODYNE;TRANDATE) 20 mg/4 mL (5 mg/mL) injection 10 mg    amLODIPine (NORVASC) tablet 10 mg  aspirin chewable tablet 81 mg    atorvastatin (LIPITOR) tablet 80 mg    clopidogreL (PLAVIX) tablet 75 mg    metoprolol succinate (TOPROL-XL) XL tablet 25 mg    albuterol-ipratropium (DUO-NEB) 2.5 MG-0.5 MG/3 ML    budesonide (PULMICORT) 500 mcg/2 ml nebulizer suspension        Labs:      Recent Labs     02/21/22  1815   PH 7.46*   PCO2 37   PO2 80   HCO3 27*   2/13 AC 15  PEEP 8 FiO2 35%  Lab Results   Component Value Date/Time    TSH 1.38 02/08/2022 03:53 AM        Imaging:    Results from [place] encounter on 02/07/22    XR CHEST PORT    Narrative  Chest single view. Comparison single view chest February 17, 2022. ET tube and NG tube have been removed. Unchanged appearance for the lungs; no gross interstitial or alveolar pulmonary  edema. Cardiac and mediastinal structures unchanged. Apical pleural thickening  unchanged. No pneumothorax or sizable pleural effusion. Results from Hospital Encounter encounter on 02/07/22    CTA HEAD NECK W WO CONT    Narrative  CTA NECK WITHOUT AND WITH INTRAVENOUS CONTRAST  CTA HEAD WITH INTRAVENOUS CONTRAST    INDICATION: Right arm weakness    COMPARISON: CTA head and neck from 2/12/2022    TECHNIQUE: Noncontrast neck CT. Transaxial CTA of the head and neck was  performed after administration of 100 mL of Isovue-370 intravenous contrast.  Multiplanar MIP and standard reconstructions performed and evaluated. Dose  reduction: Per department policy, all CT scans at this facility are performed  using dose reduction optimization techniques as appropriate to a performed  examination including the following: Automated exposure control, adjustments of  the mA and/or KV according to patient size, or use of iterative reconstruction  technique. FINDINGS:  Contrast injection through the right upper extremity causes streak artifact  which obscures evaluation of the surrounding structures.  No focal  hemodynamically significant luminal narrowing of the visualized aortic arch,  innominate artery, or included/visualized subclavian and axillary arteries. Right common carotid artery is widely patent. Mild calcified atherosclerosis of  the right carotid bifurcation without hemodynamically significant luminal  stenosis by NASCET-like criteria. Calcified plaque of the proximal right  internal carotid artery without hemodynamically significant luminal stenosis. Right ICA is patent in the neck. Cavernous carotid circumferential calcified  atherosclerosis. Right ophthalmic artery is patent. Right anterior cerebral  artery and middle cerebral artery are patent. Left common carotid artery is widely patent. Mild calcified atherosclerosis  without hemodynamically significant luminal stenosis by NASCET-like criteria. Left internal carotid artery is widely patent. Mild calcified plaque of the left  cavernous ICA. Left ophthalmic artery is patent. Left anterior cerebral artery  is patent. No definite large vessel occlusion of the left middle cerebral  artery. Calcified atherosclerosis of bilateral vertebral arteries. Intradural vertebral  arteries also show calcified atherosclerosis. Basilar artery is patent. Bilateral posterior cerebral arteries are patent. Emphysema of the included lung apices. Visualized osseous structures show  chronic fusion of the C3-4 vertebral bodies. Impression  No large vessel occlusion or hemodynamically significant stenosis seen  intracranially. No hemodynamically significant luminal stenosis of the carotid  bifurcations by NASCET-like criteria. · 2/12 patient condition got worse yesterday and he became cyanotic while he was on a ventilator transferred to ICU he is back on Precedex and propofol ABG acceptable  · 2/13 back on ventilator assist control. Low-grade fever 100.6. Will check urine and sputum cultures although chest x-ray looks clear. Will decrease PEEP to 5.   Repeat labs in a.m. hypercapnia today we will give Diamox  · 2/14 will decrease FiO2 sputum positive for gram-positive cocci in clusters started on vancomycin and Zosyn pending sensitivity still spiking temperature  · 2/16 remain on ventilator  will try to wean him today once extubated cardiologist suggested about cardiac cath  · 2/17 on assist control mode will try to wean him again today  · 2/18 alert awake nasal cannula talking weak  · 2/20/22 Patient transferred to floor on 2 L O2 NC satting appropriately talking and no short of breath. Patient has been getting straight cathed for the past few days, will consult urology. Patient also not sleeping, will add melatonin for now. · 2/21 patient confused according to the family he is talking out of his head started yesterday. Last blood gas no CO2 retention serum CO2 normal despite this we will check another blood gas.   No wheezing will start decreasing Solu-Medrol

## 2022-02-22 NOTE — PROGRESS NOTES
CM spoke with wife Fredy Cancino [phone] related to patient discharge disposition. She and patient is in agreement to SNF stay. Choice letter reviewed. Patient is a [place]. Choice: [facility]. CM sent referral. Will inform [place] of patient hospital stay.

## 2022-02-22 NOTE — CONSULTS
Neurology Progress Note    Date: 2/22/2022    Mr. Chepe Pires is a 73year old man is seen in follow-up and he was successfully extubated on 02- and tolerating it very well. Answering questions appropriately. With PMH of PE used to be on coumadin but stopped for GI bleed, vocal cord dysfunctioon who was brought in to the ER for hypoxia, \" AMS\", chest pain and recurrent episodes of syncope lasting 5-10 sec. He would awaken after sternal rub but loose consciousness shortly after again. In between the episodes he is able to wake up and is alert and oriented and able to provide the Ed staff his history. He was found to be hypoxic to 86%. It seems he passed out multiple times in the ambulance as well. From the chart he has had episodes of loss of consciousness after whole body cramping episodes at home as well. CT head reported as \"no evidence of acute intracranial process\". Ct chest/abd/pelvis WNL. Subjective  awake and talking but disoriented. Today is moving his right arm against gravity     No past medical history on file. No past surgical history on file. No family history on file.    Social History     Tobacco Use    Smoking status: Not on file    Smokeless tobacco: Not on file   Substance Use Topics    Alcohol use: Not on file       Current Facility-Administered Medications   Medication Dose Route Frequency Provider Last Rate Last Admin    methylPREDNISolone (PF) (SOLU-MEDROL) injection 20 mg  20 mg IntraVENous Q12H Mohan Lott MD        hydrOXYzine (VISTARIL) 25 mg/mL injection 25 mg  25 mg IntraMUSCular Q6H PRN Wanda Varela MD   25 mg at 02/20/22 1953    nystatin (MYCOSTATIN) 100,000 unit/mL oral suspension 500,000 Units  500,000 Units Oral QID Jory Otto MD   500,000 Units at 02/21/22 2238    piperacillin-tazobactam (ZOSYN) 3.375 g in 0.9% sodium chloride (MBP/ADV) 100 mL MBP  3.375 g IntraVENous Q8H Heide Lovell MD 25 mL/hr at 02/22/22 0517 3.375 g at 02/22/22 4269    albuterol-ipratropium (DUO-NEB) 2.5 MG-0.5 MG/3 ML  3 mL Nebulization Q4H PRN Cha, Darice Brunner, MD        bethanechol (URECHOLINE) tablet 100 mg  100 mg Oral BID Nidia Joe MD   100 mg at 02/21/22 2237    finasteride (PROSCAR) tablet 5 mg  5 mg Oral DAILY Cha, Darice Brunner, MD   5 mg at 02/21/22 0930    OXcarbazepine (TRILEPTAL) tablet 150 mg  150 mg Oral BID Nidia Joe MD   150 mg at 02/21/22 2237    tamsulosin (FLOMAX) capsule 0.4 mg  0.4 mg Oral DAILY Nidia Joe MD   0.4 mg at 02/21/22 5491    acetaminophen (TYLENOL) tablet 650 mg  650 mg Oral Q6H PRN Nidia Joe MD   650 mg at 02/15/22 2066    Or    acetaminophen (TYLENOL) suppository 650 mg  650 mg Rectal Q6H PRN Nidia Jeo MD   650 mg at 02/14/22 0258    polyethylene glycol (MIRALAX) packet 17 g  17 g Oral DAILY PRN Cha, Darice Brunner, MD        ondansetron (ZOFRAN ODT) tablet 4 mg  4 mg Oral Q8H PRN Cha, Darice Brunner, MD        Or    ondansetron (ZOFRAN) injection 4 mg  4 mg IntraVENous Q6H PRN Cha, Darice Brunner, MD        enoxaparin (LOVENOX) injection 40 mg  40 mg SubCUTAneous DAILY Cha, Darice Brunner, MD   40 mg at 02/21/22 0931    famotidine (PF) (PEPCID) 20 mg in 0.9% sodium chloride 10 mL injection  20 mg IntraVENous Q12H Nidai Joe MD   20 mg at 02/21/22 2237    labetaloL (NORMODYNE;TRANDATE) 20 mg/4 mL (5 mg/mL) injection 10 mg  10 mg IntraVENous Q4H PRN Dania Hamilton MD   10 mg at 02/08/22 1741    amLODIPine (NORVASC) tablet 10 mg  10 mg Oral DAILY Dania Hamilton MD   10 mg at 02/21/22 0930    aspirin chewable tablet 81 mg  81 mg Oral DAILY Al-Howaidi, Judaism, MD   81 mg at 02/21/22 0929    atorvastatin (LIPITOR) tablet 80 mg  80 mg Oral DAILY Duyen Teixeira MD   80 mg at 02/21/22 0929    clopidogreL (PLAVIX) tablet 75 mg  75 mg Oral DAILY Duyen Teixeira MD   75 mg at 02/21/22 1286    metoprolol succinate (TOPROL-XL) XL tablet 25 mg  25 mg Oral DAILY Duyen Teixeira MD   25 mg at 02/21/22 7837    albuterol-ipratropium (DUO-NEB) 2.5 MG-0.5 MG/3 ML  3 mL Nebulization Q6HWA RT Aj Lovell MD   3 mL at 02/22/22 0740    budesonide (PULMICORT) 500 mcg/2 ml nebulizer suspension  500 mcg Nebulization BID RT Malaika Lovell MD   500 mcg at 02/22/22 0740      Review of Systems   Unable to perform ROS: Intubated     Objective     Vital signs for last 24 hours:  Visit Vitals  BP (!) 152/91 (BP 1 Location: Left upper arm, BP Patient Position: At rest)   Pulse (!) 102   Temp 98 °F (36.7 °C)   Resp 18   Ht 6' (1.829 m)   Wt 111.5 kg (245 lb 13 oz)   SpO2 94%   BMI 33.34 kg/m²     Intake/Output this shift:  Current Shift: No intake/output data recorded. Last 3 Shifts: 02/20 1901 - 02/22 0700  In: -   Out: 2100 [Urine:2100]    Data Review:   Recent Results (from the past 24 hour(s))   BLOOD GAS, ARTERIAL    Collection Time: 02/21/22  6:15 PM   Result Value Ref Range    pH 7.46 (H) 7.35 - 7.45      PCO2 37 35 - 45 mmHg    PO2 80 75 - 100 mmHg    O2 SAT 95 (L) >95 %    BICARBONATE 27 (H) 22 - 26 mmol/L    BASE EXCESS 2.4 (H) 0 - 2 mmol/L    O2 METHOD Nasal Cannula      O2 FLOW RATE 2.0 L/min    Sample source Arterial      SITE Right Radial      VÍCTOR'S TEST PASS       Neuro Physical Exam    General: Well developed, well nourished. Morbidly obese and now extubated and tolerating it well    Neurological Exam:  Mental Status: The patient is awake and alert and oriented to person place and time partially. No aphasia or dysarthria noted  His extraocular movements are intact with right esotropia which he says is old, blinks to visual threat b/l   He is moving right arm against gravity , moves left arm against gravity. Will move both legs on bed but no antigravity movement in legs           Assessment and Plan: Mr. Andreia Diaz is a 73year old man who comes in with hypoxia and recurrent episodes of syncope. The description of these events is NOT consistent with seizures. He is extubated and tolerating it very well.    At this time etiology of recurrent syncope is not clear. If this recurs post extubation then eeg can be considered. CTA head and neck did not reveal any stenotic disease, other than 30-35% stenosis of the origin of the right ICA, which does not explain the spells he had been having. It is possible that he may be going in and out of hypoxia contributing to these spells. He does have some worsened disorientation today where he is talking about irrelevant non sensical things. Likely delirious. - keep windows open during day  - should eat on time  -if not sleeping at night try melatonin     No further neurology workup.  Will sign off       Thank you,

## 2022-02-22 NOTE — PROGRESS NOTES
Problem: Ventilator Management  Goal: *Adequate oxygenation and ventilation  Outcome: Progressing Towards Goal  Goal: *Patient maintains clear airway/free of aspiration  Outcome: Progressing Towards Goal  Goal: *Absence of infection signs and symptoms  Outcome: Progressing Towards Goal  Goal: *Normal spontaneous ventilation  Outcome: Progressing Towards Goal     Problem: Patient Education: Go to Patient Education Activity  Goal: Patient/Family Education  Outcome: Progressing Towards Goal     Problem: Pressure Injury - Risk of  Goal: *Prevention of pressure injury  Description: Document Storm Scale and appropriate interventions in the flowsheet. Outcome: Progressing Towards Goal  Note: Pressure Injury Interventions:  Sensory Interventions: Assess changes in LOC    Moisture Interventions: Absorbent underpads,Apply protective barrier, creams and emollients    Activity Interventions: Assess need for specialty bed,Pressure redistribution bed/mattress(bed type)    Mobility Interventions: Assess need for specialty bed    Nutrition Interventions: Document food/fluid/supplement intake    Friction and Shear Interventions: Apply protective barrier, creams and emollients                Problem: Patient Education: Go to Patient Education Activity  Goal: Patient/Family Education  Outcome: Progressing Towards Goal     Problem: Falls - Risk of  Goal: *Absence of Falls  Description: Document Mark Kirkland Fall Risk and appropriate interventions in the flowsheet.   Outcome: Progressing Towards Goal  Note: Fall Risk Interventions:  Mobility Interventions: Bed/chair exit alarm    Mentation Interventions: Bed/chair exit alarm,Door open when patient unattended    Medication Interventions: Bed/chair exit alarm,Patient to call before getting OOB,Teach patient to arise slowly    Elimination Interventions: Bed/chair exit alarm,Call light in reach    History of Falls Interventions: Bed/chair exit alarm         Problem: Patient Education: Go to Patient Education Activity  Goal: Patient/Family Education  Outcome: Progressing Towards Goal     Problem: Discharge Planning  Goal: *Discharge to safe environment  Outcome: Progressing Towards Goal  Goal: *Knowledge of medication management  Outcome: Progressing Towards Goal  Goal: *Knowledge of discharge instructions  Outcome: Progressing Towards Goal     Problem: Patient Education: Go to Patient Education Activity  Goal: Patient/Family Education  Outcome: Progressing Towards Goal     Problem: Airway Clearance - Ineffective  Goal: *Patent airway  Outcome: Progressing Towards Goal  Goal: *Absence of airway secretions  Outcome: Progressing Towards Goal  Goal: *Able to cough effectively  Outcome: Progressing Towards Goal     Problem: Breathing Pattern - Ineffective  Goal: *Absence of hypoxia  Outcome: Progressing Towards Goal  Goal: *Use of effective breathing techniques  Outcome: Progressing Towards Goal     Problem: Patient Education: Go to Patient Education Activity  Goal: Patient/Family Education  Outcome: Progressing Towards Goal     Problem: Patient Education: Go to Patient Education Activity  Goal: Patient/Family Education  Outcome: Progressing Towards Goal

## 2022-02-22 NOTE — PROGRESS NOTES
OCCUPATIONAL THERAPY EVALUATION  Patient: Marcelino Joel [de-identified]73 y.o. male)  Date: 2/22/2022  Primary Diagnosis: Respiratory failure ([address] [J96.90]  Procedure(s) (LRB):  LEFT HEART CATH / CORONARY ANGIOGRAPHY (N/A) Day of Surgery   Precautions: Fall    ASSESSMENT  Pt is a 74 y/o M with PMH of PE, vocal cord dysfunction, CAD, COPD, recurrent episodes of syncope presenting to [facility] with c/o chest pain, AMS, and SOB, admitted 02/07/22  and being treated for acute respiratory failure with hypoxia. Pt received semi-supine in bed upon arrival, AXO to person, disoriented to place and situation, and agreeable to OT evaluation at this time. Per pt report, pt lives with his wife in a two-story home with 5 WILIAN and B HR. Per chart review, pt was receiving assist with bathing from his wife, however he was IND with dressing and using the bathroom. Pt was IND with mobility with no AD at [facility]. Pt appears confused and is a poor historian at this time. Based on current observations, pt presents with deficits in generalized strength, balance, functional activity tolerance, and cognition impacting overall performance of ADLs and functional transfers/mobility. Pt required total A to don socks while supine in bed. Pt currently requires max Ax2 for rolling and sup>sit EOB. Pt requires total Ax2 for sit>sup and scooting. Pt sat EOB and required mod A to don hospital gown as pt unable to follow command to thread BUE through sleeves, pt required guidance by therapist to thread BUE through sleeves. Pt requires max Ax2 for sit>stand and stand>sit transfers. Pt requires max A x2 while standing with RW. Pt demonstrated poor standing balance requiring x2 assist. Pt demonstrated difficulty following commands during today's session with additional time needed for functional mobility/transfers. Pt reported feeling lightheaded upon sitting EOB with no resolution of symptoms after approximately 5 minutes.  Pt's BP checked and was 132/91 while seated EOB. Overall, pt tolerates session fair. Pt would benefit from continued skilled OT services to address current impairments and improve IND and safety with self cares and functional transfers/mobility. Recommend discharge to SNF once medically appropriate. Other factors to consider for discharge: home support, PLOF, severity of deficits     Patient will benefit from skilled therapy intervention to address the above noted impairments. PLAN :  Recommendations and Planned Interventions: self care training, functional mobility training, therapeutic exercise, balance training, therapeutic activities, endurance activities and patient education    Frequency/Duration: Patient will be followed by occupational therapy:  3-5x/week to address goals. Recommendation for discharge: (in order for the patient to meet his/her long term goals)  Woodrow Talley    This discharge recommendation:  Has been made in collaboration with the attending provider and/or case management    IF patient discharges home will need the following DME: TBD       SUBJECTIVE:   Patient stated Are you staying with me?    OBJECTIVE DATA SUMMARY:   HISTORY:   No past medical history on file. No past surgical history on file.     Expanded or extensive additional review of patient history:     Home Situation  Home Environment: Private residence  # Steps to Enter: 5  Rails to Enter: Yes  Hand Rails : Bilateral  One/Two Story Residence: Two story, live on 1st floor  # of Interior Steps: 14  Living Alone: No  Support Systems: Spouse/Significant Other (wife)  Patient Expects to be Discharged to[de-identified] Home  Current DME Used/Available at Home: None (pt not best historian)    Hand dominance: Right    EXAMINATION OF PERFORMANCE DEFICITS:  Cognitive/Behavioral Status:  Neurologic State: Alert;Confused  Orientation Level: Oriented to person;Oriented to time;Disoriented to situation;Disoriented to place  Cognition: Decreased attention/concentration; Impaired decision making; Impulsive      Hearing: Auditory  Auditory Impairment: None    Vision/Perceptual:                                     Range of Motion:  AROM: Generally decreased, functional  PROM: Generally decreased, functional                      Strength:  Strength: Generally decreased, functional                Coordination:     Fine Motor Skills-Upper: Left Intact; Right Intact    Gross Motor Skills-Upper: Left Intact; Right Intact      Balance:  Sitting: Impaired  Sitting - Static: Fair (occasional)  Sitting - Dynamic: Poor (constant support)    Functional Mobility and Transfers for ADLs:  Bed Mobility:  Rolling: Maximum assistance;Assist x2  Supine to Sit: Maximum assistance;Assist x2  Sit to Supine: Total assistance;Assist x2  Scooting: Total assistance;Assist x2    Transfers:  Sit to Stand: Maximum assistance;Assist x2  Stand to Sit: Maximum assistance;Assist x2      ADL Intervention and task modifications:         Upper Body [address]: Moderate assistance (Pt required assist to guide UE through sleeves)    Lower Body Dressing Assistance  Socks: Total assistance (dependent)  Position Performed: Supine              Therapeutic Exercise:  Pt would benefit from UE HEP initiated at next session. Functional Measure:    [facility] [address] [address] AM-PACTM \"6 Clicks\"                                                       Daily Activity Inpatient Short Form  How much help from another person does the patient currently need. .. Total; A Lot A Little None   1. Putting on and taking off regular lower body clothing? [x]  1 []  2 []  3 []  4   2. Bathing (including washing, rinsing, drying)? [x]  1 []  2 []  3 []  4   3. Toileting, which includes using toilet, bedpan or urinal? [x] 1 []  2 []  3 []  4   4. Putting on and taking off regular upper body clothing? []  1 [x]  2 []  3 []  4   5. Taking care of personal grooming such as brushing teeth?  []  1 [x]  2 []  3 []  4   6. Eating meals? []  1 [x]  2 []  3 []  4   © , Trustees of [address] [place] [address] [address] [address], under license to [employer]. All rights reserved     Score:      Interpretation of Tool:  Represents clinically-significant functional categories (i.e. Activities of daily living). Percentage of Impairment CH    0%   CI    1-19% CJ    20-39% CK    40-59% CL    60-79% CM    80-99% CN     100%   [facility]  Score 6-24 24 23 20-22 15-19 10-14 7-9 6         Occupational Therapy Evaluation Charge Determination   History Examination Decision-Making   LOW Complexity : Brief history review  LOW Complexity : 1-3 performance deficits relating to physical, cognitive , or psychosocial skils that result in activity limitations and / or participation restrictions  LOW Complexity : No comorbidities that affect functional and no verbal or physical assistance needed to complete eval tasks       Based on the above components, the patient evaluation is determined to be of the following complexity level: LOW   Pain Ratin/10    Activity Tolerance:   Fair  Please refer to the flowsheet for vital signs taken during this treatment. After treatment patient left in no apparent distress:    Supine in bed, Call bell within reach and CNA present    COMMUNICATION/EDUCATION:   The patients plan of care was discussed with: Physical therapy assistant and Certified nursing assistant/patient care technician. Patient/family agree to work toward stated goals and plan of care. This patients plan of care is appropriate for delegation to [facility]. PT/OT sessions occurred together for increased safety of pt and clinician. Thank you for this referral.  Yolanda Saldivar OT       Problem: Self Care Deficits Care Plan (Adult)  Goal: *Acute Goals and Plan of Care (Insert Text)  Description: 1. Pt will be IND sup <> sit in prep for EOB ADLs  2. Pt will be IND grooming sitting EOB  3. Pt will be IND LE dressing sitting EOB/long sit  4.  Pt will be IND sit <>  prep for toileting LRAD  5. Pt will be IND toileting/toilet transfer/cloth mgmt LRAD  6.  Pt will be IND following UE HEP in prep for self care tasks      Outcome: Not Met

## 2022-02-22 NOTE — PROGRESS NOTES
Progress Note    Patient: Angy Mac MRN: [id number]  SSN: [id number]    YOB: 1955  Age: 73 y.o. Sex: male      Admit Date: 2/7/2022    LOS: 15 days     Subjective:     No acute events overnight    Objective:     Vitals:    02/21/22 2002 02/22/22 0049 02/22/22 0741 02/22/22 0805   BP: (!) 145/90 (!) 131/99  (!) 152/91   Pulse: (!) 109 (!) 107  (!) 102   Resp:  18  18   Temp: 97.6 °F (36.4 °C) 97.4 °F (36.3 °C)  98 °F (36.7 °C)   SpO2: 95% 94% 94% 94%   Weight:       Height:            Intake and Output:  Current Shift: No intake/output data recorded. Last three shifts: 02/20 1901 - 02/22 0700  In: -   Out: 2100 [Urine:2100]    Physical Exam:   General:   Confused and weak   Eyes:  Conjunctivae/corneas clear. PERRL, EOMs intact. Fundi benign   Ears:  Normal TMs and external ear canals both ears. Nose: Nares normal. Septum midline. Mucosa normal. No drainage or sinus tenderness. Mouth/Throat: Lips, mucosa, and tongue normal. Teeth and gums normal.   Neck: Supple, symmetrical, trachea midline, no adenopathy, thyroid: no enlargment/tenderness/nodules, no carotid bruit and no JVD. Back:   Symmetric, no curvature. ROM normal. No CVA tenderness. Lungs:   Clear to auscultation bilaterally. Heart:  Regular rate and rhythm, S1, S2 normal, no murmur, click, rub or gallop. Abdomen:   Soft, non-tender. Bowel sounds normal. No masses,  No organomegaly. Extremities:  Left upper extremity swelling. Pulses: 2+ and symmetric all extremities. Skin: Skin color, texture, turgor normal. No rashes or lesions   Lymph nodes: Cervical, supraclavicular, and axillary nodes normal.   Neurologic:  Confused       Lab/Data Review: All lab results for the last 24 hours reviewed. Assessment:     Active Problems:    Respiratory failure ([address] (2/7/2022)    Patient is 42-year-old white male with:  1. Syncope  2. Non-STEMI  3. CAD status post PCI of left circumflex artery in 2018  4. Hyperlipidemia  6. Vocal cord dysfunction  7. History of PE  8. Warfarin was stopped due to GI bleed  9. COPD  7. Obesity  8. Edema  9. Hypertensive emergency   10. Bilateral renal lesions, largest about 1.7 cm on the right side posteriorly. Plan:     Patient remains to be confused. He was working with physical therapy. It took 3-4 people to stand him up. Denied having any chest pain or shortness of breath. We'll continue medical management and plan for further ischemic evaluation depending on his clinical improvement. Probably this will be done as an outpatient.       Signed By: Aziza Avelar MD     February 22, 2022

## 2022-02-22 NOTE — PROGRESS NOTES
PHYSICAL THERAPY TREATMENT  Patient: Lisa Griffith (62 y.o. male)  Date: 2/22/2022  Diagnosis: [facility] ([facility] [J96.90] <principal problem not specified>  Procedure(s) (LRB):  LEFT HEART CATH / CORONARY ANGIOGRAPHY (N/A) Day of Surgery  Precautions: Fall  Chart, physical therapy assessment, plan of care and goals were reviewed. ASSESSMENT  Patient continues with skilled PT services and is not progressing towards goals. Patient supine in bed upon approach and agreed to therapy today. Patient was very confused and only oriented to person and year/month. Patient performed supine > seated EOB at max Ax2. Once seated EOB patient demonstrated impaired sitting balance needing occasional min-mod support to remain seated upright. Patients BP taken seated EOB at 132/91 with a HRof 85 BPM and aO2 % of 95%. Patient then performed STS to RW with max Ax2. Patient required mod-max VC to improve posterior postural leaning and keep head up/not look at feet. Patient was only able to stand for 15-20 seconds with mod ax2 and RW for support. Patient flopped down onto bedside. During standing patient was having O2 stats monitored by respiratory therapist. O2 stayed at 95% throughout standing session ( see respiratory therapist note for more details) Patient performed seated EOB > supine at total Ax2. When laying supine patient reported that he was going to be sick but never had any vomiting. Patient left supine in bed with call bell within reach and all needs meet. Once set up in bed patient not longer reported having to vomit/ be sick. Current Level of Function Impacting Discharge (mobility/balance): max/total  Ax2  for bed mobility, confusion    Other factors to consider for discharge: PLOF, assistance at home level of deficits, confusion          PLAN :  Patient continues to benefit from skilled intervention to address the above impairments. Continue treatment per established plan of care.   to address goals. Recommendation for discharge: (in order for the patient to meet his/her long term goals)  Woodrow Talley    This discharge recommendation:  Has been made in collaboration with the attending provider and/or case management    IF patient discharges home will need the following DME: gait belt and rolling walker       SUBJECTIVE:   Patient stated my son is outside.  Patients son was not outside , patient very confused    OBJECTIVE DATA SUMMARY:   Critical Behavior:  Neurologic State: Alert,Confused  Orientation Level: Oriented to person,Oriented to time,Disoriented to situation,Disoriented to place  Cognition: Decreased attention/concentration,Impaired decision making,Impulsive  Safety/Judgement: Awareness of environment  Functional Mobility Training:  Bed Mobility:  Rolling: Maximum assistance;Assist x2  Supine to Sit: Maximum assistance;Assist x2  Sit to Supine: Total assistance;Assist x2  Scooting: Total assistance;Assist x2  Transfers:  Sit to Stand: Maximum assistance;Assist x2  Stand to Sit: Maximum assistance;Assist x2  Balance:  Sitting: Impaired  Sitting - Static: Fair (occasional)  Sitting - Dynamic: Poor (constant support)  Pain Ratin/10    Activity Tolerance:   Poor and requires rest breaks  Please refer to the flowsheet for vital signs taken during this treatment. After treatment patient left in no apparent distress:   Supine in bed, Call bell within reach, and Side rails x 3    COMMUNICATION/COLLABORATION:   The patients plan of care was discussed with: Registered nurse and Respiratory therapist. Treatment occurred with OT due to patient requiring ax2 for all mobility and safety of patient and therapists. Problem: Mobility Impaired (Adult and Pediatric)  Goal: *Acute Goals and Plan of Care (Insert Text)  Description: Pt will be I with LE HEP in 7 days. Pt will perform bed mobility with mod A x1-2 in 7 days.   Pt will independently verbalize and perform energy conservation techniques such as pursed lip breathing in 7 days.       2/22/2022 1506 by Desi Seaman PTA  Outcome: Progressing Towards Goal    Patrick Espana PTA   Time Calculation: 30 mins

## 2022-02-22 NOTE — PROGRESS NOTES
SPEECH LANGUAGE PATHOLOGY DYSPHAGIA TREATMENT  Patient: Amna Pizano [de-identified]73 y.o. male)  Date: 2/22/2022  Diagnosis: Respiratory failure ([facility] [J96.90] Procedure(s) (LRB):  LEFT HEART CATH / CORONARY ANGIOGRAPHY (N/A) Day of Surgery  Precautions: Fall    ASSESSMENT :  Patient is A&Ox2, confused, follows basic commands w/ min cues and non sensical speech. PO trials of thin, puree and hard solids. Oral phase c/b mild disorganization w/ mastication s/t confusion and talking while eating. He clears mild oral residue of solids w/ liquid wash. Pharyngeal phase c/b minimal swallow delay and HLE is wfl upon palpation. No overt s/s of pen/asp observed. Patient will benefit from skilled intervention to address the above impairments. Patients rehabilitation potential is considered to be Fair     PLAN :  Recommendations and Planned Interventions:  Rec diet upgrade to mince and moist diet w/ thin liquids and strict asp/GERD precautions. Rec cues not to speak while eating, slow rate of intake, supervision, single bite/sips, and liquid wash. Frequency/Duration: Patient will be followed by speech-language pathology 5 times a week to address goals. Discharge Recommendations: Home Health vs SNF pending progress     SUBJECTIVE:   Patient confused w/ nonsensical speech. OBJECTIVE:       Current Diet:  DIET ADULT     Cognitive and Communication Status:  Neurologic State: Alert,Confused  Orientation Level: Oriented to person,Oriented to place  Cognition: Decreased attention/concentration  Perception: Appears intact  Perseveration: No perseveration noted  Safety/Judgement: Awareness of environment      After treatment:   Patient left in no apparent distress in bed, Call bell within reach, Nursing notified, and Bed / chair alarm activated    COMMUNICATION/EDUCATION:   Patient's confusion negatively impacts comprehension and carryover of education.      Problem: Dysphagia (Adult)  Goal: *Acute Goals and Plan of Care (Insert Text)  Description: Speech Therapy Swallow Goals  Initiated 2/18/2022  -Patient will tolerate full diet with mildly thick liquids without clinical indicators of aspiration given minimal cues within 7 day(s). -Patient will tolerate PO trials without clinical indicators of aspiration given minimal cues within 7 day(s). -Patient will participate in modified barium swallow study within 7 day(s). -Patient will demonstrate understanding of swallow safety precautions and aspiration precautions, diet recs with minimal cues within 7 day(s).            Outcome: Progressing Towards Goal     Thank you for this referral.  Sandy Obando M.S., M.Ed., CCC-SLP  Time Calculation: 14 mins

## 2022-02-22 NOTE — PROGRESS NOTES
IMPRESSION:   1. Acute hypoxic respiratory failure corrected on 2 L nasal oxygen  2. Klebsiella pneumonia and moraxella catarrhalis afebrile on Zosyn  3. Confusion disorientation questionable cause  4. NSTEMI  5. Generalized Edema  6. Syncope  7. COPD   8. Hypokalemia resolved  9. Delirium   10. Urinary retention      RECOMMENDATIONS/PLAN:   1. Transferred to   2. Extubated on 2/17 alert awake talking on 2 L nasal cannula, some mild confusion marked confusion agitation today MRI no definite abnormality serum CO2 is normal but will check a blood gas to make sure CO2 retention is not playing a role  3. Patient on IV Solu-Medrol no wheezing will decrease Solu-Medrol  4. Hold trilogy inhaler continue nebulized albuterol Atrovent budesonide  5. Chest x-ray no acute infiltrate which shows emphysematous changes, CXR 2/18/22 no acute changes  6. Altered mental status yesterday increased confusion, MRI negative, CT head negative, neuro signed off diagnosed with delirium  7. CTA head negative  8. Superficial thrombus in LUE  9. 2/18/22 ABG WNL we will repeat  10. Potassium corrected  11. Ambulatory pulse ox for discharge     [x] High complexity decision making was performed  [x] See my orders for details  HPI  43-year-old male came in because of chest pain with shortness of breath he has also recurrent episodes of syncope significant past medical history of vocal cord dysfunction, pulmonary [place], coronary artery disease status post stent COPD current everyday smoker no history of sleep apnea he was having swelling of the lower extremities for couple of months today came into the emergency room as previously was having chest pain or shortness of breath he passed out subsequently got intubated and now on ventilator critical care consult was called. Patient transferred to floor, no longer intubated, on NC O2 and satting appropriately. Clinically much improved from pulmonary standpoint.     PMH:  has no past medical history on file. PSH:   has no past surgical history on file. FHX: family history is not on file.      SHX:      ALL:   Allergies   Allergen Reactions    Sulfa (Sulfonamide Antibiotics) Other (comments)     syncope          MEDS:   [x] Reviewed - As Below   [] Not reviewed    Current Facility-Administered Medications   Medication    methylPREDNISolone (PF) (SOLU-MEDROL) injection 20 mg    hydrOXYzine (VISTARIL) 25 mg/mL injection 25 mg    nystatin (MYCOSTATIN) 100,000 unit/mL oral suspension 500,000 Units    piperacillin-tazobactam (ZOSYN) 3.375 g in 0.9% sodium chloride (MBP/ADV) 100 mL MBP    albuterol-ipratropium (DUO-NEB) 2.5 MG-0.5 MG/3 ML    bethanechol (URECHOLINE) tablet 100 mg    finasteride (PROSCAR) tablet 5 mg    OXcarbazepine (TRILEPTAL) tablet 150 mg    tamsulosin (FLOMAX) capsule 0.4 mg    acetaminophen (TYLENOL) tablet 650 mg    Or    acetaminophen (TYLENOL) suppository 650 mg    polyethylene glycol (MIRALAX) packet 17 g    ondansetron (ZOFRAN ODT) tablet 4 mg    Or    ondansetron (ZOFRAN) injection 4 mg    enoxaparin (LOVENOX) injection 40 mg    famotidine (PF) (PEPCID) 20 mg in 0.9% sodium chloride 10 mL injection    labetaloL (NORMODYNE;TRANDATE) 20 mg/4 mL (5 mg/mL) injection 10 mg    amLODIPine (NORVASC) tablet 10 mg    aspirin chewable tablet 81 mg    atorvastatin (LIPITOR) tablet 80 mg    clopidogreL (PLAVIX) tablet 75 mg    metoprolol succinate (TOPROL-XL) XL tablet 25 mg    albuterol-ipratropium (DUO-NEB) 2.5 MG-0.5 MG/3 ML    budesonide (PULMICORT) 500 mcg/2 ml nebulizer suspension      MAR reviewed and pertinent medications noted or modified as needed   Current Facility-Administered Medications   Medication    methylPREDNISolone (PF) (SOLU-MEDROL) injection 20 mg    hydrOXYzine (VISTARIL) 25 mg/mL injection 25 mg    nystatin (MYCOSTATIN) 100,000 unit/mL oral suspension 500,000 Units    piperacillin-tazobactam (ZOSYN) 3.375 g in 0.9% sodium chloride (MBP/ADV) 100 mL MBP    albuterol-ipratropium (DUO-NEB) 2.5 MG-0.5 MG/3 ML    bethanechol (URECHOLINE) tablet 100 mg    finasteride (PROSCAR) tablet 5 mg    OXcarbazepine (TRILEPTAL) tablet 150 mg    tamsulosin (FLOMAX) capsule 0.4 mg    acetaminophen (TYLENOL) tablet 650 mg    Or    acetaminophen (TYLENOL) suppository 650 mg    polyethylene glycol (MIRALAX) packet 17 g    ondansetron (ZOFRAN ODT) tablet 4 mg    Or    ondansetron (ZOFRAN) injection 4 mg    enoxaparin (LOVENOX) injection 40 mg    famotidine (PF) (PEPCID) 20 mg in 0.9% sodium chloride 10 mL injection    labetaloL (NORMODYNE;TRANDATE) 20 mg/4 mL (5 mg/mL) injection 10 mg    amLODIPine (NORVASC) tablet 10 mg    aspirin chewable tablet 81 mg    atorvastatin (LIPITOR) tablet 80 mg    clopidogreL (PLAVIX) tablet 75 mg    metoprolol succinate (TOPROL-XL) XL tablet 25 mg    albuterol-ipratropium (DUO-NEB) 2.5 MG-0.5 MG/3 ML    budesonide (PULMICORT) 500 mcg/2 ml nebulizer suspension      PMH:  has no past medical history on file. PSH:   has no past surgical history on file. FHX: family history is not on file. SHX:       ROS:  Patient alert awake talking review of systems as mentioned in HPI and physical exam    Hemodynamics:    CO:    CI:    CVP:    SVR:   PAP Systolic:    PAP Diastolic:    PVR:    IC91:        Ventilator Settings:      Mode Rate TV Press PEEP FiO2 PIP Min.  Vent   Assist control,Volume control    550 ml 15 cm H2O  5 cm H20 30 %  18 cm H2O  10.7 l/min        Vital Signs: Telemetry:    normal sinus rhythm Intake/Output:   Visit Vitals  BP (!) 152/91 (BP 1 Location: Left upper arm, BP Patient Position: At rest)   Pulse (!) 102   Temp 98 °F (36.7 °C)   Resp 18   Ht 6' (1.829 m)   Wt 245 lb 13 oz (111.5 kg)   SpO2 94%   BMI 33.34 kg/m²       Temp (24hrs), Av.7 °F (36.5 °C), Min:97.4 °F (36.3 °C), Max:98 °F (36.7 °C)        O2 Device: None (Room air) O2 Flow Rate (L/min): 2 l/min       Wt Readings from Last 4 Encounters:   02/18/22 245 lb 13 oz (111.5 kg)          Intake/Output Summary (Last 24 hours) at 2/22/2022 0926  Last data filed at 2/22/2022 0054  Gross per 24 hour   Intake    Output 1100 ml   Net -1100 ml       Last shift:      No intake/output data recorded. Last 3 shifts: 02/20 1901 - 02/22 0700  In: -   Out: 2100 [Urine:2100]       Physical Exam:     General: Alert awake very confused some periods of agitation  HEENT: NCAT,   Eyes: anicteric; conjunctiva clear   Neck: no nodes,no accessory MM use. Chest: no deformity,   Cardiac: IR regular; no murmur;   Lungs: distant breath sounds; no wheeze  Abd: soft, NT, hypoactive BS  Ext: no edema; no joint swelling;  No clubbing  : clear urine  Neuro: Moving extremities  Psych-no issues  Skin: warm, dry, no cyanosis;   Pulses: Brachial and radial pulses intact  Capillary: Normal capillary refill      DATA:    MAR reviewed and pertinent medications noted or modified as needed  MEDS:   Current Facility-Administered Medications   Medication    methylPREDNISolone (PF) (SOLU-MEDROL) injection 20 mg    hydrOXYzine (VISTARIL) 25 mg/mL injection 25 mg    nystatin (MYCOSTATIN) 100,000 unit/mL oral suspension 500,000 Units    piperacillin-tazobactam (ZOSYN) 3.375 g in 0.9% sodium chloride (MBP/ADV) 100 mL MBP    albuterol-ipratropium (DUO-NEB) 2.5 MG-0.5 MG/3 ML    bethanechol (URECHOLINE) tablet 100 mg    finasteride (PROSCAR) tablet 5 mg    OXcarbazepine (TRILEPTAL) tablet 150 mg    tamsulosin (FLOMAX) capsule 0.4 mg    acetaminophen (TYLENOL) tablet 650 mg    Or    acetaminophen (TYLENOL) suppository 650 mg    polyethylene glycol (MIRALAX) packet 17 g    ondansetron (ZOFRAN ODT) tablet 4 mg    Or    ondansetron (ZOFRAN) injection 4 mg    enoxaparin (LOVENOX) injection 40 mg    famotidine (PF) (PEPCID) 20 mg in 0.9% sodium chloride 10 mL injection    labetaloL (NORMODYNE;TRANDATE) 20 mg/4 mL (5 mg/mL) injection 10 mg    amLODIPine (NORVASC) tablet 10 mg  aspirin chewable tablet 81 mg    atorvastatin (LIPITOR) tablet 80 mg    clopidogreL (PLAVIX) tablet 75 mg    metoprolol succinate (TOPROL-XL) XL tablet 25 mg    albuterol-ipratropium (DUO-NEB) 2.5 MG-0.5 MG/3 ML    budesonide (PULMICORT) 500 mcg/2 ml nebulizer suspension        Labs:      Recent Labs     02/21/22  1815   PH 7.46*   PCO2 37   PO2 80   HCO3 27*   2/13 AC 15  PEEP 8 FiO2 35%  Lab Results   Component Value Date/Time    TSH 1.38 02/08/2022 03:53 AM        Imaging:    Results from [place] encounter on 02/07/22    XR CHEST PORT    Narrative  Chest single view. Comparison single view chest February 17, 2022. ET tube and NG tube have been removed. Unchanged appearance for the lungs; no gross interstitial or alveolar pulmonary  edema. Cardiac and mediastinal structures unchanged. Apical pleural thickening  unchanged. No pneumothorax or sizable pleural effusion. Results from Hospital Encounter encounter on 02/07/22    CTA HEAD NECK W WO CONT    Narrative  CTA NECK WITHOUT AND WITH INTRAVENOUS CONTRAST  CTA HEAD WITH INTRAVENOUS CONTRAST    INDICATION: Right arm weakness    COMPARISON: CTA head and neck from 2/12/2022    TECHNIQUE: Noncontrast neck CT. Transaxial CTA of the head and neck was  performed after administration of 100 mL of Isovue-370 intravenous contrast.  Multiplanar MIP and standard reconstructions performed and evaluated. Dose  reduction: Per department policy, all CT scans at this facility are performed  using dose reduction optimization techniques as appropriate to a performed  examination including the following: Automated exposure control, adjustments of  the mA and/or KV according to patient size, or use of iterative reconstruction  technique. FINDINGS:  Contrast injection through the right upper extremity causes streak artifact  which obscures evaluation of the surrounding structures.  No focal  hemodynamically significant luminal narrowing of the visualized aortic arch,  innominate artery, or included/visualized subclavian and axillary arteries. Right common carotid artery is widely patent. Mild calcified atherosclerosis of  the right carotid bifurcation without hemodynamically significant luminal  stenosis by NASCET-like criteria. Calcified plaque of the proximal right  internal carotid artery without hemodynamically significant luminal stenosis. Right ICA is patent in the neck. Cavernous carotid circumferential calcified  atherosclerosis. Right ophthalmic artery is patent. Right anterior cerebral  artery and middle cerebral artery are patent. Left common carotid artery is widely patent. Mild calcified atherosclerosis  without hemodynamically significant luminal stenosis by NASCET-like criteria. Left internal carotid artery is widely patent. Mild calcified plaque of the left  cavernous ICA. Left ophthalmic artery is patent. Left anterior cerebral artery  is patent. No definite large vessel occlusion of the left middle cerebral  artery. Calcified atherosclerosis of bilateral vertebral arteries. Intradural vertebral  arteries also show calcified atherosclerosis. Basilar artery is patent. Bilateral posterior cerebral arteries are patent. Emphysema of the included lung apices. Visualized osseous structures show  chronic fusion of the C3-4 vertebral bodies. Impression  No large vessel occlusion or hemodynamically significant stenosis seen  intracranially. No hemodynamically significant luminal stenosis of the carotid  bifurcations by NASCET-like criteria. · 2/12 patient condition got worse yesterday and he became cyanotic while he was on a ventilator transferred to ICU he is back on Precedex and propofol ABG acceptable  · 2/13 back on ventilator assist control. Low-grade fever 100.6. Will check urine and sputum cultures although chest x-ray looks clear. Will decrease PEEP to 5.   Repeat labs in a.m. hypercapnia today we will give Diamox  · 2/14 will decrease FiO2 sputum positive for gram-positive cocci in clusters started on vancomycin and Zosyn pending sensitivity still spiking temperature  · 2/16 remain on ventilator  will try to wean him today once extubated cardiologist suggested about cardiac cath  · 2/17 on assist control mode will try to wean him again today  · 2/18 alert awake nasal cannula talking weak  · 2/20/22 Patient transferred to floor on 2 L O2 NC satting appropriately talking and no short of breath. Patient has been getting straight cathed for the past few days, will consult urology. Patient also not sleeping, will add melatonin for now. · 2/21 patient confused according to the family he is talking out of his head started yesterday. Last blood gas no CO2 retention serum CO2 normal despite this we will check another blood gas.   No wheezing will start decreasing Solu-Medrol  ·   · Alfonzo Moralez MD  ·

## 2022-02-22 NOTE — PROGRESS NOTES
Patient spo2 on room air at rest 95%  Patient is unstable to ambulate at this time . Awaiting on PT to assist with ambulation.

## 2022-02-22 NOTE — PROGRESS NOTES
CM communicating with [facility] via [facility]. They are reviewing his paperwork for possible admission. They require COVID Test within 24 hrs of admission. CM will put in order. Patient wife made aware of needed Service paperwork ([phone]). She is at work and will fax to facility when able.

## 2022-02-22 NOTE — PROGRESS NOTES
UROLOGY Progress Note         [phone]      Daily Progress Note: 2/22/2022      Subjective: The patient is seen for UROLOGIC follow up for urinary retention. The patient is a 81-year-old male who came to the hospital with shortness of breath syncope and confusion. He has a history of COPD CAD he is a smoker and history of sleep apnea. Today patient is mildly confused. He is unable to give any history. I spoke to Ms. Allen patient's wife (via the phone) who told me that the patient has a long history of urinary retention. As per patient's wife, patient has been followed by [place] [facility] Dr. Teo Hightower (but she is  not completely sure) The patient  had cystoscopy few years back, and developed bleeding after. Also patient has a history of retention and painful urination. At times he takes Pyridium for pain. Few times,  rubin catheter has been placed outpatient due to retention. The patient has no history of prostate cancer.     Problem List:  Patient Active Problem List   Diagnosis Code    Respiratory failure ([facility]) J96.90         Medications reviewed  Current Facility-Administered Medications   Medication Dose Route Frequency    melatonin tablet 3 mg  3 mg Oral QHS PRN    predniSONE (DELTASONE) tablet 20 mg  20 mg Oral DAILY WITH BREAKFAST    hydrOXYzine (VISTARIL) 25 mg/mL injection 25 mg  25 mg IntraMUSCular Q6H PRN    nystatin (MYCOSTATIN) 100,000 unit/mL oral suspension 500,000 Units  500,000 Units Oral QID    piperacillin-tazobactam (ZOSYN) 3.375 g in 0.9% sodium chloride (MBP/ADV) 100 mL MBP  3.375 g IntraVENous Q8H    albuterol-ipratropium (DUO-NEB) 2.5 MG-0.5 MG/3 ML  3 mL Nebulization Q4H PRN    bethanechol (URECHOLINE) tablet 100 mg  100 mg Oral BID    finasteride (PROSCAR) tablet 5 mg  5 mg Oral DAILY    OXcarbazepine (TRILEPTAL) tablet 150 mg  150 mg Oral BID    tamsulosin (FLOMAX) capsule 0.4 mg  0.4 mg Oral DAILY    acetaminophen (TYLENOL) tablet 650 mg  650 mg Oral Q6H PRN    Or    acetaminophen (TYLENOL) suppository 650 mg  650 mg Rectal Q6H PRN    polyethylene glycol (MIRALAX) packet 17 g  17 g Oral DAILY PRN    ondansetron (ZOFRAN ODT) tablet 4 mg  4 mg Oral Q8H PRN    Or    ondansetron (ZOFRAN) injection 4 mg  4 mg IntraVENous Q6H PRN    enoxaparin (LOVENOX) injection 40 mg  40 mg SubCUTAneous DAILY    famotidine (PF) (PEPCID) 20 mg in 0.9% sodium chloride 10 mL injection  20 mg IntraVENous Q12H    labetaloL (NORMODYNE;TRANDATE) 20 mg/4 mL (5 mg/mL) injection 10 mg  10 mg IntraVENous Q4H PRN    amLODIPine (NORVASC) tablet 10 mg  10 mg Oral DAILY    aspirin chewable tablet 81 mg  81 mg Oral DAILY    atorvastatin (LIPITOR) tablet 80 mg  80 mg Oral DAILY    clopidogreL (PLAVIX) tablet 75 mg  75 mg Oral DAILY    metoprolol succinate (TOPROL-XL) XL tablet 25 mg  25 mg Oral DAILY    albuterol-ipratropium (DUO-NEB) 2.5 MG-0.5 MG/3 ML  3 mL Nebulization Q6HWA RT    budesonide (PULMICORT) 500 mcg/2 ml nebulizer suspension  500 mcg Nebulization BID RT       Review of Systems:   Review of Systems   Reason unable to perform ROS: confusion. Objective:   Physical Exam  Constitutional:       Appearance: Normal appearance. Comments: Alert to self and time   HENT:      Head: Normocephalic. Cardiovascular:      Rate and Rhythm: Normal rate. Pulses: Normal pulses. Pulmonary:      Effort: Pulmonary effort is normal.   Abdominal:      Palpations: Abdomen is soft. Comments: Positive bowel sounds   Genitourinary:     Comments: Toro catheter draining tea colored urine  Musculoskeletal:      Cervical back: Neck supple. Right lower leg: Edema present. Left lower leg: Edema present. Skin:     General: Skin is warm. Neurological:      Mental Status: He is disoriented.    Psychiatric:         Mood and Affect: Mood normal.          Visit Vitals  BP (!) 152/91 (BP 1 Location: Left upper arm, BP Patient Position: At rest)   Pulse (!) 102   Temp 98 °F (36.7 °C)   Resp 18   Ht 6' (1.829 m)   Wt 245 lb 13 oz (111.5 kg)   SpO2 94%   BMI 33.34 kg/m²         Data Review:       Recent Days:  Recent Labs     02/22/22  0923 02/21/22  0801 02/20/22  0833   WBC 11.9* 11.5* 13.1*   HGB 12.5 12.9 12.8   HCT 38.8 39.4 38.7    234 248     Recent Labs     02/22/22  0923 02/21/22  0801 02/20/22  0833   * 149* 145   K 3.6 4.0 3.8   * 117* 115*   CO2 28 26 24   * 153* 163*   BUN 43* 43* 40*   CREA 1.04 0.89 0.67*   CA 8.8 8.8 8.7   MG 2.6* 2.6* 2.4       24 Hour Results:  Recent Results (from the past 24 hour(s))   BLOOD GAS, ARTERIAL    Collection Time: 02/21/22  6:15 PM   Result Value Ref Range    pH 7.46 (H) 7.35 - 7.45      PCO2 37 35 - 45 mmHg    PO2 80 75 - 100 mmHg    O2 SAT 95 (L) >95 %    BICARBONATE 27 (H) 22 - 26 mmol/L    BASE EXCESS 2.4 (H) 0 - 2 mmol/L    O2 METHOD Nasal Cannula      O2 FLOW RATE 2.0 L/min    Sample source Arterial      SITE Right Radial      VÍCTOR'S TEST PASS     CBC W/O DIFF    Collection Time: 02/22/22  9:23 AM   Result Value Ref Range    WBC 11.9 (H) 4.1 - 11.1 K/uL    RBC 4.27 4. 10 - 5.70 M/uL    HGB 12.5 12.1 - 17.0 g/dL    HCT 38.8 36.6 - 50.3 %    MCV 90.9 80.0 - 99.0 FL    MCH 29.3 26.0 - 34.0 PG    MCHC 32.2 30.0 - 36.5 g/dL    RDW 15.4 (H) 11.5 - 14.5 %    PLATELET 911 012 - 697 K/uL    MPV 9.5 8.9 - 12.9 FL    NRBC 0.0 0.0  WBC    ABSOLUTE NRBC 0.00 0.00 - 5.86 K/uL   METABOLIC PANEL, BASIC    Collection Time: 02/22/22  9:23 AM   Result Value Ref Range    Sodium 149 (H) 136 - 145 mmol/L    Potassium 3.6 3.5 - 5.1 mmol/L    Chloride 116 (H) 97 - 108 mmol/L    CO2 28 21 - 32 mmol/L    Anion gap 5 5 - 15 mmol/L    Glucose 182 (H) 65 - 100 mg/dL    BUN 43 (H) 6 - 20 mg/dL    Creatinine 1.04 0.70 - 1.30 mg/dL    BUN/Creatinine ratio 41 (H) 12 - 20      GFR est AA >60 >60 ml/min/1.73m2    GFR est non-AA >60 >60 ml/min/1.73m2    Calcium 8.8 8.5 - 10.1 mg/dL   MAGNESIUM    Collection Time: 02/22/22  9:23 AM Result Value Ref Range    Magnesium 2.6 (H) 1.6 - 2.4 mg/dL           Assessment/     Patient Active Problem List   Diagnosis Code    Respiratory failure ([address] J96.90       Plan:  Urinary retention  Continue with the tamsulosin and Toro catheter for now  Voiding trail?-  Patient will need follow-up with Dr. Carolina Gates or Dr. Foy Kind   We will talk to Ms. Ms. Elveria Osler tomorrow to find out who is the patient's urologist   Ct scan from 2/7/22  Gallbladder is present without  pericholecystic stranding. No urinary stone or hydronephrosis on either side. Bilateral renal lesions, largest about 1.7 cm on the right side posteriorly, are  too small to characterize, may represent cyst cyst, hyperdense cyst or other. No bowel obstruction. Residual contrast in parts of colon. No contrast  extravasation in the remainder of the bowel. No evidence of diverticulitis. Normal appendix. No abscess. No free intraperitoneal air. Prostate measures  about 4.5 x 3.5 cm in diameter containing small calcifications. There are some  fatty changes/atrophy of some of the muscles. No acute fracture in the  visualized bony structures.             Care Plan discussed with: Dr. Raul Bennett, Attending Physician      Fco Tenorio NP

## 2022-02-23 PROCEDURE — 74011250637 HC RX REV CODE- 250/637: Performed by: INTERNAL MEDICINE

## 2022-02-23 PROCEDURE — 74011000250 HC RX REV CODE- 250: Performed by: INTERNAL MEDICINE

## 2022-02-23 PROCEDURE — 74011250636 HC RX REV CODE- 250/636: Performed by: INTERNAL MEDICINE

## 2022-02-23 PROCEDURE — 65270000029 HC RM PRIVATE

## 2022-02-23 PROCEDURE — 97530 THERAPEUTIC ACTIVITIES: CPT

## 2022-02-23 PROCEDURE — 74011636637 HC RX REV CODE- 636/637: Performed by: INTERNAL MEDICINE

## 2022-02-23 PROCEDURE — 94640 AIRWAY INHALATION TREATMENT: CPT

## 2022-02-23 PROCEDURE — 74011000258 HC RX REV CODE- 258: Performed by: INTERNAL MEDICINE

## 2022-02-23 PROCEDURE — 92526 ORAL FUNCTION THERAPY: CPT

## 2022-02-23 PROCEDURE — 74011250637 HC RX REV CODE- 250/637: Performed by: HOSPITALIST

## 2022-02-23 RX ADMIN — PIPERACILLIN AND TAZOBACTAM 3.38 G: 3; .375 INJECTION, POWDER, LYOPHILIZED, FOR SOLUTION INTRAVENOUS at 04:48

## 2022-02-23 RX ADMIN — ATORVASTATIN CALCIUM 80 MG: 40 TABLET, FILM COATED ORAL at 11:17

## 2022-02-23 RX ADMIN — ACETAMINOPHEN 325MG 650 MG: 325 TABLET ORAL at 21:10

## 2022-02-23 RX ADMIN — PREDNISONE 20 MG: 20 TABLET ORAL at 11:18

## 2022-02-23 RX ADMIN — SODIUM CHLORIDE, PRESERVATIVE FREE 20 MG: 5 INJECTION INTRAVENOUS at 21:10

## 2022-02-23 RX ADMIN — ACETAMINOPHEN 325MG 650 MG: 325 TABLET ORAL at 16:20

## 2022-02-23 RX ADMIN — OXCARBAZEPINE 150 MG: 150 TABLET, FILM COATED ORAL at 21:11

## 2022-02-23 RX ADMIN — NYSTATIN 500000 UNITS: 100000 SUSPENSION ORAL at 13:00

## 2022-02-23 RX ADMIN — NYSTATIN 500000 UNITS: 100000 SUSPENSION ORAL at 21:10

## 2022-02-23 RX ADMIN — CLOPIDOGREL BISULFATE 75 MG: 75 TABLET, FILM COATED ORAL at 11:18

## 2022-02-23 RX ADMIN — PIPERACILLIN AND TAZOBACTAM 3.38 G: 3; .375 INJECTION, POWDER, LYOPHILIZED, FOR SOLUTION INTRAVENOUS at 16:00

## 2022-02-23 RX ADMIN — IPRATROPIUM BROMIDE AND ALBUTEROL SULFATE 3 ML: 2.5; .5 SOLUTION RESPIRATORY (INHALATION) at 20:21

## 2022-02-23 RX ADMIN — POLYETHYLENE GLYCOL 3350 17 G: 17 POWDER, FOR SOLUTION ORAL at 16:00

## 2022-02-23 RX ADMIN — IPRATROPIUM BROMIDE AND ALBUTEROL SULFATE 3 ML: 2.5; .5 SOLUTION RESPIRATORY (INHALATION) at 07:41

## 2022-02-23 RX ADMIN — FINASTERIDE 5 MG: 5 TABLET, FILM COATED ORAL at 11:18

## 2022-02-23 RX ADMIN — BUDESONIDE 500 MCG: 0.5 SUSPENSION RESPIRATORY (INHALATION) at 07:41

## 2022-02-23 RX ADMIN — METOPROLOL SUCCINATE 25 MG: 25 TABLET, EXTENDED RELEASE ORAL at 11:18

## 2022-02-23 RX ADMIN — BETHANECHOL CHLORIDE 100 MG: 25 TABLET ORAL at 11:18

## 2022-02-23 RX ADMIN — BUDESONIDE 500 MCG: 0.5 SUSPENSION RESPIRATORY (INHALATION) at 20:21

## 2022-02-23 RX ADMIN — MELATONIN TAB 3 MG 3 MG: 3 TAB at 21:10

## 2022-02-23 RX ADMIN — BETHANECHOL CHLORIDE 100 MG: 25 TABLET ORAL at 21:09

## 2022-02-23 RX ADMIN — TAMSULOSIN HYDROCHLORIDE 0.4 MG: 0.4 CAPSULE ORAL at 11:18

## 2022-02-23 RX ADMIN — SODIUM CHLORIDE, PRESERVATIVE FREE 20 MG: 5 INJECTION INTRAVENOUS at 11:18

## 2022-02-23 RX ADMIN — PIPERACILLIN AND TAZOBACTAM 3.38 G: 3; .375 INJECTION, POWDER, LYOPHILIZED, FOR SOLUTION INTRAVENOUS at 21:11

## 2022-02-23 RX ADMIN — ENOXAPARIN SODIUM 40 MG: 100 INJECTION SUBCUTANEOUS at 11:26

## 2022-02-23 RX ADMIN — AMLODIPINE BESYLATE 10 MG: 5 TABLET ORAL at 11:18

## 2022-02-23 RX ADMIN — ASPIRIN 81 MG CHEWABLE TABLET 81 MG: 81 TABLET CHEWABLE at 11:18

## 2022-02-23 RX ADMIN — OXCARBAZEPINE 150 MG: 150 TABLET, FILM COATED ORAL at 11:18

## 2022-02-23 RX ADMIN — NYSTATIN 500000 UNITS: 100000 SUSPENSION ORAL at 18:00

## 2022-02-23 RX ADMIN — IPRATROPIUM BROMIDE AND ALBUTEROL SULFATE 3 ML: 2.5; .5 SOLUTION RESPIRATORY (INHALATION) at 13:53

## 2022-02-23 NOTE — DISCHARGE SUMMARY
Hospitalist Discharge Summary     Patient ID:  Pa Hamilton  [id number]  73 y.o.  1955 2/7/2022    PCP on record: Miguelina Hendricks MD    Admit date: 2/7/2022  Discharge date and time: 2/23/2022    DISCHARGE DIAGNOSIS:    Acute respiratory failure with hypoxia/COPD exacerbation/pneumonia/sepsis/recurrent syncope/hypertension/type II non-ST elevation MI/left arm    CONSULTATIONS:  IP CONSULT TO NEUROLOGY  IP CONSULT TO NEPHROLOGY  IP CONSULT TO UROLOGY  IP CONSULT TO CARDIOLOGY  IP CONSULT TO PULMONOLOGY    Excerpted HPI from H&P of Jay Martinez MD:  Limited history was obtained as patient was intubated and sedated. [de-identified] of history obtained from wife at bedside and ED physician.  Ana Cristina Weir is a 73 y.o. male possible history of vocal cord dysfunction, history of pulmonary embolism (on warfarin due to GI bleed), CAD s/p stent (6 years ago), and COPD. He presented to the ED today with chief complaint of chest pain, shortness of breath, syncope and altered mental status. He initially went to outside ED for chest pain shortness of breath, however noted to have altered mental status by EMS crew. Recurrent, back-to-back episodes of syncope that lasted 5 to 10 seconds, every 1 minutes. He was noted to have normal pulses, normal breathing and no arrhythmia.      In the ED, he was initially hypertensive (230/143) and hypoxic, SPO2 in 30s. He continued to have episodes of syncope, but wakes up after sternal rub. No postictal phase of confusion noted per ED physician, and was able to provide history to ED physician. As patient was unable to protect his airway and continue to be hypoxic, patient was intubated.     Per wife, patient has been having intermittent, generalized/whole body cramps that last between 15 minutes to 1 hour. Those episodes generally followed by loss of consciousness for about 15 seconds.   He usually will have short period of confusion and slurred speech after regaining consciousness. No extremity weakness after the episodes. Wife reports no fever, chills or chest pain. Reports have shortness of breath and cough at baseline due to COPD and has not been worse than normal in the past 3 weeks. Of note, he has been worked-up in the hospital in [place] (record not found in system), and no causes has been identified. EEG was never done.     Initial work-up showed no leukocytosis, no significant electrolytes abnormalities, no elevated troponin or BNP. UA not indicated of of UTI, tox screen negative. CT head and chest x-ray showed no acute findings. CT PE pending.    ______________________________________________________________________  DISCHARGE SUMMARY/HOSPITAL COURSE:  for full details see H&P, daily progress notes, labs, consult notes. 78-year-old male with history of COPD, CAD with PCI and pulmonary embolism (not on anticoagulation due to GI bleed) who was admitted on 2/7 with altered mental status and syncope complicated by hypertensive urgency and hypoxia. Initially had a blood pressure of 230/143 with oxygen saturations in the 30s. He was intubated in the ED. CT of the chest was unremarkable. Patient has a history of recurrent syncopal episodes. Per wife, patient had been having intermittent, generalized/whole body cramps that last between 15 minutes to 1 hour.  Those episodes generally are  followed by loss of consciousness for about 15 seconds. Kristy Milton usually will have short period of confusion and slurred speech after regaining consciousness.  No extremity weakness after the episodes.  Wife reported no fever, chills or chest pain.  Reports have shortness of breath and cough at baseline due to COPD and has not been worse than normal in the past 3 weeks.  Of note, he has been worked-up in the hospital in [place] (record not found in system), and no cause has been identified.   EEG was never done.     Patient was seen by neurology who does not feel the above episodes represented seizures.     COVID test was negative.     Hospital course was complicated by non-STEMI felt due to malignant hypertension.     He  has been maintained on the ventilator and followed by pulmonology. Etiology of respiratory failure seems to be largely COPD exacerbation. Patient was successfully extubated, transferred out of ICU, remained on Zosyn as well as Solu-Medrol, wean down on oxygen, patient was evaluated by neurology, received MRI brain, evaluated by physical therapy as well as Occupational Therapy, overall patient's clinical status improved, patient needed cardiac catheterization which can be done as an outpatient, patient's was subsequently transitioned to oral steroids as well as oral antibiotics and deemed stable for discharge to skilled nursing facility with close outpatient follow-up with primary care physician as well as neurology as well as cardiology as well as nephrology. _______________________________________________________________________  Patient seen and examined by me on discharge day. Pertinent Findings:  Gen:    Not in distress  Chest: Decreased air entry bilaterally in bilateral lower lung zones  CVS:   Regular rhythm, s1/s2 no m/r/g  No edema  Abd:  Soft, not distended, not tender  Neuro:  Alert, at  baseline  _______________________________________________________________________  DISCHARGE MEDICATIONS:   Current Discharge Medication List      START taking these medications    Details   acetaminophen (TYLENOL) 325 mg tablet Take 2 Tablets by mouth every six (6) hours as needed for Pain or Fever. Qty: 60 Tablet, Refills: 0  Start date: 2/21/2022      amLODIPine (NORVASC) 10 mg tablet Take 1 Tablet by mouth daily. Qty: 30 Tablet, Refills: 0  Start date: 2/22/2022      budesonide (PULMICORT) 0.5 mg/2 mL nbsp 2 mL by Nebulization route two (2) times a day.   Qty: 200 mL, Refills: 0  Start date: 2/21/2022      glycopyrrolate (ROBINUL) 0.2 mg/mL injection 0.5 mL by IntraVENous route three (3) times daily. Qty: 20 mL, Refills: 0  Start date: 2/21/2022      nystatin (MYCOSTATIN) 100,000 unit/mL suspension Take 5 mL by mouth four (4) times daily. swish and spit  Qty: 473 mL, Refills: 0  Start date: 2/21/2022      predniSONE (DELTASONE) 20 mg tablet Take 1 tablet twice daily for 7 days, then take 1 tablet once daily for 7 days  Qty: 21 Tablet, Refills: 0  Start date: 2/21/2022      amoxicillin-clavulanate (Augmentin) 875-125 mg per tablet Take 1 Tablet by mouth two (2) times a day. Qty: 14 Tablet, Refills: 0  Start date: 2/21/2022         CONTINUE these medications which have NOT CHANGED    Details   albuterol (ProAir HFA) 90 mcg/actuation inhaler Take 1 Puff by inhalation four (4) times daily as needed. aspirin 81 mg chewable tablet Take 81 mg by mouth daily. atorvastatin (Lipitor) 40 mg tablet Take 40 mg by mouth daily. bethanechol chloride (URECHOLINE) 50 mg tablet Take 200 mg by mouth daily. clopidogreL (PLAVIX) 75 mg tab Take 75 mg by mouth daily. esomeprazole (NexIUM) 40 mg capsule Take 40 mg by mouth daily. finasteride (PROSCAR) 5 mg tablet Take 5 mg by mouth daily. fluticasone-umeclidinium-vilanterol (TRELEGY ELLIPTA) 100-62.5-25 mcg inhaler Take 1 Puff by inhalation daily. ipratropium-albuteroL (Combivent Respimat)  mcg/actuation inhaler Take 1 Puff by inhalation two (2) times daily as needed. omeprazole (PRILOSEC) 20 mg capsule Take 20 mg by mouth daily. OXcarbazepine (Oxtellar XR) 300 mg Tb24 Take 300 mg by mouth daily. tamsulosin (Flomax) 0.4 mg capsule Take 0.4 mg by mouth daily. metoprolol succinate (TOPROL-XL) 25 mg XL tablet Take 25 mg by mouth daily.          STOP taking these medications       bumetanide (BUMEX) 1 mg tablet Comments:   Reason for Stopping:         furosemide (LASIX) 20 mg tablet Comments:   Reason for Stopping:         gabapentin (NEURONTIN) 300 mg capsule Comments:   Reason for Stopping:         isosorbide dinitrate (ISORDIL) 30 mg tablet Comments:   Reason for Stopping:         losartan (COZAAR) 25 mg tablet Comments:   Reason for Stopping:         QUEtiapine (SEROqueL) 400 mg tablet Comments:   Reason for Stopping:         sertraline (Zoloft) 100 mg tablet Comments:   Reason for Stopping:         ticagrelor (Brilinta) 90 mg tablet Comments:   Reason for Stopping:                 Patient Follow Up Instructions: Activity: PT/OT Eval and Treat  Diet: Full liquid diet moderately thickened  Wound Care: As directed    Follow-up with PCP/Pulmonology/Neurology/Cardiology/Nephrology in 2 weeks.   Follow-up tests/labs As per above physcians  Follow-up Information     Follow up With Specialties Details Why Contact Info    Ramonita Ramon MD Family Medicine In 1 week  [address] [address] [address]      Michael Busby MD Pulmonary Disease In 1 week  [address] [address] [facility]      John Sanchez MD Neurology In 1 week  [address] [facility] [address] [place] [address]   [phone]      Umm Ferrell MD Cardiology In 1 week  [facility] [address] [facility]      Lisa Deluca MD Nephrology In 1 week  [address] [facility]  [phone]          ________________________________________________________________    Risk of deterioration: Low    Condition at Discharge:  Stable  __________________________________________________________________    Disposition  SNF/LTC    ____________________________________________________________________    Code Status: Full Code  ___________________________________________________________________      Total time in minutes spent coordinating this discharge (includes going over instructions, follow-up, prescriptions, and preparing report for sign off to her PCP) :  45 minutes    Signed:  Keyanna Sanchez MD

## 2022-02-23 NOTE — PROGRESS NOTES
Problem: Ventilator Management  Goal: *Adequate oxygenation and ventilation  Outcome: Progressing Towards Goal  Goal: *Patient maintains clear airway/free of aspiration  Outcome: Progressing Towards Goal  Goal: *Absence of infection signs and symptoms  Outcome: Progressing Towards Goal  Goal: *Normal spontaneous ventilation  Outcome: Progressing Towards Goal     Problem: Patient Education: Go to Patient Education Activity  Goal: Patient/Family Education  Outcome: Progressing Towards Goal     Problem: Pressure Injury - Risk of  Goal: *Prevention of pressure injury  Description: Document Storm Scale and appropriate interventions in the flowsheet. Outcome: Progressing Towards Goal  Note: Pressure Injury Interventions:  Sensory Interventions: Assess changes in LOC,Check visual cues for pain,Float heels,Keep linens dry and wrinkle-free,Minimize linen layers    Moisture Interventions: Absorbent underpads,Apply protective barrier, creams and emollients,Internal/External urinary devices,Minimize layers    Activity Interventions: Assess need for specialty bed    Mobility Interventions: Assess need for specialty bed,HOB 30 degrees or less    Nutrition Interventions: Document food/fluid/supplement intake    Friction and Shear Interventions: Apply protective barrier, creams and emollients                Problem: Patient Education: Go to Patient Education Activity  Goal: Patient/Family Education  Outcome: Progressing Towards Goal     Problem: Falls - Risk of  Goal: *Absence of Falls  Description: Document Raymundo Fall Risk and appropriate interventions in the flowsheet.   Outcome: Progressing Towards Goal  Note: Fall Risk Interventions:  Mobility Interventions: Bed/chair exit alarm    Mentation Interventions: Bed/chair exit alarm,More frequent rounding    Medication Interventions: Teach patient to arise slowly,Bed/chair exit alarm    Elimination Interventions: Call light in reach,Bed/chair exit alarm    History of Falls Interventions: Bed/chair exit alarm         Problem: Patient Education: Go to Patient Education Activity  Goal: Patient/Family Education  Outcome: Progressing Towards Goal     Problem: Discharge Planning  Goal: *Discharge to safe environment  Outcome: Progressing Towards Goal  Goal: *Knowledge of medication management  Outcome: Progressing Towards Goal  Goal: *Knowledge of discharge instructions  Outcome: Progressing Towards Goal     Problem: Patient Education: Go to Patient Education Activity  Goal: Patient/Family Education  Outcome: Progressing Towards Goal     Problem: Airway Clearance - Ineffective  Goal: *Patent airway  Outcome: Progressing Towards Goal  Goal: *Absence of airway secretions  Outcome: Progressing Towards Goal  Goal: *Able to cough effectively  Outcome: Progressing Towards Goal     Problem: Breathing Pattern - Ineffective  Goal: *Absence of hypoxia  Outcome: Progressing Towards Goal  Goal: *Use of effective breathing techniques  Outcome: Progressing Towards Goal     Problem: Patient Education: Go to Patient Education Activity  Goal: Patient/Family Education  Outcome: Progressing Towards Goal     Problem: Patient Education: Go to Patient Education Activity  Goal: Patient/Family Education  Outcome: Progressing Towards Goal     Problem: Patient Education: Go to Patient Education Activity  Goal: Patient/Family Education  Outcome: Progressing Towards Goal

## 2022-02-23 NOTE — PROGRESS NOTES
Nutrition Assessment     Type and Reason for Visit: Reassess    Nutrition Recommendations/Plan:   Continue w/ current diet   Ensure HP x3/day   Rec MVI     Monitor and Record wts, po/supplement intake & bms in I/Os     Nutrition Assessment:  Admitted for hypoxia and multiple episodes of syncope, SOB & AMS. (2/7) Intubated. afebrile Plan for cardiac cath once extubation. minimal sedation per neuro to assess for brain damage. (2/15) Plan to wean vent however, unsuccessful r/t Spiked a fever of 101.3, remain intubated. No pressors, sedated on versed and propofol. (2/23):  extubated (2/17), transfer to floor, diet upgraded to full liquid diet than to minced and moist (2/22). Noted appetite is poor, per RN roughly 25% @ meals. Prefer liquids over solids. Will add ON. Labs: NA (149), glucose (182), BUN (43), mg (2.6), pro (6.1),alb (2.4). meds: Norvasc, Lipitor, Plavix, melatonin, deltasone, Flomax. Malnutrition Assessment:  Malnutrition Status: Moderate malnutrition     Estimated Daily Nutrient Needs:  Energy (kcal):  2220kcals (25kcal/adjbw)  Protein (g):  96g (1.1g/adjbw)       Fluid (ml/day):  1936(22ml/adjkg)    Nutrition Related Findings:  Nourished, no fat or muscle wasting observed. No reported n/v/d. new onset of dysphagia following by SLP. BL UE, +1 pitting BL LE edema. No BM recorded since adm. ?? constipation.       Current Nutrition Therapies:  ADULT DIET Dysphagia - Minced & Moist    Anthropometric Measures:  · Height:  6' (182.9 cm)  · Current Body Wt:  111.1 kg (245 lb)  · BMI: 33.2    Nutrition Diagnosis:   · Inadequate oral intake related to impaired respiratory function,swallowing difficulty as evidenced by intake 0-25%      Nutrition Intervention:  Food and/or Nutrient Delivery: Continue current diet,Start oral nutrition supplement  Nutrition Education and Counseling: Education not indicated  Coordination of Nutrition Care: Continue to monitor while inpatient,Speech therapy,Swallow evaluation,Feeding assistance/environmental change    Goals:  Meet >75%of EENs in 5 days, wt maintenance within +/-0.5kg in 5 days       Nutrition Monitoring and Evaluation:   Behavioral-Environmental Outcomes: None identified  Food/Nutrient Intake Outcomes: Diet advancement/tolerance,Food and nutrient intake,Supplement intake  Physical Signs/Symptoms Outcomes: Fluid status or edema,Weight,Meal time behavior,Biochemical data    Discharge Planning:    No discharge needs at this time     Electronically signed by Sanjana Enamorado on 2/23/2022 at 2:18 PM    Contact Number: Leo Bynum

## 2022-02-23 NOTE — PROGRESS NOTES
PHYSICAL THERAPY TREATMENT  Patient: Bennett Pringle (20 y.o. male)  Date: 2022  Diagnosis: Respiratory failure ([facility] [address] [J96.90] <principal problem not specified>  Procedure(s) (LRB):  LEFT HEART CATH / CORONARY ANGIOGRAPHY (N/A) 1 Day Post-Op  Precautions: Fall  Chart, physical therapy assessment, plan of care and goals were reviewed. ASSESSMENT  Patient continues with skilled PT services and is progressing towards goals. Patient supine in bed upon approach and agreed to therapy today. Patient reported that he was having cramps in stomach. Patient was oriented to time and and , disoriented to place and name. Patient performed supine to EOB at max Ax2. Patient sat at EOB with min-modA to remain seated upright. Patient performed STS to RW at mod-max Ax2 for 2 attempted standing for 15 seconds the first time and 5-8 seconds the second time before requesting to sit down due to fatigue. O2 taken after each standing attempt at 94-96% each time. Patient attempted side steps on second standing attempt but could not take any steps/unload either feet. Patient returned to supine in bed at mod-max Ax2. Patient left supine in bed with call bell within reach and all needs meet. Spoke with supervising Pt needing updated standing goals. Current Level of Function Impacting Discharge (mobility/balance): generalized weakness, poor endurance. Other factors to consider for discharge: PLOF, assistance at home, confusion, level of deficits. PLAN :  Patient continues to benefit from skilled intervention to address the above impairments. Continue treatment per established plan of care. to address goals.     Recommendation for discharge: (in order for the patient to meet his/her long term goals)  Woodrow Talley    This discharge recommendation:  Has been made in collaboration with the attending provider and/or case management    IF patient discharges home will need the following DME: gait belt and rolling walker       SUBJECTIVE:   Patient stated im too tired to keep going.  After second standing attempt    OBJECTIVE DATA SUMMARY:   Critical Behavior:  Neurologic State: Alert,Confused  Orientation Level: Oriented to person,Oriented to time  Cognition: Follows commands,Memory loss  Safety/Judgement: Awareness of environment  Functional Mobility Training:  Bed Mobility:  Rolling: Maximum assistance;Assist x2  Supine to Sit: Maximum assistance;Assist x2  Sit to Supine: Total assistance;Assist x2  Scooting: Total assistance;Assist x2  Transfers:  Sit to Stand: Moderate assistance;Maximum assistance;Assist x2  Stand to Sit: Moderate assistance;Maximum assistance;Assist x2  Balance:  Sitting: Impaired; With support  Sitting - Static: Fair (occasional)  Sitting - Dynamic: Poor (constant support)  Standing: Impaired; With support  Standing - Static: Constant support;Poor  Standing - Dynamic : Constant support;Poor    Pain Rating:  10/10 pain in head at end of therapy session , patient could not elaborate any further    Activity Tolerance:   Poor and requires rest breaks  Please refer to the flowsheet for vital signs taken during this treatment. After treatment patient left in no apparent distress:   Sitting in chair, Call bell within reach, and Side rails x 3    COMMUNICATION/COLLABORATION:   The patients plan of care was discussed with: Occupational therapist and Registered nurse. Treatment occurred wit OT due to patient requiring ax2 for in and OOB mobility and safety         Problem: Mobility Impaired (Adult and Pediatric)  Goal: *Acute Goals and Plan of Care (Insert Text)  Description: Pt will be I with LE HEP in 7 days. Pt will perform bed mobility with mod A x1-2 in 7 days. Pt will independently verbalize and perform energy conservation techniques such as pursed lip breathing in 7 days. Pt will perform transfers with CGA in 7 days. Pt will amb 5-10 feet with LRAD safely with CGA in 7 days.      Outcome: Progressing Towards Goal       Bebo Betancourt, PTA   Time Calculation: 30 mins

## 2022-02-23 NOTE — PROGRESS NOTES
Pt observably agitated/confused refusing medications at this time. Wife at bedside.     Agitation medication to be administered

## 2022-02-23 NOTE — PROGRESS NOTES
OCCUPATIONAL THERAPY TREATMENT  Patient: Marco Antonio Lucas (89 y.o. male)  Date: 2022  Diagnosis: Respiratory failure ([facility]) [J96.90] <principal problem not specified>  Procedure(s) (LRB):  LEFT HEART CATH / CORONARY ANGIOGRAPHY (N/A) 1 Day Post-Op  Precautions: Fall  Chart, occupational therapy assessment, plan of care, and goals were reviewed. ASSESSMENT  Patient continues with skilled OT services and is progressing towards goals. Upon MILLER/PTA arrival, pt semi supine in bed and agreeable to tx session. Pt reporting having cramps in stomach throughout session, PCT reporting pt constipated and just received medication. Pt noted to be oriented to time and , but disoriented to place and name. Pt completed all bed mobility this date with MaxA x2. Pt sat at EOB with Min/ModA to remain upright. Pt completed sit>stand using RW for balance upon standing with Mod/MaxA x2 for x2 trials. Pt able to stand for ~15 seconds during first trial and ~5-8 seconds during second. Pt reporting fatigue after standing trials and completed seated rest break at EOB. O2 read at EOB at 94-96% throughout session. Pt attempted side steps this date but unable to complete. Pt completed seated EOB hair brushing with ModA due to fatigue and decreased command following. Donning of clean gown completed at EOB with [address]. Pt returned to supine with Mod/MaxA x2 and left semi supine in bed with call bell within reach. Will continue to follow pt throughout remainder of stay and progress towards OT goals. Recommending SNF at discharge when medically appropriate. Current Level of Function Impacting Discharge (ADLs): MaxA x2 bed mobility, ModA UB dressing, ModA grooming    Other factors to consider for discharge: home support, PLOF, severity of deficits         PLAN :  Patient continues to benefit from skilled intervention to address the above impairments. Continue treatment per established plan of care.   to address goals. Recommend next OT session: sit>stand, EOB grooming    Recommendation for discharge: (in order for the patient to meet his/her long term goals)  Woodrow Talley    This discharge recommendation:  Has been made in collaboration with the attending provider and/or case management    IF patient discharges home will need the following DME: TBD       SUBJECTIVE:   Patient stated I am going to fly this plane.     OBJECTIVE DATA SUMMARY:   Cognitive/Behavioral Status:  Neurologic State: Alert;Confused  Orientation Level: Disoriented to place; Disoriented to situation;Oriented to person;Oriented to time  Cognition: Follows commands;Memory loss    Functional Mobility and Transfers for ADLs:  Bed Mobility:  Rolling: Maximum assistance;Assist x2  Supine to Sit: Maximum assistance;Assist x2  Sit to Supine: Maximum assistance;Assist x2  Scooting: Maximum assistance;Assist x2    Transfers:  Sit to Stand: Moderate assistance;Maximum assistance;Assist x2    Balance:  Sitting: Impaired; With support  Sitting - Static: Fair (occasional)  Sitting - Dynamic: Poor (constant support)  Standing: Impaired; With support  Standing - Static: Constant support;Poor  Standing - Dynamic : Constant support;Poor    ADL Intervention:  Grooming  Position Performed: Seated edge of bed  Brushing/Combing Hair: Moderate assistance    Upper Body [address]: Moderate assistance    Pain:  10/10 head pain  Pain reported in stomach but unable to give formal rating    Activity Tolerance:   Fair and requires rest breaks  Please refer to the flowsheet for vital signs taken during this treatment. After treatment patient left in no apparent distress:   Supine in bed, Call bell within reach, Bed / chair alarm activated, and Side rails x 3    COMMUNICATION/COLLABORATION:   The patients plan of care was discussed with: Physical therapy assistant and Certified nursing assistant/patient care technician.      [place] with PT for increased safety for pt and clinician. PAUL Ross  Time Calculation: 29 mins    Problem: Self Care Deficits Care Plan (Adult)  Goal: *Acute Goals and Plan of Care (Insert Text)  Description: 1. Pt will be IND sup <> sit in prep for EOB ADLs  2. Pt will be IND grooming sitting EOB  3. Pt will be IND LE dressing sitting EOB/long sit  4. Pt will be IND sit <>  prep for toileting LRAD  5. Pt will be IND toileting/toilet transfer/cloth mgmt LRAD  6.  Pt will be IND following UE HEP in prep for self care tasks      Outcome: Progressing Towards Goal

## 2022-02-23 NOTE — PROGRESS NOTES
SPEECH LANGUAGE PATHOLOGY DYSPHAGIA TREATMENT  Patient: Cynthia Chand [de-identified]73 y.o. male)  Date: 2/23/2022  Diagnosis: Respiratory failure (HCC) [J96.90] Procedure(s) (LRB):  LEFT HEART CATH / CORONARY ANGIOGRAPHY (N/A) 1 Day Post-Op  Precautions: Fall    ASSESSMENT :  Patient is A&Ox2, confused, follows basic commands w/ min cues and non sensical speech. Attempted to get OOB upon arrival. He is easily redirected. Notified PCT and RN of concerns. PO trials of thin, puree, mixed consistencies, and hard solids. Oral phase c/b mild disorganization w/ mastication s/t confusion and talking while eating. He clears mild oral residue of solids w/ liquid wash. Pharyngeal phase c/b minimal swallow delay and HLE is wfl upon palpation. Overt s/s of pen/asp w/ mixed consistency x1. Patient will benefit from skilled intervention to address the above impairments. Patients rehabilitation potential is considered to be Fair      PLAN :  Recommendations and Planned Interventions:  Rec cont mince and moist diet w/ thin liquids and strict asp/GERD precautions. Rec cues not to speak while eating, slow rate of intake, supervision, single bite/sips, and liquid wash. Frequency/Duration: Patient will be followed by speech-language pathology 5 times a week to address goals. Discharge Recommendations: Home Health vs SNF pending progress     SUBJECTIVE:   Patient confused w/ nonsensical speech trying to get out of bed. OBJECTIVE:     No past medical history on file.     CXR Results  (Last 48 hours)      None            Current Diet:  DIET ADULT  DIET ADULT ORAL NUTRITION SUPPLEMENT     Cognitive and Communication Status:  Neurologic State: Alert,Confused  Orientation Level: Oriented to person  Cognition: Decreased attention/concentration,Follows commands,Impaired decision making,Impulsive  Perception: Appears intact  Perseveration: No perseveration noted  Safety/Judgement: Awareness of environment      Pain:  Pain Scale 1: Numeric (0 - 10)  Pain Intensity 1: 10  Pain Location 1: Abdomen; Head    After treatment:   Patient left in no apparent distress in bed, Call bell within reach, Nursing notified, and Bed / chair alarm activated    COMMUNICATION/EDUCATION:   Patient's cognitive deficits negatively impact comprehension and carryover of education. Problem: Dysphagia (Adult)  Goal: *Acute Goals and Plan of Care (Insert Text)  Description: Speech Therapy Swallow Goals  Initiated 2/18/2022  -Patient will tolerate full diet with mildly thick liquids without clinical indicators of aspiration given minimal cues within 7 day(s). -Patient will tolerate PO trials without clinical indicators of aspiration given minimal cues within 7 day(s). -Patient will participate in modified barium swallow study within 7 day(s). -Patient will demonstrate understanding of swallow safety precautions and aspiration precautions, diet recs with minimal cues within 7 day(s).            Outcome: Progressing Towards Goal     Thank you for this referral.  Teresa Reyes M.S., M.Ed., CCC-SLP  Time Calculation: 12 mins

## 2022-02-23 NOTE — PROGRESS NOTES
Patient declined by [facility]. CM informed wife Heaven Whitfield [phone]. She gave additional choices for [employer], [employer] and [facility]. Referrals sent.

## 2022-02-24 PROCEDURE — 94640 AIRWAY INHALATION TREATMENT: CPT

## 2022-02-24 PROCEDURE — 74011000250 HC RX REV CODE- 250: Performed by: INTERNAL MEDICINE

## 2022-02-24 PROCEDURE — 94760 N-INVAS EAR/PLS OXIMETRY 1: CPT

## 2022-02-24 PROCEDURE — 74011250637 HC RX REV CODE- 250/637: Performed by: INTERNAL MEDICINE

## 2022-02-24 PROCEDURE — 74011636637 HC RX REV CODE- 636/637: Performed by: INTERNAL MEDICINE

## 2022-02-24 PROCEDURE — 74011000258 HC RX REV CODE- 258: Performed by: INTERNAL MEDICINE

## 2022-02-24 PROCEDURE — 74011250636 HC RX REV CODE- 250/636: Performed by: INTERNAL MEDICINE

## 2022-02-24 PROCEDURE — 74011250637 HC RX REV CODE- 250/637: Performed by: HOSPITALIST

## 2022-02-24 PROCEDURE — 65270000029 HC RM PRIVATE

## 2022-02-24 RX ORDER — SENNOSIDES 8.6 MG/1
1 TABLET ORAL DAILY
Status: DISCONTINUED | OUTPATIENT
Start: 2022-02-24 | End: 2022-02-25 | Stop reason: HOSPADM

## 2022-02-24 RX ORDER — PREDNISONE 5 MG/1
10 TABLET ORAL
Status: DISCONTINUED | OUTPATIENT
Start: 2022-02-25 | End: 2022-02-25 | Stop reason: HOSPADM

## 2022-02-24 RX ADMIN — METOPROLOL SUCCINATE 25 MG: 25 TABLET, EXTENDED RELEASE ORAL at 08:49

## 2022-02-24 RX ADMIN — BETHANECHOL CHLORIDE 100 MG: 25 TABLET ORAL at 08:46

## 2022-02-24 RX ADMIN — PREDNISONE 20 MG: 20 TABLET ORAL at 08:46

## 2022-02-24 RX ADMIN — CLOPIDOGREL BISULFATE 75 MG: 75 TABLET, FILM COATED ORAL at 08:46

## 2022-02-24 RX ADMIN — BUDESONIDE 500 MCG: 0.5 SUSPENSION RESPIRATORY (INHALATION) at 19:54

## 2022-02-24 RX ADMIN — NYSTATIN 500000 UNITS: 100000 SUSPENSION ORAL at 17:45

## 2022-02-24 RX ADMIN — SODIUM CHLORIDE, PRESERVATIVE FREE 20 MG: 5 INJECTION INTRAVENOUS at 21:21

## 2022-02-24 RX ADMIN — IPRATROPIUM BROMIDE AND ALBUTEROL SULFATE 3 ML: 2.5; .5 SOLUTION RESPIRATORY (INHALATION) at 13:40

## 2022-02-24 RX ADMIN — BETHANECHOL CHLORIDE 100 MG: 25 TABLET ORAL at 21:21

## 2022-02-24 RX ADMIN — OXCARBAZEPINE 150 MG: 150 TABLET, FILM COATED ORAL at 08:47

## 2022-02-24 RX ADMIN — AMLODIPINE BESYLATE 10 MG: 5 TABLET ORAL at 08:47

## 2022-02-24 RX ADMIN — PIPERACILLIN AND TAZOBACTAM 3.38 G: 3; .375 INJECTION, POWDER, LYOPHILIZED, FOR SOLUTION INTRAVENOUS at 21:21

## 2022-02-24 RX ADMIN — NYSTATIN 500000 UNITS: 100000 SUSPENSION ORAL at 08:46

## 2022-02-24 RX ADMIN — IPRATROPIUM BROMIDE AND ALBUTEROL SULFATE 3 ML: 2.5; .5 SOLUTION RESPIRATORY (INHALATION) at 09:24

## 2022-02-24 RX ADMIN — ASPIRIN 81 MG CHEWABLE TABLET 81 MG: 81 TABLET CHEWABLE at 08:46

## 2022-02-24 RX ADMIN — TAMSULOSIN HYDROCHLORIDE 0.4 MG: 0.4 CAPSULE ORAL at 08:47

## 2022-02-24 RX ADMIN — PIPERACILLIN AND TAZOBACTAM 3.38 G: 3; .375 INJECTION, POWDER, LYOPHILIZED, FOR SOLUTION INTRAVENOUS at 14:05

## 2022-02-24 RX ADMIN — PIPERACILLIN AND TAZOBACTAM 3.38 G: 3; .375 INJECTION, POWDER, LYOPHILIZED, FOR SOLUTION INTRAVENOUS at 06:15

## 2022-02-24 RX ADMIN — SODIUM CHLORIDE, PRESERVATIVE FREE 20 MG: 5 INJECTION INTRAVENOUS at 08:46

## 2022-02-24 RX ADMIN — SENNOSIDES 8.6 MG: 8.6 TABLET, COATED ORAL at 10:57

## 2022-02-24 RX ADMIN — OXCARBAZEPINE 150 MG: 150 TABLET, FILM COATED ORAL at 21:21

## 2022-02-24 RX ADMIN — POLYETHYLENE GLYCOL 3350 17 G: 17 POWDER, FOR SOLUTION ORAL at 08:46

## 2022-02-24 RX ADMIN — FINASTERIDE 5 MG: 5 TABLET, FILM COATED ORAL at 08:49

## 2022-02-24 RX ADMIN — BUDESONIDE 500 MCG: 0.5 SUSPENSION RESPIRATORY (INHALATION) at 09:24

## 2022-02-24 RX ADMIN — ENOXAPARIN SODIUM 40 MG: 100 INJECTION SUBCUTANEOUS at 08:47

## 2022-02-24 RX ADMIN — NYSTATIN 500000 UNITS: 100000 SUSPENSION ORAL at 21:21

## 2022-02-24 RX ADMIN — IPRATROPIUM BROMIDE AND ALBUTEROL SULFATE 3 ML: 2.5; .5 SOLUTION RESPIRATORY (INHALATION) at 19:54

## 2022-02-24 RX ADMIN — ATORVASTATIN CALCIUM 80 MG: 40 TABLET, FILM COATED ORAL at 08:46

## 2022-02-24 RX ADMIN — MELATONIN TAB 3 MG 3 MG: 3 TAB at 21:21

## 2022-02-24 RX ADMIN — NYSTATIN 500000 UNITS: 100000 SUSPENSION ORAL at 14:05

## 2022-02-24 NOTE — PROGRESS NOTES
IMPRESSION:   1. Acute hypoxic respiratory failure corrected now on room air  2. Klebsiella pneumonia and moraxella catarrhalis afebrile on Zosyn day 11  3. Confusion disorientation questionable cause  4. NSTEMI  5. Generalized Edema clear  6. Syncope  7. COPD   8. Hypokalemia resolved  9. Delirium   10. Urinary retention      RECOMMENDATIONS/PLAN:   1. Transferred to   2. Extubated on 2/17 alert awake talking on 2 L nasal cannula, some mild confusion marked confusion agitation 2/22 MRI no definite abnormality PCO2 was normal  3. No wheezing decrease prednisone to 10 mg  4. Hold trilogy inhaler continue nebulized albuterol Atrovent budesonide  5. CXR 2/18/22 no acute changes  6. CTA head negative  7. Superficial thrombus in LUE  10. Potassium corrected  11. Ambulatory pulse ox for discharge     [x] High complexity decision making was performed  [x] See my orders for details  HPI  80-year-old male came in because of chest pain with shortness of breath he has also recurrent episodes of syncope significant past medical history of vocal cord dysfunction, pulmonary [place], coronary artery disease status post stent COPD current everyday smoker no history of sleep apnea he was having swelling of the lower extremities for couple of months today came into the emergency room as previously was having chest pain or shortness of breath he passed out subsequently got intubated and now on ventilator critical care consult was called. Patient transferred to floor, no longer intubated, on NC O2 and satting appropriately. Clinically much improved from pulmonary standpoint. PMH:  has no past medical history on file. PSH:   has no past surgical history on file. FHX: family history is not on file.      SHX:      ALL:   Allergies   Allergen Reactions    Sulfa (Sulfonamide Antibiotics) Other (comments)     syncope          MEDS:   [x] Reviewed - As Below   [] Not reviewed    Current Facility-Administered Medications Medication    senna (SENOKOT) tablet 8.6 mg    melatonin tablet 3 mg    predniSONE (DELTASONE) tablet 20 mg    hydrOXYzine (VISTARIL) 25 mg/mL injection 25 mg    nystatin (MYCOSTATIN) 100,000 unit/mL oral suspension 500,000 Units    piperacillin-tazobactam (ZOSYN) 3.375 g in 0.9% sodium chloride (MBP/ADV) 100 mL MBP    albuterol-ipratropium (DUO-NEB) 2.5 MG-0.5 MG/3 ML    bethanechol (URECHOLINE) tablet 100 mg    finasteride (PROSCAR) tablet 5 mg    OXcarbazepine (TRILEPTAL) tablet 150 mg    tamsulosin (FLOMAX) capsule 0.4 mg    acetaminophen (TYLENOL) tablet 650 mg    Or    acetaminophen (TYLENOL) suppository 650 mg    polyethylene glycol (MIRALAX) packet 17 g    ondansetron (ZOFRAN ODT) tablet 4 mg    Or    ondansetron (ZOFRAN) injection 4 mg    enoxaparin (LOVENOX) injection 40 mg    famotidine (PF) (PEPCID) 20 mg in 0.9% sodium chloride 10 mL injection    labetaloL (NORMODYNE;TRANDATE) 20 mg/4 mL (5 mg/mL) injection 10 mg    amLODIPine (NORVASC) tablet 10 mg    aspirin chewable tablet 81 mg    atorvastatin (LIPITOR) tablet 80 mg    clopidogreL (PLAVIX) tablet 75 mg    metoprolol succinate (TOPROL-XL) XL tablet 25 mg    albuterol-ipratropium (DUO-NEB) 2.5 MG-0.5 MG/3 ML    budesonide (PULMICORT) 500 mcg/2 ml nebulizer suspension      MAR reviewed and pertinent medications noted or modified as needed   Current Facility-Administered Medications   Medication    senna (SENOKOT) tablet 8.6 mg    melatonin tablet 3 mg    predniSONE (DELTASONE) tablet 20 mg    hydrOXYzine (VISTARIL) 25 mg/mL injection 25 mg    nystatin (MYCOSTATIN) 100,000 unit/mL oral suspension 500,000 Units    piperacillin-tazobactam (ZOSYN) 3.375 g in 0.9% sodium chloride (MBP/ADV) 100 mL MBP    albuterol-ipratropium (DUO-NEB) 2.5 MG-0.5 MG/3 ML    bethanechol (URECHOLINE) tablet 100 mg    finasteride (PROSCAR) tablet 5 mg    OXcarbazepine (TRILEPTAL) tablet 150 mg    tamsulosin (FLOMAX) capsule 0.4 mg    acetaminophen (TYLENOL) tablet 650 mg    Or    acetaminophen (TYLENOL) suppository 650 mg    polyethylene glycol (MIRALAX) packet 17 g    ondansetron (ZOFRAN ODT) tablet 4 mg    Or    ondansetron (ZOFRAN) injection 4 mg    enoxaparin (LOVENOX) injection 40 mg    famotidine (PF) (PEPCID) 20 mg in 0.9% sodium chloride 10 mL injection    labetaloL (NORMODYNE;TRANDATE) 20 mg/4 mL (5 mg/mL) injection 10 mg    amLODIPine (NORVASC) tablet 10 mg    aspirin chewable tablet 81 mg    atorvastatin (LIPITOR) tablet 80 mg    clopidogreL (PLAVIX) tablet 75 mg    metoprolol succinate (TOPROL-XL) XL tablet 25 mg    albuterol-ipratropium (DUO-NEB) 2.5 MG-0.5 MG/3 ML    budesonide (PULMICORT) 500 mcg/2 ml nebulizer suspension      PMH:  has no past medical history on file. PSH:   has no past surgical history on file. FHX: family history is not on file. SHX:       ROS:  Patient alert awake talking review of systems as mentioned in HPI and physical exam    Hemodynamics:    CO:    CI:    CVP:    SVR:   PAP Systolic:    PAP Diastolic:    PVR:    KZ33:        Ventilator Settings:      Mode Rate TV Press PEEP FiO2 PIP Min.  Vent   Assist control,Volume control    550 ml 15 cm H2O  5 cm H20 30 %  18 cm H2O  10.7 l/min        Vital Signs: Telemetry:    normal sinus rhythm Intake/Output:   Visit Vitals  BP (!) 149/88   Pulse 97   Temp 98.1 °F (36.7 °C)   Resp 16   Ht 6' (1.829 m)   Wt 111.5 kg (245 lb 13 oz)   SpO2 94%   BMI 33.34 kg/m²       Temp (24hrs), Av.6 °F (36.4 °C), Min:96.8 °F (36 °C), Max:98.1 °F (36.7 °C)        O2 Device: None (Room air) O2 Flow Rate (L/min): 2 l/min       Wt Readings from Last 4 Encounters:   22 111.5 kg (245 lb 13 oz)          Intake/Output Summary (Last 24 hours) at 2022 1452  Last data filed at 2022 1424  Gross per 24 hour   Intake 300 ml   Output 1775 ml   Net -1475 ml       Last shift:      701 - 1900  In: -   Out: 625 [Urine:625]  Last 3 shifts: 1901 - 02/24 0700  In: 865 [P.O.:565; I.V.:300]  Out: 1150 [Urine:1150]       Physical Exam:     General: Alert awake mildly confused does not know he is in the hospital lying in bed has taken all of his clothes off he does not know why   HEENT: NCAT,   Eyes: anicteric; conjunctiva clear extraocular movements intact  Neck: no nodes,no accessory MM use. Chest: no deformity,   Cardiac: IR regular; no murmur;   Lungs: distant breath sounds; no wheeze  Abd: soft, NT, hypoactive BS  Ext: no edema; no joint swelling;  No clubbing  : clear urine  Neuro: Awake alert confused does not know he is in the hospital answer simple questions moves all 4 extremities  Psych-no issues  Skin: warm, dry, no cyanosis;   Pulses: Brachial and radial pulses intact  Capillary: Normal capillary refill      DATA:    MAR reviewed and pertinent medications noted or modified as needed  MEDS:   Current Facility-Administered Medications   Medication    senna (SENOKOT) tablet 8.6 mg    melatonin tablet 3 mg    predniSONE (DELTASONE) tablet 20 mg    hydrOXYzine (VISTARIL) 25 mg/mL injection 25 mg    nystatin (MYCOSTATIN) 100,000 unit/mL oral suspension 500,000 Units    piperacillin-tazobactam (ZOSYN) 3.375 g in 0.9% sodium chloride (MBP/ADV) 100 mL MBP    albuterol-ipratropium (DUO-NEB) 2.5 MG-0.5 MG/3 ML    bethanechol (URECHOLINE) tablet 100 mg    finasteride (PROSCAR) tablet 5 mg    OXcarbazepine (TRILEPTAL) tablet 150 mg    tamsulosin (FLOMAX) capsule 0.4 mg    acetaminophen (TYLENOL) tablet 650 mg    Or    acetaminophen (TYLENOL) suppository 650 mg    polyethylene glycol (MIRALAX) packet 17 g    ondansetron (ZOFRAN ODT) tablet 4 mg    Or    ondansetron (ZOFRAN) injection 4 mg    enoxaparin (LOVENOX) injection 40 mg    famotidine (PF) (PEPCID) 20 mg in 0.9% sodium chloride 10 mL injection    labetaloL (NORMODYNE;TRANDATE) 20 mg/4 mL (5 mg/mL) injection 10 mg    amLODIPine (NORVASC) tablet 10 mg    aspirin chewable tablet 81 mg  atorvastatin (LIPITOR) tablet 80 mg    clopidogreL (PLAVIX) tablet 75 mg    metoprolol succinate (TOPROL-XL) XL tablet 25 mg    albuterol-ipratropium (DUO-NEB) 2.5 MG-0.5 MG/3 ML    budesonide (PULMICORT) 500 mcg/2 ml nebulizer suspension        Labs:  2/24 room air oxygen saturation 94%    Recent Labs     02/21/22  1815   PH 7.46*   PCO2 37   PO2 80   HCO3 27*   2/13 AC 15  PEEP 8 FiO2 35%  Lab Results   Component Value Date/Time    TSH 1.38 02/08/2022 03:53 AM        Imaging:    Results from [place] encounter on 02/07/22    XR CHEST PORT    Narrative  Chest single view. Comparison single view chest February 17, 2022. ET tube and NG tube have been removed. Unchanged appearance for the lungs; no gross interstitial or alveolar pulmonary  edema. Cardiac and mediastinal structures unchanged. Apical pleural thickening  unchanged. No pneumothorax or sizable pleural effusion. Results from Hospital Encounter encounter on 02/07/22    CTA HEAD NECK W WO CONT    Narrative  CTA NECK WITHOUT AND WITH INTRAVENOUS CONTRAST  CTA HEAD WITH INTRAVENOUS CONTRAST    INDICATION: Right arm weakness    COMPARISON: CTA head and neck from 2/12/2022    TECHNIQUE: Noncontrast neck CT. Transaxial CTA of the head and neck was  performed after administration of 100 mL of Isovue-370 intravenous contrast.  Multiplanar MIP and standard reconstructions performed and evaluated. Dose  reduction: Per department policy, all CT scans at this facility are performed  using dose reduction optimization techniques as appropriate to a performed  examination including the following: Automated exposure control, adjustments of  the mA and/or KV according to patient size, or use of iterative reconstruction  technique. FINDINGS:  Contrast injection through the right upper extremity causes streak artifact  which obscures evaluation of the surrounding structures.  No focal  hemodynamically significant luminal narrowing of the visualized aortic arch,  innominate artery, or included/visualized subclavian and axillary arteries. Right common carotid artery is widely patent. Mild calcified atherosclerosis of  the right carotid bifurcation without hemodynamically significant luminal  stenosis by NASCET-like criteria. Calcified plaque of the proximal right  internal carotid artery without hemodynamically significant luminal stenosis. Right ICA is patent in the neck. Cavernous carotid circumferential calcified  atherosclerosis. Right ophthalmic artery is patent. Right anterior cerebral  artery and middle cerebral artery are patent. Left common carotid artery is widely patent. Mild calcified atherosclerosis  without hemodynamically significant luminal stenosis by NASCET-like criteria. Left internal carotid artery is widely patent. Mild calcified plaque of the left  cavernous ICA. Left ophthalmic artery is patent. Left anterior cerebral artery  is patent. No definite large vessel occlusion of the left middle cerebral  artery. Calcified atherosclerosis of bilateral vertebral arteries. Intradural vertebral  arteries also show calcified atherosclerosis. Basilar artery is patent. Bilateral posterior cerebral arteries are patent. Emphysema of the included lung apices. Visualized osseous structures show  chronic fusion of the C3-4 vertebral bodies. Impression  No large vessel occlusion or hemodynamically significant stenosis seen  intracranially. No hemodynamically significant luminal stenosis of the carotid  bifurcations by NASCET-like criteria. · 2/12 patient condition got worse yesterday and he became cyanotic while he was on a ventilator transferred to ICU he is back on Precedex and propofol ABG acceptable  · 2/13 back on ventilator assist control. Low-grade fever 100.6. Will check urine and sputum cultures although chest x-ray looks clear. Will decrease PEEP to 5.   Repeat labs in a.m. hypercapnia today we will give Diamox  · 2/14 will decrease FiO2 sputum positive for gram-positive cocci in clusters started on vancomycin and Zosyn pending sensitivity still spiking temperature  · 2/16 remain on ventilator  will try to wean him today once extubated cardiologist suggested about cardiac cath  · 2/17 on assist control mode will try to wean him again today  · 2/18 alert awake nasal cannula talking weak  · 2/20/22 Patient transferred to floor on 2 L O2 NC satting appropriately talking and no short of breath. Patient has been getting straight cathed for the past few days, will consult urology. Patient also not sleeping, will add melatonin for now. · 2/21 patient confused according to the family he is talking out of his head started yesterday. Last blood gas no CO2 retention serum CO2 normal despite this we will check another blood gas.   No wheezing will start decreasing Solu-Medrol  · 2/24 awake alert still confused no agitation does not know where he is or why he is removed his close

## 2022-02-24 NOTE — PROGRESS NOTES
Patient with increased confusion. Patient alert and oriented x2 (perso and place). Patient asked writer if she wanted to [place] the airport with him and his wife. Patient requested to speak with wife. Patient able to recall wife's number but required help with using the phone. 1815-Patient wife called requesting a psych consult and would like the physician to call her in regards to patients confusion. Dr. Waynetta Aase notified. 1853- {Bedside shift change report given to  Sixto Soni (oncoming nurse) by Holden Gibbs  (offgoing nurse). Report included the following information SBAR, Kardex, Intake/Output, MAR and Cardiac Rhythm NSR.

## 2022-02-24 NOTE — PROGRESS NOTES
0900: Chart reviewed. SUZANNE has messaged both [facility] and [facility] to determine ability to accept patient. [facility] has declined stating no bed available. 1145: CM telephoned [facility] [phone] speaking with Nikky Holloway in Admissions who stated she would review referral and respond in [address]. CM telephoned [facility] [phone] speaking with Marianela Ansari in Admissions. She stated she was not at her desk, but would review referral and respond in [address]. 1155: [facility] has declined patient in [place]: Concern about transition to next level of care. 1400: [facility] has accepted patient and hope to have a bed tomorrow or over the weekend. Patient will need a private room to be quarantined for 10d. SUZANNE has attached d/c order/summary, PT/OT/ST, MAR, COVID results and nutrition notes in [facility]. SUZANNE has notified Mrs. Allen of above. Understanding verbalized.

## 2022-02-24 NOTE — DISCHARGE SUMMARY
Hospitalist Discharge Summary     Patient ID:  Cynthia Chand  [id number]  73 y.o.  1955 2/7/2022    PCP on record: Christian Hogan MD    Admit date: 2/7/2022  Discharge date and time: 2/24/2022    DISCHARGE DIAGNOSIS:    Acute respiratory failure with hypoxia/COPD exacerbation/pneumonia/sepsis/recurrent syncope/hypertension/type II non-ST elevation MI/left arm    CONSULTATIONS:  IP CONSULT TO NEUROLOGY  IP CONSULT TO NEPHROLOGY  IP CONSULT TO UROLOGY  IP CONSULT TO CARDIOLOGY  IP CONSULT TO PULMONOLOGY    Excerpted HPI from H&P of Samir Bhandari MD:  Limited history was obtained as patient was intubated and sedated. [de-identified] of history obtained from wife at bedside and ED physician.  Nhan Roberts is a 73 y.o. male possible history of vocal cord dysfunction, history of pulmonary embolism (on warfarin due to GI bleed), CAD s/p stent (6 years ago), and COPD. He presented to the ED today with chief complaint of chest pain, shortness of breath, syncope and altered mental status. He initially went to outside ED for chest pain shortness of breath, however noted to have altered mental status by EMS crew. Recurrent, back-to-back episodes of syncope that lasted 5 to 10 seconds, every 1 minutes. He was noted to have normal pulses, normal breathing and no arrhythmia.      In the ED, he was initially hypertensive (230/143) and hypoxic, SPO2 in 30s. He continued to have episodes of syncope, but wakes up after sternal rub. No postictal phase of confusion noted per ED physician, and was able to provide history to ED physician. As patient was unable to protect his airway and continue to be hypoxic, patient was intubated.     Per wife, patient has been having intermittent, generalized/whole body cramps that last between 15 minutes to 1 hour. Those episodes generally followed by loss of consciousness for about 15 seconds.   He usually will have short period of confusion and slurred speech after regaining consciousness. No extremity weakness after the episodes. Wife reports no fever, chills or chest pain. Reports have shortness of breath and cough at baseline due to COPD and has not been worse than normal in the past 3 weeks. Of note, he has been worked-up in the hospital in [place] (record not found in system), and no causes has been identified. EEG was never done.     Initial work-up showed no leukocytosis, no significant electrolytes abnormalities, no elevated troponin or BNP. UA not indicated of of UTI, tox screen negative. CT head and chest x-ray showed no acute findings. CT PE pending.    ______________________________________________________________________  DISCHARGE SUMMARY/HOSPITAL COURSE:  for full details see H&P, daily progress notes, labs, consult notes. 71-year-old male with history of COPD, CAD with PCI and pulmonary embolism (not on anticoagulation due to GI bleed) who was admitted on 2/7 with altered mental status and syncope complicated by hypertensive urgency and hypoxia. Initially had a blood pressure of 230/143 with oxygen saturations in the 30s. He was intubated in the ED. CT of the chest was unremarkable. Patient has a history of recurrent syncopal episodes. Per wife, patient had been having intermittent, generalized/whole body cramps that last between 15 minutes to 1 hour.  Those episodes generally are  followed by loss of consciousness for about 15 seconds. Cole Loya usually will have short period of confusion and slurred speech after regaining consciousness.  No extremity weakness after the episodes.  Wife reported no fever, chills or chest pain.  Reports have shortness of breath and cough at baseline due to COPD and has not been worse than normal in the past 3 weeks.  Of note, he has been worked-up in the hospital in [place] (record not found in system), and no cause has been identified.   EEG was never done.     Patient was seen by neurology who does not feel the above episodes represented seizures.     COVID test was negative.     Hospital course was complicated by non-STEMI felt due to malignant hypertension.     He  has been maintained on the ventilator and followed by pulmonology. Etiology of respiratory failure seems to be largely COPD exacerbation. Patient was successfully extubated, transferred out of ICU, remained on Zosyn as well as Solu-Medrol, wean down on oxygen, patient was evaluated by neurology, received MRI brain, evaluated by physical therapy as well as Occupational Therapy, overall patient's clinical status improved, patient needed cardiac catheterization which can be done as an outpatient, patient's was subsequently transitioned to oral steroids as well as oral antibiotics and deemed stable for discharge to skilled nursing facility with close outpatient follow-up with primary care physician as well as neurology as well as cardiology as well as nephrology. _______________________________________________________________________  Patient seen and examined by me on discharge day. Pertinent Findings:  Gen:    Not in distress  Chest: Decreased air entry bilaterally in bilateral lower lung zones  CVS:   Regular rhythm, s1/s2 no m/r/g  No edema  Abd:  Soft, not distended, not tender  Neuro:  Alert, at  baseline  _______________________________________________________________________  DISCHARGE MEDICATIONS:   Current Discharge Medication List      START taking these medications    Details   acetaminophen (TYLENOL) 325 mg tablet Take 2 Tablets by mouth every six (6) hours as needed for Pain or Fever. Qty: 60 Tablet, Refills: 0  Start date: 2/21/2022      amLODIPine (NORVASC) 10 mg tablet Take 1 Tablet by mouth daily. Qty: 30 Tablet, Refills: 0  Start date: 2/22/2022      budesonide (PULMICORT) 0.5 mg/2 mL nbsp 2 mL by Nebulization route two (2) times a day.   Qty: 200 mL, Refills: 0  Start date: 2/21/2022      glycopyrrolate (ROBINUL) 0.2 mg/mL injection 0.5 mL by IntraVENous route three (3) times daily. Qty: 20 mL, Refills: 0  Start date: 2/21/2022      nystatin (MYCOSTATIN) 100,000 unit/mL suspension Take 5 mL by mouth four (4) times daily. swish and spit  Qty: 473 mL, Refills: 0  Start date: 2/21/2022      predniSONE (DELTASONE) 20 mg tablet Take 1 tablet twice daily for 7 days, then take 1 tablet once daily for 7 days  Qty: 21 Tablet, Refills: 0  Start date: 2/21/2022      amoxicillin-clavulanate (Augmentin) 875-125 mg per tablet Take 1 Tablet by mouth two (2) times a day. Qty: 14 Tablet, Refills: 0  Start date: 2/21/2022         CONTINUE these medications which have NOT CHANGED    Details   albuterol (ProAir HFA) 90 mcg/actuation inhaler Take 1 Puff by inhalation four (4) times daily as needed. aspirin 81 mg chewable tablet Take 81 mg by mouth daily. atorvastatin (Lipitor) 40 mg tablet Take 40 mg by mouth daily. bethanechol chloride (URECHOLINE) 50 mg tablet Take 200 mg by mouth daily. clopidogreL (PLAVIX) 75 mg tab Take 75 mg by mouth daily. esomeprazole (NexIUM) 40 mg capsule Take 40 mg by mouth daily. finasteride (PROSCAR) 5 mg tablet Take 5 mg by mouth daily. fluticasone-umeclidinium-vilanterol (TRELEGY ELLIPTA) 100-62.5-25 mcg inhaler Take 1 Puff by inhalation daily. ipratropium-albuteroL (Combivent Respimat)  mcg/actuation inhaler Take 1 Puff by inhalation two (2) times daily as needed. omeprazole (PRILOSEC) 20 mg capsule Take 20 mg by mouth daily. OXcarbazepine (Oxtellar XR) 300 mg Tb24 Take 300 mg by mouth daily. tamsulosin (Flomax) 0.4 mg capsule Take 0.4 mg by mouth daily. metoprolol succinate (TOPROL-XL) 25 mg XL tablet Take 25 mg by mouth daily.          STOP taking these medications       bumetanide (BUMEX) 1 mg tablet Comments:   Reason for Stopping:         furosemide (LASIX) 20 mg tablet Comments:   Reason for Stopping:         gabapentin (NEURONTIN) 300 mg capsule Comments:   Reason for Stopping:         isosorbide dinitrate (ISORDIL) 30 mg tablet Comments:   Reason for Stopping:         losartan (COZAAR) 25 mg tablet Comments:   Reason for Stopping:         QUEtiapine (SEROqueL) 400 mg tablet Comments:   Reason for Stopping:         sertraline (Zoloft) 100 mg tablet Comments:   Reason for Stopping:         ticagrelor (Brilinta) 90 mg tablet Comments:   Reason for Stopping:                 Patient Follow Up Instructions: Activity: PT/OT Eval and Treat  Diet: Full liquid diet moderately thickened  Wound Care: As directed    Follow-up with PCP/Pulmonology/Neurology/Cardiology/Nephrology in 2 weeks.   Follow-up tests/labs As per above physcians  Follow-up Information     Follow up With Specialties Details Why Contact Info    Miguelina Hendricks MD Family Medicine In 1 week  [address] [facility] [address]      Mike Peña MD Pulmonary Disease In 1 week  [address] [address] [facility]      Judie Alva MD Neurology In 1 week  [address] [address] [facility] [address]  [phone]      Aleah Narvaez MD Cardiology In 1 week  [facility] [address] [facility]      Venita Ornelas MD Nephrology In 1 week  [address] [place] [address]  [phone]          ________________________________________________________________    Risk of deterioration: Low    Condition at Discharge:  Stable  __________________________________________________________________    Disposition  SNF/LTC    ____________________________________________________________________    Code Status: Full Code  ___________________________________________________________________      Total time in minutes spent coordinating this discharge (includes going over instructions, follow-up, prescriptions, and preparing report for sign off to her PCP) :  45 minutes    Signed:  Pavel Ruiz MD

## 2022-02-24 NOTE — PROGRESS NOTES
Problem: Breathing Pattern - Ineffective  Goal: *Absence of hypoxia  Outcome: Progressing Towards Goal  Note: Patient is on room air and his O2 saturations are remaining above 90%         Bedside shift change report given to Win Thompson RN (oncoming nurse) by Jassi Moore RN (offgoing nurse). Report included the following information SBAR, Kardex, ED Summary, Intake/Output, MAR, Accordion, Med Rec Status and Cardiac Rhythm NSR.

## 2022-02-25 VITALS
RESPIRATION RATE: 16 BRPM | OXYGEN SATURATION: 95 % | TEMPERATURE: 97.4 F | SYSTOLIC BLOOD PRESSURE: 145 MMHG | BODY MASS INDEX: 33.29 KG/M2 | HEART RATE: 102 BPM | WEIGHT: 245.81 LBS | DIASTOLIC BLOOD PRESSURE: 83 MMHG | HEIGHT: 72 IN

## 2022-02-25 PROCEDURE — 94640 AIRWAY INHALATION TREATMENT: CPT

## 2022-02-25 PROCEDURE — 74011000258 HC RX REV CODE- 258: Performed by: INTERNAL MEDICINE

## 2022-02-25 PROCEDURE — 74011250637 HC RX REV CODE- 250/637: Performed by: INTERNAL MEDICINE

## 2022-02-25 PROCEDURE — 74011000250 HC RX REV CODE- 250: Performed by: INTERNAL MEDICINE

## 2022-02-25 PROCEDURE — 74011250637 HC RX REV CODE- 250/637: Performed by: HOSPITALIST

## 2022-02-25 PROCEDURE — 74011636637 HC RX REV CODE- 636/637: Performed by: INTERNAL MEDICINE

## 2022-02-25 PROCEDURE — 74011250636 HC RX REV CODE- 250/636: Performed by: INTERNAL MEDICINE

## 2022-02-25 RX ORDER — SENNOSIDES 8.6 MG/1
1 TABLET ORAL DAILY
Qty: 30 TABLET | Refills: 0 | Status: SHIPPED
Start: 2022-02-25 | End: 2022-03-04

## 2022-02-25 RX ORDER — PREDNISONE 10 MG/1
10 TABLET ORAL
Qty: 2 TABLET | Refills: 0 | Status: SHIPPED
Start: 2022-02-26 | End: 2022-03-04

## 2022-02-25 RX ADMIN — NYSTATIN 500000 UNITS: 100000 SUSPENSION ORAL at 10:02

## 2022-02-25 RX ADMIN — ENOXAPARIN SODIUM 40 MG: 100 INJECTION SUBCUTANEOUS at 10:00

## 2022-02-25 RX ADMIN — ASPIRIN 81 MG CHEWABLE TABLET 81 MG: 81 TABLET CHEWABLE at 10:02

## 2022-02-25 RX ADMIN — AMLODIPINE BESYLATE 10 MG: 5 TABLET ORAL at 10:02

## 2022-02-25 RX ADMIN — CLOPIDOGREL BISULFATE 75 MG: 75 TABLET, FILM COATED ORAL at 10:01

## 2022-02-25 RX ADMIN — PIPERACILLIN AND TAZOBACTAM 3.38 G: 3; .375 INJECTION, POWDER, LYOPHILIZED, FOR SOLUTION INTRAVENOUS at 05:44

## 2022-02-25 RX ADMIN — IPRATROPIUM BROMIDE AND ALBUTEROL SULFATE 3 ML: 2.5; .5 SOLUTION RESPIRATORY (INHALATION) at 09:44

## 2022-02-25 RX ADMIN — METOPROLOL SUCCINATE 25 MG: 25 TABLET, EXTENDED RELEASE ORAL at 10:02

## 2022-02-25 RX ADMIN — IPRATROPIUM BROMIDE AND ALBUTEROL SULFATE 3 ML: 2.5; .5 SOLUTION RESPIRATORY (INHALATION) at 14:53

## 2022-02-25 RX ADMIN — BETHANECHOL CHLORIDE 100 MG: 25 TABLET ORAL at 10:01

## 2022-02-25 RX ADMIN — TAMSULOSIN HYDROCHLORIDE 0.4 MG: 0.4 CAPSULE ORAL at 10:02

## 2022-02-25 RX ADMIN — PIPERACILLIN AND TAZOBACTAM 3.38 G: 3; .375 INJECTION, POWDER, LYOPHILIZED, FOR SOLUTION INTRAVENOUS at 13:09

## 2022-02-25 RX ADMIN — PREDNISONE 10 MG: 5 TABLET ORAL at 10:02

## 2022-02-25 RX ADMIN — NYSTATIN 500000 UNITS: 100000 SUSPENSION ORAL at 13:09

## 2022-02-25 RX ADMIN — BUDESONIDE 500 MCG: 0.5 SUSPENSION RESPIRATORY (INHALATION) at 09:44

## 2022-02-25 RX ADMIN — ATORVASTATIN CALCIUM 80 MG: 40 TABLET, FILM COATED ORAL at 10:01

## 2022-02-25 RX ADMIN — FINASTERIDE 5 MG: 5 TABLET, FILM COATED ORAL at 10:01

## 2022-02-25 RX ADMIN — OXCARBAZEPINE 150 MG: 150 TABLET, FILM COATED ORAL at 10:00

## 2022-02-25 NOTE — DISCHARGE INSTRUCTIONS
Patient Education     Patient Education        Learning About Screening for Heart Attack and Stroke Risk  What is screening for heart attack and stroke risk? Screening for heart attack and stroke risk is a way for your doctor to check your chance of having a problem called atherosclerosis. This problem is also called hardening of the arteries. It is the starting point for most heart and blood flow problems, such as coronary artery disease, heart attack, stroke, and peripheral arterial disease. You and your doctor can use your risk score to decide if you want to take steps to lower your risk. How can you find out your risk? Your doctor looks at things that put you at risk for a heart attack and stroke. He or she might look at many things, such as:  · Your cholesterol levels. · Your blood pressure. · Your age. · Your race. · Whether you are male or female. · Whether or not you smoke. Your doctor might use a tool to calculate a risk score for you. There are different tools that doctors use. They may show that your risk is higher or lower than it really is. But the tools give you and your doctor a good idea about your risk. What happens after screening? Knowing your risk can help you and your doctor talk about whether to take steps to lower your risk. A heart-healthy lifestyle is important for everyone. Some people also take medicine to lower their risk. You and your doctor can work together to decide what is best for you. Where can you learn more? Go to http://www.gray.com/  Enter M928 in the search box to learn more about \"Learning About Screening for Heart Attack and Stroke Risk. \"  Current as of: April 29, 2021               Content Version: 13.0  © 2006-2021 Healthwise[employer] Inc[employer]. Care instructions adapted under license by [employer] (which disclaims liability or warranty for this information).  If you have questions about a medical condition or this instruction, always ask your healthcare professional. [address] any warranty or liability for your use of this information. High Blood Pressure: Care Instructions  Overview     It's normal for blood pressure to go up and down throughout the day. But if it stays up, you have high blood pressure. Another name for high blood pressure is hypertension. Despite what a lot of people think, high blood pressure usually doesn't cause headaches or make you feel dizzy or lightheaded. It usually has no symptoms. But it does increase your risk of stroke, heart attack, and other problems. You and your doctor will talk about your risks of these problems based on your blood pressure. Your doctor will give you a goal for your blood pressure. Your goal will be based on your health and your age. Lifestyle changes, such as eating healthy and being active, are always important to help lower blood pressure. You might also take medicine to reach your blood pressure goal.  Follow-up care is a key part of your treatment and safety. Be sure to make and go to all appointments, and call your doctor if you are having problems. It's also a good idea to know your test results and keep a list of the medicines you take. How can you care for yourself at home? Medical treatment  · If you stop taking your medicine, your blood pressure will go back up. You may take one or more types of medicine to lower your blood pressure. Be safe with medicines. Take your medicine exactly as prescribed. Call your doctor if you think you are having a problem with your medicine. · Talk to your doctor before you start taking aspirin every day. Aspirin can help certain people lower their risk of a heart attack or stroke. But taking aspirin isn't right for everyone, because it can cause serious bleeding. · See your doctor regularly. You may need to see the doctor more often at first or until your blood pressure comes down.   · If you are taking blood pressure medicine, talk to your doctor before you take decongestants or anti-inflammatory medicine, such as ibuprofen. Some of these medicines can raise blood pressure. · Learn how to check your blood pressure at home. Lifestyle changes  · Stay at a healthy weight. This is especially important if you put on weight around the waist. Losing even 10 pounds can help you lower your blood pressure. · If your doctor recommends it, get more exercise. Walking is a good choice. Bit by bit, increase the amount you walk every day. Try for at least 30 minutes on most days of the week. You also may want to swim, bike, or do other activities. · Avoid or limit alcohol. Talk to your doctor about whether you can drink any alcohol. · Try to limit how much sodium you eat to less than 2,300 milligrams (mg) a day. Your doctor may ask you to try to eat less than 1,500 mg a day. · Eat plenty of fruits (such as bananas and oranges), vegetables, legumes, whole grains, and low-fat dairy products. · Lower the amount of saturated fat in your diet. Saturated fat is found in animal products such as milk, cheese, and meat. Limiting these foods may help you lose weight and also lower your risk for heart disease. · Do not smoke. Smoking increases your risk for heart attack and stroke. If you need help quitting, talk to your doctor about stop-smoking programs and medicines. These can increase your chances of quitting for good. When should you call for help? Call 911  anytime you think you may need emergency care. This may mean having symptoms that suggest that your blood pressure is causing a serious heart or blood vessel problem. Your blood pressure may be over 180/120. For example, call 911 if:    · You have symptoms of a heart attack. These may include:  ? Chest pain or pressure, or a strange feeling in the chest.  ? Sweating. ? Shortness of breath. ? Nausea or vomiting.   ? Pain, pressure, or a strange feeling in the back, neck, jaw, or upper belly or in one or both shoulders or arms. ? Lightheadedness or sudden weakness. ? A fast or irregular heartbeat.     · You have symptoms of a stroke. These may include:  ? Sudden numbness, tingling, weakness, or loss of movement in your face, arm, or leg, especially on only one side of your body. ? Sudden vision changes. ? Sudden trouble speaking. ? Sudden confusion or trouble understanding simple statements. ? Sudden problems with walking or balance. ? A sudden, severe headache that is different from past headaches.     · You have severe back or belly pain. Do not wait until your blood pressure comes down on its own. Get help right away. Call your doctor now or seek immediate care if:    · Your blood pressure is much higher than normal (such as 180/120 or higher), but you don't have symptoms.     · You think high blood pressure is causing symptoms, such as:  ? Severe headache.  ? Blurry vision. Watch closely for changes in your health, and be sure to contact your doctor if:    · Your blood pressure measures higher than your doctor recommends at least 2 times. That means the top number is higher or the bottom number is higher, or both.     · You think you may be having side effects from your blood pressure medicine. Where can you learn more? Go to http://www.gray.com/  Enter [id number] in the search box to learn more about \"High Blood Pressure: Care Instructions. \"  Current as of: April 29, 2021               Content Version: 13.0  © 2006-2021 Healthwise, Incorporated. Care instructions adapted under license by [employer] (which disclaims liability or warranty for this information). If you have questions about a medical condition or this instruction, always ask your healthcare professional. [place] [address] any warranty or liability for your use of this information.

## 2022-02-25 NOTE — PROGRESS NOTES
IMPRESSION:   1. Acute hypoxic respiratory failure corrected now on room air  2. Klebsiella pneumonia and moraxella catarrhalis afebrile on Zosyn day 11  3. Confusion disorientation questionable cause  4. NSTEMI  5. Generalized Edema clear  6. Syncope  7. COPD   8. Hypokalemia resolved  9. Delirium   10. Urinary retention      RECOMMENDATIONS/PLAN:   1. Transferred to   2. Extubated on 2/17 alert awake talking on 2 L nasal cannula, some mild confusion marked confusion agitation 2/22 MRI no definite abnormality PCO2 was normal  3. No wheezing decrease prednisone to 10 mg  4. Hold trilogy inhaler continue nebulized albuterol Atrovent budesonide  5. CXR 2/18/22 no acute changes  6. CTA head negative  7. Superficial thrombus in LUE  10. Potassium corrected  11. Ambulatory pulse ox for discharge pulse ox on room air saturation is 92%     [x] High complexity decision making was performed  [x] See my orders for details  HPI  20-year-old male came in because of chest pain with shortness of breath he has also recurrent episodes of syncope significant past medical history of vocal cord dysfunction, pulmonary [place], coronary artery disease status post stent COPD current everyday smoker no history of sleep apnea he was having swelling of the lower extremities for couple of months today came into the emergency room as previously was having chest pain or shortness of breath he passed out subsequently got intubated and now on ventilator critical care consult was called. Patient transferred to floor, no longer intubated, on NC O2 and satting appropriately. Clinically much improved from pulmonary standpoint. PMH:  has no past medical history on file. PSH:   has no past surgical history on file. FHX: family history is not on file.      SHX:      ALL:   Allergies   Allergen Reactions    Sulfa (Sulfonamide Antibiotics) Other (comments)     syncope          MEDS:   [x] Reviewed - As Below   [] Not reviewed    Current Facility-Administered Medications   Medication    senna (SENOKOT) tablet 8.6 mg    predniSONE (DELTASONE) tablet 10 mg    melatonin tablet 3 mg    hydrOXYzine (VISTARIL) 25 mg/mL injection 25 mg    nystatin (MYCOSTATIN) 100,000 unit/mL oral suspension 500,000 Units    piperacillin-tazobactam (ZOSYN) 3.375 g in 0.9% sodium chloride (MBP/ADV) 100 mL MBP    albuterol-ipratropium (DUO-NEB) 2.5 MG-0.5 MG/3 ML    bethanechol (URECHOLINE) tablet 100 mg    finasteride (PROSCAR) tablet 5 mg    OXcarbazepine (TRILEPTAL) tablet 150 mg    tamsulosin (FLOMAX) capsule 0.4 mg    acetaminophen (TYLENOL) tablet 650 mg    Or    acetaminophen (TYLENOL) suppository 650 mg    polyethylene glycol (MIRALAX) packet 17 g    ondansetron (ZOFRAN ODT) tablet 4 mg    Or    ondansetron (ZOFRAN) injection 4 mg    enoxaparin (LOVENOX) injection 40 mg    famotidine (PF) (PEPCID) 20 mg in 0.9% sodium chloride 10 mL injection    labetaloL (NORMODYNE;TRANDATE) 20 mg/4 mL (5 mg/mL) injection 10 mg    amLODIPine (NORVASC) tablet 10 mg    aspirin chewable tablet 81 mg    atorvastatin (LIPITOR) tablet 80 mg    clopidogreL (PLAVIX) tablet 75 mg    metoprolol succinate (TOPROL-XL) XL tablet 25 mg    albuterol-ipratropium (DUO-NEB) 2.5 MG-0.5 MG/3 ML    budesonide (PULMICORT) 500 mcg/2 ml nebulizer suspension      MAR reviewed and pertinent medications noted or modified as needed   Current Facility-Administered Medications   Medication    senna (SENOKOT) tablet 8.6 mg    predniSONE (DELTASONE) tablet 10 mg    melatonin tablet 3 mg    hydrOXYzine (VISTARIL) 25 mg/mL injection 25 mg    nystatin (MYCOSTATIN) 100,000 unit/mL oral suspension 500,000 Units    piperacillin-tazobactam (ZOSYN) 3.375 g in 0.9% sodium chloride (MBP/ADV) 100 mL MBP    albuterol-ipratropium (DUO-NEB) 2.5 MG-0.5 MG/3 ML    bethanechol (URECHOLINE) tablet 100 mg    finasteride (PROSCAR) tablet 5 mg    OXcarbazepine (TRILEPTAL) tablet 150 mg    tamsulosin (FLOMAX) capsule 0.4 mg    acetaminophen (TYLENOL) tablet 650 mg    Or    acetaminophen (TYLENOL) suppository 650 mg    polyethylene glycol (MIRALAX) packet 17 g    ondansetron (ZOFRAN ODT) tablet 4 mg    Or    ondansetron (ZOFRAN) injection 4 mg    enoxaparin (LOVENOX) injection 40 mg    famotidine (PF) (PEPCID) 20 mg in 0.9% sodium chloride 10 mL injection    labetaloL (NORMODYNE;TRANDATE) 20 mg/4 mL (5 mg/mL) injection 10 mg    amLODIPine (NORVASC) tablet 10 mg    aspirin chewable tablet 81 mg    atorvastatin (LIPITOR) tablet 80 mg    clopidogreL (PLAVIX) tablet 75 mg    metoprolol succinate (TOPROL-XL) XL tablet 25 mg    albuterol-ipratropium (DUO-NEB) 2.5 MG-0.5 MG/3 ML    budesonide (PULMICORT) 500 mcg/2 ml nebulizer suspension      PMH:  has no past medical history on file. PSH:   has no past surgical history on file. FHX: family history is not on file. SHX:       ROS:  Patient alert awake talking review of systems as mentioned in HPI and physical exam    Hemodynamics:    CO:    CI:    CVP:    SVR:   PAP Systolic:    PAP Diastolic:    PVR:    YY15:        Ventilator Settings:      Mode Rate TV Press PEEP FiO2 PIP Min.  Vent   Assist control,Volume control    550 ml 15 cm H2O  5 cm H20 30 %  18 cm H2O  10.7 l/min        Vital Signs: Telemetry:    normal sinus rhythm Intake/Output:   Visit Vitals  BP (!) 126/96 (BP 1 Location: Right upper arm, BP Patient Position: At rest)   Pulse 98   Temp 97.8 °F (36.6 °C)   Resp 18   Ht 6' (1.829 m)   Wt 111.5 kg (245 lb 13 oz)   SpO2 95%   BMI 33.34 kg/m²       Temp (24hrs), Av.6 °F (36.4 °C), Min:97.4 °F (36.3 °C), Max:97.8 °F (36.6 °C)        O2 Device: None (Room air) O2 Flow Rate (L/min): 2 l/min       Wt Readings from Last 4 Encounters:   22 111.5 kg (245 lb 13 oz)          Intake/Output Summary (Last 24 hours) at 2022 0957  Last data filed at 2022 0306  Gross per 24 hour   Intake 300 ml   Output 1275 ml Net -975 ml       Last shift:      No intake/output data recorded. Last 3 shifts: 02/23 1901 - 02/25 0700  In: 600 [I.V.:600]  Out: 2425 [Urine:2425]       Physical Exam:     General: Alert awake mildly confused does not know he is in the hospital lying in bed has taken all of his clothes off he does not know why   HEENT: NCAT,   Eyes: anicteric; conjunctiva clear extraocular movements intact  Neck: no nodes,no accessory MM use. Chest: no deformity,   Cardiac: IR regular; no murmur;   Lungs: distant breath sounds; no wheeze  Abd: soft, NT, hypoactive BS  Ext: no edema; no joint swelling;  No clubbing  : clear urine  Neuro: Awake alert confused does not know he is in the hospital answer simple questions moves all 4 extremities  Psych-no issues  Skin: warm, dry, no cyanosis;   Pulses: Brachial and radial pulses intact  Capillary: Normal capillary refill      DATA:    MAR reviewed and pertinent medications noted or modified as needed  MEDS:   Current Facility-Administered Medications   Medication    senna (SENOKOT) tablet 8.6 mg    predniSONE (DELTASONE) tablet 10 mg    melatonin tablet 3 mg    hydrOXYzine (VISTARIL) 25 mg/mL injection 25 mg    nystatin (MYCOSTATIN) 100,000 unit/mL oral suspension 500,000 Units    piperacillin-tazobactam (ZOSYN) 3.375 g in 0.9% sodium chloride (MBP/ADV) 100 mL MBP    albuterol-ipratropium (DUO-NEB) 2.5 MG-0.5 MG/3 ML    bethanechol (URECHOLINE) tablet 100 mg    finasteride (PROSCAR) tablet 5 mg    OXcarbazepine (TRILEPTAL) tablet 150 mg    tamsulosin (FLOMAX) capsule 0.4 mg    acetaminophen (TYLENOL) tablet 650 mg    Or    acetaminophen (TYLENOL) suppository 650 mg    polyethylene glycol (MIRALAX) packet 17 g    ondansetron (ZOFRAN ODT) tablet 4 mg    Or    ondansetron (ZOFRAN) injection 4 mg    enoxaparin (LOVENOX) injection 40 mg    famotidine (PF) (PEPCID) 20 mg in 0.9% sodium chloride 10 mL injection    labetaloL (NORMODYNE;TRANDATE) 20 mg/4 mL (5 mg/mL) injection 10 mg    amLODIPine (NORVASC) tablet 10 mg    aspirin chewable tablet 81 mg    atorvastatin (LIPITOR) tablet 80 mg    clopidogreL (PLAVIX) tablet 75 mg    metoprolol succinate (TOPROL-XL) XL tablet 25 mg    albuterol-ipratropium (DUO-NEB) 2.5 MG-0.5 MG/3 ML    budesonide (PULMICORT) 500 mcg/2 ml nebulizer suspension        Labs:  2/24 room air oxygen saturation 94%    No results for input(s): PH, PCO2, PO2, HCO3, FIO2 in the last 72 hours. 2/13 AC 15  PEEP 8 FiO2 35%  Lab Results   Component Value Date/Time    TSH 1.38 02/08/2022 03:53 AM        Imaging:    Results from [place] encounter on 02/07/22    XR CHEST PORT    Narrative  Chest single view. Comparison single view chest February 17, 2022. ET tube and NG tube have been removed. Unchanged appearance for the lungs; no gross interstitial or alveolar pulmonary  edema. Cardiac and mediastinal structures unchanged. Apical pleural thickening  unchanged. No pneumothorax or sizable pleural effusion. Results from Hospital Encounter encounter on 02/07/22    CTA HEAD NECK W WO CONT    Narrative  CTA NECK WITHOUT AND WITH INTRAVENOUS CONTRAST  CTA HEAD WITH INTRAVENOUS CONTRAST    INDICATION: Right arm weakness    COMPARISON: CTA head and neck from 2/12/2022    TECHNIQUE: Noncontrast neck CT. Transaxial CTA of the head and neck was  performed after administration of 100 mL of Isovue-370 intravenous contrast.  Multiplanar MIP and standard reconstructions performed and evaluated. Dose  reduction: Per department policy, all CT scans at this facility are performed  using dose reduction optimization techniques as appropriate to a performed  examination including the following: Automated exposure control, adjustments of  the mA and/or KV according to patient size, or use of iterative reconstruction  technique.     FINDINGS:  Contrast injection through the right upper extremity causes streak artifact  which obscures evaluation of the surrounding structures. No focal  hemodynamically significant luminal narrowing of the visualized aortic arch,  innominate artery, or included/visualized subclavian and axillary arteries. Right common carotid artery is widely patent. Mild calcified atherosclerosis of  the right carotid bifurcation without hemodynamically significant luminal  stenosis by NASCET-like criteria. Calcified plaque of the proximal right  internal carotid artery without hemodynamically significant luminal stenosis. Right ICA is patent in the neck. Cavernous carotid circumferential calcified  atherosclerosis. Right ophthalmic artery is patent. Right anterior cerebral  artery and middle cerebral artery are patent. Left common carotid artery is widely patent. Mild calcified atherosclerosis  without hemodynamically significant luminal stenosis by NASCET-like criteria. Left internal carotid artery is widely patent. Mild calcified plaque of the left  cavernous ICA. Left ophthalmic artery is patent. Left anterior cerebral artery  is patent. No definite large vessel occlusion of the left middle cerebral  artery. Calcified atherosclerosis of bilateral vertebral arteries. Intradural vertebral  arteries also show calcified atherosclerosis. Basilar artery is patent. Bilateral posterior cerebral arteries are patent. Emphysema of the included lung apices. Visualized osseous structures show  chronic fusion of the C3-4 vertebral bodies. Impression  No large vessel occlusion or hemodynamically significant stenosis seen  intracranially. No hemodynamically significant luminal stenosis of the carotid  bifurcations by NASCET-like criteria. · 2/12 patient condition got worse yesterday and he became cyanotic while he was on a ventilator transferred to ICU he is back on Precedex and propofol ABG acceptable  · 2/13 back on ventilator assist control. Low-grade fever 100.6.   Will check urine and sputum cultures although chest x-ray looks clear.  Will decrease PEEP to 5. Repeat labs in a.m. hypercapnia today we will give Diamox  · 2/14 will decrease FiO2 sputum positive for gram-positive cocci in clusters started on vancomycin and Zosyn pending sensitivity still spiking temperature  · 2/16 remain on ventilator  will try to wean him today once extubated cardiologist suggested about cardiac cath  · 2/17 on assist control mode will try to wean him again today  · 2/18 alert awake nasal cannula talking weak  · 2/20/22 Patient transferred to floor on 2 L O2 NC satting appropriately talking and no short of breath. Patient has been getting straight cathed for the past few days, will consult urology. Patient also not sleeping, will add melatonin for now. · 2/21 patient confused according to the family he is talking out of his head started yesterday. Last blood gas no CO2 retention serum CO2 normal despite this we will check another blood gas.   No wheezing will start decreasing Solu-Medrol  · 2/24 awake alert still confused no agitation does not know where he is or why he is removed his close

## 2022-02-25 NOTE — DISCHARGE SUMMARY
Hospitalist Discharge Summary     Patient ID:  Macario Heard  [id number]  73 y.o.  1955 2/7/2022    PCP on record: Ignacio Davila MD    Admit date: 2/7/2022  Discharge date and time: 2/25/2022    DISCHARGE DIAGNOSIS:    Acute respiratory failure with hypoxia/COPD exacerbation/pneumonia/sepsis/recurrent syncope/hypertension/type II non-ST elevation MI/left arm    CONSULTATIONS:  IP CONSULT TO NEUROLOGY  IP CONSULT TO NEPHROLOGY  IP CONSULT TO UROLOGY  IP CONSULT TO CARDIOLOGY  IP CONSULT TO PULMONOLOGY    Excerpted HPI from H&P of April Monson MD:  Limited history was obtained as patient was intubated and sedated. [de-identified] of history obtained from wife at bedside and ED physician.  Drew De Leon is a 73 y.o. male possible history of vocal cord dysfunction, history of pulmonary embolism (on warfarin due to GI bleed), CAD s/p stent (6 years ago), and COPD. He presented to the ED today with chief complaint of chest pain, shortness of breath, syncope and altered mental status. He initially went to outside ED for chest pain shortness of breath, however noted to have altered mental status by EMS crew. Recurrent, back-to-back episodes of syncope that lasted 5 to 10 seconds, every 1 minutes. He was noted to have normal pulses, normal breathing and no arrhythmia.      In the ED, he was initially hypertensive (230/143) and hypoxic, SPO2 in 30s. He continued to have episodes of syncope, but wakes up after sternal rub. No postictal phase of confusion noted per ED physician, and was able to provide history to ED physician. As patient was unable to protect his airway and continue to be hypoxic, patient was intubated.     Per wife, patient has been having intermittent, generalized/whole body cramps that last between 15 minutes to 1 hour. Those episodes generally followed by loss of consciousness for about 15 seconds.   He usually will have short period of confusion and slurred speech after regaining consciousness. No extremity weakness after the episodes. Wife reports no fever, chills or chest pain. Reports have shortness of breath and cough at baseline due to COPD and has not been worse than normal in the past 3 weeks. Of note, he has been worked-up in the hospital in [place] (record not found in system), and no causes has been identified. EEG was never done.     Initial work-up showed no leukocytosis, no significant electrolytes abnormalities, no elevated troponin or BNP. UA not indicated of of UTI, tox screen negative. CT head and chest x-ray showed no acute findings. CT PE pending.    ______________________________________________________________________  DISCHARGE SUMMARY/HOSPITAL COURSE:  for full details see H&P, daily progress notes, labs, consult notes. 78-year-old male with history of COPD, CAD with PCI and pulmonary embolism (not on anticoagulation due to GI bleed) who was admitted on 2/7 with altered mental status and syncope complicated by hypertensive urgency and hypoxia. Initially had a blood pressure of 230/143 with oxygen saturations in the 30s. He was intubated in the ED. CT of the chest was unremarkable. Patient has a history of recurrent syncopal episodes. Per wife, patient had been having intermittent, generalized/whole body cramps that last between 15 minutes to 1 hour.  Those episodes generally are  followed by loss of consciousness for about 15 seconds. Zadie Bernheim usually will have short period of confusion and slurred speech after regaining consciousness.  No extremity weakness after the episodes.  Wife reported no fever, chills or chest pain.  Reports have shortness of breath and cough at baseline due to COPD and has not been worse than normal in the past 3 weeks.  Of note, he has been worked-up in the hospital in [place] (record not found in system), and no cause has been identified.   EEG was never done.     Patient was seen by neurology who does not feel the above episodes represented seizures.     COVID test was negative.     Hospital course was complicated by non-STEMI felt due to malignant hypertension.     He  has been maintained on the ventilator and followed by pulmonology. Etiology of respiratory failure seems to be largely COPD exacerbation. Patient was successfully extubated, transferred out of ICU, remained on Zosyn as well as Solu-Medrol, wean down on oxygen, patient was evaluated by neurology, received MRI brain, evaluated by physical therapy as well as Occupational Therapy, overall patient's clinical status improved, patient needed cardiac catheterization which can be done as an outpatient, patient's was subsequently transitioned to oral steroids as well as oral antibiotics and deemed stable for discharge to skilled nursing facility with close outpatient follow-up with primary care physician as well as neurology as well as cardiology as well as nephrology. Patient has indwelling rubin which was placed for urinary retention. Patient needs to follow up with urology for rubin removal.      _______________________________________________________________________  Patient seen and examined by me on discharge day. Pertinent Findings:  Gen:    Not in distress  Chest: Decreased air entry bilaterally in bilateral lower lung zones  CVS:   Regular rhythm, s1/s2 no m/r/g  No edema  Abd:  Soft, not distended, not tender  Neuro:  Alert, at  baseline  _______________________________________________________________________  DISCHARGE MEDICATIONS:   Current Discharge Medication List      START taking these medications    Details   acetaminophen (TYLENOL) 325 mg tablet Take 2 Tablets by mouth every six (6) hours as needed for Pain or Fever. Qty: 60 Tablet, Refills: 0  Start date: 2/21/2022      amLODIPine (NORVASC) 10 mg tablet Take 1 Tablet by mouth daily.   Qty: 30 Tablet, Refills: 0  Start date: 2/22/2022      budesonide (PULMICORT) 0.5 mg/2 mL nbsp 2 mL by Nebulization route two (2) times a day. Qty: 200 mL, Refills: 0  Start date: 2/21/2022      glycopyrrolate (ROBINUL) 0.2 mg/mL injection 0.5 mL by IntraVENous route three (3) times daily. Qty: 20 mL, Refills: 0  Start date: 2/21/2022      nystatin (MYCOSTATIN) 100,000 unit/mL suspension Take 5 mL by mouth four (4) times daily. swish and spit  Qty: 473 mL, Refills: 0  Start date: 2/21/2022      predniSONE (DELTASONE) 20 mg tablet Take 1 tablet twice daily for 7 days, then take 1 tablet once daily for 7 days  Qty: 21 Tablet, Refills: 0  Start date: 2/21/2022      amoxicillin-clavulanate (Augmentin) 875-125 mg per tablet Take 1 Tablet by mouth two (2) times a day. Qty: 14 Tablet, Refills: 0  Start date: 2/21/2022         CONTINUE these medications which have NOT CHANGED    Details   albuterol (ProAir HFA) 90 mcg/actuation inhaler Take 1 Puff by inhalation four (4) times daily as needed. aspirin 81 mg chewable tablet Take 81 mg by mouth daily. atorvastatin (Lipitor) 40 mg tablet Take 40 mg by mouth daily. bethanechol chloride (URECHOLINE) 50 mg tablet Take 200 mg by mouth daily. clopidogreL (PLAVIX) 75 mg tab Take 75 mg by mouth daily. esomeprazole (NexIUM) 40 mg capsule Take 40 mg by mouth daily. finasteride (PROSCAR) 5 mg tablet Take 5 mg by mouth daily. fluticasone-umeclidinium-vilanterol (TRELEGY ELLIPTA) 100-62.5-25 mcg inhaler Take 1 Puff by inhalation daily. ipratropium-albuteroL (Combivent Respimat)  mcg/actuation inhaler Take 1 Puff by inhalation two (2) times daily as needed. omeprazole (PRILOSEC) 20 mg capsule Take 20 mg by mouth daily. OXcarbazepine (Oxtellar XR) 300 mg Tb24 Take 300 mg by mouth daily. tamsulosin (Flomax) 0.4 mg capsule Take 0.4 mg by mouth daily. metoprolol succinate (TOPROL-XL) 25 mg XL tablet Take 25 mg by mouth daily.          STOP taking these medications       bumetanide (BUMEX) 1 mg tablet Comments:   Reason for Stopping:         furosemide (LASIX) 20 mg tablet Comments:   Reason for Stopping:         gabapentin (NEURONTIN) 300 mg capsule Comments:   Reason for Stopping:         isosorbide dinitrate (ISORDIL) 30 mg tablet Comments:   Reason for Stopping:         losartan (COZAAR) 25 mg tablet Comments:   Reason for Stopping:         QUEtiapine (SEROqueL) 400 mg tablet Comments:   Reason for Stopping:         sertraline (Zoloft) 100 mg tablet Comments:   Reason for Stopping:         ticagrelor (Brilinta) 90 mg tablet Comments:   Reason for Stopping:                 Patient Follow Up Instructions: Activity: PT/OT Eval and Treat  Diet: Full liquid diet moderately thickened  Wound Care: As directed    Follow-up with PCP/Pulmonology/Neurology/Cardiology/Nephrology in 2 weeks.   Follow-up tests/labs As per above physcians  Follow-up Information     Follow up With Specialties Details Why Contact Info    Michael Horta MD Family Medicine In 1 week  [address] [address] [address]      Aileen Beard MD Pulmonary Disease In 1 week  [address] [address]      Carson De MD Neurology In 1 week  [address] [facility] [address] [facility] [address]   [phone]      Walter Gilman MD Cardiology In 1 week  [address]      Jose Luis Byrnes MD Nephrology In 1 week  [facility]  [phone]          ________________________________________________________________    Risk of deterioration: Low    Condition at Discharge:  Stable  __________________________________________________________________    Disposition  SNF/LTC    ____________________________________________________________________    Code Status: Full Code  ___________________________________________________________________      Total time in minutes spent coordinating this discharge (includes going over instructions, follow-up, prescriptions, and preparing report for sign off to her PCP) :  45 minutes    Signed:  Kem Kowalski MD

## 2022-02-25 NOTE — PROGRESS NOTES
CM communicated with Adrianna of [facility]. [address]. Patient can be admitted today. Patient going to Rm 121. Nurse to call report to [phone]. Toro to remain. Patient to follow up with urology. Medicare pt has received, reviewed, and signed 2nd IM letter informing them of their right to appeal the discharge. Signed copied has been placed on pt bedside chart.

## 2022-02-25 NOTE — PROGRESS NOTES
Ambulating oximetry must be completed with PT due to mobility issues, discussed with ALEKS Mcintyre RN.

## 2022-02-25 NOTE — PROGRESS NOTES
1300: New orders rec'd for patient discharge to [facility]. Handoff report called to nursing staff; questions asked and answered. 1630: Transport at bedside to transfer patient to facility. All personal belongings and discharge information with transport team. Patient to discharge facility with indwelling rubin catheter. Discharge completed without incident.

## 2022-02-25 NOTE — PROGRESS NOTES
Problem: Breathing Pattern - Ineffective  Goal: *Absence of hypoxia  Outcome: Progressing Towards Goal  Note: Patient is on room air and his O2 saturations are remaining above 90%         Bedside shift change report given to LIOR Mosley (oncoming nurse) by LIOR Anaya (offgoing nurse). Report included the following information SBAR, Kardex, Intake/Output, MAR, Accordion, Recent Results and Cardiac Rhythm NSR.

## 2022-02-26 ENCOUNTER — HOSPITAL ENCOUNTER (EMERGENCY)
Age: 67
Discharge: HOME | End: 2022-02-27
Attending: EMERGENCY MEDICINE
Payer: MEDICARE

## 2022-02-26 ENCOUNTER — APPOINTMENT (OUTPATIENT)
Dept: VASCULAR SURGERY | Age: 67
End: 2022-02-26
Attending: EMERGENCY MEDICINE
Payer: MEDICARE

## 2022-02-26 ENCOUNTER — APPOINTMENT (OUTPATIENT)
Dept: CT IMAGING | Age: 67
End: 2022-02-26
Attending: EMERGENCY MEDICINE
Payer: MEDICARE

## 2022-02-26 ENCOUNTER — APPOINTMENT (OUTPATIENT)
Dept: GENERAL RADIOLOGY | Age: 67
End: 2022-02-26
Attending: EMERGENCY MEDICINE
Payer: MEDICARE

## 2022-02-26 DIAGNOSIS — I82.4Y2 ACUTE DEEP VEIN THROMBOSIS (DVT) OF PROXIMAL VEIN OF LEFT LOWER EXTREMITY (HCC): Primary | ICD-10-CM

## 2022-02-26 DIAGNOSIS — I26.93 SINGLE SUBSEGMENTAL PULMONARY EMBOLISM WITHOUT ACUTE COR PULMONALE (HCC): ICD-10-CM

## 2022-02-26 DIAGNOSIS — R00.0 TACHYCARDIA: ICD-10-CM

## 2022-02-26 LAB
ALBUMIN SERPL-MCNC: 3 G/DL (ref 3.5–5)
ALBUMIN/GLOB SERPL: 0.8 {RATIO} (ref 1.1–2.2)
ALP SERPL-CCNC: 113 U/L (ref 45–117)
ALT SERPL-CCNC: 246 U/L (ref 12–78)
ANION GAP SERPL CALC-SCNC: 8 MMOL/L (ref 5–15)
APPEARANCE UR: ABNORMAL
APTT PPP: 24.6 SEC (ref 22.1–31)
AST SERPL-CCNC: 84 U/L (ref 15–37)
BACTERIA URNS QL MICRO: ABNORMAL /HPF
BASOPHILS # BLD: 0 K/UL (ref 0–0.1)
BASOPHILS NFR BLD: 0 % (ref 0–1)
BILIRUB SERPL-MCNC: 1.3 MG/DL (ref 0.2–1)
BILIRUB UR QL CFM: NEGATIVE
BNP SERPL-MCNC: 103 PG/ML
BUN SERPL-MCNC: 35 MG/DL (ref 6–20)
BUN/CREAT SERPL: 42 (ref 12–20)
CALCIUM SERPL-MCNC: 8.9 MG/DL (ref 8.5–10.1)
CAOX CRY URNS QL MICRO: ABNORMAL
CHLORIDE SERPL-SCNC: 107 MMOL/L (ref 97–108)
CO2 SERPL-SCNC: 23 MMOL/L (ref 21–32)
COLOR UR: ABNORMAL
COMMENT, HOLDF: NORMAL
CREAT SERPL-MCNC: 0.84 MG/DL (ref 0.7–1.3)
DIFFERENTIAL METHOD BLD: ABNORMAL
EOSINOPHIL # BLD: 0.1 K/UL (ref 0–0.4)
EOSINOPHIL NFR BLD: 1 % (ref 0–7)
EPITH CASTS URNS QL MICRO: ABNORMAL /LPF
ERYTHROCYTE [DISTWIDTH] IN BLOOD BY AUTOMATED COUNT: 14.6 % (ref 11.5–14.5)
GLOBULIN SER CALC-MCNC: 3.9 G/DL (ref 2–4)
GLUCOSE SERPL-MCNC: 94 MG/DL (ref 65–100)
GLUCOSE UR STRIP.AUTO-MCNC: NEGATIVE MG/DL
HCT VFR BLD AUTO: 41.7 % (ref 36.6–50.3)
HGB BLD-MCNC: 14 G/DL (ref 12.1–17)
HGB UR QL STRIP: ABNORMAL
IMM GRANULOCYTES # BLD AUTO: 0.1 K/UL (ref 0–0.04)
IMM GRANULOCYTES NFR BLD AUTO: 1 % (ref 0–0.5)
INR PPP: 1.2 (ref 0.9–1.1)
KETONES UR QL STRIP.AUTO: >80 MG/DL
LEUKOCYTE ESTERASE UR QL STRIP.AUTO: NEGATIVE
LYMPHOCYTES # BLD: 1.7 K/UL (ref 0.8–3.5)
LYMPHOCYTES NFR BLD: 15 % (ref 12–49)
MAGNESIUM SERPL-MCNC: 2.3 MG/DL (ref 1.6–2.4)
MCH RBC QN AUTO: 29.5 PG (ref 26–34)
MCHC RBC AUTO-ENTMCNC: 33.6 G/DL (ref 30–36.5)
MCV RBC AUTO: 88 FL (ref 80–99)
MONOCYTES # BLD: 0.5 K/UL (ref 0–1)
MONOCYTES NFR BLD: 5 % (ref 5–13)
NEUTS SEG # BLD: 9.2 K/UL (ref 1.8–8)
NEUTS SEG NFR BLD: 78 % (ref 32–75)
NITRITE UR QL STRIP.AUTO: NEGATIVE
NRBC # BLD: 0 K/UL (ref 0–0.01)
NRBC BLD-RTO: 0 PER 100 WBC
PH UR STRIP: 5 [PH] (ref 5–8)
PLATELET # BLD AUTO: 201 K/UL (ref 150–400)
PMV BLD AUTO: 9.3 FL (ref 8.9–12.9)
POTASSIUM SERPL-SCNC: 3.6 MMOL/L (ref 3.5–5.1)
PROT SERPL-MCNC: 6.9 G/DL (ref 6.4–8.2)
PROT UR STRIP-MCNC: 30 MG/DL
PROTHROMBIN TIME: 12.4 SEC (ref 9–11.1)
RBC # BLD AUTO: 4.74 M/UL (ref 4.1–5.7)
RBC #/AREA URNS HPF: ABNORMAL /HPF (ref 0–5)
SAMPLES BEING HELD,HOLD: NORMAL
SODIUM SERPL-SCNC: 138 MMOL/L (ref 136–145)
SP GR UR REFRACTOMETRY: >1.03 (ref 1–1.03)
THERAPEUTIC RANGE,PTTT: NORMAL SECS (ref 58–77)
TROPONIN-HIGH SENSITIVITY: 19 NG/L (ref 0–76)
TROPONIN-HIGH SENSITIVITY: 19 NG/L (ref 0–76)
UR CULT HOLD, URHOLD: NORMAL
UROBILINOGEN UR QL STRIP.AUTO: 2 EU/DL (ref 0.2–1)
WBC # BLD AUTO: 11.8 K/UL (ref 4.1–11.1)
WBC URNS QL MICRO: ABNORMAL /HPF (ref 0–4)

## 2022-02-26 PROCEDURE — 80053 COMPREHEN METABOLIC PANEL: CPT

## 2022-02-26 PROCEDURE — 93971 EXTREMITY STUDY: CPT

## 2022-02-26 PROCEDURE — 36415 COLL VENOUS BLD VENIPUNCTURE: CPT

## 2022-02-26 PROCEDURE — 74011000636 HC RX REV CODE- 636: Performed by: RADIOLOGY

## 2022-02-26 PROCEDURE — 85025 COMPLETE CBC W/AUTO DIFF WBC: CPT

## 2022-02-26 PROCEDURE — 84484 ASSAY OF TROPONIN QUANT: CPT

## 2022-02-26 PROCEDURE — 71045 X-RAY EXAM CHEST 1 VIEW: CPT

## 2022-02-26 PROCEDURE — 71275 CT ANGIOGRAPHY CHEST: CPT

## 2022-02-26 PROCEDURE — 83880 ASSAY OF NATRIURETIC PEPTIDE: CPT

## 2022-02-26 PROCEDURE — 74011250636 HC RX REV CODE- 250/636: Performed by: EMERGENCY MEDICINE

## 2022-02-26 PROCEDURE — 74011250637 HC RX REV CODE- 250/637: Performed by: EMERGENCY MEDICINE

## 2022-02-26 PROCEDURE — 99285 EMERGENCY DEPT VISIT HI MDM: CPT

## 2022-02-26 PROCEDURE — 85610 PROTHROMBIN TIME: CPT

## 2022-02-26 PROCEDURE — 85730 THROMBOPLASTIN TIME PARTIAL: CPT

## 2022-02-26 PROCEDURE — 93005 ELECTROCARDIOGRAM TRACING: CPT

## 2022-02-26 PROCEDURE — 83735 ASSAY OF MAGNESIUM: CPT

## 2022-02-26 PROCEDURE — 77030005513 HC CATH URETH FOL11 MDII -B

## 2022-02-26 PROCEDURE — 81001 URINALYSIS AUTO W/SCOPE: CPT

## 2022-02-26 RX ORDER — RIVAROXABAN 15 MG-20MG
KIT ORAL
Qty: 1 DOSE PACK | Refills: 0 | Status: SHIPPED | OUTPATIENT
Start: 2022-02-26 | End: 2022-03-04

## 2022-02-26 RX ADMIN — SODIUM CHLORIDE 1000 ML: 9 INJECTION, SOLUTION INTRAVENOUS at 20:01

## 2022-02-26 RX ADMIN — RIVAROXABAN 15 MG: 15 TABLET, FILM COATED ORAL at 18:57

## 2022-02-26 RX ADMIN — IOPAMIDOL 80 ML: 755 INJECTION, SOLUTION INTRAVENOUS at 17:14

## 2022-02-26 NOTE — PROGRESS NOTES
1st attempt. Instructed patient to remove pants so exam could be performed. X-ray now bedside. Will perform exam once x-ray is complete. Exam complete.  Prelim given to

## 2022-02-26 NOTE — ED TRIAGE NOTES
Patient presents to ED with complaint of chest pain, left leg, and wrist pain that started about 1 hour ago. Reports he was diagnosed with MI on Feb 7th and PE. States he has been at the [facility] for rehab.

## 2022-02-26 NOTE — ED PROVIDER NOTES
The history is provided by the patient. No  was used. Chest Pain (Angina)   This is a new problem. The current episode started 1 to 2 hours ago. The problem has not changed since onset. The problem occurs constantly. The pain is associated with normal activity. The pain is at a severity of 7/10. The pain is moderate. The quality of the pain is described as pressure-like. The pain does not radiate. Associated symptoms include leg pain, lower extremity edema, nausea and shortness of breath. Pertinent negatives include no abdominal pain, no back pain, no claudication, no cough, no diaphoresis, no dizziness, no exertional chest pressure, no fever, no headaches, no hemoptysis, no irregular heartbeat, no malaise/fatigue, no near-syncope, no numbness, no orthopnea, no palpitations, no PND, no sputum production, no vomiting and no weakness. He has tried nothing for the symptoms. The treatment provided no relief. His past medical history is significant for DVT, HTN and PE. Procedural history includes cardiac stents. No past medical history on file. No past surgical history on file. No family history on file.     Social History     Socioeconomic History    Marital status:      Spouse name: Not on file    Number of children: Not on file    Years of education: Not on file    Highest education level: Not on file   Occupational History    Not on file   Tobacco Use    Smoking status: Not on file    Smokeless tobacco: Not on file   Substance and Sexual Activity    Alcohol use: Not on file    Drug use: Not on file    Sexual activity: Not on file   Other Topics Concern    Not on file   Social History Narrative    Not on file     Social Determinants of Health     Financial Resource Strain:     Difficulty of Paying Living Expenses: Not on file   Food Insecurity:     Worried About Running Out of Food in the Last Year: Not on file    Shala of Food in the Last Year: Not on file Transportation Needs:     Lack of Transportation (Medical): Not on file    Lack of Transportation (Non-Medical): Not on file   Physical Activity:     Days of Exercise per Week: Not on file    Minutes of Exercise per Session: Not on file   Stress:     Feeling of Stress : Not on file   Social Connections:     Frequency of Communication with Friends and Family: Not on file    Frequency of Social Gatherings with Friends and Family: Not on file    Attends Tenriism Services: Not on file    Active Member of [address] [place] [address] or Organizations: Not on file    Attends Club or Organization Meetings: Not on file    Marital Status: Not on file   Intimate Partner Violence:     Fear of Current or Ex-Partner: Not on file    Emotionally Abused: Not on file    Physically Abused: Not on file    Sexually Abused: Not on file   Housing Stability:     Unable to Pay for Housing in the Last Year: Not on file    Number of [place] in the Last Year: Not on file    Unstable Housing in the Last Year: Not on file         ALLERGIES: Sulfa (sulfonamide antibiotics)    Review of Systems   Constitutional: Negative for activity change, chills, diaphoresis, fever and malaise/fatigue. HENT: Negative for nosebleeds, sore throat, trouble swallowing and voice change. Eyes: Negative for visual disturbance. Respiratory: Positive for shortness of breath. Negative for cough, hemoptysis and sputum production. Cardiovascular: Positive for chest pain. Negative for palpitations, orthopnea, claudication, PND and near-syncope. Gastrointestinal: Positive for nausea. Negative for abdominal pain, constipation, diarrhea and vomiting. Genitourinary: Negative for difficulty urinating, dysuria, hematuria and urgency. Musculoskeletal: Positive for arthralgias and myalgias. Negative for back pain, neck pain and neck stiffness. Skin: Negative for color change. Allergic/Immunologic: Negative for immunocompromised state.    Neurological: Negative for dizziness, seizures, syncope, weakness, light-headedness, numbness and headaches. Psychiatric/Behavioral: Negative for behavioral problems, confusion, hallucinations, self-injury and suicidal ideas. Vitals:    22 1550   BP: (!) 134/104   Pulse: (!) 124   Resp: 20   Temp: 97.9 °F (36.6 °C)   SpO2: 97%   Weight: 103.9 kg (229 lb)   Height: 6' 3\" (1.905 m)            Physical Exam  Vitals and nursing note reviewed. Constitutional:       General: He is not in acute distress. Appearance: He is well-developed. He is morbidly obese. He is ill-appearing. He is not diaphoretic. Interventions: Nasal cannula in place. HENT:      Head: Normocephalic and atraumatic. Eyes:      Pupils: Pupils are equal, round, and reactive to light. Cardiovascular:      Rate and Rhythm: Regular rhythm. Tachycardia present. Heart sounds: Normal heart sounds. No murmur heard. No friction rub. No gallop. Pulmonary:      Effort: Pulmonary effort is normal. No respiratory distress. Breath sounds: Decreased breath sounds present. No wheezing. Abdominal:      General: Bowel sounds are normal. There is no distension. Palpations: Abdomen is soft. Tenderness: There is no abdominal tenderness. There is no guarding or rebound. Musculoskeletal:         General: Normal range of motion. Left wrist: Normal.      Cervical back: Normal range of motion and neck supple. Right lower le+ Edema present. Left lower le+ Edema present. Skin:     General: Skin is warm. Findings: Ecchymosis present. No rash. Neurological:      Mental Status: He is alert and oriented to person, place, and time. Psychiatric:         Behavior: Behavior normal.         Thought Content:  Thought content normal.         Judgment: Judgment normal.          MDM     This is a 81-year-old male with past medical history, review of systems, physical exam as above, presenting with complaints of chest pain, left wrist pain, left lower leg pain. Patient was discharged yesterday from an outside hospital following approximately 2-week admission for altered mental status, hypoxic respiratory failure requiring intubation, hypertensive emergency, and NSTEMI. Chart review indicates a history of DVT and PE, not anticoagulated due to GI bleed, history of coronary disease with PCI, COPD, myoclonic episodes with normal MRI and EEG. Patient was discharged to long-term care yesterday, with above complaints developing early this afternoon. He states substernal chest pressure, nonradiating, currently 7 out of 10. He is noted to be tachycardic, on arrival discharge instructions appear to indicate 2 L oxygen by nasal cannula he is noted to be satting 95% on 4 L upon arrival.  Breath sounds are diminished throughout, likely secondary to body habitus, ecchymosis on the abdomen likely secondary to Lovenox versus insulin injections, bilateral lower extremity pedal edema, without erythema, focus or swelling notable in the left lower extremity. Differential includes COPD exacerbation, ACS, recurrent PE, patient was noted to have CTA of the chest approximately 2 weeks ago without recurrence. Plan to obtain CMP, CBC, EKG, chest x-ray, cardiac enzymes, BMP, left lower extremity Doppler. We will reassess, and make a disposition. Procedures    6:23 PM  Patient with recurrent left lower extremity DVT, and tiny PE. Results discussed with patient at bedside, as he was previously taking off anticoagulation for GI bleed. Patient agrees that attempting treatment with NOAC is in his best interest.  Will place order, repeat troponin pending. 10:01 PM  Results and findings discussed with daughter at bedside, UA without findings of acute infection. Discussed with patient he is no longer taking ticagrelor, discussed risks of Plavix and anticoagulants.   Will discharge back to his facility, encourage primary care follow-up, return precautions given.

## 2022-02-27 VITALS
WEIGHT: 229 LBS | DIASTOLIC BLOOD PRESSURE: 84 MMHG | HEIGHT: 75 IN | RESPIRATION RATE: 20 BRPM | OXYGEN SATURATION: 95 % | HEART RATE: 117 BPM | BODY MASS INDEX: 28.47 KG/M2 | TEMPERATURE: 97.9 F | SYSTOLIC BLOOD PRESSURE: 153 MMHG

## 2022-02-27 LAB
ATRIAL RATE: 119 BPM
CALCULATED P AXIS, ECG09: 73 DEGREES
CALCULATED R AXIS, ECG10: 23 DEGREES
CALCULATED T AXIS, ECG11: 54 DEGREES
DIAGNOSIS, 93000: NORMAL
P-R INTERVAL, ECG05: 150 MS
Q-T INTERVAL, ECG07: 310 MS
QRS DURATION, ECG06: 72 MS
QTC CALCULATION (BEZET), ECG08: 436 MS
VENTRICULAR RATE, ECG03: 119 BPM

## 2022-02-27 PROCEDURE — 74011250637 HC RX REV CODE- 250/637: Performed by: EMERGENCY MEDICINE

## 2022-02-27 RX ORDER — ONDANSETRON 4 MG/1
4 TABLET, ORALLY DISINTEGRATING ORAL
Status: COMPLETED | OUTPATIENT
Start: 2022-02-27 | End: 2022-02-27

## 2022-02-27 RX ADMIN — ONDANSETRON 4 MG: 4 TABLET, ORALLY DISINTEGRATING ORAL at 00:55

## 2022-02-27 NOTE — ED NOTES
Reports given to EMS. The patient left the Emergency Department ambulatory, alert and oriented and in no acute distress. The patient was encouraged to call or return to the ED for worsening issues or problems and was encouraged to schedule a follow up appointment for continuing care. The patient verbalized understanding of discharge instructions and prescriptions, all questions were answered. The patient has no further concerns at this time.

## 2022-02-28 NOTE — PROGRESS NOTES
Physician Progress Note      Cecilia Law  CSN #:                  [id number]  :                       1955  ADMIT DATE:       2022 8:55 PM  [address] DATE:        2022 4:30 PM  RESPONDING  PROVIDER #:        Gualberto Bowens MD          QUERY TEXT:    Pt admitted with Acute Resp Failure. Pt noted to have Sepsis. If possible, please document in progress notes and discharge summary the present on admission status of Sepsis. The medical record reflects the following:  Risk Factors: Acute Resp Failure, Sepsis, MI type 2, Pneumonia, HTN, CHF  Clinical Indicators: Pt admitted , noted Sepsis documented on 2/15 PN: \"Sepsis. \" DC Summary: \"Acute respiratory failure with hypoxia/COPD exacerbation/pneumonia/sepsis/recurrent syncope/hypertension/type II non-ST elevation MI/left arm. \"  Treatment: Cardio, Neuro, & Pulmo consulted, BC obtained, CXR, cardiac & pulse ox monitoring, IV ABX, Lab monitoring. Thank you,  JEANMARIE RobbN, RN, [place], [facility] Specialist  [phone] or [email or web address]  Options provided:  -- No, Sepsis was not present on admission and developed during the inpatient stay  -- Yes, Sepsis was present at the time of the order to admit to the hospital  -- Other - I will add my own diagnosis  -- Disagree - Not applicable / Not valid  -- Disagree - Clinically unable to determine / Unknown  -- Refer to Clinical Documentation Reviewer    PROVIDER RESPONSE TEXT:    No, Sepsis was not present on admission and developed during the inpatient stay.     Query created by: Krysta Payan on 2022 7:07 AM      Electronically signed by:  Gualberto Bowens MD 2022 2:21 PM

## 2022-03-02 ENCOUNTER — APPOINTMENT (OUTPATIENT)
Dept: VASCULAR SURGERY | Age: 67
End: 2022-03-02
Attending: INTERNAL MEDICINE
Payer: MEDICARE

## 2022-03-02 ENCOUNTER — HOSPITAL ENCOUNTER (OUTPATIENT)
Age: 67
Setting detail: OBSERVATION
Discharge: NURSING, REHAB OR LONG-TERM CARE | End: 2022-03-04
Attending: EMERGENCY MEDICINE | Admitting: INTERNAL MEDICINE
Payer: MEDICARE

## 2022-03-02 ENCOUNTER — PATIENT OUTREACH (OUTPATIENT)
Dept: CASE MANAGEMENT | Age: 67
End: 2022-03-02

## 2022-03-02 ENCOUNTER — APPOINTMENT (OUTPATIENT)
Dept: GENERAL RADIOLOGY | Age: 67
End: 2022-03-02
Attending: EMERGENCY MEDICINE
Payer: MEDICARE

## 2022-03-02 DIAGNOSIS — D50.0 BLOOD LOSS ANEMIA: Primary | ICD-10-CM

## 2022-03-02 DIAGNOSIS — Z79.01 LONG TERM (CURRENT) USE OF ANTICOAGULANTS: ICD-10-CM

## 2022-03-02 DIAGNOSIS — N39.0 URINARY TRACT INFECTION ASSOCIATED WITH INDWELLING URETHRAL CATHETER, INITIAL ENCOUNTER (HCC): ICD-10-CM

## 2022-03-02 DIAGNOSIS — R31.0 GROSS HEMATURIA: ICD-10-CM

## 2022-03-02 DIAGNOSIS — R53.81 DEBILITATED PATIENT: ICD-10-CM

## 2022-03-02 DIAGNOSIS — T83.511A URINARY TRACT INFECTION ASSOCIATED WITH INDWELLING URETHRAL CATHETER, INITIAL ENCOUNTER (HCC): ICD-10-CM

## 2022-03-02 PROBLEM — D62 ACUTE BLOOD LOSS ANEMIA: Status: ACTIVE | Noted: 2022-03-02

## 2022-03-02 PROBLEM — R82.81 PYURIA: Status: ACTIVE | Noted: 2022-03-02

## 2022-03-02 PROBLEM — R31.9 HEMATURIA: Status: ACTIVE | Noted: 2022-03-02

## 2022-03-02 PROBLEM — J96.90 RESPIRATORY FAILURE (HCC): Status: RESOLVED | Noted: 2022-02-07 | Resolved: 2022-03-02

## 2022-03-02 PROBLEM — I82.409 ACUTE DEEP VENOUS THROMBOSIS (HCC): Status: ACTIVE | Noted: 2022-03-02

## 2022-03-02 PROBLEM — G93.41 ACUTE METABOLIC ENCEPHALOPATHY: Status: ACTIVE | Noted: 2022-03-02

## 2022-03-02 PROBLEM — K92.2 LOWER GI BLEED: Status: ACTIVE | Noted: 2022-03-02

## 2022-03-02 LAB
AMPHET UR QL SCN: NEGATIVE
ANION GAP SERPL CALC-SCNC: 6 MMOL/L (ref 5–15)
APPEARANCE UR: ABNORMAL
BACTERIA URNS QL MICRO: NEGATIVE /HPF
BARBITURATES UR QL SCN: NEGATIVE
BASOPHILS # BLD: 0 K/UL (ref 0–0.1)
BASOPHILS NFR BLD: 0 % (ref 0–1)
BENZODIAZ UR QL: NEGATIVE
BILIRUB UR QL CFM: NEGATIVE
BUN SERPL-MCNC: 18 MG/DL (ref 6–20)
BUN/CREAT SERPL: 26 (ref 12–20)
CALCIUM SERPL-MCNC: 8.8 MG/DL (ref 8.5–10.1)
CANNABINOIDS UR QL SCN: NEGATIVE
CAOX CRY URNS QL MICRO: ABNORMAL
CHLORIDE SERPL-SCNC: 106 MMOL/L (ref 97–108)
CO2 SERPL-SCNC: 26 MMOL/L (ref 21–32)
COCAINE UR QL SCN: NEGATIVE
COLOR UR: ABNORMAL
CREAT SERPL-MCNC: 0.7 MG/DL (ref 0.7–1.3)
DIFFERENTIAL METHOD BLD: ABNORMAL
DRUG SCRN COMMENT,DRGCM: NORMAL
EOSINOPHIL # BLD: 0 K/UL (ref 0–0.4)
EOSINOPHIL NFR BLD: 0 % (ref 0–7)
EPITH CASTS URNS QL MICRO: ABNORMAL /LPF
ERYTHROCYTE [DISTWIDTH] IN BLOOD BY AUTOMATED COUNT: 14.7 % (ref 11.5–14.5)
ETHANOL SERPL-MCNC: <10 MG/DL
FERRITIN SERPL-MCNC: 247 NG/ML (ref 26–388)
FOLATE SERPL-MCNC: 6.2 NG/ML (ref 5–21)
GLUCOSE SERPL-MCNC: 122 MG/DL (ref 65–100)
GLUCOSE UR STRIP.AUTO-MCNC: 250 MG/DL
GRAN CASTS URNS QL MICRO: ABNORMAL /LPF
HAPTOGLOB SERPL-MCNC: 245 MG/DL (ref 30–200)
HCT VFR BLD AUTO: 33.2 % (ref 36.6–50.3)
HGB BLD-MCNC: 11.5 G/DL (ref 12.1–17)
HGB UR QL STRIP: ABNORMAL
HYALINE CASTS URNS QL MICRO: ABNORMAL /LPF (ref 0–5)
IMM GRANULOCYTES # BLD AUTO: 0.1 K/UL (ref 0–0.04)
IMM GRANULOCYTES NFR BLD AUTO: 1 % (ref 0–0.5)
IRON SATN MFR SERPL: 52 % (ref 20–50)
IRON SERPL-MCNC: 123 UG/DL (ref 35–150)
KETONES UR QL STRIP.AUTO: ABNORMAL MG/DL
LDH SERPL L TO P-CCNC: 252 U/L (ref 85–241)
LEUKOCYTE ESTERASE UR QL STRIP.AUTO: ABNORMAL
LYMPHOCYTES # BLD: 1.2 K/UL (ref 0.8–3.5)
LYMPHOCYTES NFR BLD: 14 % (ref 12–49)
MAGNESIUM SERPL-MCNC: 2.1 MG/DL (ref 1.6–2.4)
MCH RBC QN AUTO: 29.6 PG (ref 26–34)
MCHC RBC AUTO-ENTMCNC: 34.6 G/DL (ref 30–36.5)
MCV RBC AUTO: 85.3 FL (ref 80–99)
METHADONE UR QL: NEGATIVE
MONOCYTES # BLD: 0.4 K/UL (ref 0–1)
MONOCYTES NFR BLD: 5 % (ref 5–13)
NEUTS SEG # BLD: 6.9 K/UL (ref 1.8–8)
NEUTS SEG NFR BLD: 80 % (ref 32–75)
NITRITE UR QL STRIP.AUTO: POSITIVE
NRBC # BLD: 0 K/UL (ref 0–0.01)
NRBC BLD-RTO: 0 PER 100 WBC
OPIATES UR QL: NEGATIVE
OTHER,OTHU: ABNORMAL
PCP UR QL: NEGATIVE
PH UR STRIP: 5.5 [PH] (ref 5–8)
PLATELET # BLD AUTO: 209 K/UL (ref 150–400)
PMV BLD AUTO: 9.9 FL (ref 8.9–12.9)
POTASSIUM SERPL-SCNC: 3.7 MMOL/L (ref 3.5–5.1)
PROCALCITONIN SERPL-MCNC: <0.05 NG/ML
PROT UR STRIP-MCNC: 100 MG/DL
RBC # BLD AUTO: 3.89 M/UL (ref 4.1–5.7)
RBC #/AREA URNS HPF: ABNORMAL /HPF (ref 0–5)
RETICS # AUTO: 0.07 M/UL (ref 0.03–0.1)
RETICS/RBC NFR AUTO: 2 % (ref 0.7–2.1)
SODIUM SERPL-SCNC: 138 MMOL/L (ref 136–145)
SP GR UR REFRACTOMETRY: 1.03 (ref 1–1.03)
TIBC SERPL-MCNC: 237 UG/DL (ref 250–450)
TROPONIN-HIGH SENSITIVITY: 12 NG/L (ref 0–76)
TROPONIN-HIGH SENSITIVITY: 16 NG/L (ref 0–76)
TSH SERPL DL<=0.05 MIU/L-ACNC: 1.26 UIU/ML (ref 0.36–3.74)
UR CULT HOLD, URHOLD: NORMAL
UROBILINOGEN UR QL STRIP.AUTO: 2 EU/DL (ref 0.2–1)
VIT B12 SERPL-MCNC: 415 PG/ML (ref 193–986)
WBC # BLD AUTO: 8.6 K/UL (ref 4.1–11.1)
WBC URNS QL MICRO: ABNORMAL /HPF (ref 0–4)

## 2022-03-02 PROCEDURE — 82728 ASSAY OF FERRITIN: CPT

## 2022-03-02 PROCEDURE — 82746 ASSAY OF FOLIC ACID SERUM: CPT

## 2022-03-02 PROCEDURE — 84484 ASSAY OF TROPONIN QUANT: CPT

## 2022-03-02 PROCEDURE — 97116 GAIT TRAINING THERAPY: CPT

## 2022-03-02 PROCEDURE — G0378 HOSPITAL OBSERVATION PER HR: HCPCS

## 2022-03-02 PROCEDURE — 96374 THER/PROPH/DIAG INJ IV PUSH: CPT

## 2022-03-02 PROCEDURE — 97535 SELF CARE MNGMENT TRAINING: CPT

## 2022-03-02 PROCEDURE — 83010 ASSAY OF HAPTOGLOBIN QUANT: CPT

## 2022-03-02 PROCEDURE — 83615 LACTATE (LD) (LDH) ENZYME: CPT

## 2022-03-02 PROCEDURE — 51798 US URINE CAPACITY MEASURE: CPT

## 2022-03-02 PROCEDURE — 82077 ASSAY SPEC XCP UR&BREATH IA: CPT

## 2022-03-02 PROCEDURE — 97530 THERAPEUTIC ACTIVITIES: CPT

## 2022-03-02 PROCEDURE — 83540 ASSAY OF IRON: CPT

## 2022-03-02 PROCEDURE — 94640 AIRWAY INHALATION TREATMENT: CPT

## 2022-03-02 PROCEDURE — 51702 INSERT TEMP BLADDER CATH: CPT

## 2022-03-02 PROCEDURE — 96372 THER/PROPH/DIAG INJ SC/IM: CPT

## 2022-03-02 PROCEDURE — 80048 BASIC METABOLIC PNL TOTAL CA: CPT

## 2022-03-02 PROCEDURE — 77030013140 HC MSK NEB VYRM -A

## 2022-03-02 PROCEDURE — 74011250636 HC RX REV CODE- 250/636: Performed by: STUDENT IN AN ORGANIZED HEALTH CARE EDUCATION/TRAINING PROGRAM

## 2022-03-02 PROCEDURE — 97162 PT EVAL MOD COMPLEX 30 MIN: CPT

## 2022-03-02 PROCEDURE — 82607 VITAMIN B-12: CPT

## 2022-03-02 PROCEDURE — 74011000250 HC RX REV CODE- 250: Performed by: STUDENT IN AN ORGANIZED HEALTH CARE EDUCATION/TRAINING PROGRAM

## 2022-03-02 PROCEDURE — C9113 INJ PANTOPRAZOLE SODIUM, VIA: HCPCS | Performed by: INTERNAL MEDICINE

## 2022-03-02 PROCEDURE — 87086 URINE CULTURE/COLONY COUNT: CPT

## 2022-03-02 PROCEDURE — 84443 ASSAY THYROID STIM HORMONE: CPT

## 2022-03-02 PROCEDURE — 81001 URINALYSIS AUTO W/SCOPE: CPT

## 2022-03-02 PROCEDURE — 80307 DRUG TEST PRSMV CHEM ANLYZR: CPT

## 2022-03-02 PROCEDURE — 71046 X-RAY EXAM CHEST 2 VIEWS: CPT

## 2022-03-02 PROCEDURE — 97165 OT EVAL LOW COMPLEX 30 MIN: CPT

## 2022-03-02 PROCEDURE — 85025 COMPLETE CBC W/AUTO DIFF WBC: CPT

## 2022-03-02 PROCEDURE — 93971 EXTREMITY STUDY: CPT

## 2022-03-02 PROCEDURE — 84145 PROCALCITONIN (PCT): CPT

## 2022-03-02 PROCEDURE — 99285 EMERGENCY DEPT VISIT HI MDM: CPT

## 2022-03-02 PROCEDURE — 74011250637 HC RX REV CODE- 250/637: Performed by: INTERNAL MEDICINE

## 2022-03-02 PROCEDURE — 83735 ASSAY OF MAGNESIUM: CPT

## 2022-03-02 PROCEDURE — 74011250636 HC RX REV CODE- 250/636: Performed by: EMERGENCY MEDICINE

## 2022-03-02 PROCEDURE — 85045 AUTOMATED RETICULOCYTE COUNT: CPT

## 2022-03-02 PROCEDURE — 94664 DEMO&/EVAL PT USE INHALER: CPT

## 2022-03-02 PROCEDURE — 74011250636 HC RX REV CODE- 250/636: Performed by: INTERNAL MEDICINE

## 2022-03-02 PROCEDURE — 74011000250 HC RX REV CODE- 250: Performed by: INTERNAL MEDICINE

## 2022-03-02 PROCEDURE — 36415 COLL VENOUS BLD VENIPUNCTURE: CPT

## 2022-03-02 RX ORDER — DICYCLOMINE HYDROCHLORIDE 10 MG/ML
20 INJECTION INTRAMUSCULAR
Status: COMPLETED | OUTPATIENT
Start: 2022-03-02 | End: 2022-03-02

## 2022-03-02 RX ORDER — ATORVASTATIN CALCIUM 20 MG/1
40 TABLET, FILM COATED ORAL DAILY
Status: DISCONTINUED | OUTPATIENT
Start: 2022-03-02 | End: 2022-03-04 | Stop reason: HOSPADM

## 2022-03-02 RX ORDER — ACETAMINOPHEN 325 MG/1
650 TABLET ORAL
Status: DISCONTINUED | OUTPATIENT
Start: 2022-03-02 | End: 2022-03-04 | Stop reason: HOSPADM

## 2022-03-02 RX ORDER — AMLODIPINE BESYLATE 5 MG/1
10 TABLET ORAL DAILY
Status: DISCONTINUED | OUTPATIENT
Start: 2022-03-02 | End: 2022-03-04 | Stop reason: HOSPADM

## 2022-03-02 RX ORDER — DEXTROSE, SODIUM CHLORIDE, AND POTASSIUM CHLORIDE 5; .45; .15 G/100ML; G/100ML; G/100ML
75 INJECTION INTRAVENOUS CONTINUOUS
Status: DISCONTINUED | OUTPATIENT
Start: 2022-03-02 | End: 2022-03-03

## 2022-03-02 RX ORDER — METOPROLOL SUCCINATE 25 MG/1
25 TABLET, EXTENDED RELEASE ORAL DAILY
Status: DISCONTINUED | OUTPATIENT
Start: 2022-03-02 | End: 2022-03-04 | Stop reason: HOSPADM

## 2022-03-02 RX ORDER — BUDESONIDE 0.5 MG/2ML
500 INHALANT ORAL
Status: DISCONTINUED | OUTPATIENT
Start: 2022-03-02 | End: 2022-03-04 | Stop reason: HOSPADM

## 2022-03-02 RX ORDER — FINASTERIDE 5 MG/1
5 TABLET, FILM COATED ORAL DAILY
Status: DISCONTINUED | OUTPATIENT
Start: 2022-03-02 | End: 2022-03-04 | Stop reason: HOSPADM

## 2022-03-02 RX ORDER — ONDANSETRON 4 MG/1
4 TABLET, ORALLY DISINTEGRATING ORAL
Status: DISCONTINUED | OUTPATIENT
Start: 2022-03-02 | End: 2022-03-04 | Stop reason: HOSPADM

## 2022-03-02 RX ORDER — TAMSULOSIN HYDROCHLORIDE 0.4 MG/1
0.4 CAPSULE ORAL DAILY
Status: DISCONTINUED | OUTPATIENT
Start: 2022-03-02 | End: 2022-03-04 | Stop reason: HOSPADM

## 2022-03-02 RX ORDER — ACETAMINOPHEN 650 MG/1
650 SUPPOSITORY RECTAL
Status: DISCONTINUED | OUTPATIENT
Start: 2022-03-02 | End: 2022-03-03

## 2022-03-02 RX ORDER — ARFORMOTEROL TARTRATE 15 UG/2ML
15 SOLUTION RESPIRATORY (INHALATION)
Status: DISCONTINUED | OUTPATIENT
Start: 2022-03-02 | End: 2022-03-04 | Stop reason: HOSPADM

## 2022-03-02 RX ORDER — ONDANSETRON 2 MG/ML
4 INJECTION INTRAMUSCULAR; INTRAVENOUS
Status: DISCONTINUED | OUTPATIENT
Start: 2022-03-02 | End: 2022-03-03

## 2022-03-02 RX ORDER — POLYETHYLENE GLYCOL 3350 17 G/17G
17 POWDER, FOR SOLUTION ORAL DAILY PRN
Status: DISCONTINUED | OUTPATIENT
Start: 2022-03-02 | End: 2022-03-04 | Stop reason: HOSPADM

## 2022-03-02 RX ADMIN — POTASSIUM CHLORIDE, DEXTROSE MONOHYDRATE AND SODIUM CHLORIDE 75 ML/HR: 150; 5; 450 INJECTION, SOLUTION INTRAVENOUS at 11:40

## 2022-03-02 RX ADMIN — AMLODIPINE BESYLATE 10 MG: 5 TABLET ORAL at 11:30

## 2022-03-02 RX ADMIN — TAMSULOSIN HYDROCHLORIDE 0.4 MG: 0.4 CAPSULE ORAL at 11:30

## 2022-03-02 RX ADMIN — BUDESONIDE 500 MCG: 0.5 SUSPENSION RESPIRATORY (INHALATION) at 19:08

## 2022-03-02 RX ADMIN — SODIUM CHLORIDE 1000 ML: 9 INJECTION, SOLUTION INTRAVENOUS at 04:50

## 2022-03-02 RX ADMIN — METOPROLOL SUCCINATE 25 MG: 25 TABLET, EXTENDED RELEASE ORAL at 11:30

## 2022-03-02 RX ADMIN — ARFORMOTEROL TARTRATE 15 MCG: 15 SOLUTION RESPIRATORY (INHALATION) at 08:22

## 2022-03-02 RX ADMIN — ARFORMOTEROL TARTRATE 15 MCG: 15 SOLUTION RESPIRATORY (INHALATION) at 19:08

## 2022-03-02 RX ADMIN — FINASTERIDE 5 MG: 5 TABLET, FILM COATED ORAL at 11:31

## 2022-03-02 RX ADMIN — SODIUM CHLORIDE, PRESERVATIVE FREE 1 G: 5 INJECTION INTRAVENOUS at 11:40

## 2022-03-02 RX ADMIN — BUDESONIDE 500 MCG: 0.5 SUSPENSION RESPIRATORY (INHALATION) at 08:23

## 2022-03-02 RX ADMIN — DICYCLOMINE HYDROCHLORIDE 20 MG: 20 INJECTION, SOLUTION INTRAMUSCULAR at 05:01

## 2022-03-02 RX ADMIN — ATORVASTATIN CALCIUM 40 MG: 20 TABLET, FILM COATED ORAL at 11:31

## 2022-03-02 NOTE — CONSULTS
New Urology Consult Note    Patient: Pa Hamilton MRN: [id number]  SSN: [id number]    YOB: 1955  Age: 73 y.o. Sex: male            Assessment:     Pa Hamilton is a 73 y.o. male with gross hematuria; he is a poor historian - he thinks it started in the last 24hrs. UA - suggests UTI   Urine is clear/yellow     Recommendations:     1. Continue culture driven abx  2. Leave rubin  No acute urologic intervention, f/u with primary Urologist post d/c   Urology signing off, please call if needed     Thank you for this consult. Please contact [facility] with any further questions/concerns. [facility] [phone]    History of Present Illness:     Reason for Consult:  Hematuria     [facility] Jimenez Jose R is seen in consultation for reasons noted above at the request of Garima Rios MD.    This is a 73 y.o. male with a history of NSTEMI, on chronic AC, chronic rubin cathter, hx of DVT, HTN.      Reports sudden onset of gross hematuria 1 day ago, denies fever, chills, or flank pain       Subjective     Past Medical History  Past Medical History:   Diagnosis Date    CAD (coronary artery disease)     Chronic anticoagulation     Chronic indwelling Rubin catheter     Chronic obstructive pulmonary disease (HCC)     GERD (gastroesophageal reflux disease)     Hx of deep venous thrombosis     Hyperlipidemia     Hypertension     Urine retention     Vocal cord dysfunction        Past Surgical History:   Past Surgical History:   Procedure Laterality Date    HX CORONARY STENT PLACEMENT         Medication:  Current Facility-Administered Medications   Medication Dose Route Frequency Provider Last Rate Last Admin    arformoteroL (BROVANA) neb solution 15 mcg  15 mcg Nebulization BID RT Garima Rios MD   15 mcg at 03/02/22 1554    budesonide (PULMICORT) 500 mcg/2 ml nebulizer suspension  500 mcg Nebulization BID RT Garima Rios MD   500 mcg at 03/02/22 8779    pantoprazole (PROTONIX) 40 mg in 0.9% sodium chloride 10 mL injection  40 mg IntraVENous Q12H Ivonne Zhou MD        amLODIPine (NORVASC) tablet 10 mg  10 mg Oral DAILY Ivonne Zhou MD   10 mg at 03/02/22 1130    atorvastatin (LIPITOR) tablet 40 mg  40 mg Oral DAILY Ivonne Zhou MD   40 mg at 03/02/22 1131    finasteride (PROSCAR) tablet 5 mg  5 mg Oral DAILY Ivonne Zhou MD   5 mg at 03/02/22 1131    metoprolol succinate (TOPROL-XL) XL tablet 25 mg  25 mg Oral DAILY Ivonne Zhou MD   25 mg at 03/02/22 1130    tamsulosin (FLOMAX) capsule 0.4 mg  0.4 mg Oral DAILY Ivonne Zhou MD   0.4 mg at 03/02/22 1130    acetaminophen (TYLENOL) tablet 650 mg  650 mg Oral Q6H PRN Ivonne Zhou MD        Or    acetaminophen (TYLENOL) suppository 650 mg  650 mg Rectal Q6H PRN Ivonne Zhou MD        polyethylene glycol [facility]) packet 17 g  17 g Oral DAILY PRN Ivonne Zhou MD        ondansetron (ZOFRAN ODT) tablet 4 mg  4 mg Oral Q8H PRN Ivonne Zhou MD        Or    ondansetron [facility]) injection 4 mg  4 mg IntraVENous Q6H PRN Ivonne Zhou MD        dextrose 5% - 0.45% NaCl with KCl 20 mEq/L infusion  75 mL/hr IntraVENous CONTINUOUS Ivonne Zhou MD 75 mL/hr at 03/02/22 1140 75 mL/hr at 03/02/22 1140     Current Outpatient Medications   Medication Sig Dispense Refill    rivaroxaban (Xarelto DVT-PE Treat 30d Start) 15 mg (42)- 20 mg (9) DsPk Take 15mg tablet twice daily for days 1-21 followed by 20mg tablet once daily for days 22-30. 1 Dose Pack 0    predniSONE (DELTASONE) 10 mg tablet Take 10 mg by mouth daily (with breakfast). 2 Tablet 0    senna (SENOKOT) 8.6 mg tablet Take 1 Tablet by mouth daily. 30 Tablet 0    acetaminophen (TYLENOL) 325 mg tablet Take 2 Tablets by mouth every six (6) hours as needed for Pain or Fever. 60 Tablet 0    amLODIPine (NORVASC) 10 mg tablet Take 1 Tablet by mouth daily.  30 Tablet 0    budesonide (PULMICORT) 0.5 mg/2 mL nbsp 2 mL by Nebulization route two (2) times a day. 200 mL 0    glycopyrrolate (ROBINUL) 0.2 mg/mL injection 0.5 mL by IntraVENous route three (3) times daily. 20 mL 0    nystatin (MYCOSTATIN) 100,000 unit/mL suspension Take 5 mL by mouth four (4) times daily. swish and spit 473 mL 0    predniSONE (DELTASONE) 20 mg tablet Take 1 tablet twice daily for 7 days, then take 1 tablet once daily for 7 days 21 Tablet 0    amoxicillin-clavulanate (Augmentin) 875-125 mg per tablet Take 1 Tablet by mouth two (2) times a day. 14 Tablet 0    albuterol (ProAir HFA) 90 mcg/actuation inhaler Take 1 Puff by inhalation four (4) times daily as needed. (Patient not taking: Reported on 2/7/2022)      aspirin 81 mg chewable tablet Take 81 mg by mouth daily. (Patient not taking: Reported on 2/7/2022)      atorvastatin (Lipitor) 40 mg tablet Take 40 mg by mouth daily. (Patient not taking: Reported on 2/7/2022)      bethanechol chloride (URECHOLINE) 50 mg tablet Take 200 mg by mouth daily.  clopidogreL (PLAVIX) 75 mg tab Take 75 mg by mouth daily. (Patient not taking: Reported on 2/7/2022)      esomeprazole (NexIUM) 40 mg capsule Take 40 mg by mouth daily. (Patient not taking: Reported on 2/7/2022)      finasteride (PROSCAR) 5 mg tablet Take 5 mg by mouth daily.  fluticasone-umeclidinium-vilanterol (TRELEGY ELLIPTA) 100-62.5-25 mcg inhaler Take 1 Puff by inhalation daily.  ipratropium-albuteroL (Combivent Respimat)  mcg/actuation inhaler Take 1 Puff by inhalation two (2) times daily as needed. (Patient not taking: Reported on 2/7/2022)      omeprazole (PRILOSEC) 20 mg capsule Take 20 mg by mouth daily. (Patient not taking: Reported on 2/7/2022)      OXcarbazepine (Oxtellar XR) 300 mg Tb24 Take 300 mg by mouth daily. (Patient not taking: Reported on 2/7/2022)      tamsulosin (Flomax) 0.4 mg capsule Take 0.4 mg by mouth daily.       metoprolol succinate (TOPROL-XL) 25 mg XL tablet Take 25 mg by mouth daily. (Patient not taking: Reported on 2/7/2022)         Allergies: Allergies   Allergen Reactions    Sulfa (Sulfonamide Antibiotics) Other (comments)     syncope         Social History:  Social History     Tobacco Use    Smoking status: Former Smoker    Smokeless tobacco: Never Used   Substance Use Topics    Alcohol use: Not Currently    Drug use: Never       Family History  History reviewed. No pertinent family history. Review of Systems  ROS from attending provider note from 03/02/22 reviewed and changes (other than per HPI) include : none. Objective:     Vital signs in last 24 hours:  Visit Vitals  /85 (BP 1 Location: Left upper arm, BP Patient Position: At rest)   Pulse (!) 110   Temp 97.8 °F (36.6 °C)   Resp 16   Ht 6' 3\" (1.905 m)   Wt 100 kg (220 lb 7.4 oz)   SpO2 95%   BMI 27.56 kg/m²       Intake/Output last 3 shifts:  Date 03/01/22 0700 - 03/02/22 0659(Not Admitted) 03/02/22 0700 - 03/03/22 0659   Shift 6874-4304 3471-5520 24 Hour Total 2223-9633 9527-2346 24 Hour Total   INTAKE   P.O.    480  480     P. O.    480  480   I.V.  1000(0.8) 1000(0.4)        Volume (sodium chloride 0.9 % bolus infusion 1,000 mL)  1000 1000      Shift Total(mL/kg)  1000(10) 1000(10) 480(4.8)  480(4.8)   OUTPUT   Shift Total(mL/kg)         NET  1000 1000 480  480   Weight (kg)  100 100 100 100 100         Physical Exam  General: NAD, A&O  HEENT: lids normal  Pulmonary: Normal work of breathing   Abdomen: soft, NTTP, nondistended, no suprapubic fullness or tenderness  : rubin draining clear/yellow urine   Gait: not observed  Neuro: Alert, oriented to current time/place, unable to provide details about his hx   Mood/Affect: normal    Lab/Imaging Review:       Most Recent Labs:  Lab Results   Component Value Date/Time    WBC 8.6 03/02/2022 04:17 AM    HGB 11.5 (L) 03/02/2022 04:17 AM    HCT 33.2 (L) 03/02/2022 04:17 AM    PLATELET 393 56/64/5344 04:17 AM    MCV 85.3 03/02/2022 04:17 AM        Lab Results Component Value Date/Time    Sodium 138 03/02/2022 04:17 AM    Potassium 3.7 03/02/2022 04:17 AM    Chloride 106 03/02/2022 04:17 AM    CO2 26 03/02/2022 04:17 AM    Anion gap 6 03/02/2022 04:17 AM    Glucose 122 (H) 03/02/2022 04:17 AM    BUN 18 03/02/2022 04:17 AM    Creatinine 0.70 03/02/2022 04:17 AM    BUN/Creatinine ratio 26 (H) 03/02/2022 04:17 AM    GFR est AA >60 03/02/2022 04:17 AM    GFR est non-AA >60 03/02/2022 04:17 AM    Calcium 8.8 03/02/2022 04:17 AM    Bilirubin, total 1.3 (H) 02/26/2022 04:05 PM    Alk. phosphatase 113 02/26/2022 04:05 PM    Protein, total 6.9 02/26/2022 04:05 PM    Albumin 3.0 (L) 02/26/2022 04:05 PM    Globulin 3.9 02/26/2022 04:05 PM    A-G Ratio 0.8 (L) 02/26/2022 04:05 PM    ALT (SGPT) 246 (H) 02/26/2022 04:05 PM        No results found for: PSA, PSA2, PSAR1, Luane Custer, PSAR3, [id number], [id number], 84604, PSAEXT     COAGS:  No results found for: APTT, PTP, INR, INREXT    No results found for: HBA1C, XAY7UMZB, GDU8BNXS     Lab Results   Component Value Date/Time     02/10/2022 05:14 AM          Urine/Blood Cultures:  Results     Procedure Component Value Units Date/Time    URINE CULTURE HOLD SAMPLE [[id number]] Collected: 03/02/22 0417    Order Status: Completed Specimen: Urine from Serum Updated: 03/02/22 0534     Urine culture hold       Urine on hold in Microbiology dept for 2 days. If unpreserved urine is submitted, it cannot be used for addtional testing after 24 hours, recollection will be required. CULTURE, URINE [[id number]] Collected: 03/02/22 0417    Order Status: Completed Specimen: Cath Urine Updated: 03/02/22 1027    URINE CULTURE HOLD SAMPLE [[id number]] Collected: 02/26/22 2112    Order Status: Completed Specimen: Urine from Serum Updated: 02/26/22 2125     Urine culture hold       Urine on hold in Microbiology dept for 2 days.   If unpreserved urine is submitted, it cannot be used for addtional testing after 24 hours, recollection will be required. IMAGING:  XR CHEST PA LAT    Result Date: 3/2/2022  EXAM:  XR CHEST PA LAT INDICATION: Chest pain and shortness of breath COMPARISON: CT and radiograph 2/26/2022 TECHNIQUE: PA and lateral chest views FINDINGS: The cardiomediastinal contours are stable. The pulmonary vasculature is within normal limits. The lungs and pleural spaces are clear. There is no pneumothorax. The bones and upper abdomen are stable. No acute process.            Signed By: Nini Bermudez NP  - March 2, 2022

## 2022-03-02 NOTE — Clinical Note
Patient Class[de-identified] OBSERVATION [104]   Type of Bed: Remote Telemetry [29]   Cardiac Monitoring Required?: Yes   Reason for Observation: gi consult   Admitting Diagnosis: Lower GI bleed [337355]   Admitting Physician: Luc Dixon [address]   Attending Physician: Rebekah Jamison

## 2022-03-02 NOTE — PROGRESS NOTES
0320: TRANSFER - OUT REPORT:    Verbal report given to Dayna(name) on [facility]  being transferred to 5th floor(unit) for routine progression of care       Report consisted of patients Situation, Background, Assessment and   Recommendations(SBAR). Information from the following report(s) SBAR, Kardex, Intake/Output, MAR, Recent Results and Cardiac Rhythm ST was reviewed with the receiving nurse. Lines:   Peripheral IV 03/02/22 Right Antecubital (Active)   Site Assessment Clean, dry, & intact 03/02/22 1130   Phlebitis Assessment 0 03/02/22 1130   Infiltration Assessment 0 03/02/22 1130   Dressing Status Clean, dry, & intact 03/02/22 1130   Dressing Type Transparent 03/02/22 1130   Hub Color/Line Status Green 03/02/22 1130   Action Taken Open ports on tubing capped 03/02/22 1130   Alcohol Cap Used Yes 03/02/22 1130        Opportunity for questions and clarification was provided.       Patient transported with:   SUPR

## 2022-03-02 NOTE — ED NOTES
Bedside and Verbal shift change report given to Abiodun Troncoso RN (oncoming nurse) by Gema RN (offgoing nurse). Report included the following information SBAR, Kardex, ED Summary, Intake/Output and Recent Results.

## 2022-03-02 NOTE — PROGRESS NOTES
Problem: Mobility Impaired (Adult and Pediatric)  Goal: *Acute Goals and Plan of Care (Insert Text)  Description: FUNCTIONAL STATUS PRIOR TO ADMISSION: Prior to admission patient was at the [facility] SNF after having STEMI with intubation in early February. Patient a fair historian, reports primarily w/c use at SNF with RW practice during therapy sessions. HOME SUPPORT PRIOR TO ADMISSION: The patient lived with his wife and did not need assistance before admission in early February. Physical Therapy Goals  Initiated 3/2/2022  1. Patient will move from supine to sit and sit to supine , scoot up and down, and roll side to side in bed with independence within 7 day(s). 2.  Patient will transfer from bed to chair and chair to bed with supervision/set-up using the least restrictive device within 7 day(s). 3.  Patient will perform sit to stand with supervision/set-up within 7 day(s). 4.  Patient will ambulate with supervision/set-up for 120 feet with the least restrictive device within 7 day(s). Outcome: Not Met  PHYSICAL THERAPY EVALUATION  Patient: Sherri Salinas [de-identified]73 y.o. male)  Date: 3/2/2022  Primary Diagnosis: Lower GI bleed [K92.2]  Hematuria [R31.9]        Precautions:          ASSESSMENT  Based on the objective data described below, the patient presents with possibly impaired cognition/intermittent confusion, impaired standing balance, decreased activity tolerance, and unsteady gait not consistent with his fairly recent baseline mobility level s/p admission for hematuria. Pertinent PMH: patient recently admitted to [address] [place] [address] due to STEMI with hypoxia requiring intubation x 11 days, discharged to SNF. Today, patient oriented x 4 but with decreased attention to questions, tangential at times, and not able to provide clear history. Exhibited posterior lean in initial stand with B HHA, able to correct with multimodal cues.   Obtained RW and exhibited improved LAURENCE and maintenance of midline with gait around room x 10'. Patient notably tachycardic with short activity, up to 140s, with SOB though SpO2 > 90% on RA. Patient very talkative, pleasant and highly motivated to regain his prior level of function. Would recommend IPR at d/c prior to return home as patient able to tolerate more intense therapy that he likely was at last d/c. Patient remains unsafe for d/c home due to high fall risk. Current Level of Function Impacting Discharge (mobility/balance): MIN to MOD A x 1-2 for transfers and gait with RW    Functional Outcome Measure: The patient scored Total: 40/100 on the Barthel Index outcome measure which is indicative of being partially dependent in basic self-care. Other factors to consider for discharge: wife's ability to assist, well below his baseline     Patient will benefit from skilled therapy intervention to address the above noted impairments. PLAN :  Recommendations and Planned Interventions: bed mobility training, transfer training, gait training, therapeutic exercises, patient and family training/education, and therapeutic activities      Frequency/Duration: Patient will be followed by physical therapy:  5 times a week to address goals. Recommendation for discharge: (in order for the patient to meet his/her long term goals)  Therapy 3 hours per day 5-7 days per week    This discharge recommendation:  Has been made in collaboration with the attending provider and/or case management    IF patient discharges home will need the following DME: to be determined (TBD)         SUBJECTIVE:   Patient stated I don't want to go back to the [facility], it was horrible. Patria Felty    OBJECTIVE DATA SUMMARY:   HISTORY:    Past Medical History:   Diagnosis Date    CAD (coronary artery disease)     Chronic anticoagulation     Chronic indwelling Toro catheter     Chronic obstructive pulmonary disease (HCC)     GERD (gastroesophageal reflux disease)     Hx of deep venous thrombosis     Hyperlipidemia Hypertension     Urine retention     Vocal cord dysfunction      Past Surgical History:   Procedure Laterality Date    HX CORONARY STENT PLACEMENT         Personal factors and/or comorbidities impacting plan of care:     Home Situation  Home Environment: Private residence  # Steps to Enter: 5  Rails to Enter: Yes  Hand Rails : Bilateral  One/Two Story Residence: Two story  # of Interior Steps: 15  Interior Rails: Right  Living Alone: No  Support Systems: Spouse/Significant Other  Patient Expects to be Discharged to[de-identified] Home with family assistance  Current DME Used/Available at Home: Nebulizer  Tub or Shower Type: Shower    EXAMINATION/PRESENTATION/DECISION MAKING:   Critical Behavior:  Neurologic State: Alert  Orientation Level: Oriented X4  Cognition: Follows commands     Hearing: Auditory  Auditory Impairment: None    Range Of Motion:  AROM: Generally decreased, functional                       Strength:    Strength: Generally decreased, functional                    Tone & Sensation:   Tone: Normal              Sensation: Intact               Coordination:  Coordination: Within functional limits  Vision:   Corrective Lenses: Glasses  Functional Mobility:  Bed Mobility:  Rolling: Contact guard assistance  Supine to Sit: Contact guard assistance  Sit to Supine: Contact guard assistance  Scooting: Contact guard assistance  Transfers:  Sit to Stand: Minimum assistance  Stand to Sit: Minimum assistance        Bed to Chair: Minimum assistance; Adaptive equipment (RW)              Balance:   Sitting: Intact  Standing: Impaired  Standing - Static: Fair;Constant support  Standing - Dynamic : Fair;Constant support  Ambulation/Gait Training:  Distance (ft): 10 Feet (ft)  Assistive Device: Gait belt;Walker, rolling (first trial with HHA)  Ambulation - Level of Assistance: Minimal assistance; Moderate assistance;Assist x1;Assist x2 (MOD 2 with HHA, MIN x 1 with RW)        Gait Abnormalities: Decreased step clearance;Trunk sway increased        Base of Support: Center of gravity altered     Speed/Polina: Pace decreased (<100 feet/min)  Step Length: Right shortened;Left shortened                   Functional Measure:  Barthel Index:    Bathin  Bladder: 0  Bowels: 5  Groomin  Dressin  Feeding: 10  Mobility: 0  Stairs: 0  Toilet Use: 5  Transfer (Bed to Chair and Back): 10  Total: 40/100       The Barthel ADL Index: Guidelines  1. The index should be used as a record of what a patient does, not as a record of what a patient could do. 2. The main aim is to establish degree of independence from any help, physical or verbal, however minor and for whatever reason. 3. The need for supervision renders the patient not independent. 4. A patient's performance should be established using the best available evidence. Asking the patient, friends/relatives and nurses are the usual sources, but direct observation and common sense are also important. However direct testing is not needed. 5. Usually the patient's performance over the preceding 24-48 hours is important, but occasionally longer periods will be relevant. 6. Middle categories imply that the patient supplies over 50 per cent of the effort. 7. Use of aids to be independent is allowed. Score Interpretation (from [address] [facility] 83)    Independent   60-79 Minimally independent   40-59 Partially dependent   20-39 Very dependent   <20 Totally dependent     -Gm Topete., Barthel, D.W. (1965). Functional evaluation: the Barthel Index. [address] [facility] ([address] [place] [address] (1997). The Barthel activities of daily living index: self-reporting versus actual performance in the old (> or = 75 years). Journal of [address] [place] [address] 45(7), [address] [facility], J.J.M.F, Светлана Holt., Romana Just. (1999).  Measuring the change in disability after inpatient rehabilitation; comparison of the responsiveness of the Barthel Index and Functional Heard Measure. Journal of Neurology, Neurosurgery, and Psychiatry, 66(4), 594-352. ADAN Carter.CALEB, MARLY Valadez, & Ny Melendez M.A. (2004) Assessment of post-stroke quality of life in cost-effectiveness studies: The usefulness of the Barthel Index and the EuroQoL-5D. Quality of Life Research, 15, 515-95        Physical Therapy Evaluation Charge Determination   History Examination Presentation Decision-Making   HIGH Complexity :3+ comorbidities / personal factors will impact the outcome/ POC  MEDIUM Complexity : 3 Standardized tests and measures addressing body structure, function, activity limitation and / or participation in recreation  MEDIUM Complexity : Evolving with changing characteristics  Other outcome measures Barthel 40  MEDIUM      Based on the above components, the patient evaluation is determined to be of the following complexity level: MEDIUM    Pain Rating:  None reported    Activity Tolerance:   Fair and tachycardic with activity    After treatment patient left in no apparent distress:   Supine in bed and Call bell within reach    COMMUNICATION/EDUCATION:   The patients plan of care was discussed with: Occupational therapist and Registered nurse. Fall prevention education was provided and the patient/caregiver indicated understanding. and Patient/family agree to work toward stated goals and plan of care.     Thank you for this referral.  Ryan Ramirez, PT   Time Calculation: 36 mins

## 2022-03-02 NOTE — PROGRESS NOTES
[address] [place] [address] Dr Discharge Note    *The information contained in this note was received during a weekly care coordination call with the post acute facility*      Patient was discharged from [address] [place] [address] ([facility]) on 3/2/2022 to [facility] dx GI bleed. PCP: MD ROBINSON Wyman appointment: No future appointments. Community Care Team will sign off at this time. Medications were not reconciled and general patient assessment was not completed during this skilled nursing facility outreach.      Jacklyn Cummings LPN Care Coordinator

## 2022-03-02 NOTE — PROGRESS NOTES
3/2/2022  11:52 AM   Case management note    Reason for Readmission:     Hematuria  2/7/2022 - 2/25/2022 respiratory failure    Patient came from [address] [facility]  Patient had lengthy stay at [facility]. Patient went to hospital confused and with malignant hypertension. Patient was also intubated for 11 days during that stay. Prior to last admission, patient was independent and drove. Patient states he is not going back to the [address]. He will get all the equipment he needs and go home. Patient does have history of COPD. [facility] @ [address] [place] [address] Casmul Score/Risk Level:     moderate    PCP: First and Last name:  Heather Leija   Name of Practice:    Are you a current patient: Yes/No: yes   Approximate date of last visit: Dec 2021   Can you participate in a virtual visit with your PCP:     Is a Care Conference indicated:       Did you attend your follow up appointment (s): If not, why not:no went to SNF         Resources/supports as identified by patient/family:   Patient is  and has several daughters       Top Challenges facing patient (as identified by patient/family and CM): Finances/Medication cost?     Medicare/   Transportation      Self / family  Support system or lack thereof?     family  Living arrangements? Came from SNF/  lives with wife    Self-care/ADLs/Cognition? Can do some self care, poor health cognition        Current Advanced Directive/Advance Care Plan:  NO AD           Plan for utilizing home health:   PT/OT to eval             Transition of Care Plan:    Based on readmission, the patient's previous Plan of Care   has been evaluated and/or modified. The current Transition of Care Plan is:      1. Home with family assistance  2. PCP follow up  3. AD planning  4. CM to follow for discharge needs    Care Management Interventions  PCP Verified by CM:  Yes Heather Leija MD)  Support Systems: Spouse/Significant Other  Confirm Follow Up Transport: Family  The Plan for Transition of Care is Related to the Following Treatment Goals : hematuria  Discharge Location  Patient Expects to be Discharged to[de-identified] Home with family assistance    Readmission Assessment  Number of days since last admission?: 8-30 days  Previous disposition: SNF  Who is being interviewed?: Patient  What was the patient's/caregiver's perception as to why they think they needed to return back to the hospital?: Other (Comment)  Did you visit your Primary Care Physician after you left the hospital, before you returned this time?: No (saw SNF MD)  Did you see a specialist, such as Cardiac, Pulmonary, Orthopedic Physician, etc. after you left the hospital?: No  Who advised the patient to return to the hospital?: Skilled Unit  Does the patient report anything that got in the way of taking their medications?: No       Jacob DING

## 2022-03-02 NOTE — ED TRIAGE NOTES
Recent admission to assisted living after hospital stay. Discharged with rubin catheter. Here tonight worried about \"bleeding out\" in urine. Multiple other complaints.

## 2022-03-02 NOTE — ED PROVIDER NOTES
Patient is a 67-year-old with a recent prolonged stay in the hospital who comes into the emergency department with multiple complaints. He reports that he has been feeling lightheaded and dizzy and approximately 20 minutes prior to our evaluation developed chest pain. He has a Toro catheter in place and noted that the color was orange as she read and he is concerned that he is \"bleeding out and that he is symptoms are secondary to anemia. 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 The history is provided by the patient and the EMS personnel. No past medical history on file. No past surgical history on file. No family history on file. Social History     Socioeconomic History    Marital status:      Spouse name: Not on file    Number of children: Not on file    Years of education: Not on file    Highest education level: Not on file   Occupational History    Not on file   Tobacco Use    Smoking status: Not on file    Smokeless tobacco: Not on file   Substance and Sexual Activity    Alcohol use: Not on file    Drug use: Not on file    Sexual activity: Not on file   Other Topics Concern    Not on file   Social History Narrative    Not on file     Social Determinants of Health     Financial Resource Strain:     Difficulty of Paying Living Expenses: Not on file   Food Insecurity:     Worried About Running Out of Food in the Last Year: Not on file    Shala of Food in the Last Year: Not on file   Transportation Needs:     Lack of Transportation (Medical): Not on file    Lack of Transportation (Non-Medical):  Not on file   Physical Activity:     Days of Exercise per Week: Not on file    Minutes of Exercise per Session: Not on file   Stress:     Feeling of Stress : Not on file   Social Connections:     Frequency of Communication with Friends and Family: Not on file    Frequency of Social Gatherings with Friends and Family: Not on file    Attends Roman Catholic Services: Not on file    Active Member of Clubs or Organizations: Not on file    Attends Club or Organization Meetings: Not on file    Marital Status: Not on file   Intimate Partner Violence:     Fear of Current or Ex-Partner: Not on file    Emotionally Abused: Not on file    Physically Abused: Not on file    Sexually Abused: Not on file   Housing Stability:     Unable to Pay for Housing in the Last Year: Not on file    Number of [place] in the Last Year: Not on file    Unstable Housing in the Last Year: Not on file         ALLERGIES: Sulfa (sulfonamide antibiotics)    Review of Systems   Constitutional: Positive for chills and fatigue. Respiratory: Positive for shortness of breath. Cardiovascular: Positive for chest pain. Gastrointestinal: Positive for abdominal pain. Negative for nausea and vomiting. Genitourinary: Positive for difficulty urinating and hematuria. Negative for decreased urine volume. Neurological: Positive for dizziness, light-headedness and headaches. Psychiatric/Behavioral: Positive for confusion (Patient reports he was on an airplane for 12 days). All other systems reviewed and are negative. Vitals:    03/02/22 0401   BP: (!) 149/126   Pulse: (!) 102   Resp: 24   Temp: 98.3 °F (36.8 °C)   SpO2: 95%   Weight: 100 kg (220 lb 7.4 oz)   Height: 6' 3\" (1.905 m)            Physical Exam  Vitals and nursing note reviewed. Constitutional:       General: He is not in acute distress. Appearance: He is well-developed. HENT:      Head: Normocephalic and atraumatic. Eyes:      Conjunctiva/sclera: Conjunctivae normal.      Pupils: Pupils are equal, round, and reactive to light. Cardiovascular:      Rate and Rhythm: Normal rate and regular rhythm. Pulmonary:      Effort: Pulmonary effort is normal.      Breath sounds: Normal breath sounds. Abdominal:      General: There is no distension. Palpations: Abdomen is soft. Tenderness:  There is no abdominal tenderness. Genitourinary:     Comments: Toro catheter in place and draining orange-tinged urine  Musculoskeletal:         General: Normal range of motion. Cervical back: Normal range of motion. Skin:     General: Skin is warm and dry. Capillary Refill: Capillary refill takes less than 2 seconds. Neurological:      Mental Status: He is alert and oriented to person, place, and time. He is confused. Psychiatric:         Thought Content: Thought content is delusional.         Cognition and Memory: Memory is impaired. MDM       Procedures      6:02 AM  Change of shift. Care of patient signed over to Dr. Jesus Steiner. Bedside handoff complete. Awaiting repeat troponin, urinalysis.

## 2022-03-02 NOTE — ED NOTES
0600  Change of shift. Care of patient taken over from Dr. Judy Hogan; H&P reviewed, bedside handoff complete. Awaiting labs/urine    Perfect Serve Consult for Admission  7:26 AM    ED Room Number: ER05/05  Patient Name and age:  Gallo Lemus 73 y.o.  male  Working Diagnosis:   1. Blood loss anemia    2. Gross hematuria    3. Long term (current) use of anticoagulants    4. Urinary tract infection associated with indwelling urethral catheter, initial encounter ([facility] [address]        COVID-19 Suspicion:  no  Sepsis present:  no  Reassessment needed: no  Code Status:  Full Code  Readmission: no  Isolation Requirements:  no  Recommended Level of Care:  med/surg  Department:[facility] ED - [phone]  Other:  73 y.o. male with indwelling catheter here with hematuria. On anticoagulation for dvt/pe. hgb from 14 to 11.5 in 4 days. Vs ok. Getting ctx for suspected UTI.        Jonathan Vogt, DO

## 2022-03-02 NOTE — PROGRESS NOTES
Problem: Self Care Deficits Care Plan (Adult)  Goal: *Acute Goals and Plan of Care (Insert Text)  Description: FUNCTIONAL STATUS PRIOR TO ADMISSION: pt active and independent with ADLs and mobility at baseline. Had recent stay at [facility] from 2/7/22-2/25/22 and required intubation for 11 days during that stay, was discharged to [facility] ([facility] from which he is re-admitted from. Pt reports his desire to return home. HOME SUPPORT: The patient lived with wife but did not require assist at baseline. He is admitted this stay from SNF-rehab. Occupational Therapy Goals  Initiated 3/2/2022  1. Patient will perform grooming, standing at sink for >2 minutes, with modified independence within 7 day(s). 2.  Patient will perform lower body dressing with supervision/set-up using AE PRN within 7 day(s). 3.  Patient will perform bathing, sitting and standing PRN, with supervision/set-up within 7 day(s). 4.  Patient will perform toilet transfers with modified independence within 7 day(s). 5.  Patient will perform all aspects of toileting with modified independence within 7 day(s). 6.  Patient will participate in upper extremity therapeutic exercise/activities with independence for 10 minutes within 7 day(s). 7.  Patient will utilize energy conservation techniques during functional activities with verbal cues within 7 day(s). Outcome: Progressing Towards Goal     OCCUPATIONAL THERAPY EVALUATION  Patient: Ellen Severe [de-identified]73 y.o. male)  Date: 3/2/2022  Primary Diagnosis: Lower GI bleed [K92.2]  Hematuria [R31.9]        Precautions: Fall       ASSESSMENT  Based on the objective data described below, the patient presents with decreased activity tolerance, impaired standing balance, decreased strength, and decreased functional reach for ADLs following admission for hematuria and work up for lower GIB. Pt today received in ED, is pleasant and agreeable to participate.  He is oriented x 4, but is very tangential with conversation, requiring redirection to task throughout session. SpO2 stable on RA and HR elevated to 140s with activity. He requires increased assistance to manage LB dressing at EOB and is easily fatigued after OOB activity. Noted improvement in stability with use of RW and he does endorse some dizziness with activity (BP monitored throughout session). At this time, pt is functioning below his baseline and will benefit from continued skilled OT services. Current Level of Function Impacting Discharge (ADLs/self-care): up to mod/max A for LB ADLs and toileting; set up to min A for seated UB ADLs; min A for ADL transfers    Functional Outcome Measure: The patient scored Total: 40/100 on the Barthel Index outcome measure which is indicative of being moderately impaired in basic self-care. Other factors to consider for discharge: fall risk; tangential and talkative     Patient will benefit from skilled therapy intervention to address the above noted impairments. PLAN :  Recommendations and Planned Interventions: self care training, functional mobility training, therapeutic exercise, balance training, therapeutic activities, endurance activities, patient education, home safety training and family training/education    Frequency/Duration: Patient will be followed by occupational therapy 5 times a week to address goals. Recommendation for discharge: (in order for the patient to meet his/her long term goals)  Therapy 3 hours per day 5-7 days per week    This discharge recommendation:  Has been made in collaboration with the attending provider and/or case management    IF patient discharges home will need the following DME: TBD - RW, shower chair,       SUBJECTIVE:   Patient stated I don't want to go back there.     OBJECTIVE DATA SUMMARY:   HISTORY:   Past Medical History:   Diagnosis Date    CAD (coronary artery disease)     Chronic anticoagulation     Chronic indwelling Toro catheter     Chronic obstructive pulmonary disease (HCC)     GERD (gastroesophageal reflux disease)     Hx of deep venous thrombosis     Hyperlipidemia     Hypertension     Urine retention     Vocal cord dysfunction      Past Surgical History:   Procedure Laterality Date    HX CORONARY STENT PLACEMENT         Expanded or extensive additional review of patient history:     Home Situation  Home Environment: Private residence  # Steps to Enter: 5  Rails to Enter: Yes  Hand Rails : Bilateral  One/Two Story Residence: Two story  # of Interior Steps: 15  Interior Rails: Right  Living Alone: No  Support Systems: Spouse/Significant Other  Patient Expects to be Discharged to[de-identified] Home with family assistance  Current DME Used/Available at Home: Nebulizer  Tub or Shower Type: Shower    Hand dominance: Right    EXAMINATION OF PERFORMANCE DEFICITS:  Cognitive/Behavioral Status:  Neurologic State: Alert  Orientation Level: Oriented X4  Cognition: Follows commands  Perception: Appears intact  Perseveration: No perseveration noted  Safety/Judgement: Awareness of environment; Fall prevention    Hearing: Auditory  Auditory Impairment: None    Vision/Perceptual:    Corrective Lenses: Glasses    Range of Motion:  AROM: Generally decreased, functional    Strength:  Strength: Generally decreased, functional    Coordination:  Coordination: Within functional limits  Fine Motor Skills-Upper: Left Intact; Right Intact    Gross Motor Skills-Upper: Left Intact; Right Intact    Tone & Sensation:  Tone: Normal  Sensation: Intact    Balance:  Sitting: Intact  Standing: Impaired  Standing - Static: Fair;Constant support  Standing - Dynamic : Fair;Constant support    Functional Mobility and Transfers for ADLs:  Bed Mobility:  Rolling: Contact guard assistance  Supine to Sit: Contact guard assistance  Sit to Supine: Contact guard assistance  Scooting: Contact guard assistance    Transfers:  Sit to Stand: Minimum assistance  Stand to Sit: Minimum assistance  Bed to Chair: Minimum assistance; Adaptive equipment (RW)  Toilet Transfer : Minimum assistance (infer based on observations)    ADL Assessment:  Feeding: Independent    Oral Facial Hygiene/Grooming: Setup;Supervision (seated)    Bathing: Moderate assistance    Upper Body Dressing: Setup;Minimum assistance    Lower Body Dressing: Moderate assistance;Maximum assistance (for distal mgmt and to pull up over hips)    Toileting: Minimum assistance; Moderate assistance    ADL Intervention and task modifications:    Upper Body [address]: Minimum  assistance  Cues: Physical assistance; Tactile cues provided;Verbal cues provided;Visual cues provided    Lower Body Dressing Assistance  Slip on Shoes with Back: Moderate assistance  Leg Crossed Method Used: No  Position Performed: Seated edge of bed  Cues: Don;Physical assistance    Cognitive Retraining  Safety/Judgement: Awareness of environment; Fall prevention     Functional Measure:    Barthel Index:  Bathin  Bladder: 0  Bowels: 5  Groomin  Dressin  Feeding: 10  Mobility: 0  Stairs: 0  Toilet Use: 5  Transfer (Bed to Chair and Back): 10  Total: 40/100      The Barthel ADL Index: Guidelines  1. The index should be used as a record of what a patient does, not as a record of what a patient could do. 2. The main aim is to establish degree of independence from any help, physical or verbal, however minor and for whatever reason. 3. The need for supervision renders the patient not independent. 4. A patient's performance should be established using the best available evidence. Asking the patient, friends/relatives and nurses are the usual sources, but direct observation and common sense are also important. However direct testing is not needed. 5. Usually the patient's performance over the preceding 24-48 hours is important, but occasionally longer periods will be relevant.   6. Middle categories imply that the patient supplies over 50 per cent of the effort. 7. Use of aids to be independent is allowed. Score Interpretation (from [facility])    Independent   60-79 Minimally independent   40-59 Partially dependent   20-39 Very dependent   <20 Totally dependent     -Gm Topete., Barthel, D.W. (1965). Functional evaluation: the Barthel Index. [address] ([address] (1997). The Barthel activities of daily living index: self-reporting versus actual performance in the old (> or = 75 years). Journal of [address] [place] [address] 45(7), [facility], J.DYANA, Serjio Amezuqita., Geno Conway. (1999). Measuring the change in disability after inpatient rehabilitation; comparison of the responsiveness of the Barthel Index and Functional Douglas Measure. Journal of Neurology, Neurosurgery, and Psychiatry, 66(4), 878-846. ZULMA Cunha, MARLY Valadez, & Stepan Ladd MBingA. (2004) Assessment of post-stroke quality of life in cost-effectiveness studies: The usefulness of the Barthel Index and the EuroQoL-5D. Quality of Life Research, 15, 252-73     Occupational Therapy Evaluation Charge Determination   History Examination Decision-Making   LOW Complexity : Brief history review  MEDIUM Complexity : 3-5 performance deficits relating to physical, cognitive , or psychosocial skils that result in activity limitations and / or participation restrictions MEDIUM Complexity : Patient may present with comorbidities that affect occupational performnce.  Miniml to moderate modification of tasks or assistance (eg, physical or verbal ) with assesment(s) is necessary to enable patient to complete evaluation       Based on the above components, the patient evaluation is determined to be of the following complexity level: LOW   Pain Rating:  Pt reporting minimal pain    Activity Tolerance:   Fair    After treatment patient left in no apparent distress:    Supine in bed, Call bell within reach and Side rails x 3    COMMUNICATION/EDUCATION:   The patients plan of care was discussed with: Physical therapist and Registered nurse. Home safety education was provided and the patient/caregiver indicated understanding., Patient/family have participated as able in goal setting and plan of care. and Patient/family agree to work toward stated goals and plan of care. This patients plan of care is appropriate for delegation to [facility].     Thank you for this referral.  Jeff Reynolds OT  Time Calculation: 33 mins

## 2022-03-02 NOTE — H&P
[facility]    Hospitalist Admission Note      NAME:  Maverick Arnold   :   1955   MRN:  [id number]     PCP:  Francisco Ma MD     Date/Time of service:  3/2/2022 7:52 AM          Subjective:     CHIEF COMPLAINT: hematuria     HISTORY OF PRESENT ILLNESS:     Mr. Andreia Diaz is a 73 y.o.  male who presented to the Emergency Department complaining of hematuria. He is a poor historian. Bleeding noted for last 24hr. New. Associated with lower abdominal pain, now better, and with a rubin cath that was placed weeks ago, during his recent prolonged admission at outside hospital (Dx NSTEMI, encephalopathy, intubated, DVT). Had been discharged last week to assisted living. Also reports fatigue, dizziness and other vague symptoms. ER finds mild anemia and hematuria. We will admit him for observation. Past Medical History:   Diagnosis Date    CAD (coronary artery disease)     Chronic anticoagulation     Chronic indwelling Rubin catheter     Chronic obstructive pulmonary disease (HCC)     GERD (gastroesophageal reflux disease)     Hx of deep venous thrombosis     Hyperlipidemia     Hypertension     Urine retention     Vocal cord dysfunction         Past Surgical History:   Procedure Laterality Date    HX CORONARY STENT PLACEMENT         Social History     Tobacco Use    Smoking status: Former Smoker    Smokeless tobacco: Never Used   Substance Use Topics    Alcohol use: Not Currently        History reviewed. No pertinent family history. Family hx cannot be fully assessed, since the patient cannot provide information    Allergies   Allergen Reactions    Sulfa (Sulfonamide Antibiotics) Other (comments)     syncope          Prior to Admission medications    Medication Sig Start Date End Date Taking?  Authorizing Provider   rivaroxaban (Xarelto DVT-PE Treat 30d Start) 15 mg (42)- 20 mg (9) DsPk Take 15mg tablet twice daily for days 1-21 followed by 20mg tablet once daily for days 22-30. 2/26/22  Yes Simone Loya MD   predniSONE (DELTASONE) 10 mg tablet Take 10 mg by mouth daily (with breakfast). 2/26/22   Jj Gotti MD   senna (SENOKOT) 8.6 mg tablet Take 1 Tablet by mouth daily. 2/25/22   Jj Gotti MD   acetaminophen (TYLENOL) 325 mg tablet Take 2 Tablets by mouth every six (6) hours as needed for Pain or Fever. 2/21/22   Von Leung MD   amLODIPine (NORVASC) 10 mg tablet Take 1 Tablet by mouth daily. 2/22/22   Von Leung MD   budesonide (PULMICORT) 0.5 mg/2 mL nbsp 2 mL by Nebulization route two (2) times a day. 2/21/22   Von Leung MD   glycopyrrolate (ROBINUL) 0.2 mg/mL injection 0.5 mL by IntraVENous route three (3) times daily. 2/21/22   Von Leung MD   nystatin (MYCOSTATIN) 100,000 unit/mL suspension Take 5 mL by mouth four (4) times daily. swish and spit 2/21/22   Von Leung MD   predniSONE (DELTASONE) 20 mg tablet Take 1 tablet twice daily for 7 days, then take 1 tablet once daily for 7 days 2/21/22   Von Leung MD   amoxicillin-clavulanate (Augmentin) 875-125 mg per tablet Take 1 Tablet by mouth two (2) times a day. 2/21/22   Von Leung MD   albuterol (ProAir HFA) 90 mcg/actuation inhaler Take 1 Puff by inhalation four (4) times daily as needed. Patient not taking: Reported on 2/7/2022    Provider, Historical   aspirin 81 mg chewable tablet Take 81 mg by mouth daily. Patient not taking: Reported on 2/7/2022    Provider, Historical   atorvastatin (Lipitor) 40 mg tablet Take 40 mg by mouth daily. Patient not taking: Reported on 2/7/2022    Provider, Historical   bethanechol chloride (URECHOLINE) 50 mg tablet Take 200 mg by mouth daily. Provider, Historical   clopidogreL (PLAVIX) 75 mg tab Take 75 mg by mouth daily.   Patient not taking: Reported on 2/7/2022    Provider, Historical   esomeprazole (NexIUM) 40 mg capsule Take 40 mg by mouth daily. Patient not taking: Reported on 2/7/2022    Provider, Historical   finasteride (PROSCAR) 5 mg tablet Take 5 mg by mouth daily. Provider, Historical   fluticasone-umeclidinium-vilanterol (TRELEGY ELLIPTA) 100-62.5-25 mcg inhaler Take 1 Puff by inhalation daily. Provider, Historical   ipratropium-albuteroL (Combivent Respimat)  mcg/actuation inhaler Take 1 Puff by inhalation two (2) times daily as needed. Patient not taking: Reported on 2/7/2022    Provider, Historical   omeprazole (PRILOSEC) 20 mg capsule Take 20 mg by mouth daily. Patient not taking: Reported on 2/7/2022    Provider, Historical   OXcarbazepine (Oxtellar XR) 300 mg Tb24 Take 300 mg by mouth daily. Patient not taking: Reported on 2/7/2022    Provider, Historical   tamsulosin (Flomax) 0.4 mg capsule Take 0.4 mg by mouth daily. Provider, Historical   metoprolol succinate (TOPROL-XL) 25 mg XL tablet Take 25 mg by mouth daily.   Patient not taking: Reported on 2/7/2022    Provider, Historical       Review of Systems:  (bold if positive, if negative)    Gen:  fatigueEyes:  ENT:  CVS:   chest pain, dizzinessPulm:  GI:  :  MS:  weaknessSkin:  Psych:  Endo:  Hem:  Renal:  Change in urineNeuro:   weakness, headache      Objective:      VITALS:    Vital signs reviewed; most recent are:    Visit Vitals  BP (!) 149/80   Pulse 92   Temp 98.3 °F (36.8 °C)   Resp 15   Ht 6' 3\" (1.905 m)   Wt 100 kg (220 lb 7.4 oz)   SpO2 95%   BMI 27.56 kg/m²     SpO2 Readings from Last 6 Encounters:   03/02/22 95%   02/26/22 95%   02/25/22 95%        No intake or output data in the 24 hours ending 03/02/22 0752     Exam:     Physical Exam:    Gen:  Well-developed, well-nourished, in no acute distress  HEENT:  Pink conjunctivae, PERRL, hearing intact to voice, moist mucous membranes  Neck:  Supple, without masses, thyroid non-tender  Resp:  No accessory muscle use, clear breath sounds without wheezes rales or rhonchi  Card:  No murmurs, normal S1, S2 without thrills, bruits or peripheral edema  Abd:  Soft, non-tender, non-distended, normoactive bowel sounds are present, no mass  Lymph:  No cervical or inguinal adenopathy  Musc:  No cyanosis or clubbing  Skin:  No rashes or ulcers, skin turgor is good  Neuro:  Cranial nerves are grossly intact, no focal motor weakness, follows commands   Psych:  Poor insight, oriented to person, place     Labs:    Recent Labs     03/02/22 0417   WBC 8.6   HGB 11.5*   HCT 33.2*        Recent Labs     03/02/22 0417      K 3.7      CO2 26   *   BUN 18   CREA 0.70   CA 8.8   MG 2.1     Lab Results   Component Value Date/Time    Glucose (POC) 130 (H) 02/17/2022 11:52 AM    Glucose (POC) 182 (H) 02/17/2022 04:48 AM     No results for input(s): PH, PCO2, PO2, HCO3, FIO2 in the last 72 hours. No results for input(s): INR, INREXT in the last 72 hours. All Micro Results     Procedure Component Value Units Date/Time    CULTURE, URINE [[id number]] Collected: 03/02/22 0417    Order Status: Completed Specimen: Cath Urine Updated: 03/02/22 0747    URINE CULTURE HOLD SAMPLE [[id number]] Collected: 03/02/22 0417    Order Status: Completed Specimen: Urine from Serum Updated: 03/02/22 0534     Urine culture hold       Urine on hold in Microbiology dept for 2 days. If unpreserved urine is submitted, it cannot be used for addtional testing after 24 hours, recollection will be required. I have reviewed previous records       Assessment and Plan:      Hematuria / Pyuria / Chronic indwelling Toro catheter / Urine retention - POA, unclear if infection. Empirically given ceftriaxone. Follow cx. Consult urology. Hold anticoagulation. Continue bethanechol, tamsulosin and proscar    Acute blood loss anemia - POA, new, mild. Check serologies. Monitor. Transfuse if Hb<7    Acute deep venous thrombosis / Chronic anticoagulation - POA, noted weeks ago during outside admission.   US then showed small DVT in left cephalic upper arm and cephalic forearm veins and small DVT in left soleal vein near the posterior tibial vein. Re check US. May not need anticoagulation at all. I doubt he needs IVC filter if ambulatory    Acute metabolic encephalopathy - POA, recurrent. Notes from outside hospital also document confusion, and neurology was consulted. He was on oxcarbazepine for trigeminal neuragia. I suspect he has some dementia or underlying psychiatric illness. CAD (coronary artery disease) / Hypertension - Recent admit for NSTEMI, but NSTEMI was actually ruled out by low troponin, no inpatient workup. With active severe bleeding hold ASA and plavix today, resume ASAP. Hold norvasc due to his complaints of edema. Continue statin and metoprolol. Chronic obstructive pulmonary disease - Stable. While here use brovana, pulmicort and prn duonebs. Hold steroids. GERD (gastroesophageal reflux disease) - PPI by IV for now. Check hemoccult to rule out GI bleed    Hyperlipidemia - Atorvastatin    Vocal cord dysfunction - Supportive care    Debilitated patient - Fall precautions. PT OT eval.      Telemetry reviewed:   normal sinus rhythm    Risk of deterioration: high      Total time spent with patient: 48 Minutes I personally reviewed chart, notes, data and current medications in the medical record. I have personally examined and treated the patient at bedside during this period.                  Care Plan discussed with: Patient, Nursing Staff and >50% of time spent in counseling and coordination of care    Discussed:  Care Plan       ___________________________________________________    Attending Physician: Rica Bloom MD

## 2022-03-03 LAB
ALBUMIN SERPL-MCNC: 2.3 G/DL (ref 3.5–5)
ALBUMIN/GLOB SERPL: 0.8 {RATIO} (ref 1.1–2.2)
ALP SERPL-CCNC: 80 U/L (ref 45–117)
ALT SERPL-CCNC: 127 U/L (ref 12–78)
ANION GAP SERPL CALC-SCNC: 4 MMOL/L (ref 5–15)
AST SERPL-CCNC: 31 U/L (ref 15–37)
B PERT DNA SPEC QL NAA+PROBE: NOT DETECTED
BACTERIA SPEC CULT: NORMAL
BILIRUB SERPL-MCNC: 0.3 MG/DL (ref 0.2–1)
BORDETELLA PARAPERTUSSIS PCR, BORPAR: NOT DETECTED
BUN SERPL-MCNC: 14 MG/DL (ref 6–20)
BUN/CREAT SERPL: 19 (ref 12–20)
C PNEUM DNA SPEC QL NAA+PROBE: NOT DETECTED
CALCIUM SERPL-MCNC: 7.9 MG/DL (ref 8.5–10.1)
CHLORIDE SERPL-SCNC: 111 MMOL/L (ref 97–108)
CO2 SERPL-SCNC: 26 MMOL/L (ref 21–32)
CREAT SERPL-MCNC: 0.74 MG/DL (ref 0.7–1.3)
ERYTHROCYTE [DISTWIDTH] IN BLOOD BY AUTOMATED COUNT: 15.4 % (ref 11.5–14.5)
FLUAV H1 2009 PAND RNA SPEC QL NAA+PROBE: NOT DETECTED
FLUAV H1 RNA SPEC QL NAA+PROBE: NOT DETECTED
FLUAV H3 RNA SPEC QL NAA+PROBE: NOT DETECTED
FLUAV SUBTYP SPEC NAA+PROBE: NOT DETECTED
FLUBV RNA SPEC QL NAA+PROBE: NOT DETECTED
GLOBULIN SER CALC-MCNC: 2.8 G/DL (ref 2–4)
GLUCOSE SERPL-MCNC: 117 MG/DL (ref 65–100)
HADV DNA SPEC QL NAA+PROBE: NOT DETECTED
HCOV 229E RNA SPEC QL NAA+PROBE: NOT DETECTED
HCOV HKU1 RNA SPEC QL NAA+PROBE: NOT DETECTED
HCOV NL63 RNA SPEC QL NAA+PROBE: NOT DETECTED
HCOV OC43 RNA SPEC QL NAA+PROBE: NOT DETECTED
HCT VFR BLD AUTO: 38.3 % (ref 36.6–50.3)
HGB BLD-MCNC: 12.9 G/DL (ref 12.1–17)
HMPV RNA SPEC QL NAA+PROBE: NOT DETECTED
HPIV1 RNA SPEC QL NAA+PROBE: NOT DETECTED
HPIV2 RNA SPEC QL NAA+PROBE: NOT DETECTED
HPIV3 RNA SPEC QL NAA+PROBE: NOT DETECTED
HPIV4 RNA SPEC QL NAA+PROBE: NOT DETECTED
M PNEUMO DNA SPEC QL NAA+PROBE: NOT DETECTED
MAGNESIUM SERPL-MCNC: 1.8 MG/DL (ref 1.6–2.4)
MCH RBC QN AUTO: 29.2 PG (ref 26–34)
MCHC RBC AUTO-ENTMCNC: 33.7 G/DL (ref 30–36.5)
MCV RBC AUTO: 86.7 FL (ref 80–99)
NRBC # BLD: 0 K/UL (ref 0–0.01)
NRBC BLD-RTO: 0 PER 100 WBC
PLATELET # BLD AUTO: 120 K/UL (ref 150–400)
PMV BLD AUTO: 9.2 FL (ref 8.9–12.9)
POTASSIUM SERPL-SCNC: 3.2 MMOL/L (ref 3.5–5.1)
PROT SERPL-MCNC: 5.1 G/DL (ref 6.4–8.2)
RBC # BLD AUTO: 4.42 M/UL (ref 4.1–5.7)
RSV RNA SPEC QL NAA+PROBE: NOT DETECTED
RV+EV RNA SPEC QL NAA+PROBE: NOT DETECTED
SARS-COV-2 PCR, COVPCR: NOT DETECTED
SERVICE CMNT-IMP: NORMAL
SODIUM SERPL-SCNC: 141 MMOL/L (ref 136–145)
WBC # BLD AUTO: 4.6 K/UL (ref 4.1–11.1)

## 2022-03-03 PROCEDURE — 94640 AIRWAY INHALATION TREATMENT: CPT

## 2022-03-03 PROCEDURE — 85027 COMPLETE CBC AUTOMATED: CPT

## 2022-03-03 PROCEDURE — 74011250636 HC RX REV CODE- 250/636: Performed by: INTERNAL MEDICINE

## 2022-03-03 PROCEDURE — 74011000250 HC RX REV CODE- 250: Performed by: INTERNAL MEDICINE

## 2022-03-03 PROCEDURE — 74011250637 HC RX REV CODE- 250/637: Performed by: INTERNAL MEDICINE

## 2022-03-03 PROCEDURE — 77030027138 HC INCENT SPIROMETER -A

## 2022-03-03 PROCEDURE — C9113 INJ PANTOPRAZOLE SODIUM, VIA: HCPCS | Performed by: INTERNAL MEDICINE

## 2022-03-03 PROCEDURE — G0378 HOSPITAL OBSERVATION PER HR: HCPCS

## 2022-03-03 PROCEDURE — 94664 DEMO&/EVAL PT USE INHALER: CPT

## 2022-03-03 PROCEDURE — 94761 N-INVAS EAR/PLS OXIMETRY MLT: CPT

## 2022-03-03 PROCEDURE — 0202U NFCT DS 22 TRGT SARS-COV-2: CPT

## 2022-03-03 PROCEDURE — 96375 TX/PRO/DX INJ NEW DRUG ADDON: CPT

## 2022-03-03 PROCEDURE — 96376 TX/PRO/DX INJ SAME DRUG ADON: CPT

## 2022-03-03 PROCEDURE — 36415 COLL VENOUS BLD VENIPUNCTURE: CPT

## 2022-03-03 PROCEDURE — 83735 ASSAY OF MAGNESIUM: CPT

## 2022-03-03 PROCEDURE — 97530 THERAPEUTIC ACTIVITIES: CPT

## 2022-03-03 PROCEDURE — 80053 COMPREHEN METABOLIC PANEL: CPT

## 2022-03-03 PROCEDURE — 97535 SELF CARE MNGMENT TRAINING: CPT

## 2022-03-03 PROCEDURE — 2709999900 HC NON-CHARGEABLE SUPPLY

## 2022-03-03 PROCEDURE — 97116 GAIT TRAINING THERAPY: CPT

## 2022-03-03 RX ORDER — CLONIDINE HYDROCHLORIDE 0.1 MG/1
0.1 TABLET ORAL ONCE
Status: COMPLETED | OUTPATIENT
Start: 2022-03-03 | End: 2022-03-03

## 2022-03-03 RX ORDER — OXYCODONE AND ACETAMINOPHEN 5; 325 MG/1; MG/1
1 TABLET ORAL
Status: DISCONTINUED | OUTPATIENT
Start: 2022-03-03 | End: 2022-03-04 | Stop reason: HOSPADM

## 2022-03-03 RX ORDER — GUAIFENESIN 100 MG/5ML
81 LIQUID (ML) ORAL DAILY
Status: DISCONTINUED | OUTPATIENT
Start: 2022-03-03 | End: 2022-03-04 | Stop reason: HOSPADM

## 2022-03-03 RX ORDER — CLOPIDOGREL BISULFATE 75 MG/1
75 TABLET ORAL DAILY
Status: DISCONTINUED | OUTPATIENT
Start: 2022-03-03 | End: 2022-03-04 | Stop reason: HOSPADM

## 2022-03-03 RX ADMIN — CLOPIDOGREL 75 MG: 75 TABLET, FILM COATED ORAL at 11:54

## 2022-03-03 RX ADMIN — FINASTERIDE 5 MG: 5 TABLET, FILM COATED ORAL at 09:42

## 2022-03-03 RX ADMIN — BUDESONIDE 500 MCG: 0.5 SUSPENSION RESPIRATORY (INHALATION) at 07:21

## 2022-03-03 RX ADMIN — ASPIRIN 81 MG: 81 TABLET, CHEWABLE ORAL at 11:54

## 2022-03-03 RX ADMIN — OXYCODONE HYDROCHLORIDE AND ACETAMINOPHEN 1 TABLET: 5; 325 TABLET ORAL at 15:51

## 2022-03-03 RX ADMIN — AMLODIPINE BESYLATE 10 MG: 5 TABLET ORAL at 09:45

## 2022-03-03 RX ADMIN — ARFORMOTEROL TARTRATE 15 MCG: 15 SOLUTION RESPIRATORY (INHALATION) at 20:23

## 2022-03-03 RX ADMIN — TAMSULOSIN HYDROCHLORIDE 0.4 MG: 0.4 CAPSULE ORAL at 09:42

## 2022-03-03 RX ADMIN — OXYCODONE HYDROCHLORIDE AND ACETAMINOPHEN 1 TABLET: 5; 325 TABLET ORAL at 23:05

## 2022-03-03 RX ADMIN — SODIUM CHLORIDE, PRESERVATIVE FREE 40 MG: 5 INJECTION INTRAVENOUS at 21:24

## 2022-03-03 RX ADMIN — ATORVASTATIN CALCIUM 40 MG: 20 TABLET, FILM COATED ORAL at 09:42

## 2022-03-03 RX ADMIN — CLONIDINE HYDROCHLORIDE 0.1 MG: 0.1 TABLET ORAL at 18:14

## 2022-03-03 RX ADMIN — METOPROLOL SUCCINATE 25 MG: 25 TABLET, EXTENDED RELEASE ORAL at 09:45

## 2022-03-03 RX ADMIN — ARFORMOTEROL TARTRATE 15 MCG: 15 SOLUTION RESPIRATORY (INHALATION) at 07:21

## 2022-03-03 RX ADMIN — POTASSIUM CHLORIDE, DEXTROSE MONOHYDRATE AND SODIUM CHLORIDE 75 ML/HR: 150; 5; 450 INJECTION, SOLUTION INTRAVENOUS at 09:50

## 2022-03-03 RX ADMIN — SODIUM CHLORIDE 1 G: 9 INJECTION INTRAMUSCULAR; INTRAVENOUS; SUBCUTANEOUS at 09:48

## 2022-03-03 RX ADMIN — BUDESONIDE 500 MCG: 0.5 SUSPENSION RESPIRATORY (INHALATION) at 20:23

## 2022-03-03 NOTE — ACP (ADVANCE CARE PLANNING)
Advance Care Planning   Advance Care Planning Inpatient Note  [address] [facility] Department    Today's Date: 3/3/2022  Unit: [facility] 5M1 MED SURG 1    Received request from patient. Upon review of chart and communication with care team, patient's decision making abilities are not in question. Patient and Spouse was/were present in the room during visit. Goals of ACP Conversation:  Discuss Advance Care planning documents  Facilitate a discussion related to patient's goals of care as they align with the patient's values and beliefs    Health Care Decision Makers:    No healthcare decision makers have been documented. Click here to complete [address] including selection of the Healthcare Decision Maker Relationship (ie \"Primary\")    Summary:  Documented Next of Kin, per patient report    Advance Care Planning Documents (Patient Wishes) on file:  None     Assessment:    Visited at patient's request for information about an AMD. His wife Tracey Cuevas was present for the conversation. Explained the AMD to both and information that in the absence of an AMD his legal NOK is his MPOA. Patient choose to keep a blank copy of an AMD. He wishes to first discuss the document with his wife.  He was encouraged to have spiritual care paged if he has any additional questions or if he later chooses to complete an AMD.    Interventions:  Provided education on documents for clarity and greater understanding  Discussed and provided education on state decision maker hierarchy  Reviewed but did not complete ACP document    Care Preferences Communicated:  No    Outcomes/Plan:  Teach Back Method used to verify the patient/Healthcare Decision Maker's understanding of key information in the advance directive documents    Yesenia Singer [facility] on 3/3/2022 at 11:19 AM

## 2022-03-03 NOTE — PROGRESS NOTES
Bedside and Verbal shift change report given to tex (oncoming nurse) by Pan Corea (offgoing nurse). Report included the following information SBAR, Kardex, Intake/Output, MAR and Recent Results.

## 2022-03-03 NOTE — PROGRESS NOTES
Bedside shift change report given to Mir Mendoza (oncoming nurse) by Carlene Godoy (offgoing nurse). Report included the following information SBAR, Kardex, Intake/Output, MAR and Recent Results.

## 2022-03-03 NOTE — PROGRESS NOTES
Problem: Mobility Impaired (Adult and Pediatric)  Goal: *Acute Goals and Plan of Care (Insert Text)  Description: FUNCTIONAL STATUS PRIOR TO ADMISSION: Prior to admission patient was at the [facility] SNF after having STEMI with intubation in early February. Patient a fair historian, reports primarily w/c use at SNF with RW practice during therapy sessions. HOME SUPPORT PRIOR TO ADMISSION: The patient lived with his wife and did not need assistance before admission in early February. Physical Therapy Goals  Initiated 3/2/2022  1. Patient will move from supine to sit and sit to supine , scoot up and down, and roll side to side in bed with independence within 7 day(s). 2.  Patient will transfer from bed to chair and chair to bed with supervision/set-up using the least restrictive device within 7 day(s). 3.  Patient will perform sit to stand with supervision/set-up within 7 day(s). 4.  Patient will ambulate with supervision/set-up for 120 feet with the least restrictive device within 7 day(s). Outcome: Progressing Towards Goal   PHYSICAL THERAPY TREATMENT  Patient: Cynthia Chand (65 y.o. male)  Date: 3/3/2022  Diagnosis: Lower GI bleed [K92.2]  Hematuria [R31.9] Lower GI bleed       Precautions:    Chart, physical therapy assessment, plan of care and goals were reviewed. ASSESSMENT  Patient continues with skilled PT services and is progressing towards goals. Patient this morning with good tolerance and participation in physical therapy session. Patient has good safety awareness and is A&Ox4 but continues to demonstrate tangential conversation and intermittent confusion which his wife reports is not his baseline. He tolerates 40ft ambulation in room with CGA and RW. Patient generally unsteady with gait, requiring constant support. He again is short of breath during ambulation but spo2 stable on room air. Patient remains unsafe to d/c home based on high fall risk and impaired cognition.      Current Level of Function Impacting Discharge (mobility/balance): CGA    Other factors to consider for discharge: Patient highly motivated to participate with therapy         PLAN :  Patient continues to benefit from skilled intervention to address the above impairments. Continue treatment per established plan of care. to address goals. Recommendation for discharge: (in order for the patient to meet his/her long term goals)  Therapy 3 hours per day 5-7 days per week    This discharge recommendation:  Has not yet been discussed the attending provider and/or case management    IF patient discharges home will need the following DME: to be determined (TBD)       SUBJECTIVE:   Patient stated oh no I need rehab.  re: discussing disposition recommendation    OBJECTIVE DATA SUMMARY:   Patient received supine in bed and was agreeable to participate in PT session. Patient was cleared by nursing to participate in PT session. Patient's wife was present for session. Critical Behavior:  Neurologic State: Alert,Eyes open spontaneously  Orientation Level: Oriented X4  Cognition: Appropriate decision making,Appropriate for age attention/concentration,Appropriate safety awareness,Follows commands  Safety/Judgement: Awareness of environment,Fall prevention  Functional Mobility Training:  Bed Mobility:     Supine to Sit: Contact guard assistance  Sit to Supine: Contact guard assistance  Scooting: Contact guard assistance        Transfers:  Sit to Stand: Contact guard assistance  Stand to Sit: Contact guard assistance        Bed to Chair: Contact guard assistance                    Balance:  Sitting: Intact; Without support  Standing: Impaired; With support;Pull to stand  Standing - Static: Fair;Constant support  Standing - Dynamic : Fair;Constant support  Ambulation/Gait Training:  Distance (ft): 40 Feet (ft)  Assistive Device: Gait belt;Walker, rolling  Ambulation - Level of Assistance: Contact guard assistance;Minimal assistance;Assist x1        Gait Abnormalities: Decreased step clearance;Trunk sway increased        Base of Support: Widened     Speed/Polina: Pace decreased (<100 feet/min); Shuffled  Step Length: Right shortened;Left shortened               Pain Rating:  Patient reports pain from stomach cramping at onset of session    Activity Tolerance:   Good, SpO2 stable on RA, and observed SOB with activity    After treatment patient left in no apparent distress:   Sitting in chair, Call bell within reach, Caregiver / family present, and lunch tray provided    COMMUNICATION/COLLABORATION:   The patients plan of care was discussed with: Occupational therapist and Registered nurse.      Raghavendra Jason PT, DPT   Time Calculation: 24 mins

## 2022-03-03 NOTE — PROGRESS NOTES
Received message from office that pt requesting NP to see today. He was seen yesterday by urology for consult of gross hematuria. 72 y/o male admitted to hospital for observation for gross hematuria. Of note, urine has been documented as \"orange\" or \"yellow\" throughout stay. Was taking pyridium PTA. Recent stay at [place] [id number] (NSTEMI, encephalopathy, intubate, DVT), also on xarelto PTA. Reports rubin catheter was placed during [facility] stay, but cannot recall why. Wife at bedside contributing to conversation. Both deny being advised of q30 catheter changes or to f/u with urologist. Today his main concern is the presence of the rubin catheter and not knowing why it was placed. Assessment:  Rubin cathter draining clear, yellow urine   No leukocytosis, Hgb 12.9, Cr 0.74, UCX negative, UA RBC 20-50  On flomax and finasteride     Plan:  -Cont flomax and finasteride  -Pt to be discharged to rehab. Due to uncertainty of DC date/time, I do not recommend VT in hospital. Pt and wife in agreeance with this. OP f/u with VT arranged for next week with [facility]U. Urology signing off. Please call with questions or concerns.

## 2022-03-03 NOTE — PROGRESS NOTES
Spiritual Care Assessment/Progress Note  Arben Collins      NAME: Marco Antonio Lucas      MRN: [id number]  AGE: 73 y.o.  SEX: male  Jewish Affiliation: Anabaptist   Language: English     3/3/2022     Total Time (in minutes): 61     Spiritual Assessment begun in [facility] 5M1 MED SURG 1 through conversation with:         [x]Patient        [x] Family    [] Friend(s)        Reason for Consult: Advance medical directive consult     Spiritual beliefs: (Please include comment if needed)     [x] Identifies with a edward tradition: Annamaria        [x] Supported by a edward community: Anabaptist          [] Claims no spiritual orientation:           [] Seeking spiritual identity:                [] Adheres to an individual form of spirituality:           [] Not able to assess:                           Identified resources for coping:      [] Prayer                               [] Music                  [] Guided Imagery     [x] Family/friends                 [] Pet visits     [] Devotional reading                         [] Unknown     [] Other:                                              Interventions offered during this visit: (See comments for more details)    Patient Interventions: Advance medical directive consult,Affirmation of emotions/emotional suffering,Affirmation of edward,Catharsis/review of pertinent events in supportive environment,Iconic (affirming the presence of God/Higher Power),Normalization of emotional/spiritual concerns,Prayer (assurance of)     Family/Friend(s): Advance medical directive consult     Plan of Care:     [] Support spiritual and/or cultural needs    [x] Support AMD and/or advance care planning process      [] Support grieving process   [] Coordinate Rites and/or Rituals    [] Coordination with community clergy   [] No spiritual needs identified at this time   [] Detailed Plan of Care below (See Comments)  [] Make referral to Music Therapy  [] Make referral to Pet Therapy     [] Make referral to Addiction services  [] Make referral to [facility]  [] Make referral to Spiritual Care Partner  [] No future visits requested        [x] Follow up visits as needed     Visited at patient's request for information about an AMD. His wife Nathalie Richard was present for the conversation. Explained the AMD to both and information that in the absence of an AMD his legal NOK is his MPOA. Patient choose to keep a blank copy of an AMD. He wishes to first discuss the document with his wife. He was encouraged to have spiritual care paged if he has any additional questions or if he later chooses to complete an AMD.  Patient also spoke about his medical issues and the impact those issues have had on his life as well as his wife. The couple have been  some 52 years and both are retired .    Chaplain Rachana, MDiv, MS, [facility]

## 2022-03-03 NOTE — PROGRESS NOTES
4:07 PM  CM spoke to patients spouse and she informed CM that the patient would like to go to [place] and she is agreeable if that is where he would like to go. This is a closer location for her to visit him. CM spoke to the liaison for [facility] and they will likely have a bed tomorrow but will not know for sure until the morning. CM spoke to patients spouse and she is agreeable to using Hospital to [address] for transport. Set up transport for 3:00PM tomorrow. Shellie Gallo      3:28 PM  CM was notified patient has been accepted by [facility]- pending bed availability- likely Friday or Saturday. CM will update patients spouse. Shellie Gallo    3/3/22  1:20 PM    Care Management Daily Progress Note:    RUR: N/A- observation status  LOS: 1D    CM reviewed EMR and discussed in IDR. Patient is ready for dc once a facility has accepted and has a bed available. CM spoke to the patient and his wife and he does not want to go back to the [facility]. PT and OT evaluated and are recommending IPR. Patient and patients spouse agreeable and would like referrals placed with [facility], [facility] and [facility]. [facility] prefers [facility]- they have accepted pending bed availability- will also need a COVID PCR. CM requested one for placement. [facility] is reviewing as well. 1). Patient is requiring medical management  2). PT/OT- recommending IPR  3). TBD on transportation  4).  CM following for dc needs    [facility]

## 2022-03-03 NOTE — PROGRESS NOTES
[facility]    Medical Progress Note      NAME: Taras Setting   :  1955  MRM:  [id number]    Date/Time of service 3/3/2022  10:21 AM          Assessment and Plan:     Hematuria / Pyuria / Chronic indwelling Toro catheter / Urine retention - POA, no sign of infection. Cx negative. stop ceftriaxone. Consulted urology, no plan for intervention. Hold anticoagulation. Continue bethanechol, tamsulosin and proscar     Acute blood loss anemia - POA, new, mild, better today. Check serologies. Monitor. Transfuse if Hb<7     Acute deep venous thrombosis / Chronic anticoagulation - POA, noted weeks ago during outside admission. US then showed small DVT in left cephalic upper arm and cephalic forearm veins and small DVT in left soleal vein near the posterior tibial vein. Re check US. May not need anticoagulation at all. I doubt he needs IVC filter if ambulatory     Debilitated patient - Fall precautions. PT OT eval.  Needs IPR vs HH    Acute metabolic encephalopathy - POA, recurrent. Notes from outside hospital also document confusion, and neurology was consulted. He was on oxcarbazepine for trigeminal neuragia. I suspect he has some dementia or underlying psychiatric illness.     CAD (coronary artery disease) / Hypertension - Recent admit for NSTEMI, but NSTEMI was actually ruled out by low troponin, no inpatient workup. REsume ASA and plavix today. Continue statin, Norvasc and metoprolol.     Chronic obstructive pulmonary disease - Stable. While here use brovana, pulmicort and prn duonebs. Hold steroids.     GERD (gastroesophageal reflux disease) - PPI by IV for now. Check hemoccult to rule out GI bleed     Hyperlipidemia - Atorvastatin     Vocal cord dysfunction - Supportive care          Subjective:     Chief Complaint:  weak    ROS:  (bold if positive, if negative)    Tolerating some PT  Tolerating Diet        Objective:     Last 24hrs VS reviewed since prior progress note.  Most recent are:    Visit Vitals  /82 (BP 1 Location: Left upper arm)   Pulse (!) 107   Temp 97.8 °F (36.6 °C)   Resp 18   Ht 6' 3\" (1.905 m)   Wt 100 kg (220 lb 7.4 oz)   SpO2 96%   BMI 27.56 kg/m²     SpO2 Readings from Last 6 Encounters:   03/03/22 96%   02/26/22 95%   02/25/22 95%            Intake/Output Summary (Last 24 hours) at 3/3/2022 1021  Last data filed at 3/3/2022 0316  Gross per 24 hour   Intake 960 ml   Output 1550 ml   Net -590 ml        Physical Exam:    Gen:  Well-developed, well-nourished, in no acute distress  HEENT:  Pink conjunctivae, PERRL, hearing intact to voice, moist mucous membranes  Neck:  Supple, without masses, thyroid non-tender  Resp:  No accessory muscle use, clear breath sounds without wheezes rales or rhonchi  Card:  No murmurs, tachycardic S1, S2 without thrills, bruits or peripheral edema  Abd:  Soft, non-tender, non-distended, normoactive bowel sounds are present, no mass  Lymph:  No cervical or inguinal adenopathy  Musc:  No cyanosis or clubbing  Skin:  No rashes or ulcers, skin turgor is good  Neuro:  Cranial nerves are grossly intact, general motor weakness, follows commands   Psych:  Poor insight, oriented to person, place and time, alert    Telemetry reviewed:   normal sinus rhythm  __________________________________________________________________  Medications Reviewed: (see below)  Medications:     Current Facility-Administered Medications   Medication Dose Route Frequency    cefTRIAXone (ROCEPHIN) 1 g in 0.9% sodium chloride 10 mL IV syringe  1 g IntraVENous Q24H    arformoteroL (BROVANA) neb solution 15 mcg  15 mcg Nebulization BID RT    budesonide (PULMICORT) 500 mcg/2 ml nebulizer suspension  500 mcg Nebulization BID RT    pantoprazole (PROTONIX) 40 mg in 0.9% sodium chloride 10 mL injection  40 mg IntraVENous Q12H    amLODIPine (NORVASC) tablet 10 mg  10 mg Oral DAILY    atorvastatin (LIPITOR) tablet 40 mg  40 mg Oral DAILY    finasteride (PROSCAR) tablet 5 mg  5 mg Oral DAILY    metoprolol succinate (TOPROL-XL) XL tablet 25 mg  25 mg Oral DAILY    tamsulosin (FLOMAX) capsule 0.4 mg  0.4 mg Oral DAILY    acetaminophen (TYLENOL) tablet 650 mg  650 mg Oral Q6H PRN    Or    acetaminophen (TYLENOL) suppository 650 mg  650 mg Rectal Q6H PRN    polyethylene glycol (MIRALAX) packet 17 g  17 g Oral DAILY PRN    ondansetron (ZOFRAN ODT) tablet 4 mg  4 mg Oral Q8H PRN    Or    ondansetron (ZOFRAN) injection 4 mg  4 mg IntraVENous Q6H PRN    dextrose 5% - 0.45% NaCl with KCl 20 mEq/L infusion  75 mL/hr IntraVENous CONTINUOUS        Lab Data Reviewed: (see below)  Lab Review:     Recent Labs     03/03/22 0240 03/02/22 0417   WBC 4.6 8.6   HGB 12.9 11.5*   HCT 38.3 33.2*   * 209     Recent Labs     03/03/22 0240 03/02/22 0417    138   K 3.2* 3.7   * 106   CO2 26 26   * 122*   BUN 14 18   CREA 0.74 0.70   CA 7.9* 8.8   MG 1.8 2.1   ALB 2.3*  --    TBILI 0.3  --    *  --      Lab Results   Component Value Date/Time    Glucose (POC) 130 (H) 02/17/2022 11:52 AM    Glucose (POC) 182 (H) 02/17/2022 04:48 AM    Glucose (POC) 138 (H) 02/17/2022 12:20 AM    Glucose (POC) 128 (H) 02/16/2022 06:45 AM    Glucose (POC) 142 (H) 02/16/2022 12:10 AM     No results for input(s): PH, PCO2, PO2, HCO3, FIO2 in the last 72 hours. No results for input(s): INR, INREXT in the last 72 hours. All Micro Results     Procedure Component Value Units Date/Time    CULTURE, URINE [[id number]] Collected: 03/02/22 0417    Order Status: Completed Specimen: Cath Urine Updated: 03/03/22 0724     Special Requests: NO SPECIAL REQUESTS        Culture result: No growth (<1,000 CFU/ML)       URINE CULTURE HOLD SAMPLE [[id number]] Collected: 03/02/22 0417    Order Status: Completed Specimen: Urine from Serum Updated: 03/02/22 0534     Urine culture hold       Urine on hold in Microbiology dept for 2 days.   If unpreserved urine is submitted, it cannot be used for addtional testing after 24 hours, recollection will be required. Other pertinent lab: none    Total time spent with patient: 30 Minutes I personally reviewed chart, notes, data and current medications in the medical record. I have personally examined and treated the patient at bedside during this period.                  Care Plan discussed with: Patient, Care Manager, Nursing Staff, Consultant/Specialist and >50% of time spent in counseling and coordination of care    Discussed:  Care Plan and D/C Planning    Prophylaxis:  H2B/PPI    Disposition:   PT, OT, RN and SAH/Rehab           ___________________________________________________    Attending Physician: Dominga Dunn MD

## 2022-03-03 NOTE — PROGRESS NOTES
Problem: Self Care Deficits Care Plan (Adult)  Goal: *Acute Goals and Plan of Care (Insert Text)  Description: FUNCTIONAL STATUS PRIOR TO ADMISSION: pt active and independent with ADLs and mobility at baseline. Had recent stay at [facility] from 2/7/22-2/25/22 and required intubation for 11 days during that stay, was discharged to [facility] ([facility] from which he is re-admitted from. Pt reports his desire to return home. HOME SUPPORT: The patient lived with wife but did not require assist at baseline. He is admitted this stay from SNF-rehab. Occupational Therapy Goals  Initiated 3/2/2022  1. Patient will perform grooming, standing at sink for >2 minutes, with modified independence within 7 day(s). 2.  Patient will perform lower body dressing with supervision/set-up using AE PRN within 7 day(s). 3.  Patient will perform bathing, sitting and standing PRN, with supervision/set-up within 7 day(s). 4.  Patient will perform toilet transfers with modified independence within 7 day(s). 5.  Patient will perform all aspects of toileting with modified independence within 7 day(s). 6.  Patient will participate in upper extremity therapeutic exercise/activities with independence for 10 minutes within 7 day(s). 7.  Patient will utilize energy conservation techniques during functional activities with verbal cues within 7 day(s). Outcome: Progressing Towards Goal   OCCUPATIONAL THERAPY TREATMENT  Patient: Freya Nur (3 y.o. male)  Date: 3/3/2022  Diagnosis: Lower GI bleed [K92.2]  Hematuria [R31.9] Lower GI bleed       Precautions:    Chart, occupational therapy assessment, plan of care, and goals were reviewed. ASSESSMENT  Patient continues with skilled OT services and is progressing towards goals. Pt ambulated to restroom and stood at sink to wash UB, LE's, HR increased to 133-145 with activity. Pt returned to sitting in recliner chair to dress UB and comb his hair.      Current Level of Function Impacting Discharge (ADLs): Contact guard ADL's standing at sink    Other factors to consider for discharge:          PLAN :  Patient continues to benefit from skilled intervention to address the above impairments. Continue treatment per established plan of care to address goals. Recommend with staff: out of bed to chair for Adl's, there ex, there act, meals    Recommend next OT session: cont towards goals    Recommendation for discharge: (in order for the patient to meet his/her long term goals)  Therapy 3 hours per day 5-7 days per week    This discharge recommendation:  Has not yet been discussed the attending provider and/or case management    IF patient discharges home will need the following DME:        SUBJECTIVE:   Patient stated This is just something I have not done yet re: brushing teeth standing at sink at hospital    OBJECTIVE DATA SUMMARY:   Cognitive/Behavioral Status:      Alert and oriented x 4                Functional Mobility and Transfers for ADLs:  Bed Mobility:  Supine to Sit: Contact guard assistance  Sit to Supine: Contact guard assistance  Scooting: Contact guard assistance    Transfers:  Sit to Stand: Contact guard assistance     Bed to Chair: Contact guard assistance    Balance:  Sitting: Intact; Without support  Standing: Impaired; With support;Pull to stand  Standing - Static: Fair;Constant support  Standing - Dynamic : Fair;Constant support    ADL Intervention:       Grooming  Grooming Assistance: Contact guard assistance  Position Performed: Standing    Upper Body Bathing  Bathing Assistance: Contact guard assistance  Position Performed: Standing    Lower Body Bathing  Bathing Assistance: Minimum assistance    Upper Body Dressing Assistance  Dressing Assistance: Stand-by assistance         Activity Tolerance:   Fair    After treatment patient left in no apparent distress:   Sitting in chair and Call bell within reach    COMMUNICATION/COLLABORATION:   The patients plan of care was discussed with: Occupational therapist and Registered nurse.      PAUL Pruitt/L  Time Calculation: 23 mins

## 2022-03-04 VITALS
HEIGHT: 75 IN | DIASTOLIC BLOOD PRESSURE: 82 MMHG | SYSTOLIC BLOOD PRESSURE: 131 MMHG | BODY MASS INDEX: 27.41 KG/M2 | HEART RATE: 93 BPM | WEIGHT: 220.46 LBS | RESPIRATION RATE: 17 BRPM | TEMPERATURE: 98.1 F | OXYGEN SATURATION: 93 %

## 2022-03-04 PROCEDURE — 2709999900 HC NON-CHARGEABLE SUPPLY

## 2022-03-04 PROCEDURE — 74011250637 HC RX REV CODE- 250/637: Performed by: INTERNAL MEDICINE

## 2022-03-04 PROCEDURE — 51798 US URINE CAPACITY MEASURE: CPT

## 2022-03-04 PROCEDURE — 94761 N-INVAS EAR/PLS OXIMETRY MLT: CPT

## 2022-03-04 PROCEDURE — 94640 AIRWAY INHALATION TREATMENT: CPT

## 2022-03-04 PROCEDURE — 96376 TX/PRO/DX INJ SAME DRUG ADON: CPT

## 2022-03-04 PROCEDURE — 74011000250 HC RX REV CODE- 250: Performed by: INTERNAL MEDICINE

## 2022-03-04 PROCEDURE — G0378 HOSPITAL OBSERVATION PER HR: HCPCS

## 2022-03-04 PROCEDURE — 77030040831 HC BAG URINE DRNG MDII -A

## 2022-03-04 PROCEDURE — 97116 GAIT TRAINING THERAPY: CPT

## 2022-03-04 PROCEDURE — 97530 THERAPEUTIC ACTIVITIES: CPT

## 2022-03-04 PROCEDURE — 94664 DEMO&/EVAL PT USE INHALER: CPT

## 2022-03-04 PROCEDURE — 77030012865 HC BG URIN LEG MDII -A

## 2022-03-04 PROCEDURE — 74011250636 HC RX REV CODE- 250/636: Performed by: INTERNAL MEDICINE

## 2022-03-04 PROCEDURE — C9113 INJ PANTOPRAZOLE SODIUM, VIA: HCPCS | Performed by: INTERNAL MEDICINE

## 2022-03-04 RX ORDER — OXYCODONE AND ACETAMINOPHEN 5; 325 MG/1; MG/1
1 TABLET ORAL
Qty: 10 TABLET | Refills: 0 | Status: SHIPPED | OUTPATIENT
Start: 2022-03-04 | End: 2022-03-07

## 2022-03-04 RX ADMIN — OXYCODONE HYDROCHLORIDE AND ACETAMINOPHEN 1 TABLET: 5; 325 TABLET ORAL at 14:34

## 2022-03-04 RX ADMIN — OXYCODONE HYDROCHLORIDE AND ACETAMINOPHEN 1 TABLET: 5; 325 TABLET ORAL at 08:18

## 2022-03-04 RX ADMIN — FINASTERIDE 5 MG: 5 TABLET, FILM COATED ORAL at 08:18

## 2022-03-04 RX ADMIN — TAMSULOSIN HYDROCHLORIDE 0.4 MG: 0.4 CAPSULE ORAL at 08:18

## 2022-03-04 RX ADMIN — BUDESONIDE 500 MCG: 0.5 SUSPENSION RESPIRATORY (INHALATION) at 08:24

## 2022-03-04 RX ADMIN — ARFORMOTEROL TARTRATE 15 MCG: 15 SOLUTION RESPIRATORY (INHALATION) at 08:24

## 2022-03-04 RX ADMIN — CLOPIDOGREL 75 MG: 75 TABLET, FILM COATED ORAL at 08:18

## 2022-03-04 RX ADMIN — METOPROLOL SUCCINATE 25 MG: 25 TABLET, EXTENDED RELEASE ORAL at 08:18

## 2022-03-04 RX ADMIN — ATORVASTATIN CALCIUM 40 MG: 20 TABLET, FILM COATED ORAL at 08:18

## 2022-03-04 RX ADMIN — AMLODIPINE BESYLATE 10 MG: 5 TABLET ORAL at 08:18

## 2022-03-04 RX ADMIN — ASPIRIN 81 MG: 81 TABLET, CHEWABLE ORAL at 08:18

## 2022-03-04 RX ADMIN — SODIUM CHLORIDE, PRESERVATIVE FREE 40 MG: 5 INJECTION INTRAVENOUS at 08:18

## 2022-03-04 NOTE — PROGRESS NOTES
Problem: Mobility Impaired (Adult and Pediatric)  Goal: *Acute Goals and Plan of Care (Insert Text)  Description: FUNCTIONAL STATUS PRIOR TO ADMISSION: Prior to admission patient was at the [facility] SNF after having STEMI with intubation in early February. Patient a fair historian, reports primarily w/c use at SNF with RW practice during therapy sessions. HOME SUPPORT PRIOR TO ADMISSION: The patient lived with his wife and did not need assistance before admission in early February. Physical Therapy Goals  Initiated 3/2/2022  1. Patient will move from supine to sit and sit to supine , scoot up and down, and roll side to side in bed with independence within 7 day(s). 2.  Patient will transfer from bed to chair and chair to bed with supervision/set-up using the least restrictive device within 7 day(s). 3.  Patient will perform sit to stand with supervision/set-up within 7 day(s). 4.  Patient will ambulate with supervision/set-up for 120 feet with the least restrictive device within 7 day(s). Note:   PHYSICAL THERAPY TREATMENT  Patient: Ellen Severe (83 y.o. male)  Date: 3/4/2022  Diagnosis: Lower GI bleed [K92.2]  Hematuria [R31.9] Lower GI bleed       Precautions:    Chart, physical therapy assessment, plan of care and goals were reviewed. ASSESSMENT  Patient continues with skilled PT services. Pt seen this AM.Pt sit to stand with CGA. Pt ambulated 60ft with RW CGA to min assist.Pt fatigues rather quickly with mobility. Pt left sitting in chair. Pt progressing slowly. continue goals. PLAN :  Patient continues to benefit from skilled intervention to address the above impairments. Continue treatment per established plan of care. to address goals.     Recommendation for discharge: (in order for the patient to meet his/her long term goals)  Therapy 3 hours per day 5-7 days per week    This discharge recommendation:  Has been made in collaboration with the attending provider and/or case management    IF patient discharges home will need the following DME: rolling walker       SUBJECTIVE:       OBJECTIVE DATA SUMMARY:   Critical Behavior:  Neurologic State: Alert  Orientation Level: Oriented X4  Cognition: Follows commands  Safety/Judgement: Awareness of environment,Fall prevention  Functional Mobility Training:       Transfers:  Sit to Stand: Contact guard assistance  Stand to Sit: Contact guard assistance                             Balance:  Sitting: Intact  Standing: With support  Standing - Static: Fair  Ambulation/Gait Training:  Distance (ft): 60 Feet (ft)  Assistive Device: Gait belt;Walker, rolling  Ambulation - Level of Assistance: Contact guard assistance;Minimal assistance        Gait Abnormalities: Decreased step clearance        Base of Support: Narrowed     Speed/Polina: Pace decreased (<100 feet/min)  Step Length: Right shortened;Left shortened               Activity Tolerance:   Fair    After treatment patient left in no apparent distress:   Sitting in chair    COMMUNICATION/COLLABORATION:   The patients plan of care was discussed with: Physical therapist.     Nano Vargas PTA   Time Calculation: 23 mins

## 2022-03-04 NOTE — PROGRESS NOTES
[facility]    Medical Progress Note      NAME: Marco Antonio Lucas   :  1955  MRM:  [id number]    Date/Time of service 3/4/2022  10:21 AM          Assessment and Plan:     Hematuria / Pyuria / Chronic indwelling Toro catheter / Urine retention - POA, no sign of infection. Cx negative. stop ceftriaxone. Consulted urology, no plan for intervention. Hold anticoagulation. Continue bethanechol, tamsulosin and proscar     Acute blood loss anemia - POA, new, mild, better today. Unremarkable serologies. Monitor. Transfuse if Hb<7     Acute deep venous thrombosis / Chronic anticoagulation - POA, noted weeks ago during outside admission. US then showed small DVT in left cephalic upper arm and cephalic forearm veins and small DVT in left soleal vein near the posterior tibial vein. Remain trivial in size on US here. In setting of bleed, I would not Rx anticoagulation for this small clot. I doubt he needs IVC filter if ambulatory. I have discussed at length with he and his family and they concur.     Debilitated patient - Fall precautions. PT OT eval.  Needs IPR vs HH    Acute metabolic encephalopathy - POA, recurrent. Notes from outside hospital also document confusion, and neurology was consulted. He was on oxcarbazepine for trigeminal neuragia. I suspect he has some dementia or underlying psychiatric illness.     CAD (coronary artery disease) / Hypertension - Recent admit for NSTEMI, but NSTEMI was actually ruled out by low troponin, no inpatient workup. REsume ASA and plavix today. Continue statin, Norvasc and metoprolol.     Chronic obstructive pulmonary disease - Stable. While here use brovana, pulmicort and prn duonebs. Hold steroids.     GERD (gastroesophageal reflux disease) - PPI by IV for now.   Check hemoccult to rule out GI bleed     Hyperlipidemia - Atorvastatin     Vocal cord dysfunction - Supportive care          Subjective:     Chief Complaint:  weak    ROS:  (bold if positive, if negative)    Tolerating some PT  Tolerating Diet        Objective:     Last 24hrs VS reviewed since prior progress note.  Most recent are:    Visit Vitals  /82 (BP 1 Location: Left upper arm, BP Patient Position: At rest)   Pulse 93   Temp 98.1 °F (36.7 °C)   Resp 17   Ht 6' 3\" (1.905 m)   Wt 100 kg (220 lb 7.4 oz)   SpO2 93%   BMI 27.56 kg/m²     SpO2 Readings from Last 6 Encounters:   03/04/22 93%   02/26/22 95%   02/25/22 95%            Intake/Output Summary (Last 24 hours) at 3/4/2022 1102  Last data filed at 3/4/2022 0825  Gross per 24 hour   Intake 720 ml   Output 3325 ml   Net -2605 ml        Physical Exam:    Gen:  Well-developed, well-nourished, in no acute distress  HEENT:  Pink conjunctivae, PERRL, hearing intact to voice, moist mucous membranes  Neck:  Supple, without masses, thyroid non-tender  Resp:  No accessory muscle use, clear breath sounds without wheezes rales or rhonchi  Card:  No murmurs, tachycardic S1, S2 without thrills, bruits or peripheral edema  Abd:  Soft, non-tender, non-distended, normoactive bowel sounds are present, no mass  Lymph:  No cervical or inguinal adenopathy  Musc:  No cyanosis or clubbing  Skin:  No rashes or ulcers, skin turgor is good  Neuro:  Cranial nerves are grossly intact, general motor weakness, follows commands   Psych:  Poor insight, oriented to person, place and time, alert    Telemetry reviewed:   normal sinus rhythm  __________________________________________________________________  Medications Reviewed: (see below)  Medications:     Current Facility-Administered Medications   Medication Dose Route Frequency    aspirin chewable tablet 81 mg  81 mg Oral DAILY    clopidogreL (PLAVIX) tablet 75 mg  75 mg Oral DAILY    oxyCODONE-acetaminophen (PERCOCET) 5-325 mg per tablet 1 Tablet  1 Tablet Oral Q4H PRN    arformoteroL (BROVANA) neb solution 15 mcg  15 mcg Nebulization BID RT    budesonide (PULMICORT) 500 mcg/2 ml nebulizer suspension  500 mcg Nebulization BID RT    pantoprazole (PROTONIX) 40 mg in 0.9% sodium chloride 10 mL injection  40 mg IntraVENous Q12H    amLODIPine (NORVASC) tablet 10 mg  10 mg Oral DAILY    atorvastatin (LIPITOR) tablet 40 mg  40 mg Oral DAILY    finasteride (PROSCAR) tablet 5 mg  5 mg Oral DAILY    metoprolol succinate (TOPROL-XL) XL tablet 25 mg  25 mg Oral DAILY    tamsulosin (FLOMAX) capsule 0.4 mg  0.4 mg Oral DAILY    acetaminophen (TYLENOL) tablet 650 mg  650 mg Oral Q6H PRN    polyethylene glycol (MIRALAX) packet 17 g  17 g Oral DAILY PRN    ondansetron (ZOFRAN ODT) tablet 4 mg  4 mg Oral Q8H PRN        Lab Data Reviewed: (see below)  Lab Review:     Recent Labs     03/03/22 0240 03/02/22 0417   WBC 4.6 8.6   HGB 12.9 11.5*   HCT 38.3 33.2*   * 209     Recent Labs     03/03/22 0240 03/02/22 0417    138   K 3.2* 3.7   * 106   CO2 26 26   * 122*   BUN 14 18   CREA 0.74 0.70   CA 7.9* 8.8   MG 1.8 2.1   ALB 2.3*  --    TBILI 0.3  --    *  --      Lab Results   Component Value Date/Time    Glucose (POC) 130 (H) 02/17/2022 11:52 AM    Glucose (POC) 182 (H) 02/17/2022 04:48 AM    Glucose (POC) 138 (H) 02/17/2022 12:20 AM    Glucose (POC) 128 (H) 02/16/2022 06:45 AM    Glucose (POC) 142 (H) 02/16/2022 12:10 AM     No results for input(s): PH, PCO2, PO2, HCO3, FIO2 in the last 72 hours. No results for input(s): INR, INREXT, INREXT in the last 72 hours.   All Micro Results     Procedure Component Value Units Date/Time    RESPIRATORY VIRUS PANEL W/COVID-19, PCR [[id number]] Collected: 03/03/22 1351    Order Status: Completed Specimen: Nasopharyngeal Updated: 03/03/22 1850     Adenovirus Not detected        Coronavirus 229E Not detected        Coronavirus HKU1 Not detected        Coronavirus CVNL63 Not detected        Coronavirus OC43 Not detected        SARS-CoV-2, PCR Not detected        Metapneumovirus Not detected        Rhinovirus and Enterovirus Not detected        Influenza A Not detected        Influenza A, subtype H1 Not detected        Influenza A, subtype H3 Not detected        INFLUENZA A H1N1 PCR Not detected        Influenza B Not detected        Parainfluenza 1 Not detected        Parainfluenza 2 Not detected        Parainfluenza 3 Not detected        Parainfluenza virus 4 Not detected        RSV by PCR Not detected        B. parapertussis, PCR Not detected        Bordetella pertussis - PCR Not detected        Chlamydophila pneumoniae DNA, QL, PCR Not detected        Mycoplasma pneumoniae DNA, QL, PCR Not detected       CULTURE, URINE [[id number]] Collected: 03/02/22 0417    Order Status: Completed Specimen: Cath Urine Updated: 03/03/22 0724     Special Requests: NO SPECIAL REQUESTS        Culture result: No growth (<1,000 CFU/ML)       URINE CULTURE HOLD SAMPLE [[id number]] Collected: 03/02/22 0417    Order Status: Completed Specimen: Urine from Serum Updated: 03/02/22 0534     Urine culture hold       Urine on hold in Microbiology dept for 2 days. If unpreserved urine is submitted, it cannot be used for addtional testing after 24 hours, recollection will be required. Other pertinent lab: none    Total time spent with patient: 30 Minutes I personally reviewed chart, notes, data and current medications in the medical record. I have personally examined and treated the patient at bedside during this period.                  Care Plan discussed with: Patient, Care Manager, Nursing Staff, Consultant/Specialist and >50% of time spent in counseling and coordination of care    Discussed:  Care Plan and D/C Planning    Prophylaxis:  H2B/PPI    Disposition:   PT, OT, RN and SAH/Rehab           ___________________________________________________    Attending Physician: Claudia Kasper MD

## 2022-03-04 NOTE — DISCHARGE INSTRUCTIONS
Patient Discharge Instructions    Saurabh Link / [id number] : 1955    Admitted 3/2/2022 Discharged: 3/4/2022     Primary Diagnoses  Problem List as of 3/4/2022 Date Reviewed: 3/2/2022           * (Principal) Lower GI bleed   Acute blood loss anemia   Pyuria   CAD (coronary artery disease)   Chronic anticoagulation   Chronic indwelling Toro catheter   Chronic obstructive pulmonary disease (HCC)   GERD (gastroesophageal reflux disease)   Hx of deep venous thrombosis   Hyperlipidemia   Hypertension   Urine retention   Vocal cord dysfunction   Acute deep venous thrombosis ([facility] [address]   Debilitated patient   Acute metabolic encephalopathy   Hematuria          Take Home Medications     · It is important that you take the medication exactly as they are prescribed. · Keep your medication in the bottles provided by the pharmacist and keep a list of the medication names, dosages, and times to be taken in your wallet. · Do not take other medications without consulting your doctor. What to do at Home    Recommended diet: Cardiac Diet and Low fat, Low cholesterol    Recommended activity: Activity as tolerated    If you experience worse symptoms, please follow up with your PCP. Follow-up with your PCP in a few weeks    Follow-up Information     Follow up With Specialties Details Why [facility]  On 3/11/2022 Sylvia Patino [place] at 9:30AM [address] [facility] [address]ustamante, [address] MD [facility] [place] Medicine Schedule an appointment as soon as possible for a visit in 1 week  [address] [place] [address]  [phone]             Information obtained by :  I understand that if any problems occur once I am at home I am to contact my physician. I understand and acknowledge receipt of the instructions indicated above.                                                                                                                                            Physician's or R.N.'s Signature                                                                  Date/Time                                                                                                                                              Patient or Representative Signature                                                          Date/Time

## 2022-03-04 NOTE — PROGRESS NOTES
1344  Pt to be d/c today to [facility]. Report called to facility. Discharge medications reviewed with patient and appropriate educational materials and side effects teaching were provided. I have reviewed discharge instructions with the patient. The patient verbalized understanding. AVS signed and given to patient. Peripheral IV removed. [facility] ETA is 1500.    [id number]  Transport arrived.

## 2022-03-04 NOTE — PROGRESS NOTES
03/04/22    Medicare pt has received, reviewed, and signed 2nd IM letter informing them of their right to appeal the discharge. Signed copied has been placed on pt bedside chart.

## 2022-03-04 NOTE — PROGRESS NOTES
3/4/2022  Case Management Discharge Note    11:46 AM  Patient is 73year old male admitted 3/2 with lower GI bleed  Patient does not have an RUR score due to observation status, letter given on admission  Covid test: negative 3/3  Chart reviewed--patient discussed at IDR rounds  Confirmed patient has a bed at [facility] today. Transport is set up for 3pm. Updated patient's wife via phone. No other needs for discharge at this time. Transition of Care Plan   1. Discharge today, order already placed  2. To [facility] for IPR  3. Transportation set up with [facility] for 3[facility]   4. OK for discharge from CM standpoint    Care Management Interventions  PCP Verified by CM:  Yes Lucius Schwab MD)  Support Systems: Spouse/Significant Other  Confirm Follow Up Transport: Family  The Plan for Transition of Care is Related to the Following Treatment Goals : hematuria  Discharge Location  Patient Expects to be Discharged to[de-identified] Rehab hospital/unit [facility]

## 2022-03-04 NOTE — PROGRESS NOTES
Bedside and Verbal shift change report given to LIOR Wren (oncoming nurse) by Hunter Garza. Madeiln Rousseau RN (offgoing nurse). Report included the following information SBAR, Kardex, Intake/Output, MAR and Recent Results.

## 2022-03-04 NOTE — DISCHARGE SUMMARY
Physician Discharge Summary     Patient ID:  Heriberto Russ  [id number]  73 y.o.  1955    Admit date: 3/2/2022    Discharge date of service and time: 3/4/2022    Admission Diagnoses:   Hematuria [R31.9]    Discharge Diagnoses:    Principal Diagnosis     Hematuria                                             Hospital Course and other diagnoses  Hematuria / Pyuria / Chronic indwelling Toro catheter / Urine retention - POA, no sign of infection. Cx negative. stop ceftriaxone.  Consulted urology, no plan for intervention.  Hold anticoagulation.  Continue bethanechol, tamsulosin and proscar     Acute blood loss anemia - POA, new, mild, better today.  Unremarkable serologies.  Monitor.  Transfuse if Hb<7     Acute deep venous thrombosis / Chronic anticoagulation - POA, noted weeks ago during outside admission.  US then showed small DVT in left cephalic upper arm and cephalic forearm veins and small DVT in left soleal vein near the posterior tibial vein.  Remain trivial in size on US here.  In setting of bleed, I would not Rx anticoagulation for this small clot.  I doubt he needs IVC filter if ambulatory.   I have discussed at length with he and his family and they concur.     Debilitated patient - Fall precautions.  PT OT eval.  Needs IPR vs HH     Acute metabolic encephalopathy - POA, recurrent.  Notes from outside hospital also document confusion, and neurology was consulted. [facility] was on oxcarbazepine for trigeminal neuragia.  I suspect he has some dementia or underlying psychiatric illness.     CAD (coronary artery disease) / Hypertension - Recent admit for NSTEMI, but NSTEMI was actually ruled out by low troponin, no inpatient workup.  REsume ASA and plavix today. Continue statin, Norvasc and metoprolol.     Chronic obstructive pulmonary disease - Stable.  While here use brovana, pulmicort and prn duonebs.  Hold steroids.     GERD (gastroesophageal reflux disease) - PPI by IV for now.  Check hemoccult to rule out GI bleed     Hyperlipidemia - Atorvastatin     Vocal cord dysfunction - Supportive care      PCP: Sierra Garcia MD    Consults: Rehabilitation Medicine and Urology    Significant Diagnostic Studies: See Hospital Course    Discharged home in improved condition. Discharge Exam:  /82 (BP 1 Location: Left upper arm, BP Patient Position: At rest)   Pulse 93   Temp 98.1 °F (36.7 °C)   Resp 17   Ht 6' 3\" (1.905 m)   Wt 100 kg (220 lb 7.4 oz)   SpO2 93%   BMI 27.56 kg/m²      Gen:  Well-developed, well-nourished, in no acute distress  HEENT:  Pink conjunctivae, PERRL, hearing intact to voice, moist mucous membranes  Neck:  Supple, without masses, thyroid non-tender  Resp:  No accessory muscle use, clear breath sounds without wheezes rales or rhonchi  Card:  No murmurs, tachycardic S1, S2 without thrills, bruits or peripheral edema  Abd:  Soft, non-tender, non-distended, normoactive bowel sounds are present, no mass  Lymph:  No cervical or inguinal adenopathy  Musc:  No cyanosis or clubbing  Skin:  No rashes or ulcers, skin turgor is good  Neuro:  Cranial nerves are grossly intact, general motor weakness, follows commands   Psych:  Poor insight, oriented to person, place and time, alert    Patient Instructions:   Current Discharge Medication List      START taking these medications    Details   oxyCODONE-acetaminophen (PERCOCET) 5-325 mg per tablet Take 1 Tablet by mouth every four (4) hours as needed for Pain for up to 3 days. Max Daily Amount: 6 Tablets. Qty: 10 Tablet, Refills: 0    Associated Diagnoses: Debilitated patient         CONTINUE these medications which have NOT CHANGED    Details   acetaminophen (TYLENOL) 325 mg tablet Take 2 Tablets by mouth every six (6) hours as needed for Pain or Fever. Qty: 60 Tablet, Refills: 0      nystatin (MYCOSTATIN) 100,000 unit/mL suspension Take 5 mL by mouth four (4) times daily.  swish and spit  Qty: 473 mL, Refills: 0      atorvastatin (Lipitor) 40 mg tablet Take 40 mg by mouth daily. esomeprazole (NexIUM) 40 mg capsule Take 40 mg by mouth daily. finasteride (PROSCAR) 5 mg tablet Take 5 mg by mouth daily. fluticasone-umeclidinium-vilanterol (TRELEGY ELLIPTA) 100-62.5-25 mcg inhaler Take 1 Puff by inhalation daily. ipratropium-albuteroL (Combivent Respimat)  mcg/actuation inhaler Take 1 Puff by inhalation two (2) times daily as needed. tamsulosin (Flomax) 0.4 mg capsule Take 0.4 mg by mouth daily. amLODIPine (NORVASC) 10 mg tablet Take 1 Tablet by mouth daily. Qty: 30 Tablet, Refills: 0      aspirin 81 mg chewable tablet Take 81 mg by mouth daily. clopidogreL (PLAVIX) 75 mg tab Take 75 mg by mouth daily. metoprolol succinate (TOPROL-XL) 25 mg XL tablet Take 25 mg by mouth daily. STOP taking these medications       rivaroxaban (Xarelto DVT-PE Treat 30d Start) 15 mg (42)- 20 mg (9) DsPk Comments:   Reason for Stopping:         predniSONE (DELTASONE) 10 mg tablet Comments:   Reason for Stopping:         budesonide (PULMICORT) 0.5 mg/2 mL nbsp Comments:   Reason for Stopping:         predniSONE (DELTASONE) 20 mg tablet Comments:   Reason for Stopping:         senna (SENOKOT) 8.6 mg tablet Comments:   Reason for Stopping:         glycopyrrolate (ROBINUL) 0.2 mg/mL injection Comments:   Reason for Stopping:         amoxicillin-clavulanate (Augmentin) 875-125 mg per tablet Comments:   Reason for Stopping:         albuterol (ProAir HFA) 90 mcg/actuation inhaler Comments:   Reason for Stopping:         bethanechol chloride (URECHOLINE) 50 mg tablet Comments:   Reason for Stopping:         omeprazole (PRILOSEC) 20 mg capsule Comments:   Reason for Stopping:         OXcarbazepine (Oxtellar XR) 300 mg Tb24 Comments:   Reason for Stopping:             Activity: Activity as tolerated and PT/OT Eval and Treat  Diet: Cardiac Diet and Low fat, Low cholesterol  Wound Care: None needed    Follow-up with your PCP in a few weeks.   Follow-up tests/labs -none    Signed:  Loyd Villatoro MD  3/4/2022  11:11 AM

## 2022-03-06 ENCOUNTER — HOSPITAL ENCOUNTER (EMERGENCY)
Age: 67
Discharge: HOME | End: 2022-03-06
Attending: FAMILY MEDICINE
Payer: COMMERCIAL

## 2022-03-06 ENCOUNTER — APPOINTMENT (OUTPATIENT)
Dept: CT IMAGING | Age: 67
End: 2022-03-06
Attending: FAMILY MEDICINE
Payer: COMMERCIAL

## 2022-03-06 VITALS
BODY MASS INDEX: 27.73 KG/M2 | OXYGEN SATURATION: 96 % | WEIGHT: 223 LBS | SYSTOLIC BLOOD PRESSURE: 133 MMHG | TEMPERATURE: 98.2 F | HEART RATE: 93 BPM | HEIGHT: 75 IN | DIASTOLIC BLOOD PRESSURE: 81 MMHG | RESPIRATION RATE: 22 BRPM

## 2022-03-06 DIAGNOSIS — R31.9 HEMATURIA, UNSPECIFIED TYPE: Primary | ICD-10-CM

## 2022-03-06 LAB
ABO + RH BLD: NORMAL
ALBUMIN SERPL-MCNC: 2.6 G/DL (ref 3.5–5)
ALBUMIN/GLOB SERPL: 0.8 {RATIO} (ref 1.1–2.2)
ALP SERPL-CCNC: 97 U/L (ref 45–117)
ALT SERPL-CCNC: 81 U/L (ref 12–78)
ANION GAP SERPL CALC-SCNC: 4 MMOL/L (ref 5–15)
APPEARANCE UR: CLEAR
AST SERPL W P-5'-P-CCNC: 29 U/L (ref 15–37)
BACTERIA URNS QL MICRO: NEGATIVE /HPF
BASOPHILS # BLD: 0 K/UL (ref 0–0.1)
BASOPHILS NFR BLD: 0 % (ref 0–1)
BILIRUB SERPL-MCNC: 0.5 MG/DL (ref 0.2–1)
BILIRUB UR QL: NEGATIVE
BLOOD GROUP ANTIBODIES SERPL: NEGATIVE
BUN SERPL-MCNC: 10 MG/DL (ref 6–20)
BUN/CREAT SERPL: 12 (ref 12–20)
CA-I BLD-MCNC: 8.7 MG/DL (ref 8.5–10.1)
CHLORIDE SERPL-SCNC: 105 MMOL/L (ref 97–108)
CO2 SERPL-SCNC: 27 MMOL/L (ref 21–32)
COLOR UR: ABNORMAL
CREAT SERPL-MCNC: 0.81 MG/DL (ref 0.7–1.3)
DIFFERENTIAL METHOD BLD: ABNORMAL
EOSINOPHIL # BLD: 0.1 K/UL (ref 0–0.4)
EOSINOPHIL NFR BLD: 1 % (ref 0–7)
ERYTHROCYTE [DISTWIDTH] IN BLOOD BY AUTOMATED COUNT: 15.9 % (ref 11.5–14.5)
GLOBULIN SER CALC-MCNC: 3.4 G/DL (ref 2–4)
GLUCOSE SERPL-MCNC: 117 MG/DL (ref 65–100)
GLUCOSE UR STRIP.AUTO-MCNC: NEGATIVE MG/DL
HCT VFR BLD AUTO: 32.3 % (ref 36.6–50.3)
HGB BLD-MCNC: 10.9 G/DL (ref 12.1–17)
HGB UR QL STRIP: ABNORMAL
HYALINE CASTS URNS QL MICRO: NORMAL /LPF (ref 0–5)
IMM GRANULOCYTES # BLD AUTO: 0.1 K/UL (ref 0–0.04)
IMM GRANULOCYTES NFR BLD AUTO: 2 % (ref 0–0.5)
KETONES UR QL STRIP.AUTO: NEGATIVE MG/DL
LEUKOCYTE ESTERASE UR QL STRIP.AUTO: NEGATIVE
LIPASE SERPL-CCNC: 79 U/L (ref 73–393)
LYMPHOCYTES # BLD: 2.1 K/UL (ref 0.8–3.5)
LYMPHOCYTES NFR BLD: 31 % (ref 12–49)
MCH RBC QN AUTO: 29.5 PG (ref 26–34)
MCHC RBC AUTO-ENTMCNC: 33.7 G/DL (ref 30–36.5)
MCV RBC AUTO: 87.5 FL (ref 80–99)
MONOCYTES # BLD: 0.5 K/UL (ref 0–1)
MONOCYTES NFR BLD: 7 % (ref 5–13)
NEUTS SEG # BLD: 4 K/UL (ref 1.8–8)
NEUTS SEG NFR BLD: 59 % (ref 32–75)
NITRITE UR QL STRIP.AUTO: NEGATIVE
NRBC # BLD: 0 K/UL (ref 0–0.01)
NRBC BLD-RTO: 0 PER 100 WBC
PH UR STRIP: 5 [PH] (ref 5–8)
PLATELET # BLD AUTO: 204 K/UL (ref 150–400)
PMV BLD AUTO: 8.9 FL (ref 8.9–12.9)
POTASSIUM SERPL-SCNC: 3.5 MMOL/L (ref 3.5–5.1)
PROT SERPL-MCNC: 6 G/DL (ref 6.4–8.2)
PROT UR STRIP-MCNC: NEGATIVE MG/DL
RBC # BLD AUTO: 3.69 M/UL (ref 4.1–5.7)
RBC #/AREA URNS HPF: NORMAL /HPF (ref 0–5)
SODIUM SERPL-SCNC: 136 MMOL/L (ref 136–145)
SP GR UR REFRACTOMETRY: 1 (ref 1–1.03)
SPECIMEN EXP DATE BLD: NORMAL
UROBILINOGEN UR QL STRIP.AUTO: 0.1 EU/DL (ref 0.1–1)
WBC # BLD AUTO: 6.7 K/UL (ref 4.1–11.1)
WBC URNS QL MICRO: NORMAL /HPF (ref 0–4)

## 2022-03-06 PROCEDURE — 36415 COLL VENOUS BLD VENIPUNCTURE: CPT

## 2022-03-06 PROCEDURE — 81001 URINALYSIS AUTO W/SCOPE: CPT

## 2022-03-06 PROCEDURE — 74011000636 HC RX REV CODE- 636: Performed by: FAMILY MEDICINE

## 2022-03-06 PROCEDURE — 99285 EMERGENCY DEPT VISIT HI MDM: CPT

## 2022-03-06 PROCEDURE — 80053 COMPREHEN METABOLIC PANEL: CPT

## 2022-03-06 PROCEDURE — 83690 ASSAY OF LIPASE: CPT

## 2022-03-06 PROCEDURE — 85025 COMPLETE CBC W/AUTO DIFF WBC: CPT

## 2022-03-06 PROCEDURE — 93005 ELECTROCARDIOGRAM TRACING: CPT

## 2022-03-06 PROCEDURE — 86900 BLOOD TYPING SEROLOGIC ABO: CPT

## 2022-03-06 PROCEDURE — 96374 THER/PROPH/DIAG INJ IV PUSH: CPT

## 2022-03-06 PROCEDURE — 74177 CT ABD & PELVIS W/CONTRAST: CPT

## 2022-03-06 PROCEDURE — 74011250636 HC RX REV CODE- 250/636: Performed by: FAMILY MEDICINE

## 2022-03-06 RX ORDER — HYDROMORPHONE HYDROCHLORIDE 1 MG/ML
1 INJECTION, SOLUTION INTRAMUSCULAR; INTRAVENOUS; SUBCUTANEOUS ONCE
Status: COMPLETED | OUTPATIENT
Start: 2022-03-06 | End: 2022-03-06

## 2022-03-06 RX ORDER — METHOCARBAMOL 750 MG/1
750 TABLET, FILM COATED ORAL
Qty: 20 TABLET | Refills: 0 | Status: SHIPPED | OUTPATIENT
Start: 2022-03-06

## 2022-03-06 RX ORDER — ACETAMINOPHEN 325 MG/1
650 TABLET ORAL
Qty: 20 TABLET | Refills: 0 | Status: SHIPPED | OUTPATIENT
Start: 2022-03-06 | End: 2022-03-12

## 2022-03-06 RX ORDER — IBUPROFEN 600 MG/1
600 TABLET ORAL
Qty: 20 TABLET | Refills: 0 | Status: SHIPPED | OUTPATIENT
Start: 2022-03-06 | End: 2022-03-12

## 2022-03-06 RX ADMIN — IOPAMIDOL 100 ML: 755 INJECTION, SOLUTION INTRAVENOUS at 05:56

## 2022-03-06 RX ADMIN — HYDROMORPHONE HYDROCHLORIDE 1 MG: 1 INJECTION, SOLUTION INTRAMUSCULAR; INTRAVENOUS; SUBCUTANEOUS at 05:38

## 2022-03-06 NOTE — PROGRESS NOTES
H&P from [facility] copied below for transition of care purposes. This patient had a chronic rubin catheter for urinary retention following a prolonged hospitalization for NSTEMI, this catheter was exchanged recently while at [facility], apparently overnight he developed severe penile pain and he called [facility] who recommended urologic evaluation in the emergency department. Per most recent notes at [facility], urology was anticipating a voiding trial sometime within the next week. Anticipate he can return to rehab once cleared from a urologic standpoint. 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 Hospital course was complicated by metabolic encephalopathy. Notably he has urinary retention with a chronic indwelling catheter, he was evaluated by urology and they are considering a voiding trial in about 1 week. Due to his functional progress in anticipation of returning home independent he is being admitted to inpatient rehabilitation. Today, he reports pain in his low back and multiple joints, oral medications help somewhat. He reports generalized weakness, denies focal weakness.   He denies spasms or sensory changes. He is continent of bowel, has Toro catheter for urinary retention. He is tolerating an oral diet without dysphagia.     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 on vent   Hematuria  Urinary retention, chronic indwelling catheter  Acute blood loss anemia    Acute/subacute DVT - L peroneal vein   Suspected recent GI bleed   CAD, h/o recent NSTEMI   HTN   HLD   COPD  GERD  Vocal cord dysfunction      Allergies (1) Active Reaction  sulfonamides None Documented        Medications (19) Active  Scheduled: (9)  amLODIPine 5 mg Tab  10 mg 2 tab, Oral, Daily, Start 03/05/22 12:00:00 EST  aspirin 81 mg Chew Tab  81 mg 1 tab, Oral, Daily, Start 03/05/22 12:00:00 EST  atorvastatin 40 mg Tab  40 mg 1 tab, Oral, Daily, Start 03/05/22 12:00:00 EST  clopidogrel 75 mg Tab  75 mg 1 tab, Oral, Daily, Start 03/05/22 12:00:00 EST  finasteride 5 mg Tab  5 mg 1 tab, Oral, Daily, Start 03/05/22 12:00:00 EST  metoprolol 25 mg ER succinate Tab  25 mg 1 tab, Oral, Daily, Start 03/05/22 12:00:00 EST  Nexium 20mg 24HR (esomeprazole magnesium delayed release capsules)  1 EA, Oral, Before breakfast, Start 03/05/22 6:30:00 EST  tamsulosin 0.4 mg Oral Cap  0.4 mg 1 cap, Oral, Daily, Start 03/05/22 12:00:00 EST  Trelegy Ellipta 100mcg-62. 5mcg-25mcg/inh inhalation powder  1 EA, INH, Daily RT, Start 03/05/22 8:30:00 EST  Continuous: (0)  PRN: (10)  acetaminophen 325 mg Tab  650 mg 2 tab, Oral, q4hr, Start 03/04/22 18:18:00 EST  acetaminophen-oxyCODONE 325 mg-5 mg Tab  1 tab, Oral, q4hr, Start 03/04/22 15:05:00 EST  bisacodyl 10 mg Supp  10 mg 1 supp, MI, Daily, Start 03/04/22 18:18:00 EST  calcium carbonate 500 mg Chew Tab  500 mg 1 tab, Chewed, QID, Start 03/04/22 18:18:00 EST  Combivent Respimat CFC free 20mcg-100mcg/inh inhalation aerosol  1 EA, INH, BID RT, Start 03/04/22 18:41:00 EST  hydrALAZINE 25 mg Tab 25 mg 1 tab, Oral, q6hr, Start 03/04/22 18:18:00 EST  ondansetron 4 mg Tab ODT  4 mg 1 tab, Oral, q6hr, Start 03/04/22 18:18:00 EST  polyethylene glycol 3350 Oral Pwdr-Reconstit 17gm (1EA UD)  17 gm 1 EA, Oral, Daily, Start 03/04/22 18:18:00 EST  senna 8.6 mg Tab  17.2 mg 2 tab, Oral, QHS, Start 03/04/22 18:18:00 EST  sorbitol 70% Oral Liquid (30mL UD)  30 mL, Oral, Daily, Start 03/04/22 18:18:00 EST       Family History:   Positive for cardiovascular disease.      Social History:   Denies smoking, alcohol, illicit drug use. Functional Assessment (current vs. prior level of function that preceded current medical/ surgical illness):   He is currently functioning at a partial assist level with most mobility and transfers, ambulating up to 40 feet with the use of a rolling walker, and needing partial to max assist with ADLs including dressing and bathing and toileting.   Prior to this hospitalization he was functionally independent and ambulating community distances without the use of any gait aids.     Review of Systems:  Unless otherwise noted above in HPI:   Constitutional: Negative for fever, chills, diaphoresis, rigors, weight changes   Vision: Negative for vision changes, eye pain, double vision  ENMT: Negative for rhinorrhea, odynophagia, oral lesions  Respiratory: Negative for shortness of breath, cough, wheezing  Cardiovascular: Negative for chest pain, palpitations, peripheral edema  GI: Negative for abdominal pain, nausea, vomiting, diarrhea, constipation  : Negative for dysuria, frequency, urgency, retention  Neurologic: Negative for weakness, sensory changes, spasms  Musculoskeletal: Negative for joint pain, joint effusion, swelling  Skin: Negative for rash, dryness, itching  Pain: Negative for headache, chest pain, abdominal pain, joint pain, positive for back pain   Psychiatric: Negative for hallucinations, suicidal ideation, homicidal ideation, mood changes      Data review:     Labs (Last four charted values)  Cr                   0.74 (MAR 03)         Labs         3/3/2022           WBC: 4.6           HGB: 12.9           HCT: 38.3           PLATELETS: 721           NA: 141           K: 3.2           CI: 111           Glucose: 117           Bun: 14           CREAT: 0.74           Albumin: 2.3           Calcium: 7.9           Magnesium: 1.8    Diet:Diet -- 03/04/22 18:18:00 EST, Texture: Level 7 - Regular, Liquid Consistency: Level 0 - Thin, Restrictions: No Restrictions       Physical Examination:      Vital Signs (last 24 hrs)_____  Last Charted___________  Temp Oral     98 DegF  (MAR 05 07:47)  Heart Rate Peripheral   H 102bpm  (MAR 05 07:49)  Resp Rate         18 br/min  (MAR 05 01:14)  SBP      135 mmHg  (MAR 05 07:48)  DBP      86 mmHg  (MAR 05 07:48)  SpO2      L 93%  (MAR 05 07:49)  BMI      28.22  (MAR 04 19:00)         Appearance/ Constitutional: No acute distress, cooperative   HEENT: Anicteric, extraocular movements intact, oropharynx clear   Neck: Supple, non-tender  Lungs: Clear to auscultation bilaterally, no rhonchi, no increased work of breathing   Cardiac: Regular rate and rhythm, no murmur, 2+ distal pulses   Abdomen: Soft, non-distended, non-tender, bowel sounds present  : Toro in place  Extremities: No peripheral edema   Skin: Dry, no rash   Musculoskeletal: No deformity, normal tone, MMT strength 4-5/5 throughout   Neurologic: Alert, oriented x2, cognitive impairment, normal speech, cranial nerves 2-12 intact, sensation intact to light touch, DTRs 1+ bilateral symmetrical patellar and brachioradialis  Psychiatric: Cooperative, appropriate mood & affect       Impression:     Metabolic encephalopathy   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 on vent   Hematuria  Urinary retention, chronic indwelling catheter  Acute blood loss anemia    Acute/subacute DVT - L peroneal vein   Suspected recent GI bleed   CAD, h/o recent NSTEMI   HTN HLD   COPD  GERD  Vocal cord dysfunction     Plan:     Tylenol, percocet   Asa 81   Plavix   Statin   BP meds, rate control   Flomax   Finasteride   Trelegy ellipta   PPI   Holding AC (xarelto) d/t poss GI bleed/hematuria   Trend hgb   Toro - urology considering void trial in 1 week   Bowel regimen     DVT ppx: no pharmacologic ppx d/t GI bleed/hematuria, SCD contraindicated d/t distal DVT   Medical comorbidities: hospitalist consult     Metabolic encephalopathy - Severe confusion from recent medical complications is resulting in delirium and possible psychosis with risks of falling. There can be multiple etiologies to include underlying infection, elevated ammonia levels, polypharmacy, negative drug response, narcotic sensitivity, stroke or cerebral hemorrhage. Close oversight by physicians, rehabilitation nurses, and therapists are required to prevent worsening. Medication changes will be made as appropriate. Risks: falls, malnutrition, aspiration pneumonia, pressure ulcer, worsening weakness, chronic cognitive impairment. Critical illness myopathy - Weakness is contributed to multiple medical complications, prolonged bed rest, prolonged respiratory +/- intubation, and muscle atrophy. Complications will be decreased cardiac and pulmonary output, dependent edema with hypoalbuminemia, orthostatic hypotension, and decreased wound healing/skin breakdown. Intensive therapy is the appropriate treatment and I expect improvement. Urinary retention increases risk of bladder infection, bladder injury, incontinence, and kidney injury/failure. Goal is to progress patient to independent spontaneous voiding as quickly as possible and to limit amount of time of catheterization. Post-void residuals will be monitored and intermittent catheterization will be utilized for increased bladder volumes. Nursing interventions including timed voids and bladder training education will be encouraged.       Anemia after blood loss can lead to increased fatigue, dizziness or orthostasis, encephalopathy with cognitive impairment, decreased activity tolerance, increased fall risk, and if severe then multi-organ failure. Hemoglobin will be monitored and appropriate medications will be started to support red blood cell growth. The need for blood transfusions to maintain red blood cell volume will be assessed regularly. DVT. Risk of pulmonary embolism, sudden respiratory failure, death. Coronary artery disease. Requires close monitoring in the setting of multiple medical comorbidities. Risks: myocardial infarction, angina, stroke, heart failure, aneurysm, arrhythmia. Hypertension can lead to stroke, myocardial infarction, syncope, renal failure, and pulmonary edema. Blood pressures will be followed by rehabilitation nursing and therapy staff to ensure a normalization of systolic and diastolic BP. Medication will need titration to ensure a normotensive range during hospitalization and stabilization of medications for discharge. COPD. Risks: respiratory failure, hypoxia, pneumonia, activity intolerance. GERD/GI prophylaxis. Risks: esophagitis, bleeding, malnutrition. The patient will receive 3 hours of therapy in physical, occupational, and speech therapy (if indicated) at least five days per week. PT will improve range of motion, strength, balance, endurance and mobility. OT will improve range of motion, strength, balance, mobility and ADLs. ST will address deficits in the areas of receptive communication, expressive communication, articulation, cognition, and swallow. Patient requires access to a physician 24 hours a day to minimize medical complications as a result of comorbidities.  The patient will be seen at least 3 times per week by the Dipak Davis will coordinate care and manage/prevent complications as a result of the patient's illness and impairments.        Treatment Plan Anticipated    Required Disciplines: PT, OT, CM, rehabilitation nursing, nutritional services      Intensity of Services: 3 hours per day at least 5 days per week      Estimated Length of Stay: 10-14 days       Attestation:  Considering all of the information above, it is my best judgment that this patient requires intensive rehabilitation therapy as described above in an inpatient hospital environment due to the complexity of nursing, medical management, and rehabilitation needs, and can reasonably be expected to participate in and benefit from an inpatient stay with an interdisciplinary team approach to the delivery of rehabilitation care under the direction and supervision of a rehabilitation physician.       Signature Line  Electronically Authenticated on  03/05/2022 10:48 AM  ____________________________________________________  Shamar Pa MD

## 2022-03-06 NOTE — DISCHARGE INSTRUCTIONS
Thank you! Thank you for allowing me to care for you in the emergency department. I sincerely hope that you are satisfied with your visit today. It is my goal to provide you with excellent care. Below you will find a list of your labs and imaging from your visit today. Should you have any questions regarding these results please do not hesitate to call the emergency department. Labs -     Recent Results (from the past 12 hour(s))   TYPE & SCREEN    Collection Time: 03/06/22  3:59 AM   Result Value Ref Range    Crossmatch Expiration 03/09/2022,2359     ABO/Rh(D) A Positive     Antibody screen Negative    CBC WITH AUTOMATED DIFF    Collection Time: 03/06/22  3:59 AM   Result Value Ref Range    WBC 6.7 4.1 - 11.1 K/uL    RBC 3.69 (L) 4.10 - 5.70 M/uL    HGB 10.9 (L) 12.1 - 17.0 g/dL    HCT 32.3 (L) 36.6 - 50.3 %    MCV 87.5 80.0 - 99.0 FL    MCH 29.5 26.0 - 34.0 PG    MCHC 33.7 30.0 - 36.5 g/dL    RDW 15.9 (H) 11.5 - 14.5 %    PLATELET 263 033 - 039 K/uL    MPV 8.9 8.9 - 12.9 FL    NRBC 0.0 0.0  WBC    ABSOLUTE NRBC 0.00 0.00 - 0.01 K/uL    NEUTROPHILS 59 32 - 75 %    LYMPHOCYTES 31 12 - 49 %    MONOCYTES 7 5 - 13 %    EOSINOPHILS 1 0 - 7 %    BASOPHILS 0 0 - 1 %    IMMATURE GRANULOCYTES 2 (H) 0 - 0.5 %    ABS. NEUTROPHILS 4.0 1.8 - 8.0 K/UL    ABS. LYMPHOCYTES 2.1 0.8 - 3.5 K/UL    ABS. MONOCYTES 0.5 0.0 - 1.0 K/UL    ABS. EOSINOPHILS 0.1 0.0 - 0.4 K/UL    ABS. BASOPHILS 0.0 0.0 - 0.1 K/UL    ABS. IMM.  GRANS. 0.1 (H) 0.00 - 0.04 K/UL    DF AUTOMATED     METABOLIC PANEL, COMPREHENSIVE    Collection Time: 03/06/22  3:59 AM   Result Value Ref Range    Sodium 136 136 - 145 mmol/L    Potassium 3.5 3.5 - 5.1 mmol/L    Chloride 105 97 - 108 mmol/L    CO2 27 21 - 32 mmol/L    Anion gap 4 (L) 5 - 15 mmol/L    Glucose 117 (H) 65 - 100 mg/dL    BUN 10 6 - 20 mg/dL    Creatinine 0.81 0.70 - 1.30 mg/dL    BUN/Creatinine ratio 12 12 - 20      GFR est AA >60 >60 ml/min/1.73m2    GFR est non-AA >60 >60 ml/min/1.73m2    Calcium 8.7 8.5 - 10.1 mg/dL    Bilirubin, total 0.5 0.2 - 1.0 mg/dL    AST (SGOT) 29 15 - 37 U/L    ALT (SGPT) 81 (H) 12 - 78 U/L    Alk. phosphatase 97 45 - 117 U/L    Protein, total 6.0 (L) 6.4 - 8.2 g/dL    Albumin 2.6 (L) 3.5 - 5.0 g/dL    Globulin 3.4 2.0 - 4.0 g/dL    A-G Ratio 0.8 (L) 1.1 - 2.2     LIPASE    Collection Time: 03/06/22  3:59 AM   Result Value Ref Range    Lipase 79 73 - 393 U/L   URINALYSIS W/ RFLX MICROSCOPIC    Collection Time: 03/06/22  5:34 AM   Result Value Ref Range    Color Yellow/Straw      Appearance Clear Clear      Specific gravity 1.005 1.003 - 1.030      pH (UA) 5.0 5.0 - 8.0      Protein Negative Negative mg/dL    Glucose Negative Negative mg/dL    Ketone Negative Negative mg/dL    Bilirubin Negative Negative      Blood Large (A) Negative      Urobilinogen 0.1 0.1 - 1.0 EU/dL    Nitrites Negative Negative      Leukocyte Esterase Negative Negative     URINE MICROSCOPIC    Collection Time: 03/06/22  5:34 AM   Result Value Ref Range    WBC 0-4 0 - 4 /hpf    RBC 5-10 0 - 5 /hpf    Bacteria Negative Negative /hpf    Hyaline cast 0-2 0 - 5 /lpf       Radiologic Studies -   CT ABD PELV W CONT   Final Result   No acute abnormality demonstrated. Large amount of stool throughout   the colon is noted. Toro catheter is in place. Tiny renal cysts. CT Results  (Last 48 hours)                 03/06/22 0610  CT ABD PELV W CONT Final result    Impression:  No acute abnormality demonstrated. Large amount of stool throughout   the colon is noted. Toro catheter is in place. Tiny renal cysts. Narrative:  Dose Reduction:        All CT scans at this facility are performed using dose reduction optimization   techniques as appropriate to a performed exam including the following: Automated   exposure control, adjustments of the mA and/or kV according to patient size, or   use of iterative reconstruction technique.        CT of the abdomen and pelvis with contrast. Axial images of the abdomen and   pelvis were obtained during IV administration of 100 mL Isovue-370. Sagittal and   coronal reconstructions were created. Comparison prior exam dated 8/12/2018. Lung bases are clear and no pleural effusion is seen. The liver spleen and   pancreas are unremarkable. No adrenal mass is seen. No renal stone or mass is   identified. A small cyst is seen within each kidney. There is no hydronephrosis   or ureteral dilatation. No ureteral stone is seen. The urinary bladder contains   a Toro catheter is not well evaluated. No dilated bowel or free air or free   fluid is seen. There is a large amount stool throughout the colon. The appendix   is not definitely identified however there is no CT evidence of appendicitis or   diverticulitis. CXR Results  (Last 48 hours)      None               If you feel that you have not received excellent quality care or timely care, please ask to speak to the nurse manager. Please choose us in the future for your continued health care needs. ------------------------------------------------------------------------------------------------------------  The exam and treatment you received in the Emergency Department were for an urgent problem and are not intended as complete care. It is important that you follow-up with a doctor, nurse practitioner, or physician assistant to:  (1) confirm your diagnosis,  (2) re-evaluation of changes in your illness and treatment, and  (3) for ongoing care. If your symptoms become worse or you do not improve as expected and you are unable to reach your usual health care provider, you should return to the Emergency Department. We are available 24 hours a day. Please take your discharge instructions with you when you go to your follow-up appointment. If a prescription has been provided, please have it filled as soon as possible to prevent a delay in treatment.  Read the entire medication instruction sheet provided to you by the pharmacy. If you have any questions or reservations about taking the medication due to side effects or interactions with other medications, please call your primary care physician or contact the ER to speak with the charge nurse. Make an appointment with your family doctor or the physician you were referred to for follow-up of this visit as instructed on your discharge paperwork, as this is a mandatory follow-up. Return to the ER if you are unable to be seen or if you are unable to be seen in a timely manner. If you have any problem arranging the follow-up visit, contact the Emergency Department immediately.

## 2022-03-06 NOTE — ED PROVIDER NOTES
HPI   73year old c/o rubin tube stopping working 1 hr ago with resultant urinary retention leading to penile and suprapubic pain which is gradual onset, constant, nonradiating, worse with palpation, burning/pulling, and severe. According to pt rubin was placed within the last 48H at [address] [place] [address] for urinary retention and hematuria. Xarelto stopped within last 24 hrs. Past Medical History:   Diagnosis Date    CAD (coronary artery disease)     Chronic anticoagulation     Chronic indwelling Rubin catheter     Chronic obstructive pulmonary disease (HCC)     GERD (gastroesophageal reflux disease)     Hx of deep venous thrombosis     Hyperlipidemia     Hypertension     Urine retention     Vocal cord dysfunction        Past Surgical History:   Procedure Laterality Date    HX CORONARY STENT PLACEMENT           History reviewed. No pertinent family history. Social History     Socioeconomic History    Marital status:      Spouse name: Not on file    Number of children: Not on file    Years of education: Not on file    Highest education level: Not on file   Occupational History    Not on file   Tobacco Use    Smoking status: Former Smoker    Smokeless tobacco: Never Used   Substance and Sexual Activity    Alcohol use: Not Currently    Drug use: Never    Sexual activity: Not Currently   Other Topics Concern    Not on file   Social History Narrative    Not on file     Social Determinants of Health     Financial Resource Strain:     Difficulty of Paying Living Expenses: Not on file   Food Insecurity:     Worried About [address] in the Last Year: Not on file    [place] of Food in the Last Year: Not on file   Transportation Needs:     Lack of Transportation (Medical): Not on file    Lack of Transportation (Non-Medical):  Not on file   Physical Activity:     Days of Exercise per Week: Not on file    Minutes of Exercise per Session: Not on file   Stress:     Feeling of Stress : Not on file   Social Connections:     Frequency of Communication with Friends and Family: Not on file    Frequency of Social Gatherings with Friends and Family: Not on file    Attends Sabianism Services: Not on file    Active Member of Clubs or Organizations: Not on file    Attends Club or Organization Meetings: Not on file    Marital Status: Not on file   Intimate Partner Violence:     Fear of Current or Ex-Partner: Not on file    Emotionally Abused: Not on file    Physically Abused: Not on file    Sexually Abused: Not on file   Housing Stability:     Unable to Pay for Housing in the Last Year: Not on file    Number of [place] in the Last Year: Not on file    Unstable Housing in the Last Year: Not on file         ALLERGIES: Sulfa (sulfonamide antibiotics)    Review of Systems   Constitutional: Negative for chills and fever. HENT: Negative for rhinorrhea and sore throat. Eyes: Negative for pain and redness. Respiratory: Negative for cough and shortness of breath. Cardiovascular: Negative for chest pain and leg swelling. Gastrointestinal: Positive for abdominal pain. Negative for nausea and vomiting. Endocrine: Negative for polydipsia and polyuria. Genitourinary: Positive for difficulty urinating and penile pain. Negative for decreased urine volume, dysuria and frequency. Musculoskeletal: Negative for arthralgias and back pain. Skin: Negative for color change and rash. Allergic/Immunologic: Negative for environmental allergies, food allergies and immunocompromised state. Neurological: Negative for dizziness and headaches. Hematological: Negative for adenopathy. Does not bruise/bleed easily. Psychiatric/Behavioral: Negative for agitation and hallucinations. All other systems reviewed and are negative.       Vitals:    03/06/22 0345 03/06/22 0346   BP: (!) 148/82    Pulse: (!) 110    Resp: 19    Temp: 98.4 °F (36.9 °C)    SpO2: 95%    Weight:  101.2 kg (223 lb)   Height:  6' 3\" (1.905 m)            Physical Exam  Vitals and nursing note reviewed. Constitutional:       General: He is not in acute distress. Appearance: He is not toxic-appearing or diaphoretic. HENT:      Head: Normocephalic and atraumatic. Right Ear: External ear normal.      Left Ear: External ear normal.      Nose: Nose normal. No rhinorrhea. Mouth/Throat:      Mouth: Mucous membranes are moist.      Pharynx: Oropharynx is clear. Eyes:      General:         Right eye: No discharge. Left eye: No discharge. Cardiovascular:      Rate and Rhythm: Normal rate and regular rhythm. Pulses: Normal pulses. Heart sounds: Normal heart sounds. No murmur heard. No friction rub. No gallop. Pulmonary:      Effort: Pulmonary effort is normal. No respiratory distress. Breath sounds: Normal breath sounds. No wheezing, rhonchi or rales. Abdominal:      Comments: Suprapubic area distended and tender   Musculoskeletal:         General: No deformity or signs of injury. Cervical back: Normal range of motion and neck supple. Skin:     General: Skin is warm and dry. Capillary Refill: Capillary refill takes less than 2 seconds. Neurological:      General: No focal deficit present. Mental Status: He is alert and oriented to person, place, and time. Psychiatric:         Mood and Affect: Mood normal.         Behavior: Behavior normal.      Reevaluation 0520: Unable to see any retained urine on bladder scan and pt stating he is hard to cath due to enlarged prostate/had to be cathed by urology last time. Obtaining CT to evaluate. Reevaluation 8518:  Signing out to oncoming physician at this time.

## 2022-03-07 LAB
ATRIAL RATE: 102 BPM
CALCULATED P AXIS, ECG09: 67 DEGREES
CALCULATED R AXIS, ECG10: 45 DEGREES
CALCULATED T AXIS, ECG11: 61 DEGREES
DIAGNOSIS, 93000: NORMAL
P-R INTERVAL, ECG05: 158 MS
Q-T INTERVAL, ECG07: 336 MS
QRS DURATION, ECG06: 92 MS
QTC CALCULATION (BEZET), ECG08: 437 MS
VENTRICULAR RATE, ECG03: 102 BPM

## 2022-03-09 ENCOUNTER — PATIENT OUTREACH (OUTPATIENT)
Dept: CASE MANAGEMENT | Age: 67
End: 2022-03-09

## 2022-03-10 ENCOUNTER — HOSPITAL ENCOUNTER (INPATIENT)
Age: 67
LOS: 2 days | Discharge: NURSING, REHAB OR LONG-TERM CARE | DRG: 175 | End: 2022-03-12
Attending: EMERGENCY MEDICINE | Admitting: INTERNAL MEDICINE
Payer: MEDICARE

## 2022-03-10 ENCOUNTER — APPOINTMENT (OUTPATIENT)
Dept: CT IMAGING | Age: 67
DRG: 175 | End: 2022-03-10
Attending: EMERGENCY MEDICINE
Payer: MEDICARE

## 2022-03-10 ENCOUNTER — APPOINTMENT (OUTPATIENT)
Dept: NON INVASIVE DIAGNOSTICS | Age: 67
DRG: 175 | End: 2022-03-10
Attending: EMERGENCY MEDICINE
Payer: MEDICARE

## 2022-03-10 ENCOUNTER — APPOINTMENT (OUTPATIENT)
Dept: INTERVENTIONAL RADIOLOGY/VASCULAR | Age: 67
DRG: 175 | End: 2022-03-10
Attending: PHYSICIAN ASSISTANT
Payer: MEDICARE

## 2022-03-10 ENCOUNTER — APPOINTMENT (OUTPATIENT)
Dept: GENERAL RADIOLOGY | Age: 67
DRG: 175 | End: 2022-03-10
Attending: EMERGENCY MEDICINE
Payer: MEDICARE

## 2022-03-10 DIAGNOSIS — D64.9 ANEMIA, UNSPECIFIED TYPE: ICD-10-CM

## 2022-03-10 DIAGNOSIS — I26.99 OTHER ACUTE PULMONARY EMBOLISM, UNSPECIFIED WHETHER ACUTE COR PULMONALE PRESENT (HCC): ICD-10-CM

## 2022-03-10 DIAGNOSIS — I82.462 DEEP VEIN THROMBOSIS (DVT) OF CALF MUSCLE VEIN OF LEFT LOWER EXTREMITY, UNSPECIFIED CHRONICITY (HCC): ICD-10-CM

## 2022-03-10 DIAGNOSIS — R55 SYNCOPE AND COLLAPSE: Primary | ICD-10-CM

## 2022-03-10 LAB
ABO + RH BLD: NORMAL
ALBUMIN SERPL-MCNC: 2.8 G/DL (ref 3.5–5)
ALBUMIN/GLOB SERPL: 1 {RATIO} (ref 1.1–2.2)
ALP SERPL-CCNC: 91 U/L (ref 45–117)
ALT SERPL-CCNC: 52 U/L (ref 12–78)
ANION GAP SERPL CALC-SCNC: 7 MMOL/L (ref 5–15)
APTT PPP: 25.3 SEC (ref 21.2–34.1)
AST SERPL W P-5'-P-CCNC: 22 U/L (ref 15–37)
ATRIAL RATE: 88 BPM
BASOPHILS # BLD: 0 K/UL (ref 0–0.1)
BASOPHILS NFR BLD: 0 % (ref 0–1)
BILIRUB SERPL-MCNC: 0.3 MG/DL (ref 0.2–1)
BLOOD GROUP ANTIBODIES SERPL: NEGATIVE
BNP SERPL-MCNC: 71 PG/ML
BUN SERPL-MCNC: 14 MG/DL (ref 6–20)
BUN/CREAT SERPL: 18 (ref 12–20)
CA-I BLD-MCNC: 8.6 MG/DL (ref 8.5–10.1)
CALCULATED P AXIS, ECG09: 50 DEGREES
CALCULATED R AXIS, ECG10: 28 DEGREES
CALCULATED T AXIS, ECG11: 52 DEGREES
CHLORIDE SERPL-SCNC: 106 MMOL/L (ref 97–108)
CO2 SERPL-SCNC: 27 MMOL/L (ref 21–32)
CREAT SERPL-MCNC: 0.8 MG/DL (ref 0.7–1.3)
D DIMER PPP FEU-MCNC: 1.85 UG/ML(FEU)
DIAGNOSIS, 93000: NORMAL
DIFFERENTIAL METHOD BLD: ABNORMAL
EOSINOPHIL # BLD: 0.1 K/UL (ref 0–0.4)
EOSINOPHIL NFR BLD: 1 % (ref 0–7)
ERYTHROCYTE [DISTWIDTH] IN BLOOD BY AUTOMATED COUNT: 16.7 % (ref 11.5–14.5)
GLOBULIN SER CALC-MCNC: 2.9 G/DL (ref 2–4)
GLUCOSE SERPL-MCNC: 107 MG/DL (ref 65–100)
HCT VFR BLD AUTO: 31.9 % (ref 36.6–50.3)
HGB BLD-MCNC: 10.2 G/DL (ref 12.1–17)
IMM GRANULOCYTES # BLD AUTO: 0.1 K/UL (ref 0–0.04)
IMM GRANULOCYTES NFR BLD AUTO: 1 % (ref 0–0.5)
INR PPP: 1 (ref 0.9–1.1)
LACTATE SERPL-SCNC: 1.6 MMOL/L (ref 0.4–2)
LYMPHOCYTES # BLD: 1.4 K/UL (ref 0.8–3.5)
LYMPHOCYTES NFR BLD: 30 % (ref 12–49)
MAGNESIUM SERPL-MCNC: 1.8 MG/DL (ref 1.6–2.4)
MCH RBC QN AUTO: 29.4 PG (ref 26–34)
MCHC RBC AUTO-ENTMCNC: 32 G/DL (ref 30–36.5)
MCV RBC AUTO: 91.9 FL (ref 80–99)
MONOCYTES # BLD: 0.4 K/UL (ref 0–1)
MONOCYTES NFR BLD: 10 % (ref 5–13)
NEUTS SEG # BLD: 2.7 K/UL (ref 1.8–8)
NEUTS SEG NFR BLD: 58 % (ref 32–75)
NRBC # BLD: 0 K/UL (ref 0–0.01)
NRBC BLD-RTO: 0 PER 100 WBC
P-R INTERVAL, ECG05: 166 MS
PLATELET # BLD AUTO: 219 K/UL (ref 150–400)
PMV BLD AUTO: 8.7 FL (ref 8.9–12.9)
POTASSIUM SERPL-SCNC: 4 MMOL/L (ref 3.5–5.1)
PROT SERPL-MCNC: 5.7 G/DL (ref 6.4–8.2)
PROTHROMBIN TIME: 12.5 SEC (ref 11.9–14.6)
Q-T INTERVAL, ECG07: 360 MS
QRS DURATION, ECG06: 92 MS
QTC CALCULATION (BEZET), ECG08: 435 MS
RBC # BLD AUTO: 3.47 M/UL (ref 4.1–5.7)
SODIUM SERPL-SCNC: 140 MMOL/L (ref 136–145)
SPECIMEN EXP DATE BLD: NORMAL
THERAPEUTIC RANGE,PTTT: NORMAL SEC (ref 82–109)
TROPONIN-HIGH SENSITIVITY: 8 NG/L (ref 0–76)
VENTRICULAR RATE, ECG03: 88 BPM
WBC # BLD AUTO: 4.7 K/UL (ref 4.1–11.1)

## 2022-03-10 PROCEDURE — 83605 ASSAY OF LACTIC ACID: CPT

## 2022-03-10 PROCEDURE — 84484 ASSAY OF TROPONIN QUANT: CPT

## 2022-03-10 PROCEDURE — 83735 ASSAY OF MAGNESIUM: CPT

## 2022-03-10 PROCEDURE — 74174 CTA ABD&PLVS W/CONTRAST: CPT

## 2022-03-10 PROCEDURE — 06H03DZ INSERTION OF INTRALUMINAL DEVICE INTO INFERIOR VENA CAVA, PERCUTANEOUS APPROACH: ICD-10-PCS | Performed by: PHYSICIAN ASSISTANT

## 2022-03-10 PROCEDURE — 80053 COMPREHEN METABOLIC PANEL: CPT

## 2022-03-10 PROCEDURE — 85730 THROMBOPLASTIN TIME PARTIAL: CPT

## 2022-03-10 PROCEDURE — 85379 FIBRIN DEGRADATION QUANT: CPT

## 2022-03-10 PROCEDURE — 93970 EXTREMITY STUDY: CPT

## 2022-03-10 PROCEDURE — 71045 X-RAY EXAM CHEST 1 VIEW: CPT

## 2022-03-10 PROCEDURE — 96374 THER/PROPH/DIAG INJ IV PUSH: CPT

## 2022-03-10 PROCEDURE — 74011250637 HC RX REV CODE- 250/637: Performed by: INTERNAL MEDICINE

## 2022-03-10 PROCEDURE — 86900 BLOOD TYPING SEROLOGIC ABO: CPT

## 2022-03-10 PROCEDURE — 74011000636 HC RX REV CODE- 636: Performed by: EMERGENCY MEDICINE

## 2022-03-10 PROCEDURE — 96375 TX/PRO/DX INJ NEW DRUG ADDON: CPT

## 2022-03-10 PROCEDURE — 85610 PROTHROMBIN TIME: CPT

## 2022-03-10 PROCEDURE — 99285 EMERGENCY DEPT VISIT HI MDM: CPT

## 2022-03-10 PROCEDURE — 65270000029 HC RM PRIVATE

## 2022-03-10 PROCEDURE — 74011250636 HC RX REV CODE- 250/636: Performed by: PHYSICIAN ASSISTANT

## 2022-03-10 PROCEDURE — C1894 INTRO/SHEATH, NON-LASER: HCPCS

## 2022-03-10 PROCEDURE — 93005 ELECTROCARDIOGRAM TRACING: CPT

## 2022-03-10 PROCEDURE — 71275 CT ANGIOGRAPHY CHEST: CPT

## 2022-03-10 PROCEDURE — 36415 COLL VENOUS BLD VENIPUNCTURE: CPT

## 2022-03-10 PROCEDURE — 74011000250 HC RX REV CODE- 250: Performed by: PHYSICIAN ASSISTANT

## 2022-03-10 PROCEDURE — 74011250637 HC RX REV CODE- 250/637: Performed by: PHYSICIAN ASSISTANT

## 2022-03-10 PROCEDURE — 83880 ASSAY OF NATRIURETIC PEPTIDE: CPT

## 2022-03-10 PROCEDURE — 74011250636 HC RX REV CODE- 250/636: Performed by: EMERGENCY MEDICINE

## 2022-03-10 PROCEDURE — 85025 COMPLETE CBC W/AUTO DIFF WBC: CPT

## 2022-03-10 PROCEDURE — 74011000636 HC RX REV CODE- 636: Performed by: INTERNAL MEDICINE

## 2022-03-10 RX ORDER — SODIUM CHLORIDE 9 MG/ML
75 INJECTION, SOLUTION INTRAVENOUS CONTINUOUS
Status: CANCELLED | OUTPATIENT
Start: 2022-03-10

## 2022-03-10 RX ORDER — SODIUM CHLORIDE 0.9 % (FLUSH) 0.9 %
5-40 SYRINGE (ML) INJECTION AS NEEDED
Status: CANCELLED | OUTPATIENT
Start: 2022-03-10

## 2022-03-10 RX ORDER — SODIUM CHLORIDE 0.9 % (FLUSH) 0.9 %
5-40 SYRINGE (ML) INJECTION EVERY 8 HOURS
Status: DISCONTINUED | OUTPATIENT
Start: 2022-03-10 | End: 2022-03-12 | Stop reason: HOSPADM

## 2022-03-10 RX ORDER — SODIUM CHLORIDE 0.9 % (FLUSH) 0.9 %
5-40 SYRINGE (ML) INJECTION EVERY 8 HOURS
Status: CANCELLED | OUTPATIENT
Start: 2022-03-10

## 2022-03-10 RX ORDER — TAMSULOSIN HYDROCHLORIDE 0.4 MG/1
0.4 CAPSULE ORAL DAILY
Status: DISCONTINUED | OUTPATIENT
Start: 2022-03-11 | End: 2022-03-12 | Stop reason: HOSPADM

## 2022-03-10 RX ORDER — TRAMADOL HYDROCHLORIDE 50 MG/1
50 TABLET ORAL
Status: DISCONTINUED | OUTPATIENT
Start: 2022-03-10 | End: 2022-03-12 | Stop reason: HOSPADM

## 2022-03-10 RX ORDER — ONDANSETRON 2 MG/ML
4 INJECTION INTRAMUSCULAR; INTRAVENOUS
Status: COMPLETED | OUTPATIENT
Start: 2022-03-10 | End: 2022-03-10

## 2022-03-10 RX ORDER — PANTOPRAZOLE SODIUM 40 MG/1
40 TABLET, DELAYED RELEASE ORAL
Status: DISCONTINUED | OUTPATIENT
Start: 2022-03-10 | End: 2022-03-12 | Stop reason: HOSPADM

## 2022-03-10 RX ORDER — SODIUM CHLORIDE 0.9 % (FLUSH) 0.9 %
5-40 SYRINGE (ML) INJECTION AS NEEDED
Status: DISCONTINUED | OUTPATIENT
Start: 2022-03-10 | End: 2022-03-12 | Stop reason: HOSPADM

## 2022-03-10 RX ORDER — IPRATROPIUM BROMIDE AND ALBUTEROL SULFATE 2.5; .5 MG/3ML; MG/3ML
3 SOLUTION RESPIRATORY (INHALATION)
Status: DISCONTINUED | OUTPATIENT
Start: 2022-03-10 | End: 2022-03-12 | Stop reason: HOSPADM

## 2022-03-10 RX ORDER — FINASTERIDE 5 MG/1
5 TABLET, FILM COATED ORAL DAILY
Status: DISCONTINUED | OUTPATIENT
Start: 2022-03-11 | End: 2022-03-12 | Stop reason: HOSPADM

## 2022-03-10 RX ORDER — ACETAMINOPHEN 650 MG/1
650 SUPPOSITORY RECTAL
Status: DISCONTINUED | OUTPATIENT
Start: 2022-03-10 | End: 2022-03-12 | Stop reason: HOSPADM

## 2022-03-10 RX ORDER — POLYETHYLENE GLYCOL 3350 17 G/17G
17 POWDER, FOR SOLUTION ORAL DAILY PRN
Status: DISCONTINUED | OUTPATIENT
Start: 2022-03-10 | End: 2022-03-12 | Stop reason: HOSPADM

## 2022-03-10 RX ORDER — OXYCODONE AND ACETAMINOPHEN 5; 325 MG/1; MG/1
1 TABLET ORAL ONCE
Status: COMPLETED | OUTPATIENT
Start: 2022-03-10 | End: 2022-03-10

## 2022-03-10 RX ORDER — MORPHINE SULFATE 4 MG/ML
4 INJECTION INTRAVENOUS ONCE
Status: COMPLETED | OUTPATIENT
Start: 2022-03-10 | End: 2022-03-10

## 2022-03-10 RX ORDER — ONDANSETRON 4 MG/1
4 TABLET, ORALLY DISINTEGRATING ORAL
Status: DISCONTINUED | OUTPATIENT
Start: 2022-03-10 | End: 2022-03-12 | Stop reason: HOSPADM

## 2022-03-10 RX ORDER — ACETAMINOPHEN 325 MG/1
650 TABLET ORAL
Status: DISCONTINUED | OUTPATIENT
Start: 2022-03-10 | End: 2022-03-12 | Stop reason: HOSPADM

## 2022-03-10 RX ORDER — ONDANSETRON 2 MG/ML
4 INJECTION INTRAMUSCULAR; INTRAVENOUS
Status: DISCONTINUED | OUTPATIENT
Start: 2022-03-10 | End: 2022-03-12 | Stop reason: HOSPADM

## 2022-03-10 RX ORDER — ATORVASTATIN CALCIUM 40 MG/1
40 TABLET, FILM COATED ORAL DAILY
Status: DISCONTINUED | OUTPATIENT
Start: 2022-03-11 | End: 2022-03-12 | Stop reason: HOSPADM

## 2022-03-10 RX ADMIN — IOPAMIDOL 29 ML: 755 INJECTION, SOLUTION INTRAVENOUS at 15:13

## 2022-03-10 RX ADMIN — IOPAMIDOL 100 ML: 755 INJECTION, SOLUTION INTRAVENOUS at 12:22

## 2022-03-10 RX ADMIN — ACETAMINOPHEN 650 MG: 325 TABLET ORAL at 17:32

## 2022-03-10 RX ADMIN — OXYCODONE AND ACETAMINOPHEN 1 TABLET: 5; 325 TABLET ORAL at 21:31

## 2022-03-10 RX ADMIN — ONDANSETRON 4 MG: 2 INJECTION INTRAMUSCULAR; INTRAVENOUS at 21:31

## 2022-03-10 RX ADMIN — PANTOPRAZOLE SODIUM 40 MG: 40 TABLET, DELAYED RELEASE ORAL at 17:31

## 2022-03-10 RX ADMIN — ONDANSETRON 4 MG: 2 INJECTION INTRAMUSCULAR; INTRAVENOUS at 10:55

## 2022-03-10 RX ADMIN — SODIUM CHLORIDE, PRESERVATIVE FREE 10 ML: 5 INJECTION INTRAVENOUS at 21:34

## 2022-03-10 RX ADMIN — MORPHINE SULFATE 4 MG: 4 INJECTION INTRAVENOUS at 10:55

## 2022-03-10 NOTE — CONSULTS
Gastroenterology Consult     Referring Physician: Alice Martel MD     Consult Date: 3/10/2022     Subjective:     Chief Complaint: Syncopal episode    History of Present Illness: Edinson Mg is a 73 y.o. male who is seen in consultation for GI bleed-vomiting of blood. Patient was admitted to the hospital from [facility] rehab due to syncopal episode. Patient had a recent prolonged hospitalization at [facility] for NSTEMI and hypoxic respiratory failure where he was intubated. He developed DVT during the hospital stay and was put on Xarelto upon discharged to [facility]. He was at the  Rehab for several days. Yesterday, patient had an episode of vomiting up blood. He was working with occupational therapy when he developed chest pain and lightheadedness, then had a syncopal episode. Other PMH includes COPD, GERD, HTN, chronic rubin catheter for intermittent hematuria and pain.     -Initial labs in ED shows WBC 8.6, Hgb 10.2, albumin 2.8, ddimer 1.85.  -3/10 CXR with No acute cardiopulmonary process. -3/10 Duplex lower ext. - no DVT  -3/10 CTA chest - New segmental pulmonary embolus within the right lower lobe. Unchanged segmental pulmonary embolus within the right middle lobe. No CT evidence of right heart strain.  -3/10 CT abdomen - No evidence of active gastrointestinal bleeding. No acute process. Patient seen in the ED, awake and alert. No complain of pain. S/p IVC filter placement today. No further episode of vomiting.    Past Medical History:   Diagnosis Date    CAD (coronary artery disease)     Chronic anticoagulation     Chronic indwelling Rubin catheter     Chronic obstructive pulmonary disease (HCC)     GERD (gastroesophageal reflux disease)     Hx of deep venous thrombosis     Hyperlipidemia     Hypertension     Urine retention     Vocal cord dysfunction      Past Surgical History:   Procedure Laterality Date    HX CORONARY STENT PLACEMENT        No family history on file.  Social History     Tobacco Use    Smoking status: Former Smoker    Smokeless tobacco: Never Used   Substance Use Topics    Alcohol use: Not Currently      Allergies   Allergen Reactions    Sulfa (Sulfonamide Antibiotics) Other (comments)     syncope       Current Facility-Administered Medications   Medication Dose Route Frequency    sodium chloride (NS) flush 5-40 mL  5-40 mL IntraVENous Q8H    sodium chloride (NS) flush 5-40 mL  5-40 mL IntraVENous PRN    acetaminophen (TYLENOL) tablet 650 mg  650 mg Oral Q6H PRN    Or    acetaminophen (TYLENOL) suppository 650 mg  650 mg Rectal Q6H PRN    polyethylene glycol (MIRALAX) packet 17 g  17 g Oral DAILY PRN    ondansetron (ZOFRAN ODT) tablet 4 mg  4 mg Oral Q8H PRN    Or    ondansetron (ZOFRAN) injection 4 mg  4 mg IntraVENous Q6H PRN    [START ON 3/11/2022] atorvastatin (LIPITOR) tablet 40 mg  40 mg Oral DAILY    pantoprazole (PROTONIX) tablet 40 mg  40 mg Oral ACB&D    [START ON 3/11/2022] finasteride (PROSCAR) tablet 5 mg  5 mg Oral DAILY    [START ON 3/11/2022] fluticasone-umeclidinium-vilanterol (TRELEGY ELLIPTA) inhaler 1 Puff  1 Puff Inhalation DAILY    albuterol-ipratropium (DUO-NEB) 2.5 MG-0.5 MG/3 ML  3 mL Nebulization Q4H PRN    [START ON 3/11/2022] tamsulosin (FLOMAX) capsule 0.4 mg  0.4 mg Oral DAILY    traMADoL (ULTRAM) tablet 50 mg  50 mg Oral Q6H PRN     Current Outpatient Medications   Medication Sig    acetaminophen (TYLENOL) 325 mg tablet Take 2 Tablets by mouth every six (6) hours as needed for Pain.  ibuprofen (MOTRIN) 600 mg tablet Take 1 Tablet by mouth three (3) times daily as needed for Pain.  methocarbamoL (Robaxin-750) 750 mg tablet Take 1 Tablet by mouth three (3) times daily as needed for Muscle Spasm(s).  acetaminophen (TYLENOL) 325 mg tablet Take 2 Tablets by mouth every six (6) hours as needed for Pain or Fever.  amLODIPine (NORVASC) 10 mg tablet Take 1 Tablet by mouth daily.  (Patient not taking: Reported on 3/2/2022)    nystatin (MYCOSTATIN) 100,000 unit/mL suspension Take 5 mL by mouth four (4) times daily. swish and spit    aspirin 81 mg chewable tablet Take 81 mg by mouth daily. (Patient not taking: Reported on 2/7/2022)    atorvastatin (Lipitor) 40 mg tablet Take 40 mg by mouth daily.  clopidogreL (PLAVIX) 75 mg tab Take 75 mg by mouth daily. (Patient not taking: Reported on 2/7/2022)    esomeprazole (NexIUM) 40 mg capsule Take 40 mg by mouth daily.  finasteride (PROSCAR) 5 mg tablet Take 5 mg by mouth daily.  fluticasone-umeclidinium-vilanterol (TRELEGY ELLIPTA) 100-62.5-25 mcg inhaler Take 1 Puff by inhalation daily.  ipratropium-albuteroL (Combivent Respimat)  mcg/actuation inhaler Take 1 Puff by inhalation two (2) times daily as needed.  tamsulosin (Flomax) 0.4 mg capsule Take 0.4 mg by mouth daily.  metoprolol succinate (TOPROL-XL) 25 mg XL tablet Take 25 mg by mouth daily. (Patient not taking: Reported on 2/7/2022)        Review of Systems:  A detailed 10 organ review of systems is obtained with pertinent positives as listed in the History of Present Illness and Past Medical History. All others are negative. Objective:     Physical Exam:  Visit Vitals  /75 (BP 1 Location: Left upper arm, BP Patient Position: At rest)   Pulse 100   Temp 98.6 °F (37 °C)   Resp 20   Ht 6' 3\" (1.905 m)   Wt 102.1 kg (225 lb)   SpO2 97%   BMI 28.12 kg/m²        Skin:  Extremities and face reveal no rashes. No cowart erythema. No telangiectasias on the chest wall. HEENT: Sclerae anicteric. Extra-occular muscles are intact. No oral ulcers. No abnormal pigmentation of the lips. The neck is supple. Cardiovascular: Regular rate and rhythm. No murmurs, gallops, or rubs. PMI nondisplaced. Carotids without bruits. Respiratory:  Comfortable breathing with no accessory muscle use. Clear breath sounds with no wheezes, rales, or rhonchi. GI:  Abdomen nondistended, soft, and nontender. Normal active bowel sounds. No enlargement of the liver or spleen. No masses palpable. Rectal:  Deferred  Musculoskeletal:  No pitting edema of the lower legs. Extremities have good range of motion. No costovertebral tenderness. Neurological:  Gross memory appears intact. Patient is alert and oriented. Psychiatric:  Mood appears appropriate with judgement intact. Lymphatic:  No cervical or supraclavicular adenopathy. Lab/Data Review:  Recent Results (from the past 24 hour(s))   CBC WITH AUTOMATED DIFF    Collection Time: 03/10/22  9:39 AM   Result Value Ref Range    WBC 4.7 4.1 - 11.1 K/uL    RBC 3.47 (L) 4.10 - 5.70 M/uL    HGB 10.2 (L) 12.1 - 17.0 g/dL    HCT 31.9 (L) 36.6 - 50.3 %    MCV 91.9 80.0 - 99.0 FL    MCH 29.4 26.0 - 34.0 PG    MCHC 32.0 30.0 - 36.5 g/dL    RDW 16.7 (H) 11.5 - 14.5 %    PLATELET 222 722 - 222 K/uL    MPV 8.7 (L) 8.9 - 12.9 FL    NRBC 0.0 0.0  WBC    ABSOLUTE NRBC 0.00 0.00 - 0.01 K/uL    NEUTROPHILS 58 32 - 75 %    LYMPHOCYTES 30 12 - 49 %    MONOCYTES 10 5 - 13 %    EOSINOPHILS 1 0 - 7 %    BASOPHILS 0 0 - 1 %    IMMATURE GRANULOCYTES 1 (H) 0 - 0.5 %    ABS. NEUTROPHILS 2.7 1.8 - 8.0 K/UL    ABS. LYMPHOCYTES 1.4 0.8 - 3.5 K/UL    ABS. MONOCYTES 0.4 0.0 - 1.0 K/UL    ABS. EOSINOPHILS 0.1 0.0 - 0.4 K/UL    ABS. BASOPHILS 0.0 0.0 - 0.1 K/UL    ABS. IMM.  GRANS. 0.1 (H) 0.00 - 0.04 K/UL    DF AUTOMATED     TYPE & SCREEN    Collection Time: 03/10/22  9:39 AM   Result Value Ref Range    Crossmatch Expiration 03/13/2022,2359     ABO/Rh(D) A Positive     Antibody screen Negative    METABOLIC PANEL, COMPREHENSIVE    Collection Time: 03/10/22  9:39 AM   Result Value Ref Range    Sodium 140 136 - 145 mmol/L    Potassium 4.0 3.5 - 5.1 mmol/L    Chloride 106 97 - 108 mmol/L    CO2 27 21 - 32 mmol/L    Anion gap 7 5 - 15 mmol/L    Glucose 107 (H) 65 - 100 mg/dL    BUN 14 6 - 20 mg/dL    Creatinine 0.80 0.70 - 1.30 mg/dL    BUN/Creatinine ratio 18 12 - 20      GFR est AA >60 >60 ml/min/1.73m2    GFR est non-AA >60 >60 ml/min/1.73m2    Calcium 8.6 8.5 - 10.1 mg/dL    Bilirubin, total 0.3 0.2 - 1.0 mg/dL    AST (SGOT) 22 15 - 37 U/L    ALT (SGPT) 52 12 - 78 U/L    Alk. phosphatase 91 45 - 117 U/L    Protein, total 5.7 (L) 6.4 - 8.2 g/dL    Albumin 2.8 (L) 3.5 - 5.0 g/dL    Globulin 2.9 2.0 - 4.0 g/dL    A-G Ratio 1.0 (L) 1.1 - 2.2     NT-PRO BNP    Collection Time: 03/10/22  9:39 AM   Result Value Ref Range    NT pro-BNP 71 <125 pg/mL   TROPONIN-HIGH SENSITIVITY    Collection Time: 03/10/22  9:39 AM   Result Value Ref Range    Troponin-High Sensitivity 8 0 - 76 ng/L   D DIMER    Collection Time: 03/10/22  9:39 AM   Result Value Ref Range    D DIMER 1.85 (H) <0.50 ug/ml(FEU)   PROTHROMBIN TIME + INR    Collection Time: 03/10/22  9:39 AM   Result Value Ref Range    Prothrombin time 12.5 11.9 - 14.6 sec    INR 1.0 0.9 - 1.1     MAGNESIUM    Collection Time: 03/10/22  9:39 AM   Result Value Ref Range    Magnesium 1.8 1.6 - 2.4 mg/dL   PTT    Collection Time: 03/10/22  9:46 AM   Result Value Ref Range    aPTT 25.3 21.2 - 34.1 sec    aPTT, therapeutic range   82 - 109 sec   LACTIC ACID    Collection Time: 03/10/22 10:00 AM   Result Value Ref Range    Lactic acid 1.6 0.4 - 2.0 mmol/L        CTA CHEST W OR W WO CONT   Final Result      1. New segmental pulmonary embolus within the right lower lobe. Unchanged   segmental pulmonary embolus within the right middle lobe. No CT evidence of   right heart strain. 2. Emphysema. 3. Remainder as above. Notification: Findings were called to discussed with Dr. Zeenat Rivero at 12:40 PM on   3/10/2022      CTA ABDOMEN PELV W CONT   Final Result      1. No evidence of active gastrointestinal bleeding. No acute process. 2. Remainder as above. DUPLEX LOWER EXT VENOUS BILAT   Final Result      XR CHEST PORT   Final Result   No acute cardiopulmonary process.                    IR PLC IVC FILTER    (Results Pending)          Assessment/Plan:   1. G bleed - vomiting blood     -Hgb 10.2 Monitor and transfuse as needed     -No anticoagulant     -continue PPI. -3/10 CT abdomen - No evidence of active gastrointestinal bleeding. No acute process.     -Schedule for EGD in am. Please get consent. NPO post midnight. 2. DVT      -3/10 CTA chest - New segmental pulmonary embolus within the right lower lobe. Unchanged segmental pulmonary embolus within the right middle lobe. No CT evidence of right heart strain.      -3/10 IVC filter placed.           Active Problems:    Pulmonary embolism ([address] (3/10/2022)         IP CONSULT TO INTERVENTIONAL RADIOLOGY  IP CONSULT TO GASTROENTEROLOGY  IP CONSULT TO HEMATOLOGY  IP CONSULT TO CARDIOLOGY    Thank you for allowing me to participate in this patients care  Cc Referring Physician   Lis Mcguire MD

## 2022-03-10 NOTE — ED NOTES
[facility] 15. Per EMS pt. Had syncope episode this AM while meeting with . Pt. States he did lose consciousness. Pt. Has Hx of trigeminal neuralgia. Per EMS pt. Has C/O vomiting bright red blood. Also has C/O rubin catheter containing a lot of blood and clots.

## 2022-03-10 NOTE — PROGRESS NOTES
3/10/22. D/C Plan is to return to [facility] upon discharge. Pt signed Choice Letter & referral sent via [employer]. Pt uses no DME/has cell phone. Per pt Urology appt scheduled for 3/11/22 has been cancelled.

## 2022-03-10 NOTE — ACP (ADVANCE CARE PLANNING)
Advance Care Planning   Healthcare Decision Maker:       Primary Decision Maker: Nell Castro - [facility] - [phone]

## 2022-03-10 NOTE — H&P
History and Physical    Patient: Angy Mac MRN: [id number]  SSN: [id number]    YOB: 1955  Age: 73 y.o. Sex: male      Subjective:      Angy Mac is a 73 y.o. male, recent non-STEMI history of COPD, GERD, hypertension, recent prolonged hospitalization that required intubation/ventilation, complicated with acute DVT that presented from [facility] rehab on 3/10/2022 after a syncopal episode. Of note patient was discharged with oral anticoagulation Xarelto when he was discharged to [facility] rehab. He has been there for approximately 6 days. He noticed yesterday that he had an episode of vomiting up blood. Today when he was working with occupational therapy he said that he got chest pain and lightheaded. Then he had a syncopal episode. He also has a very extensive history of urinary retention with a chronic indwelling catheter which he is followed by [facility] Dr. Arely Dsouza. He has intermittent hematuria and pain due to clot burden. Due to his extensive history and concern for possible bleeding and syncopal episode he was brought to [facility] ED. In the ED patient's laboratory data was significant for a WBC of 10.2, D-dimer of 1.84. Lactic acid 1.6, troponin x1 8, BNP 17.  Venous Dopplers were negative for deep vein or superficial vein thrombosis bilaterally but did show a indeterminate occlusive thrombus in the left soleal vein. CTA of the chest with and without contrast showed a new segmental pulmonary embolism with on the right lower lobe, unchanged segmental pulmonary embolism within the right middle lobe. There was no evidence of heart strain noted on CT scan. CTA of the abdomen pelvis showed no evidence of acute gastrointestinal bleeding.   Due to new PE with a syncopal episode and active bleeding is requested admission for further evaluation      Past Medical History:   Diagnosis Date    CAD (coronary artery disease)     Chronic anticoagulation     Chronic indwelling Toro catheter     Chronic obstructive pulmonary disease (HCC)     GERD (gastroesophageal reflux disease)     Hx of deep venous thrombosis     Hyperlipidemia     Hypertension     Urine retention     Vocal cord dysfunction      Past Surgical History:   Procedure Laterality Date    HX CORONARY STENT PLACEMENT        No family history on file. Social History     Tobacco Use    Smoking status: Former Smoker    Smokeless tobacco: Never Used   Substance Use Topics    Alcohol use: Not Currently      Prior to Admission medications    Medication Sig Start Date End Date Taking? Authorizing Provider   acetaminophen (TYLENOL) 325 mg tablet Take 2 Tablets by mouth every six (6) hours as needed for Pain. 3/6/22   Carlos Geller MD   ibuprofen (MOTRIN) 600 mg tablet Take 1 Tablet by mouth three (3) times daily as needed for Pain. 3/6/22   Carlos Geller MD   methocarbamoL (Robaxin-750) 750 mg tablet Take 1 Tablet by mouth three (3) times daily as needed for Muscle Spasm(s). 3/6/22   Carlos Geller MD   acetaminophen (TYLENOL) 325 mg tablet Take 2 Tablets by mouth every six (6) hours as needed for Pain or Fever. 2/21/22   Guerrero Vaughn MD   amLODIPine (NORVASC) 10 mg tablet Take 1 Tablet by mouth daily. Patient not taking: Reported on 3/2/2022 2/22/22   Guerrero Vaughn MD   nystatin (MYCOSTATIN) 100,000 unit/mL suspension Take 5 mL by mouth four (4) times daily. swish and spit 2/21/22   Guerrero Vaughn MD   aspirin 81 mg chewable tablet Take 81 mg by mouth daily. Patient not taking: Reported on 2/7/2022    Provider, Historical   atorvastatin (Lipitor) 40 mg tablet Take 40 mg by mouth daily. Provider, Historical   clopidogreL (PLAVIX) 75 mg tab Take 75 mg by mouth daily. Patient not taking: Reported on 2/7/2022    Provider, Historical   esomeprazole (NexIUM) 40 mg capsule Take 40 mg by mouth daily.     Provider, Historical   finasteride (PROSCAR) 5 mg tablet Take 5 mg by mouth daily. Provider, Historical   fluticasone-umeclidinium-vilanterol (TRELEGY ELLIPTA) 100-62.5-25 mcg inhaler Take 1 Puff by inhalation daily. Provider, Historical   ipratropium-albuteroL (Combivent Respimat)  mcg/actuation inhaler Take 1 Puff by inhalation two (2) times daily as needed. Provider, Historical   tamsulosin (Flomax) 0.4 mg capsule Take 0.4 mg by mouth daily. Provider, Historical   metoprolol succinate (TOPROL-XL) 25 mg XL tablet Take 25 mg by mouth daily. Patient not taking: Reported on 2/7/2022    Provider, Historical        Allergies   Allergen Reactions    Sulfa (Sulfonamide Antibiotics) Other (comments)     syncope         Review of Systems:  Constitutional: No fevers, No chills, No fatigue, ++weakness  Eyes: No visual disturbance  Ears, Nose, Mouth, Throat, and Face: No nasal congestion, No sore throat  Respiratory: No cough, No sputum, No wheezing, No SOB  Cardiovascular: No chest pain, No lower extremity edema, No Palpitations   Gastrointestinal: No nausea, No vomiting, No diarrhea, No constipation, No abdominal pain  Genitourinary: No frequency, No dysuria, No hematuria  Integument/Breast: No rash, No skin lesion(s), No dryness  Musculoskeletal: No arthralgias, No neck pain, No back pain  Neurological: No headaches, No dizziness, No confusion,  No seizures  Behavioral/Psychiatric: No anxiety, No depression      Objective:     Vitals:    03/10/22 0930   BP: 128/75   Pulse: 100   Resp: 20   Temp: 98.6 °F (37 °C)   SpO2: 97%   Weight: 102.1 kg (225 lb)   Height: 6' 3\" (1.905 m)        Physical Exam:  General: alert, cooperative, no distress  Eye: conjunctivae/corneas clear. PERRL, EOM's intact. Throat and Neck: normal and no erythema or exudates noted. No mass   Lung: clear to auscultation bilaterally  Heart: regular rate and rhythm,   Abdomen: soft, non-tender.  Bowel sounds normal. No masses,  Extremities:  able to move all extremities normal, atraumatic  Skin: Normal.  Neurologic: AOx3. Cranial nerves 2-12 and sensation grossly intact. Psychiatric: non focal    Recent Results (from the past 24 hour(s))   CBC WITH AUTOMATED DIFF    Collection Time: 03/10/22  9:39 AM   Result Value Ref Range    WBC 4.7 4.1 - 11.1 K/uL    RBC 3.47 (L) 4.10 - 5.70 M/uL    HGB 10.2 (L) 12.1 - 17.0 g/dL    HCT 31.9 (L) 36.6 - 50.3 %    MCV 91.9 80.0 - 99.0 FL    MCH 29.4 26.0 - 34.0 PG    MCHC 32.0 30.0 - 36.5 g/dL    RDW 16.7 (H) 11.5 - 14.5 %    PLATELET 065 617 - 220 K/uL    MPV 8.7 (L) 8.9 - 12.9 FL    NRBC 0.0 0.0  WBC    ABSOLUTE NRBC 0.00 0.00 - 0.01 K/uL    NEUTROPHILS 58 32 - 75 %    LYMPHOCYTES 30 12 - 49 %    MONOCYTES 10 5 - 13 %    EOSINOPHILS 1 0 - 7 %    BASOPHILS 0 0 - 1 %    IMMATURE GRANULOCYTES 1 (H) 0 - 0.5 %    ABS. NEUTROPHILS 2.7 1.8 - 8.0 K/UL    ABS. LYMPHOCYTES 1.4 0.8 - 3.5 K/UL    ABS. MONOCYTES 0.4 0.0 - 1.0 K/UL    ABS. EOSINOPHILS 0.1 0.0 - 0.4 K/UL    ABS. BASOPHILS 0.0 0.0 - 0.1 K/UL    ABS. IMM. GRANS. 0.1 (H) 0.00 - 0.04 K/UL    DF AUTOMATED     TYPE & SCREEN    Collection Time: 03/10/22  9:39 AM   Result Value Ref Range    Crossmatch Expiration 03/13/2022,2359     ABO/Rh(D) A Positive     Antibody screen Negative    METABOLIC PANEL, COMPREHENSIVE    Collection Time: 03/10/22  9:39 AM   Result Value Ref Range    Sodium 140 136 - 145 mmol/L    Potassium 4.0 3.5 - 5.1 mmol/L    Chloride 106 97 - 108 mmol/L    CO2 27 21 - 32 mmol/L    Anion gap 7 5 - 15 mmol/L    Glucose 107 (H) 65 - 100 mg/dL    BUN 14 6 - 20 mg/dL    Creatinine 0.80 0.70 - 1.30 mg/dL    BUN/Creatinine ratio 18 12 - 20      GFR est AA >60 >60 ml/min/1.73m2    GFR est non-AA >60 >60 ml/min/1.73m2    Calcium 8.6 8.5 - 10.1 mg/dL    Bilirubin, total 0.3 0.2 - 1.0 mg/dL    AST (SGOT) 22 15 - 37 U/L    ALT (SGPT) 52 12 - 78 U/L    Alk.  phosphatase 91 45 - 117 U/L    Protein, total 5.7 (L) 6.4 - 8.2 g/dL    Albumin 2.8 (L) 3.5 - 5.0 g/dL    Globulin 2.9 2.0 - 4.0 g/dL A-G Ratio 1.0 (L) 1.1 - 2.2     NT-PRO BNP    Collection Time: 03/10/22  9:39 AM   Result Value Ref Range    NT pro-BNP 71 <125 pg/mL   TROPONIN-HIGH SENSITIVITY    Collection Time: 03/10/22  9:39 AM   Result Value Ref Range    Troponin-High Sensitivity 8 0 - 76 ng/L   D DIMER    Collection Time: 03/10/22  9:39 AM   Result Value Ref Range    D DIMER 1.85 (H) <0.50 ug/ml(FEU)   PROTHROMBIN TIME + INR    Collection Time: 03/10/22  9:39 AM   Result Value Ref Range    Prothrombin time 12.5 11.9 - 14.6 sec    INR 1.0 0.9 - 1.1     MAGNESIUM    Collection Time: 03/10/22  9:39 AM   Result Value Ref Range    Magnesium 1.8 1.6 - 2.4 mg/dL   LACTIC ACID    Collection Time: 03/10/22 10:00 AM   Result Value Ref Range    Lactic acid 1.6 0.4 - 2.0 mmol/L       XR Results (maximum last 3): Results from Hospital Encounter encounter on 03/10/22    XR CHEST PORT    Narrative  Exam: XR CHEST PORT    Indication:  cp;    Comparison: Chest radiograph from March 2, 2022    Findings: The cardiomediastinal silhouette is within normal limits. No focal parenchymal  process. No pleural effusion. No pneumothorax. No acute osseous abnormality. Impression  No acute cardiopulmonary process. Results from Hospital Encounter encounter on 03/02/22    XR CHEST PA LAT    Narrative  EXAM:  XR CHEST PA LAT    INDICATION: Chest pain and shortness of breath    COMPARISON: CT and radiograph 2/26/2022    TECHNIQUE: PA and lateral chest views    FINDINGS: The cardiomediastinal contours are stable. The pulmonary vasculature  is within normal limits. The lungs and pleural spaces are clear. There is no pneumothorax. The bones and  upper abdomen are stable. Impression  No acute process. Results from Hospital Encounter encounter on 02/26/22    XR CHEST PORT    Narrative  EXAM: XR CHEST PORT    INDICATION: Chest pain and nausea.     COMPARISON: Portable chest on 2/18/2022 and 2/15/2022    TECHNIQUE: 2 images of upright portable chest AP view    FINDINGS: Cardiac monitoring wires overlie the thorax. The cardiomediastinal and  hilar contours are within normal limits. The pulmonary vasculature is within  normal limits. The lungs and pleural spaces are clear. The visualized bones and upper abdomen  are age-appropriate. Impression  No acute process on portable chest. No change. CT Results (maximum last 3): Results from [facility] encounter on 03/10/22    CTA ABDOMEN PELV W CONT    Narrative  Exam: CTA ABDOMEN PELV W CONT    TECHNIQUE: Multiple transaxial CT images of the abdomen and pelvis were obtained  following the uneventful administration of 100 mL Isovue-370 intravenous  contrast. Coronal and sagittal reformatted images were provided. Dose reduction: All CT scans at this facility are performed using dose reduction  optimization techniques as appropriate to a performed exam including the  following: Automated exposure control, adjustments of the mA and/or kV according  to patient size, or use of iterative reconstruction technique. COMPARISON: CT of the abdomen and pelvis from March 6, 2022    HISTORY: gib    FINDINGS:    Lower thorax: Please refer to the dedicated CT of the chest for details of the  diaphragm. Abdomen and pelvis:  Liver: Normal morphology. Normal attenuation and enhancement. No suspicious  lesion. Gallbladder: Unremarkable. Biliary system: Nondilated. Pancreas: No masses or ductal dilatation. Spleen: No splenomegaly or focal splenic lesion. Adrenal glands: Normal.  Kidneys and ureters: The kidneys enhance symmetrically. Right renal cyst.  Additional cortically based subcentimeter hypodensities are too small to  characterize. No hydronephrosis or hydroureter. Urinary bladder: Urinary bladder is mostly decompressed around a Toro catheter. Reproductive organs: Unremarkable. Bowel: Normal stomach. Small and large bowel are normal in caliber.  There is a  moderate volume of stool throughout the colon. No active extravasation is  identified. Peritoneum: No pneumoperitoneum. No free fluid. Lymph nodes: No abdominal/pelvic lymphadenopathy. Aorta and other vessels: Calcific atherosclerosis of the aorta. Bones: No findings of acute or aggressive osseous abnormality. Superficial soft tissues: Unremarkable. Impression  1. No evidence of active gastrointestinal bleeding. No acute process. 2. Remainder as above. CTA CHEST W OR W WO CONT    Narrative  Exam: CTA CHEST W OR W WO CONT    TECHNIQUE: Multiple transaxial CT images of the chest were obtained following  the uneventful administration of 100 mL Isovue-370 intravenous contrast. Coronal  and sagittal reformatted images were provided. Dose reduction: All CT scans at this facility are performed using dose reduction  optimization techniques as appropriate to a performed exam including the  following: Automated exposure control, adjustments of the mA and/or kV according  to patient size, or use of iterative reconstruction technique. COMPARISON: CT of the chest from February 26, 2022    HISTORY: pe    FINDINGS:    Vasculature: There is a filling defect within the right lower lobe posterior  basal segmental pulmonary artery as well as the right middle lobe segmental  pulmonary artery. The right lower lobe filling defect appears new (series 13  image 76)    Thorax:    LUNGS:Central airways are patent. Centrilobular and paraseptal emphysema with  right apical scarring. No infiltrate or consolidation. No pleural effusion or  pneumothorax. Few scattered calcified and noncalcified sub-3 mm micronodules. No  dominant pulmonary nodule or mass. Mediastinum:Thyroid is unremarkable. Esophagus is decompressed. There is no  mediastinal or hilar adenopathy. The heart is normal in size. No pericardial  effusion. Severe coronary artery calcifications. Axilla and soft tissues: No axillary or supraclavicular lymphadenopathy.  Lipoma  within the right posterior back measures 9.4 x 3.5 cm. Bones: No fracture or malalignment. No aggressive osseous lesion. Impression  1. New segmental pulmonary embolus within the right lower lobe. Unchanged  segmental pulmonary embolus within the right middle lobe. No CT evidence of  right heart strain. 2. Emphysema. 3. Remainder as above. Notification: Findings were called to discussed with Dr. Zana Puente at 12:40 PM on  3/10/2022      Results from [place] encounter on 03/06/22    CT ABD PELV W CONT    Narrative  Dose Reduction:    All CT scans at this facility are performed using dose reduction optimization  techniques as appropriate to a performed exam including the following: Automated  exposure control, adjustments of the mA and/or kV according to patient size, or  use of iterative reconstruction technique. CT of the abdomen and pelvis with contrast. Axial images of the abdomen and  pelvis were obtained during IV administration of 100 mL Isovue-370. Sagittal and  coronal reconstructions were created. Comparison prior exam dated 8/12/2018. Lung bases are clear and no pleural effusion is seen. The liver spleen and  pancreas are unremarkable. No adrenal mass is seen. No renal stone or mass is  identified. A small cyst is seen within each kidney. There is no hydronephrosis  or ureteral dilatation. No ureteral stone is seen. The urinary bladder contains  a Toro catheter is not well evaluated. No dilated bowel or free air or free  fluid is seen. There is a large amount stool throughout the colon. The appendix  is not definitely identified however there is no CT evidence of appendicitis or  diverticulitis. Impression  No acute abnormality demonstrated. Large amount of stool throughout  the colon is noted. Toro catheter is in place. Tiny renal cysts. MRI Results (maximum last 3):   Results from [place] encounter on 02/07/22    MRI BRAIN WO CONT    Narrative  MRI BRAIN WITHOUT CONTRAST:    Clinical History: Altered mental status. T1 and T2 weighted images were obtained in the axial and coronal planes. Diffusion weighted images were also obtained. Available scans show the ventricles to be midline and of normal size. Mild  dilatation of the cortical sulci over the convexities is noted. Mild  microvascular ischemic changes are noted. Diffusion-weighted images show no  acute infarction. Gradient echo images show no intra or extra-axial hemorrhage. No intra or extra-axial mass is demonstrated. Empty sella is noted. Scans through the posterior fossa show the brainstem and the cerebellum show no  acute infarction or hemorrhage. 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 No CP angle mass  Was demonstrated. Signal void is noted in both internal carotid arteries and the basilar artery  consistent with patency of these vessels. The cerebellar tonsils are at the level of the foramen magnum in a satisfactory  position. Both orbits have a normal MR appearance. Impression  No acute intracranial abnormality. Nuclear Medicine Results (maximum last 3): No results found for this or any previous visit. US Results (maximum last 3): No results found for this or any previous visit. Assessment:   1. Acute on chronic PE/age indeterminate occlusive thrombus in the left soleal vein  2. Syncopal episode secondary to #1  3. Recent non-STEMI/chronic grade 1 diastolic dysfunction with preserved ejection fraction/CAD  4. COPD without exacerbation  5. Chronic indwelling catheter/urinary retention/hematuria  6. GERD with hematemesis  7. Generalized weakness    Plan:   1. CTA of the chest shows new PE on chronic PE. Duplex shows a age-indeterminate thrombus in the left soleal vein. Hemoglobin is stable however due to bleeding risk concern that risks outweigh the benefit for further anticoagulation. Consult hematology.   Also consulted interventional radiology for IVC filter placement. 2.  Patient is single episode most likely secondary to PE. 3. Troponin x1 -. EKG is not showing ischemic changes. BNP within normal limits. 2D echo in February 2022 showed normal EF and systolic function with grade 1 diastolic function with preserved ejection fraction. CTA of the chest does not show any evidence of right heart strain. Per patient's request to have consulted patient's cardiologist.  Blood pressure stable. Cardiac monitoring. Holding aspirin and Plavix due to bleeding risk  4. Oxygen saturation within normal limits on room air. Patient pulmonologist is Dr. Tad Riojas. Continue with Trelegy and DuoNebs as needed. We will hold off on pulmonology consult at this time  5. Patient follows with [place] [facility] Dr. Vera Camacho. Has a chronic indwelling Toro catheter. He has had extensive work-up with this urologist therefore we will hold off on a urology consult at this time. If there is any complications we will consult urology. 6.  CTA of the and pelvis does not show any signs of active bleeding. He did have an episode of hematemesis. Patient on Protonix 40 mg twice daily. Consult GI for possible EGD and/or colonoscopy. Occult blood is pending  7. PT and OT in the a.m. after IVC filter placement  8. CBC BMP in the a.m.       Full code    DVT prophylaxis SCDs/IVC filter  GI prophylaxis protonix    Total time: 63 min           Signed By: John Izaguirre PA-C     March 10, 2022

## 2022-03-10 NOTE — CONSULTS
Consult    Subjective:     Harmeet Martel is a 73 y.o.  male who is being seen for PE,GI bleeding regarding anticoagulation. Pt with recnt LLE DVT and PE admitted after syncope. Pt had a episode of vomiting blood. CTA of the chest showed a new segmental pulmonary embolism right lower lobe, unchanged segmental pulmonary embolism within the right middle lobe. No evidence of heart strain . Venous doppler of BLE showed indeterminate thrombus left soleal vein. pt also has blood in urine on and off. Pt with SOB with exertion.        Past Medical History:   Diagnosis Date    CAD (coronary artery disease)     Chronic anticoagulation     Chronic indwelling Toro catheter     Chronic obstructive pulmonary disease (HCC)     GERD (gastroesophageal reflux disease)     Hx of deep venous thrombosis     Hyperlipidemia     Hypertension     Urine retention     Vocal cord dysfunction       Past Surgical History:   Procedure Laterality Date    HX CORONARY STENT PLACEMENT       No family history on file.    Social History     Tobacco Use    Smoking status: Former Smoker    Smokeless tobacco: Never Used   Substance Use Topics    Alcohol use: Not Currently       Current Facility-Administered Medications   Medication Dose Route Frequency Provider Last Rate Last Admin    sodium chloride (NS) flush 5-40 mL  5-40 mL IntraVENous Q8H Simran Tubbs PA-C        sodium chloride (NS) flush 5-40 mL  5-40 mL IntraVENous PRN Simran Tubbs PA-C        acetaminophen (TYLENOL) tablet 650 mg  650 mg Oral Q6H PRN Simran Tubbs PA-C        Or    acetaminophen (TYLENOL) suppository 650 mg  650 mg Rectal Q6H PRN Simran Tubbs PA-C        polyethylene glycol (MIRALAX) packet 17 g  17 g Oral DAILY PRN Simran Tubbs PA-C        ondansetron (ZOFRAN ODT) tablet 4 mg  4 mg Oral Q8H PRN Simran Tubbs PA-C        Or    ondansetron (ZOFRAN) injection 4 mg  4 mg IntraVENous Q6H PRN Simran Tubbs PA-C        [START ON 3/11/2022] atorvastatin (LIPITOR) tablet 40 mg  40 mg Oral DAILY Simran Tubbs PA-C        pantoprazole (PROTONIX) tablet 40 mg  40 mg Oral ACB&D Simran Tubbs PA-C        [START ON 3/11/2022] finasteride (PROSCAR) tablet 5 mg  5 mg Oral DAILY Simran Tubbs PA-C        [START ON 3/11/2022] fluticasone-umeclidinium-vilanterol (TRELEGY ELLIPTA) inhaler 1 Puff  1 Puff Inhalation DAILY Simran Tubbs PA-C        albuterol-ipratropium (DUO-NEB) 2.5 MG-0.5 MG/3 ML  3 mL Nebulization Q4H PRN Simran Tubbs PA-C        [START ON 3/11/2022] tamsulosin (FLOMAX) capsule 0.4 mg  0.4 mg Oral DAILY Simran Tubbs PA-C        traMADoL (ULTRAM) tablet 50 mg  50 mg Oral Q6H PRN Jose Antonio Tubbs PA-C         Current Outpatient Medications   Medication Sig Dispense Refill    acetaminophen (TYLENOL) 325 mg tablet Take 2 Tablets by mouth every six (6) hours as needed for Pain. 20 Tablet 0    ibuprofen (MOTRIN) 600 mg tablet Take 1 Tablet by mouth three (3) times daily as needed for Pain. 20 Tablet 0    methocarbamoL (Robaxin-750) 750 mg tablet Take 1 Tablet by mouth three (3) times daily as needed for Muscle Spasm(s). 20 Tablet 0    acetaminophen (TYLENOL) 325 mg tablet Take 2 Tablets by mouth every six (6) hours as needed for Pain or Fever. 60 Tablet 0    amLODIPine (NORVASC) 10 mg tablet Take 1 Tablet by mouth daily. (Patient not taking: Reported on 3/2/2022) 30 Tablet 0    nystatin (MYCOSTATIN) 100,000 unit/mL suspension Take 5 mL by mouth four (4) times daily. swish and spit 473 mL 0    aspirin 81 mg chewable tablet Take 81 mg by mouth daily. (Patient not taking: Reported on 2/7/2022)      atorvastatin (Lipitor) 40 mg tablet Take 40 mg by mouth daily.  clopidogreL (PLAVIX) 75 mg tab Take 75 mg by mouth daily. (Patient not taking: Reported on 2/7/2022)      esomeprazole (NexIUM) 40 mg capsule Take 40 mg by mouth daily.  finasteride (PROSCAR) 5 mg tablet Take 5 mg by mouth daily.       fluticasone-umeclidinium-vilanterol (TRELEGY ELLIPTA) 100-62.5-25 mcg inhaler Take 1 Puff by inhalation daily.  ipratropium-albuteroL (Combivent Respimat)  mcg/actuation inhaler Take 1 Puff by inhalation two (2) times daily as needed.  tamsulosin (Flomax) 0.4 mg capsule Take 0.4 mg by mouth daily.  metoprolol succinate (TOPROL-XL) 25 mg XL tablet Take 25 mg by mouth daily. (Patient not taking: Reported on 2/7/2022)          Allergies   Allergen Reactions    Sulfa (Sulfonamide Antibiotics) Other (comments)     syncope          Review of Systems:   Other than mentioned in HPI 12 point review of systems negative    Objective: Intake and Output:    No intake/output data recorded. No intake/output data recorded. Blood pressure 128/75, pulse 100, temperature 98.6 °F (37 °C), resp. rate 20, height 6' 3\" (1.905 m), weight 102.1 kg (225 lb), SpO2 97 %. Physical Exam:   General:  Alert, cooperative, no distress, appears stated age. Lungs:   Clear to auscultation bilaterally. Chest wall:  No tenderness or deformity. Heart:  Regular rate and rhythm, S1, S2 normal   Abdomen:   Soft, non-tender. Bowel sounds normal. No masses,  No organomegaly. Extremities: Extremities normal, atraumatic, no cyanosis . Edema Bl LE. Pulses: 2+ and symmetric all extremities. Skin: Skin color, texture, turgor normal. No rashes or lesions   Neurologic: CNII-XII intact. No gross sensory or motor deficits       Images:   CTA CHEST W OR W WO CONT   Final Result      1. New segmental pulmonary embolus within the right lower lobe. Unchanged   segmental pulmonary embolus within the right middle lobe. No CT evidence of   right heart strain. 2. Emphysema. 3. Remainder as above. Notification: Findings were called to discussed with Dr. Winston Mclaughlin at 12:40 PM on   3/10/2022      CTA ABDOMEN PELV W CONT   Final Result      1. No evidence of active gastrointestinal bleeding. No acute process. 2. Remainder as above. DUPLEX LOWER EXT VENOUS BILAT   Final Result      XR CHEST PORT   Final Result   No acute cardiopulmonary process. IR PLC IVC FILTER    (Results Pending)            Data Review:   Recent Results (from the past 24 hour(s))   CBC WITH AUTOMATED DIFF    Collection Time: 03/10/22  9:39 AM   Result Value Ref Range    WBC 4.7 4.1 - 11.1 K/uL    RBC 3.47 (L) 4.10 - 5.70 M/uL    HGB 10.2 (L) 12.1 - 17.0 g/dL    HCT 31.9 (L) 36.6 - 50.3 %    MCV 91.9 80.0 - 99.0 FL    MCH 29.4 26.0 - 34.0 PG    MCHC 32.0 30.0 - 36.5 g/dL    RDW 16.7 (H) 11.5 - 14.5 %    PLATELET 825 805 - 695 K/uL    MPV 8.7 (L) 8.9 - 12.9 FL    NRBC 0.0 0.0  WBC    ABSOLUTE NRBC 0.00 0.00 - 0.01 K/uL    NEUTROPHILS 58 32 - 75 %    LYMPHOCYTES 30 12 - 49 %    MONOCYTES 10 5 - 13 %    EOSINOPHILS 1 0 - 7 %    BASOPHILS 0 0 - 1 %    IMMATURE GRANULOCYTES 1 (H) 0 - 0.5 %    ABS. NEUTROPHILS 2.7 1.8 - 8.0 K/UL    ABS. LYMPHOCYTES 1.4 0.8 - 3.5 K/UL    ABS. MONOCYTES 0.4 0.0 - 1.0 K/UL    ABS. EOSINOPHILS 0.1 0.0 - 0.4 K/UL    ABS. BASOPHILS 0.0 0.0 - 0.1 K/UL    ABS. IMM. GRANS. 0.1 (H) 0.00 - 0.04 K/UL    DF AUTOMATED     TYPE & SCREEN    Collection Time: 03/10/22  9:39 AM   Result Value Ref Range    Crossmatch Expiration 03/13/2022,2359     ABO/Rh(D) A Positive     Antibody screen Negative    METABOLIC PANEL, COMPREHENSIVE    Collection Time: 03/10/22  9:39 AM   Result Value Ref Range    Sodium 140 136 - 145 mmol/L    Potassium 4.0 3.5 - 5.1 mmol/L    Chloride 106 97 - 108 mmol/L    CO2 27 21 - 32 mmol/L    Anion gap 7 5 - 15 mmol/L    Glucose 107 (H) 65 - 100 mg/dL    BUN 14 6 - 20 mg/dL    Creatinine 0.80 0.70 - 1.30 mg/dL    BUN/Creatinine ratio 18 12 - 20      GFR est AA >60 >60 ml/min/1.73m2    GFR est non-AA >60 >60 ml/min/1.73m2    Calcium 8.6 8.5 - 10.1 mg/dL    Bilirubin, total 0.3 0.2 - 1.0 mg/dL    AST (SGOT) 22 15 - 37 U/L    ALT (SGPT) 52 12 - 78 U/L    Alk.  phosphatase 91 45 - 117 U/L    Protein, total 5.7 (L) 6.4 - 8.2 g/dL    Albumin 2.8 (L) 3.5 - 5.0 g/dL    Globulin 2.9 2.0 - 4.0 g/dL    A-G Ratio 1.0 (L) 1.1 - 2.2     NT-PRO BNP    Collection Time: 03/10/22  9:39 AM   Result Value Ref Range    NT pro-BNP 71 <125 pg/mL   TROPONIN-HIGH SENSITIVITY    Collection Time: 03/10/22  9:39 AM   Result Value Ref Range    Troponin-High Sensitivity 8 0 - 76 ng/L   D DIMER    Collection Time: 03/10/22  9:39 AM   Result Value Ref Range    D DIMER 1.85 (H) <0.50 ug/ml(FEU)   PROTHROMBIN TIME + INR    Collection Time: 03/10/22  9:39 AM   Result Value Ref Range    Prothrombin time 12.5 11.9 - 14.6 sec    INR 1.0 0.9 - 1.1     MAGNESIUM    Collection Time: 03/10/22  9:39 AM   Result Value Ref Range    Magnesium 1.8 1.6 - 2.4 mg/dL   PTT    Collection Time: 03/10/22  9:46 AM   Result Value Ref Range    aPTT 25.3 21.2 - 34.1 sec    aPTT, therapeutic range   82 - 109 sec   LACTIC ACID    Collection Time: 03/10/22 10:00 AM   Result Value Ref Range    Lactic acid 1.6 0.4 - 2.0 mmol/L        Assessment/Plan     1. Recurrent PE:Pt has left Soleal thrombus. Anticoagulation held due to GI bleeding. IR consulted for IVC filter. Status post IVC filter. GI consult pending. 2.  Anemia: Monitor H&H. Patient with GI bleeding. Transfuse PRBC if hemoglobin less than 7 or symptomatic. 3.  Syncope: Most likely due to PE. 4.  Coronary artery disease. 5.  Urinary retention with chronic indwelling catheter and hematuria. Urology consult pending. Thank you for the consult.

## 2022-03-10 NOTE — PROGRESS NOTES
Patient being admitted for PE. Per 3-midnight rule, should workup be completed and treatment plan initiated that can be continued in the rehab setting, this patient can return to [facility] without any further submittals/authorizations/delays Saturday 3/12 before midnight. Will follow peripherally. Please call with questions, [phone].      Eloina Lin MD  Physical Medicine and Rehabilitation

## 2022-03-10 NOTE — PROGRESS NOTES
Reason for Readmission: PE             RUR Score/Risk Level:   N/A      PCP: First and Last name:  Meagan Law   Name of Practice:    Are you a current patient: Yes/No: Yes   Approximate date of last visit: Seen @ [facility]. Can you participate in a virtual visit with your PCP: Yes/Call/Has cell phone. Is a Care Conference indicated:  No      Did you attend your follow up appointment (s): If not, why not:No, cancelled - per pt. Resources/supports as identified by patient/family:   Family       Top Challenges facing patient (as identified by patient/family and CM): Finances/Medication cost?  No     Transportation  No      Support system or lack thereof? No      Living arrangements? No          Self-care/ADLs/Cognition? No           Current Advanced Directive/Advance Care Plan:           Full Code  Plan for utilizing home health:None. Pt signed Choice Letter to return to [facility] upon discharge. Referral sent via [employer]. Pt uses no DME. Transition of Care Plan:    Based on readmission, the patient's previous Plan of Care   has been evaluated and/or modified. The current Transition of Care Plan is:     D/C Plan is to return to [facility] via transportation.

## 2022-03-10 NOTE — ED PROVIDER NOTES
EMERGENCY DEPARTMENT HISTORY AND PHYSICAL EXAM      Date: 3/10/2022  Patient Name: Lisa Griffith    History of Presenting Illness     Chief Complaint   Patient presents with    Syncope    Chest Pain       History Provided By: Patient, EMS and Nursing Home/SNF/Rehab Center    HPI: Lisa Griffith, 73 y.o. male with a past medical history significant diabetes, hypertension, hyperlipidemia and myocardial infarction presents to the ED with chief complaint of Syncope and Chest Pain  . 28-year-old male recently with an NSTEMI intubated at an outside facility ICU now at rehab. Patient has had a recent GI bleed known DVT in the left leg as well as a small pulmonary emboli. Not presently on anticoagulation. Patient was at rehab being evaluated by the physician for morning rounds when he had a witnessed syncopal event was complaining of chest pain shortness of breath preceding that. He has now alert and oriented upon arrival still actively having left-sided chest pain. Persistent left groin pain. No active GI bleed. There are no other complaints, changes, or physical findings at this time.     PCP: Sabino Perez MD    Current Facility-Administered Medications   Medication Dose Route Frequency Provider Last Rate Last Admin    sodium chloride (NS) flush 5-40 mL  5-40 mL IntraVENous Q8H Simran Tubbs PA-C   10 mL at 03/11/22 0625    sodium chloride (NS) flush 5-40 mL  5-40 mL IntraVENous PRN Simran Tubbs PA-C        acetaminophen (TYLENOL) tablet 650 mg  650 mg Oral Q6H PRN KINJAL Molina-C   650 mg at 03/10/22 1732    Or    acetaminophen (TYLENOL) suppository 650 mg  650 mg Rectal Q6H PRN Simran Tubbs PA-C        polyethylene glycol (MIRALAX) packet 17 g  17 g Oral DAILY PRN Simran Tubbs PA-C        ondansetron (ZOFRAN ODT) tablet 4 mg  4 mg Oral Q8H PRN Simran Tubbs PA-C        Or    ondansetron (ZOFRAN) injection 4 mg  4 mg IntraVENous Q6H PRN Simran Tubbs PA-C   4 mg at 03/11/22 0546    atorvastatin (LIPITOR) tablet 40 mg  40 mg Oral DAILY Simran Tubbs PA-C        pantoprazole (PROTONIX) tablet 40 mg  40 mg Oral ACB&D Lenny Flores PA-C   40 mg at 03/10/22 1731    finasteride (PROSCAR) tablet 5 mg  5 mg Oral DAILY Simran Tubbs PA-C        fluticasone-umeclidinium-vilanterol (TRELEGY ELLIPTA) inhaler 1 Puff  1 Puff Inhalation DAILY Simran Tubbs PA-C        albuterol-ipratropium (DUO-NEB) 2.5 MG-0.5 MG/3 ML  3 mL Nebulization Q4H PRN Simran Tubbs PA-C        tamsulosin (FLOMAX) capsule 0.4 mg  0.4 mg Oral DAILY Simran Tubbs PA-C        traMADoL (ULTRAM) tablet 50 mg  50 mg Oral Q6H PRN Simran Tubbs PA-C   50 mg at 03/11/22 2913       Past History     Past Medical History:  Past Medical History:   Diagnosis Date    CAD (coronary artery disease)     Chronic anticoagulation     Chronic indwelling Toro catheter     Chronic obstructive pulmonary disease (HCC)     GERD (gastroesophageal reflux disease)     Hx of deep venous thrombosis     Hyperlipidemia     Hypertension     Syncope     started in 2018    Thromboembolus [facility])     DVT/PE's    Urine retention     Vocal cord dysfunction        Past Surgical History:  Past Surgical History:   Procedure Laterality Date    HX CORONARY STENT PLACEMENT      HX HEENT      right lazy eye corrective sx    HX ORTHOPAEDIC      bone spurs removed 1996    IR IVC FILTER      placed 3/10/22    SD ABDOMEN SURGERY PROC UNLISTED      inguinal hernia 1984    SD ABDOMEN SURGERY PROC UNLISTED      bilateral inguinal hernia sx with mesh 1987    SD CARDIAC SURG PROCEDURE UNLIST      stent 1996, 2018    VASCULAR SURGERY PROCEDURE UNLIST         Family History:  No family history on file.     Social History:  Social History     Tobacco Use    Smoking status: Former Smoker    Smokeless tobacco: Never Used    Tobacco comment: quit 2021   Substance Use Topics    Alcohol use: Not Currently    Drug use: Never Allergies: Allergies   Allergen Reactions    Sulfa (Sulfonamide Antibiotics) Other (comments)     syncope           Review of Systems   Review of Systems   Constitutional: Negative. Negative for chills, fatigue and fever. HENT: Negative. Negative for congestion, ear pain, nosebleeds and sore throat. Eyes: Negative. Negative for pain, discharge and visual disturbance. Respiratory: Positive for chest tightness and shortness of breath. Negative for cough. Cardiovascular: Negative. Negative for chest pain and leg swelling. Gastrointestinal: Negative. Negative for abdominal pain, blood in stool, constipation, diarrhea, nausea and vomiting. Endocrine: Negative. Genitourinary: Negative. Negative for difficulty urinating, dysuria and flank pain. Musculoskeletal: Positive for myalgias. Negative for back pain. Skin: Negative. Negative for rash and wound. Allergic/Immunologic: Negative. Neurological: Positive for syncope. Negative for dizziness, weakness, numbness and headaches. Hematological: Negative. Does not bruise/bleed easily. Psychiatric/Behavioral: Negative. Negative for agitation, confusion, hallucinations and suicidal ideas. All other systems reviewed and are negative. Physical Exam   Physical Exam  Vitals and nursing note reviewed. Constitutional:       General: He is not in acute distress. Appearance: He is normal weight. He is ill-appearing. HENT:      Head: Normocephalic and atraumatic. Right Ear: External ear normal.      Left Ear: External ear normal.      Nose: Nose normal. No rhinorrhea. Mouth/Throat:      Mouth: Mucous membranes are moist.      Pharynx: Oropharynx is clear. Eyes:      Extraocular Movements: Extraocular movements intact. Conjunctiva/sclera: Conjunctivae normal.      Pupils: Pupils are equal, round, and reactive to light. Cardiovascular:      Rate and Rhythm: Normal rate and regular rhythm.       Pulses: Normal pulses. Heart sounds: Normal heart sounds. Pulmonary:      Effort: Pulmonary effort is normal. No respiratory distress. Breath sounds: Normal breath sounds. Abdominal:      General: Abdomen is flat. Bowel sounds are normal.      Palpations: Abdomen is soft. Musculoskeletal:         General: No tenderness or deformity. Normal range of motion. Cervical back: Normal range of motion and neck supple. Skin:     General: Skin is warm and dry. Capillary Refill: Capillary refill takes less than 2 seconds. Findings: No bruising, lesion or rash. Neurological:      General: No focal deficit present. Mental Status: He is alert and oriented to person, place, and time. Mental status is at baseline. Psychiatric:         Mood and Affect: Mood normal.         Behavior: Behavior normal.         Thought Content:  Thought content normal.         Judgment: Judgment normal.         Diagnostic Study Results     Labs -   03/10/22 1053  LACTIC ACID   Collected: 03/10/22 1000  Final result  Specimen: Plasma     Lactic acid 1.6 mmol/L          03/10/22 1421  PTT   Collected: 03/10/22 0946  Final result  Specimen: Blood from Plasma     aPTT 25.3 sec    aPTT, therapeutic range   sec          03/10/22 1058  TYPE & SCREEN   Collected: 03/10/22 0939  Final result  Specimen: Blood from Plasma     Crossmatch Expiration 03/13/2022,2359   ABO/Rh(D) A Positive   Antibody screen Negative          03/10/22 1031  MAGNESIUM   Collected: 03/10/22 0939  Final result  Specimen: Serum or Plasma     Magnesium 1.8 mg/dL          03/10/22 1032  NT-PRO BNP   Collected: 03/10/22 8259  Final result  Specimen: Serum or Plasma     NT pro-BNP 71 pg/mL           66/18/79 2175  METABOLIC PANEL, COMPREHENSIVE   Collected: 03/10/22 8242  Final result  Specimen: Serum or Plasma     Sodium 140 mmol/L   Potassium 4.0 mmol/L   Chloride 106 mmol/L   CO2 27 mmol/L   Anion gap 7 mmol/L   Glucose 107 High  mg/dL   BUN 14 mg/dL Creatinine 0.80 mg/dL   BUN/Creatinine ratio 18     GFR est AA >60 ml/min/1.73m2   GFR est non-AA >60 ml/min/1.73m2   Calcium 8.6 mg/dL   Bilirubin, total 0.3 mg/dL   AST (SGOT) 22 U/L   ALT (SGPT) 52 U/L   Alk. phosphatase 91 U/L   Protein, total 5.7 Low  g/dL   Albumin 2.8 Low  g/dL   Globulin 2.9 g/dL   A-G Ratio 1.0 Low             03/10/22 1028  D DIMER   Collected: 03/10/22 0939  Final result  Specimen: Whole Blood from Plasma     D DIMER 1.85 High  ug/ml(FEU)           03/10/22 1024  TROPONIN-HIGH SENSITIVITY   Collected: 03/10/22 0939  Final result  Specimen: Plasma     Troponin-High Sensitivity 8 ng/L          03/10/22 1010  PROTHROMBIN TIME + INR   Collected: 03/10/22 3163  Final result  Specimen: Blood from Plasma     Prothrombin time 12.5 sec   INR 1.0            03/10/22 1000  CBC WITH AUTOMATED DIFF   Collected: 03/10/22 5220  Final result  Specimen: Whole Blood     WBC 4.7 K/uL   RBC 3.47 Low  M/uL   HGB 10.2 Low  g/dL   HCT 31.9 Low  %   MCV 91.9 FL    MCH 29.4 PG   MCHC 32.0 g/dL   RDW 16.7 High  %   PLATELET 454 K/uL   MPV 8.7 Low  FL   NRBC 0.0  WBC   ABSOLUTE NRBC 0.00 K/uL   NEUTROPHILS 58 %   LYMPHOCYTES 30 %   MONOCYTES 10 %   EOSINOPHILS 1 %   BASOPHILS 0 %   IMMATURE GRANULOCYTES 1 High  %   ABS. NEUTROPHILS 2.7 K/UL   ABS. LYMPHOCYTES 1.4 K/UL   ABS. MONOCYTES 0.4 K/UL   ABS. EOSINOPHILS 0.1 K/UL   ABS. BASOPHILS 0.0 K/UL   ABS. IMM. GRANS. 0.1 High  K/UL   DF AUTOMATED               No results found for this or any previous visit (from the past 12 hour(s)). Radiologic Studies -   CTA CHEST W OR W WO CONT   Final Result      1. New segmental pulmonary embolus within the right lower lobe. Unchanged   segmental pulmonary embolus within the right middle lobe. No CT evidence of   right heart strain. 2. Emphysema. 3. Remainder as above.       Notification: Findings were called to discussed with Dr. Winston Mclaughlin at 12:40 PM on   3/10/2022      CTA ABDOMEN PELV W CONT   Final Result      1. No evidence of active gastrointestinal bleeding. No acute process. 2. Remainder as above. DUPLEX LOWER EXT VENOUS BILAT   Final Result      XR CHEST PORT   Final Result   No acute cardiopulmonary process. IR PLC IVC FILTER    (Results Pending)     CT Results  (Last 48 hours)               03/10/22 1221  CTA CHEST W OR W WO CONT Final result    Impression:      1. New segmental pulmonary embolus within the right lower lobe. Unchanged   segmental pulmonary embolus within the right middle lobe. No CT evidence of   right heart strain. 2. Emphysema. 3. Remainder as above. Notification: Findings were called to discussed with Dr. Robbin Daley at 12:40 PM on   3/10/2022       Narrative:  Exam: CTA CHEST W OR W WO CONT       TECHNIQUE: Multiple transaxial CT images of the chest were obtained following   the uneventful administration of 100 mL Isovue-370 intravenous contrast. Coronal   and sagittal reformatted images were provided. Dose reduction: All CT scans at this facility are performed using dose reduction   optimization techniques as appropriate to a performed exam including the   following: Automated exposure control, adjustments of the mA and/or kV according   to patient size, or use of iterative reconstruction technique. COMPARISON: CT of the chest from February 26, 2022       HISTORY: pe       FINDINGS:       Vasculature: There is a filling defect within the right lower lobe posterior   basal segmental pulmonary artery as well as the right middle lobe segmental   pulmonary artery. The right lower lobe filling defect appears new (series 13   image 76)       Thorax:       LUNGS:Central airways are patent. Centrilobular and paraseptal emphysema with   right apical scarring. No infiltrate or consolidation. No pleural effusion or   pneumothorax. Few scattered calcified and noncalcified sub-3 mm micronodules. No   dominant pulmonary nodule or mass. Mediastinum:Thyroid is unremarkable. Esophagus is decompressed. There is no   mediastinal or hilar adenopathy. The heart is normal in size. No pericardial   effusion. Severe coronary artery calcifications. Axilla and soft tissues: No axillary or supraclavicular lymphadenopathy. Lipoma   within the right posterior back measures 9.4 x 3.5 cm. Bones: No fracture or malalignment. No aggressive osseous lesion. 03/10/22 1221  CTA ABDOMEN PELV W CONT Final result    Impression:      1. No evidence of active gastrointestinal bleeding. No acute process. 2. Remainder as above. Narrative:  Exam: CTA ABDOMEN PELV W CONT       TECHNIQUE: Multiple transaxial CT images of the abdomen and pelvis were obtained   following the uneventful administration of 100 mL Isovue-370 intravenous   contrast. Coronal and sagittal reformatted images were provided. Dose reduction: All CT scans at this facility are performed using dose reduction   optimization techniques as appropriate to a performed exam including the   following: Automated exposure control, adjustments of the mA and/or kV according   to patient size, or use of iterative reconstruction technique. COMPARISON: CT of the abdomen and pelvis from March 6, 2022       HISTORY: gib       FINDINGS:       Lower thorax: Please refer to the dedicated CT of the chest for details of the   diaphragm. Abdomen and pelvis:   Liver: Normal morphology. Normal attenuation and enhancement. No suspicious   lesion. Gallbladder: Unremarkable. Biliary system: Nondilated. Pancreas: No masses or ductal dilatation. Spleen: No splenomegaly or focal splenic lesion. Adrenal glands: Normal.   Kidneys and ureters: The kidneys enhance symmetrically. Right renal cyst.   Additional cortically based subcentimeter hypodensities are too small to   characterize. No hydronephrosis or hydroureter.    Urinary bladder: Urinary bladder is mostly decompressed around a Toro catheter. Reproductive organs: Unremarkable. Bowel: Normal stomach. Small and large bowel are normal in caliber. There is a   moderate volume of stool throughout the colon. No active extravasation is   identified. Peritoneum: No pneumoperitoneum. No free fluid. Lymph nodes: No abdominal/pelvic lymphadenopathy. Aorta and other vessels: Calcific atherosclerosis of the aorta. Bones: No findings of acute or aggressive osseous abnormality. Superficial soft tissues: Unremarkable. CXR Results  (Last 48 hours)               03/10/22 1010  XR CHEST PORT Final result    Impression:  No acute cardiopulmonary process. Narrative:  Exam: XR CHEST PORT         Indication:  cp;       Comparison: Chest radiograph from March 2, 2022       Findings: The cardiomediastinal silhouette is within normal limits. No focal parenchymal   process. No pleural effusion. No pneumothorax. No acute osseous abnormality. Medical Decision Making and ED Course   I am the first provider for this patient. I reviewed the vital signs, available nursing notes, past medical history, past surgical history, family history and social history. Vital Signs-Reviewed the patient's vital signs. Patient Vitals for the past 12 hrs:   Temp Pulse Resp BP SpO2   03/11/22 0445 97.7 °F (36.5 °C) 93 18 126/79 93 %   03/10/22 2134 97.9 °F (36.6 °C) 94 16 137/77 94 %       EKG interpretation:   EKG at 934 normal sinus rhythm rate of 88. No ST changes. No T wave inversions. Normal intervals. Reason rule out ACS. Interpreted by ER physician. Records Reviewed: Previous Hospital chart. EMS run report      ED Course:   Initial assessment performed. The patients presenting problems have been discussed, and they are in agreement with the care plan formulated and outlined with them.   I have encouraged them to ask questions as they arise throughout their visit. Orders Placed This Encounter    CTA CHEST W OR W WO CONT     Standing Status:   Standing     Number of Occurrences:   1     Order Specific Question:   Transport     Answer:   BED [2]     Order Specific Question:   Reason for Exam     Answer:   pe     Order Specific Question:   Does patient have history of Renal Disease? Answer:   No     Order Specific Question:   Decision Support Exception     Answer:   Emergency Medical Condition (MA) [1]    CTA ABDOMEN PELV W CONT     Standing Status:   Standing     Number of Occurrences:   1     Order Specific Question:   Transport     Answer:   BED [2]     Order Specific Question:   Reason for Exam     Answer:   gib     Order Specific Question:   Type of Contrast     Answer:   IV     Order Specific Question:   Does patient have history of Renal Disease?      Answer:   No     Order Specific Question:   Oral Contrast     Answer:   Per Radiologist Protocol     Order Specific Question:   Decision Support Exception     Answer:   Emergency Medical Condition (MA) [1]    XR CHEST PORT     Standing Status:   Standing     Number of Occurrences:   1     Order Specific Question:   Reason for Exam     Answer:   cp    IR PLC IVC FILTER     Standing Status:   Standing     Number of Occurrences:   1     Order Specific Question:   Transport     Answer:   Stretcher [5]     Order Specific Question:   Reason for Exam     Answer:   blockage    CBC WITH AUTOMATED DIFF     Standing Status:   Standing     Number of Occurrences:   1    COMPREHENSIVE METABOLIC PANEL     Standing Status:   Standing     Number of Occurrences:   1    PRO-BNP     Standing Status:   Standing     Number of Occurrences:   1    TROPONIN-HIGH SENSITIVITY     Standing Status:   Standing     Number of Occurrences:   1    D DIMER     Standing Status:   Standing     Number of Occurrences:   1    PROTHROMBIN TIME + INR     Standing Status:   Standing     Number of Occurrences:   1    MAGNESIUM     Standing Status:   Standing     Number of Occurrences:   1    URINALYSIS W/ RFLX MICROSCOPIC     Standing Status:   Standing     Number of Occurrences:   1    LACTIC ACID     Standing Status:   Standing     Number of Occurrences:   1    PTT     Standing Status:   Standing     Number of Occurrences:   1    OCCULT BLOOD, STOOL     Standing Status:   Standing     Number of Occurrences:   1    METABOLIC PANEL, BASIC     Standing Status:   Standing     Number of Occurrences:   1    CBC WITH AUTOMATED DIFF     Standing Status:   Standing     Number of Occurrences:   1    DIET NPO     Standing Status:   Standing     Number of Occurrences:   1    VITAL SIGNS     Standing Status:   Standing     Number of Occurrences:   1    VITAL SIGNS     Per unit routine     Standing Status:   Standing     Number of Occurrences:   1    NOTIFY PROVIDER: SPECIFY Notify physician for pulse less than 50 or greater than 120, respiratory rate less than 12 or greater than 25, oral temperature greater than 101.3 F (34.2 C), systolic BP less than 90 or greater than 732, diastolic BP less. .. Notify physician for pulse less than 50 or greater than 120, respiratory rate less than 12 or greater than 25, oral temperature greater than 101.3 F (52.7 C), systolic BP less than 90 or greater than 899, diastolic BP less than 50 or greater than 100. Standing Status:   Standing     Number of Occurrences:   1    WEIGH PATIENT     Standing Status:   Standing     Number of Occurrences:   1    INTAKE AND OUTPUT     Call for urine ouput less than 120ml in 4 hours     Standing Status:   Standing     Number of Occurrences:   1    STRAIGHT CATHETER (NURSING) PRN Routine PRN inability to void. If necessary to cath a second time, use indwelling urinary catheter, if residual greater than 300 ml leave in and discontinue in 24 hours. PRN inability to void.  If necessary to cath a second time, use indwelling urinary catheter, if residual greater than 300 ml leave in and discontinue in 24 hours. Standing Status:   Standing     Number of Occurrences:   26707    BEDREST, COMPLETE     Standing Status:   Standing     Number of Occurrences:   1    CARDIAC MONITORING     Standing Status:   Standing     Number of Occurrences:   1     Order Specific Question:   Type: Answer:   Remote Telemetry     Order Specific Question:   Patient may go off unit without monitor     Answer:   No    APPLY/MAINTAIN SEQUENTIAL COMPRESSION DEVICE     Standing Status:   Standing     Number of Occurrences:   1    FULL CODE     Standing Status:   Standing     Number of Occurrences:   1    IP CONSULT TO INTERVENTIONAL RADIOLOGY     Standing Status:   Standing     Number of Occurrences:   1     Order Specific Question:   Reason for Consult: Answer:   IVC filter     Order Specific Question:   Did you call or speak to the consulting provider? Answer: Yes    IP CONSULT TO GASTROENTEROLOGY     Standing Status:   Standing     Number of Occurrences:   1     Order Specific Question:   Reason for Consult: Answer:   vomiting blood     Order Specific Question:   Did you call or speak to the consulting provider? Answer:   No     Order Specific Question:   Consult To     Answer:   vomitnig blood    IP CONSULT TO HEMATOLOGY     Standing Status:   Standing     Number of Occurrences:   1     Order Specific Question:   Reason for Consult: Answer:   [address] choices     Order Specific Question:   Did you call or speak to the consulting provider? Answer:    Yes    OT--EVAL, DEVISE PLAN OF CARE AND TREAT     Standing Status:   Standing     Number of Occurrences:   1    PT--EVAL, DEVISE PLAN OF CARE AND TREAT     Standing Status:   Standing     Number of Occurrences:   1    OXIMETRY, SPOT CHECK     Standing Status:   Standing     Number of Occurrences:   61045    OXYGEN CANNULA Liters per minute: 2; Indications for O2 therapy: HYPOXIA PRN Routine     Titrate rate up to 4 L / minute to maintain oxygen saturation at 90 % or greater. Standing Status:   Standing     Number of Occurrences:   10269     Order Specific Question:   Liters per minute: Answer:   2     Order Specific Question:   Indications for O2 therapy     Answer:   HYPOXIA    EKG, 12 LEAD, INITIAL     Standing Status:   Standing     Number of Occurrences:   1     Order Specific Question:   Reason for Exam:     Answer:   cp    TYPE & SCREEN     ENTER SURGERY DATE IF FOR PRE-OP TESTING. Standing Status:   Standing     Number of Occurrences:   1     Order Specific Question:   Has patient been transfused or pregnant in the last 3 mos. ? Answer:   Unknown    SALINE LOCK IV ONE TIME STAT     Standing Status:   Standing     Number of Occurrences:   1    morphine injection 4 mg    ondansetron (ZOFRAN) injection 4 mg    iopamidoL (ISOVUE-370) 76 % injection 100 mL    sodium chloride (NS) flush 5-40 mL    sodium chloride (NS) flush 5-40 mL    OR Linked Order Group     acetaminophen (TYLENOL) tablet 650 mg     acetaminophen (TYLENOL) suppository 650 mg    polyethylene glycol (MIRALAX) packet 17 g    OR Linked Order Group     ondansetron (ZOFRAN ODT) tablet 4 mg     ondansetron (ZOFRAN) injection 4 mg    atorvastatin (LIPITOR) tablet 40 mg     OP SIG:Take 40 mg by mouth daily.  pantoprazole (PROTONIX) tablet 40 mg     Order Specific Question:   PPI INDICATION     Answer:   GI Bleed    finasteride (PROSCAR) tablet 5 mg     OP SIG:Take 5 mg by mouth daily.  fluticasone-umeclidinium-vilanterol (TRELEGY ELLIPTA) inhaler 1 Puff     OP SIG:Take 1 Puff by inhalation daily.  albuterol-ipratropium (DUO-NEB) 2.5 MG-0.5 MG/3 ML     Order Specific Question:   MODE OF DELIVERY     Answer:   Nebulizer     Order Specific Question:   Initiate RT Bronchodilator Protocol     Answer: Yes    tamsulosin (FLOMAX) capsule 0.4 mg     OP SIG:Take 0.4 mg by mouth daily.       traMADoL (ULTRAM) tablet 50 mg    iopamidoL (ISOVUE-370) 76 % injection 29 mL    oxyCODONE-acetaminophen (PERCOCET) 5-325 mg per tablet 1 Tablet    oxyCODONE-acetaminophen (PERCOCET) 5-325 mg per tablet 1 Tablet    IP CONSULT TO CARDIOLOGY     Standing Status:   Standing     Number of Occurrences:   1     Order Specific Question:   Reason for Consult: Answer:   per patient's request     Order Specific Question:   Did you call or speak to the consulting provider? Answer:   No     Order Specific Question:   Consult To     Answer:   per patient's request    INITIAL PHYSICIAN ORDER: INPATIENT Remote Telemetry; Yes; 3. Patient receiving treatment that can only be provided in an inpatient setting (further clarification in H&P documentation)     Standing Status:   Standing     Number of Occurrences:   1     Order Specific Question:   Status: Answer:   INPATIENT [101]     Order Specific Question:   Type of Bed     Answer:   Remote Telemetry [29]     Order Specific Question:   Cardiac Monitoring Required? Answer:   Yes     Order Specific Question:   Inpatient Hospitalization Certified Necessary for the Following Reasons     Answer:   3. Patient receiving treatment that can only be provided in an inpatient setting (further clarification in H&P documentation)     Order Specific Question:   Admitting Diagnosis     Answer:   Pulmonary embolism ([facility], [address] [[id number]]     Order Specific Question:   Admitting Physician     Answer:   Krishna Finley     Order Specific Question:   Attending Physician     Answer:   Krishna Finley     Order Specific Question:   Estimated Length of Stay     Answer:   2 Midnights     Order Specific Question:   Discharge Plan:     Answer: Other (Specify)    IP CONSULT TO CASE MANAGEMENT     Standing Status:   Standing     Number of Occurrences:   1     Order Specific Question:   Reasons for Consult:      Answer:   Assistance with Discharge Planning    Pharmacy to [address] [place] [address] will order the necessary lab work, if needed, to complete the dosing and ongoing monitoring of requested drug therapy. Details will be updated within the patients Progress Notes. Standing Status:   Standing     Number of Occurrences:   1     Order Specific Question:   Reason for Consult: Answer:   renal dosing                 Provider Notes (Medical Decision Making):   60-year-old complaining of pleuritic chest pain shortness of breath known PE with a worsening PE burden. Also a DVT persistent in the leg. Not on anticoagulation secondary to recent GI bleed. Will admit for the syncope. Discussed the case initially with the hospitalist who wants to defer to the next hospitalist on how to anticoagulate. They want to hold on any anticoagulation until they see which they will do immediately. vss      Consults               Admitted    Procedures           CRITICAL CARE NOTE :  7:41 AM  Amount of Critical Care Time: 45 minutes    IMPENDING DETERIORATION -Airway, Respiratory and Cardiovascular  ASSOCIATED RISK FACTORS - Hypotension  MANAGEMENT- Bedside Assessment  INTERPRETATION -  CT Scan  INTERVENTIONS - hemodynamic mngmt  CASE REVIEW - Hospitalist/Intensivist  TREATMENT RESPONSE -Improved  PERFORMED BY - Self    NOTES   :  I have spent critical care time involved in lab review, consultations with specialist, family decision- making, bedside attention and documentation. This time excludes time spent in any separate billed procedures. During this entire length of time I was immediately available to the patient . Doris Stapleton MD                Disposition       Emergency Department Disposition:  Admitted      Diagnosis     Clinical Impression:   1. Syncope and collapse    2. Other acute pulmonary embolism, unspecified whether acute cor pulmonale present ([address]    3. Deep vein thrombosis (DVT) of calf muscle vein of left lower extremity, unspecified chronicity ([address]    4.  Anemia, unspecified type        Attestations:    Andre Gottron MD Autumn    Please note that this dictation was completed with [employer], the computer voice recognition software. Quite often unanticipated grammatical, syntax, homophones, and other interpretive errors are inadvertently transcribed by the computer software. Please disregard these errors. Please excuse any errors that have escaped final proofreading. Thank you.

## 2022-03-10 NOTE — CONSULTS
Interventional Radiology Post Procedure Note    3/10/2022    Indications: newly found PE with contraindication for anticoagulation    Procedure(s): image-guided IVC filter placement    Preliminary Findings (full report to follow): patent IVC    Fluoro Time: See report    Contrast: See report    Implants: See above    Estimated blood loss: Minimal    Complications: None    Plan: follow up with [facility] in 30 days for retrieval plan of the filter     Follow Up: PRN

## 2022-03-10 NOTE — PROGRESS NOTES
Discharge summary from [facility] copied below for transition of care purposes. Transferred to ED for syncopal event followed by unremitting left sided chest pain in the setting of recent hospitalization for NSTEMI leading to ventilator and distal DVT. Anticipate return to rehab once medically stable/workup complete. Rehab D/C Summary     Patient:   Neville Murphy            MRN: [id number]            FIN: [id number]               Age:   73 years     Sex:  Male     :  1955   Associated Diagnoses:   None   Author:   Tal Johnson MD, Kandi Sidhu      Admission Date/Time:  22 16:13    Discharge Date and Time: 3/10/22     History of Present Illness (per admission H&P):   This is a 42-year-old male admitted from [facility] who was found to have critical illness myopathy after a prolonged hospitalization for NSTEMI and hypoxic respiratory failure requiring intubation/ventilation and acute DVT, he was quite debilitated after that hospitalization and was discharged to skilled nursing facility where he had been progressing functionally, however he was readmitted to acute care with acute blood loss anemia due to hematuria and possible GI bleed. Hospital course was complicated by metabolic encephalopathy. Notably he has urinary retention with a chronic indwelling catheter, he was evaluated by urology and they are considering a voiding trial in about 1 week. Due to his functional progress in anticipation of returning home independent he is being admitted to inpatient rehabilitation. Today, he reports pain in his low back and multiple joints, oral medications help somewhat. He reports generalized weakness, denies focal weakness. He denies spasms or sensory changes. He is continent of bowel, has Toro catheter for urinary retention. He is tolerating an oral diet without dysphagia. Hospital Course:   On day of transfer he was noted to have syncopal event, unresponsive while sitting up x20-30 seconds, rapid response called, BP sitting 120/70 and supine 145/80, orders to treat vasovagal syncope/orthostasis with IV placement and IVF. About 10-15 minutes later he reported left sided chest pain without resolution, second rapid response called. Note that a few weeks ago he was hospitalized (at [facility]) for NSTEMI which resulted in resp failure/mechanical ventilation. Note that during that time he was diagnosed with acute distal DVT, not on AC/prophylaxis due to suspected GI bleed and hematuria. He will need chest pain workup and would include CTA to rule out PE given chest pain and syncope and h/o DVT. He is being transferred to [address] [place] [address] ED for further workup and management.     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 on vent   Hematuria  Urinary retention, chronic indwelling catheter  Acute blood loss anemia    Acute/subacute DVT - L peroneal vein   Suspected recent GI bleed   CAD, h/o recent NSTEMI   HTN   HLD   COPD  GERD  Vocal cord dysfunction     Plan:     Tylenol, percocet, lidocaine topical - penile   Asa 81   Plavix   Statin   BP meds, rate control   Flomax   Finasteride   Trelegy ellipta   PPI   Holding AC (xarelto) d/t poss GI bleed/hematuria   Trend hgb   Toro - urology OP visit for void trial 3/11 (difficult cath)  Bowel regimen     DVT ppx: no pharmacologic ppx d/t GI bleed/hematuria, SCD contraindicated d/t distal DVT   Medical comorbidities: hospitalist consult       Per hospitalist documentation:  Impression/Plan:   CAD  recent non-ST elevation MI 2  Continue aspirin statin beta-blocker  Cardiology follow-up as an outpatient    Tachycardia  increase BB    Hematuria  Urology follow-up as an outpatient  Chronic Toro  Initially treated with IV Rocephin which has been discontinued as cultures were negative    Abnormal LFT with elevated ALT  Continue to monitor    Blood loss anemia  Secondary to above   Monitor hemoglobin    ? hemoptysis  cxr/cbc ordered    Rapid/Racing thoughts  ?tele psych consult  d/w primary rehab service    Urinary retention  Chronic Toro  Continue finasteride, Flomax    Hypertension  Continue amlodipine    DVT  Repeat ultrasound ordered - pending  Patient currently not on any anticoagulation secondary to a small/trivial clot     Functional Status at Discharge:      ADL Status  [id number] Eating: Independent - 06 (03/09/22)  [id number] Oral Hygiene: Independent - 06 (03/09/22)  BP3813 Upper Body Dressing: Independent - 06 (03/09/22)  KL2272 Lower Body Dressing: Supervision or touching assistance - 04 (03/09/22)  [id number] Toileting Hygiene: Donne Grippe - 06 (03/09/22)  KE5716 Toilet Transfer: Supervision or touching assistance - 04 (03/05/22)  [id number] Shower, Bathe Self: Supervision or touching assistance - 04 (03/09/22)  Tub/Shower Transfer- OT: Sup (03/09/22)      Mobility Functional Status  [id number] Lying to Sitting Side of Bed Goal: Independent - 06 (03/09/22)  [id number] Sit to Stand: Partial/Moderate assistance - 03 (03/10/22)  MY3121 Chair,Bed to Chair Transfer: Partial/Moderate assistance - 03 (03/10/22)  OP2011 Walk 10 Feet: Supervision or touching assistance - 04 (03/07/22)  [id number] Walk 50 Feet with Two Turns: Supervision or touching assistance - 04 (03/09/22)  [id number] Walk 150 Feet: Supervision or touching assistance - 04 (03/09/22)      Speech/Language & Cognition Functional Status  Comprehension Mode Functional Status:  Auditory, Visual (03/04/22)  [id number] Understanding Verbal Content: Understands -4 (03/09/22)  IG1549 Expression of Ideas and Wants: Expresses w/o difficulty & with clear speech - 4 (03/09/22)  Expression Mode Functional Status: Vocal (03/04/22)  Person Orientation IP: Independent (03/07/22)  Place Orientation IP: Independent (03/07/22)  Time Orientation IP: Independent (03/07/22)  Situation Orientation IP: Independent (03/07/22)  Biographical Information Orientation IP: Independent (03/07/22)  Attention Functional Status - OT: Usually Independent (03/07/22)  Attention Functional Status - SLP: Sometimes Independent (03/07/22)  Memory Functional Status - OT IP: Usually Independent (03/07/22)  Memory Functional Status - SLP IP: Sometimes Independent (03/07/22)  Safety Awareness/Insight - OT: Usually Independent (03/07/22)  Safety Awareness/Insight - SLP: Sometimes Independent (03/07/22)  Simple Problem Solving Func Status OT: Usually Independent (03/07/22)  Simple Problem Solving Func Status SLP: Sometimes Independent (03/07/22)  Complex Problem Solving Func Status OT: Usually Independent (03/07/22)  Complex Problem Solving Func Status SLP: Dependent (03/07/22)  Communication/Cognition Descriptors: Confabulation, Perseveration, Requires verbal redirection to tasks, Tangential (03/07/22)       Discharge Medications:       Home Medications (11) Active  acetaminophen-oxyCODONE 325 mg-5 mg oral tablet 1 tab, PRN, Oral, q4hr  amLODIPine 10 mg oral tablet 10 mg = 1 tab, Oral, Daily  aspirin 81 mg oral tablet, chewable 81 mg = 1 tab, Oral, Daily  atorvastatin 40 mg oral tablet 40 mg = 1 tab, Oral, Daily  clopidogrel 75 mg oral tablet 75 mg = 1 tab, Oral, Daily  Combivent Respimat CFC free 20 mcg-100 mcg/inh inhalation aerosol 1 puff, PRN, INH, BID RT  esomeprazole 40 mg oral delayed release capsule 40 mg = 1 cap, Oral, Before breakfast  finasteride 5 mg oral tablet 5 mg = 1 tab, Oral, Daily  metoprolol succinate 25 mg oral tablet, extended release 25 mg = 1 tab, Oral, Daily  tamsulosin 0.4 mg oral capsule 0.4 mg = 1 cap, Oral, Daily  Trelegy Ellipta 100 mcg-62.5 mcg-25 mcg/inh inhalation powder 1 puff, INH, Daily RT      Vital Signs (last 24 hrs)_____  Last Charted___________  Temp Oral     97.8 DegF  (MAR 10 08:49)  Heart Rate Peripheral   H 104bpm  (MAR 10 08:49)  Resp Rate         20 br/min  (MAR 10 08:49)  SBP      139 mmHg  (MAR 10 08:49)  DBP      88 mmHg  (MAR 10 08:49)  SpO2      97 %  (MAR 10 08:49)          Labs (Last four charted values)  WBC                  6.7 (MAR 08) 5.7 (MAR 05)   Hgb                  L 11.4 (MAR 08) L 11.0 (MAR 05)   Hct                  L 34.3 (MAR 08) L 33.1 (MAR 05)   Plt                  250 (MAR 08) 209 (MAR 05)   Na                   141 (MAR 05)   K                    L 3.4 (MAR 05)   CO2                  20 (MAR 05)   Cl                   105 (MAR 05)   Cr                   0.80 (MAR 05) 0.74 (MAR 03)   BUN                  10 (MAR 05)   Glucose              H 109 (MAR 05) H 141 (MAR 10)   Ca                   8.7 (MAR 05)       Diet -- 03/05/22 11:45:00 EST, Texture: Level 7 - Regular, Liquid Consistency: Level 0 - Thin, Restrictions: No Restrictions, Special Instructions: WHOLE MILK ALL MEALS      Disposition:   [facility] ED     Report called to [facility] ED. Signature Line  Electronically Authenticated on  03/10/2022 08:59 AM  ____________________________________________________  Selena Haney MD

## 2022-03-11 ENCOUNTER — ANESTHESIA (OUTPATIENT)
Dept: ENDOSCOPY | Age: 67
DRG: 175 | End: 2022-03-11
Payer: MEDICARE

## 2022-03-11 ENCOUNTER — ANESTHESIA EVENT (OUTPATIENT)
Dept: ENDOSCOPY | Age: 67
DRG: 175 | End: 2022-03-11
Payer: MEDICARE

## 2022-03-11 ENCOUNTER — APPOINTMENT (OUTPATIENT)
Dept: ENDOSCOPY | Age: 67
DRG: 175 | End: 2022-03-11
Attending: INTERNAL MEDICINE
Payer: MEDICARE

## 2022-03-11 LAB
ANION GAP SERPL CALC-SCNC: 4 MMOL/L (ref 5–15)
BASOPHILS # BLD: 0 K/UL (ref 0–0.1)
BASOPHILS NFR BLD: 0 % (ref 0–1)
BUN SERPL-MCNC: 11 MG/DL (ref 6–20)
BUN/CREAT SERPL: 15 (ref 12–20)
CA-I BLD-MCNC: 8.5 MG/DL (ref 8.5–10.1)
CHLORIDE SERPL-SCNC: 106 MMOL/L (ref 97–108)
CO2 SERPL-SCNC: 30 MMOL/L (ref 21–32)
COVID-19 RAPID TEST, COVR: NOT DETECTED
CREAT SERPL-MCNC: 0.75 MG/DL (ref 0.7–1.3)
DIFFERENTIAL METHOD BLD: ABNORMAL
EOSINOPHIL # BLD: 0.1 K/UL (ref 0–0.4)
EOSINOPHIL NFR BLD: 1 % (ref 0–7)
ERYTHROCYTE [DISTWIDTH] IN BLOOD BY AUTOMATED COUNT: 16.7 % (ref 11.5–14.5)
GLUCOSE SERPL-MCNC: 92 MG/DL (ref 65–100)
HCT VFR BLD AUTO: 30.4 % (ref 36.6–50.3)
HGB BLD-MCNC: 10 G/DL (ref 12.1–17)
IMM GRANULOCYTES # BLD AUTO: 0.1 K/UL (ref 0–0.04)
IMM GRANULOCYTES NFR BLD AUTO: 1 % (ref 0–0.5)
LYMPHOCYTES # BLD: 1.7 K/UL (ref 0.8–3.5)
LYMPHOCYTES NFR BLD: 30 % (ref 12–49)
MCH RBC QN AUTO: 29.9 PG (ref 26–34)
MCHC RBC AUTO-ENTMCNC: 32.9 G/DL (ref 30–36.5)
MCV RBC AUTO: 91 FL (ref 80–99)
MONOCYTES # BLD: 0.5 K/UL (ref 0–1)
MONOCYTES NFR BLD: 9 % (ref 5–13)
NEUTS SEG # BLD: 3.3 K/UL (ref 1.8–8)
NEUTS SEG NFR BLD: 59 % (ref 32–75)
NRBC # BLD: 0 K/UL (ref 0–0.01)
NRBC BLD-RTO: 0 PER 100 WBC
PLATELET # BLD AUTO: 210 K/UL (ref 150–400)
PMV BLD AUTO: 8.7 FL (ref 8.9–12.9)
POTASSIUM SERPL-SCNC: 3.8 MMOL/L (ref 3.5–5.1)
RBC # BLD AUTO: 3.34 M/UL (ref 4.1–5.7)
SODIUM SERPL-SCNC: 140 MMOL/L (ref 136–145)
WBC # BLD AUTO: 5.6 K/UL (ref 4.1–11.1)

## 2022-03-11 PROCEDURE — 65270000029 HC RM PRIVATE

## 2022-03-11 PROCEDURE — 76040000007: Performed by: INTERNAL MEDICINE

## 2022-03-11 PROCEDURE — 94640 AIRWAY INHALATION TREATMENT: CPT

## 2022-03-11 PROCEDURE — 36415 COLL VENOUS BLD VENIPUNCTURE: CPT

## 2022-03-11 PROCEDURE — 0DJ08ZZ INSPECTION OF UPPER INTESTINAL TRACT, VIA NATURAL OR ARTIFICIAL OPENING ENDOSCOPIC: ICD-10-PCS | Performed by: INTERNAL MEDICINE

## 2022-03-11 PROCEDURE — 74011250636 HC RX REV CODE- 250/636: Performed by: PHYSICIAN ASSISTANT

## 2022-03-11 PROCEDURE — 87635 SARS-COV-2 COVID-19 AMP PRB: CPT

## 2022-03-11 PROCEDURE — 74011250636 HC RX REV CODE- 250/636: Performed by: NURSE ANESTHETIST, CERTIFIED REGISTERED

## 2022-03-11 PROCEDURE — 76060000032 HC ANESTHESIA 0.5 TO 1 HR: Performed by: INTERNAL MEDICINE

## 2022-03-11 PROCEDURE — 74011000250 HC RX REV CODE- 250: Performed by: NURSE ANESTHETIST, CERTIFIED REGISTERED

## 2022-03-11 PROCEDURE — 74011250637 HC RX REV CODE- 250/637: Performed by: INTERNAL MEDICINE

## 2022-03-11 PROCEDURE — 80048 BASIC METABOLIC PNL TOTAL CA: CPT

## 2022-03-11 PROCEDURE — 74011250637 HC RX REV CODE- 250/637: Performed by: PHYSICIAN ASSISTANT

## 2022-03-11 PROCEDURE — 74011000250 HC RX REV CODE- 250: Performed by: PHYSICIAN ASSISTANT

## 2022-03-11 PROCEDURE — 85025 COMPLETE CBC W/AUTO DIFF WBC: CPT

## 2022-03-11 PROCEDURE — 2709999900 HC NON-CHARGEABLE SUPPLY: Performed by: INTERNAL MEDICINE

## 2022-03-11 RX ORDER — PROPOFOL 10 MG/ML
INJECTION, EMULSION INTRAVENOUS AS NEEDED
Status: DISCONTINUED | OUTPATIENT
Start: 2022-03-11 | End: 2022-03-11 | Stop reason: HOSPADM

## 2022-03-11 RX ORDER — OXYCODONE AND ACETAMINOPHEN 5; 325 MG/1; MG/1
1 TABLET ORAL ONCE
Status: COMPLETED | OUTPATIENT
Start: 2022-03-11 | End: 2022-03-11

## 2022-03-11 RX ORDER — SODIUM CHLORIDE, SODIUM LACTATE, POTASSIUM CHLORIDE, CALCIUM CHLORIDE 600; 310; 30; 20 MG/100ML; MG/100ML; MG/100ML; MG/100ML
INJECTION, SOLUTION INTRAVENOUS
Status: DISCONTINUED | OUTPATIENT
Start: 2022-03-11 | End: 2022-03-11 | Stop reason: HOSPADM

## 2022-03-11 RX ORDER — HYDROMORPHONE HYDROCHLORIDE 1 MG/ML
0.5 INJECTION, SOLUTION INTRAMUSCULAR; INTRAVENOUS; SUBCUTANEOUS ONCE
Status: COMPLETED | OUTPATIENT
Start: 2022-03-12 | End: 2022-03-12

## 2022-03-11 RX ORDER — LIDOCAINE HYDROCHLORIDE 20 MG/ML
INJECTION, SOLUTION EPIDURAL; INFILTRATION; INTRACAUDAL; PERINEURAL AS NEEDED
Status: DISCONTINUED | OUTPATIENT
Start: 2022-03-11 | End: 2022-03-11 | Stop reason: HOSPADM

## 2022-03-11 RX ADMIN — TRAMADOL HYDROCHLORIDE 50 MG: 50 TABLET, COATED ORAL at 21:20

## 2022-03-11 RX ADMIN — FINASTERIDE 5 MG: 5 TABLET, FILM COATED ORAL at 08:36

## 2022-03-11 RX ADMIN — PROPOFOL 100 MG: 10 INJECTION, EMULSION INTRAVENOUS at 12:32

## 2022-03-11 RX ADMIN — TRAMADOL HYDROCHLORIDE 50 MG: 50 TABLET, COATED ORAL at 16:38

## 2022-03-11 RX ADMIN — ONDANSETRON 4 MG: 4 TABLET, ORALLY DISINTEGRATING ORAL at 21:20

## 2022-03-11 RX ADMIN — SODIUM CHLORIDE, PRESERVATIVE FREE 10 ML: 5 INJECTION INTRAVENOUS at 21:19

## 2022-03-11 RX ADMIN — OXYCODONE AND ACETAMINOPHEN 1 TABLET: 5; 325 TABLET ORAL at 05:44

## 2022-03-11 RX ADMIN — ONDANSETRON 4 MG: 2 INJECTION INTRAMUSCULAR; INTRAVENOUS at 05:46

## 2022-03-11 RX ADMIN — SODIUM CHLORIDE, PRESERVATIVE FREE 10 ML: 5 INJECTION INTRAVENOUS at 06:25

## 2022-03-11 RX ADMIN — FLUTICASONE FUROATE, UMECLIDINIUM BROMIDE AND VILANTEROL TRIFENATATE 1 PUFF: 100; 62.5; 25 POWDER RESPIRATORY (INHALATION) at 09:00

## 2022-03-11 RX ADMIN — PANTOPRAZOLE SODIUM 40 MG: 40 TABLET, DELAYED RELEASE ORAL at 16:35

## 2022-03-11 RX ADMIN — TAMSULOSIN HYDROCHLORIDE 0.4 MG: 0.4 CAPSULE ORAL at 08:36

## 2022-03-11 RX ADMIN — TRAMADOL HYDROCHLORIDE 50 MG: 50 TABLET, COATED ORAL at 04:19

## 2022-03-11 RX ADMIN — SODIUM CHLORIDE, POTASSIUM CHLORIDE, SODIUM LACTATE AND CALCIUM CHLORIDE: 600; 310; 30; 20 INJECTION, SOLUTION INTRAVENOUS at 12:29

## 2022-03-11 RX ADMIN — LIDOCAINE HYDROCHLORIDE 100 MG: 20 INJECTION, SOLUTION EPIDURAL; INFILTRATION; INTRACAUDAL; PERINEURAL at 12:32

## 2022-03-11 RX ADMIN — PROPOFOL 50 MG: 10 INJECTION, EMULSION INTRAVENOUS at 12:36

## 2022-03-11 RX ADMIN — ATORVASTATIN CALCIUM 40 MG: 40 TABLET, FILM COATED ORAL at 08:36

## 2022-03-11 NOTE — ED NOTES
TRANSFER - OUT REPORT:    Verbal report given to Kathy Orellana RN (name) on [address] [address]  being transferred to  (unit) for routine progression of care       Report consisted of patients Situation, Background, Assessment and   Recommendations(SBAR). Information from the following report(s) SBAR, ED Summary, Intake/Output, MAR, Recent Results and Cardiac Rhythm NSR was reviewed with the receiving nurse. Lines:   Peripheral IV 03/10/22 Left Antecubital (Active)   Site Assessment Clean, dry, & intact 03/10/22 0956   Phlebitis Assessment 0 03/10/22 0956   Infiltration Assessment 0 03/10/22 0956   Dressing Status Clean, dry, & intact 03/10/22 0956   Dressing Type Transparent 03/10/22 0956   Hub Color/Line Status Green 03/10/22 0956       Peripheral IV Right Antecubital (Active)   Site Assessment Clean, dry, & intact 03/10/22 0957   Phlebitis Assessment 0 03/10/22 0957   Infiltration Assessment 0 03/10/22 0957   Dressing Status Clean, dry, & intact 03/10/22 0957   Dressing Type Transparent 03/10/22 0957   Hub Color/Line Status Green 03/10/22 0957        Opportunity for questions and clarification was provided.       Patient transported with:   Monitor  Tech

## 2022-03-11 NOTE — PROGRESS NOTES
Progress Note    Patient: Renee Ramos MRN: [id number]  SSN: [id number]    YOB: 1955  Age: 73 y.o. Sex: male      Admit Date: 3/10/2022    LOS: 1 day     Subjective:   GI in consultation for GI bleed    3/11: Patient seen status post EGD showing chronic gastritis without bleeding, duodenitis without bleeding, no active bleeding in upper GI tract. Recommend no anticoagulation for 3 days. Patient reports he feels \"great\" and states he has zero abdominal pain. He reports he did have his rubin catheter removed and has voided 4 times at time of exam.  He is on Nexium chronicly and reports that is the only medication he can take for his acid reflux that does not cause side effects. EGD 3/11:  chronic gastritis without bleeding, duodenitis without bleeding, no active bleeding in upper GI tract. Recommend no anticoagulation for 3 days. History of Present Illness: Renee Ramos is a 73 y.o. male who is seen in consultation for GI bleed-vomiting of blood. Patient was admitted to the hospital from [facility] rehab due to syncopal episode. Patient had a recent prolonged hospitalization at [facility] for NSTEMI and hypoxic respiratory failure where he was intubated. He developed DVT during the hospital stay and was put on Xarelto upon discharged to [facility]. He was at the  Rehab for several days. Yesterday, patient had an episode of vomiting up blood. He was working with occupational therapy when he developed chest pain and lightheadedness, then had a syncopal episode. Other PMH includes COPD, GERD, HTN, chronic rubin catheter for intermittent hematuria and pain.      -Initial labs in ED shows WBC 8.6, Hgb 10.2, albumin 2.8, ddimer 1.85.  -3/10 CXR with No acute cardiopulmonary process. -3/10 Duplex lower ext. - no DVT  -3/10 CTA chest - New segmental pulmonary embolus within the right lower lobe. Unchanged segmental pulmonary embolus within the right middle lobe.  No CT evidence of right heart strain.  -3/10 CT abdomen - No evidence of active gastrointestinal bleeding. No acute process.     Patient seen in the ED, awake and alert. No complain of pain. S/p IVC filter placement today. No further episode of vomiting. Objective:     Vitals:    03/11/22 1245 03/11/22 1250 03/11/22 1255 03/11/22 1300   BP: 133/86 (!) 133/90 135/89 134/83   Pulse: 89 (!) 101 89 90   Resp: 20 22 21 16   Temp: 97.7 °F (36.5 °C)      SpO2: 95% 96% 93% 94%   Weight:       Height:            Intake and Output:  Current Shift: 03/11 0701 - 03/11 1900  In: 200 [I.V.:200]  Out: -   Last three shifts: 03/09 1901 - 03/11 0700  In: -   Out: 450 [Urine:450]    Physical Exam:   Skin:  Extremities and face reveal no rashes. No cowart erythema. HEENT: Sclerae anicteric. Extra-occular muscles are intact. No abnormal pigmentation of the lips. The neck is supple. Cardiovascular: Regular rate and rhythm. Respiratory:  Comfortable breathing with no accessory muscle use. GI:  Abdomen nondistended, soft, and nontender. No enlargement of the liver or spleen. No masses palpable. Rectal:  Deferred  Musculoskeletal: Extremities have good range of motion. Neurological:  Gross memory appears intact. Patient is alert and oriented. Psychiatric:  Mood appears appropriate with judgement intact.   Lymphatic:  No visible adenopathy      Lab/Data Review:  Recent Results (from the past 24 hour(s))   METABOLIC PANEL, BASIC    Collection Time: 03/11/22  8:47 AM   Result Value Ref Range    Sodium 140 136 - 145 mmol/L    Potassium 3.8 3.5 - 5.1 mmol/L    Chloride 106 97 - 108 mmol/L    CO2 30 21 - 32 mmol/L    Anion gap 4 (L) 5 - 15 mmol/L    Glucose 92 65 - 100 mg/dL    BUN 11 6 - 20 mg/dL    Creatinine 0.75 0.70 - 1.30 mg/dL    BUN/Creatinine ratio 15 12 - 20      GFR est AA >60 >60 ml/min/1.73m2    GFR est non-AA >60 >60 ml/min/1.73m2    Calcium 8.5 8.5 - 10.1 mg/dL   CBC WITH AUTOMATED DIFF    Collection Time: 03/11/22  8:47 AM Result Value Ref Range    WBC 5.6 4.1 - 11.1 K/uL    RBC 3.34 (L) 4.10 - 5.70 M/uL    HGB 10.0 (L) 12.1 - 17.0 g/dL    HCT 30.4 (L) 36.6 - 50.3 %    MCV 91.0 80.0 - 99.0 FL    MCH 29.9 26.0 - 34.0 PG    MCHC 32.9 30.0 - 36.5 g/dL    RDW 16.7 (H) 11.5 - 14.5 %    PLATELET 959 055 - 091 K/uL    MPV 8.7 (L) 8.9 - 12.9 FL    NRBC 0.0 0.0  WBC    ABSOLUTE NRBC 0.00 0.00 - 0.01 K/uL    NEUTROPHILS 59 32 - 75 %    LYMPHOCYTES 30 12 - 49 %    MONOCYTES 9 5 - 13 %    EOSINOPHILS 1 0 - 7 %    BASOPHILS 0 0 - 1 %    IMMATURE GRANULOCYTES 1 (H) 0 - 0.5 %    ABS. NEUTROPHILS 3.3 1.8 - 8.0 K/UL    ABS. LYMPHOCYTES 1.7 0.8 - 3.5 K/UL    ABS. MONOCYTES 0.5 0.0 - 1.0 K/UL    ABS. EOSINOPHILS 0.1 0.0 - 0.4 K/UL    ABS. BASOPHILS 0.0 0.0 - 0.1 K/UL    ABS. IMM. GRANS. 0.1 (H) 0.00 - 0.04 K/UL    DF AUTOMATED     COVID-19 RAPID TEST    Collection Time: 03/11/22 10:40 AM   Result Value Ref Range    COVID-19 rapid test Not Detected Not Detected                IR PLC IVC FILTER   Final Result   Patent right IJ vein and IVC. Successful placement of an infrarenal IVC filter (Option Elite). PLAN:   Bedrest for 1 hour with HOB elevated to 30 degree. Follow up in 1 month to plan for filter removal.      CTA CHEST W OR W WO CONT   Final Result      1. New segmental pulmonary embolus within the right lower lobe. Unchanged   segmental pulmonary embolus within the right middle lobe. No CT evidence of   right heart strain. 2. Emphysema. 3. Remainder as above. Notification: Findings were called to discussed with Dr. Martin Suazo at 12:40 PM on   3/10/2022      CTA ABDOMEN PELV W CONT   Final Result      1. No evidence of active gastrointestinal bleeding. No acute process. 2. Remainder as above. DUPLEX LOWER EXT VENOUS BILAT   Final Result      XR CHEST PORT   Final Result   No acute cardiopulmonary process. Assessment:     Active Problems:    Pulmonary embolism ([address] (3/10/2022)        Plan:   1. G bleed - vomiting blood     -Hgb 10.2 Monitor and transfuse as needed     -No anticoagulant     -continue PPI. -3/10 CT abdomen - No evidence of active gastrointestinal bleeding. No acute process. s/p EGD 3/11: chronic gastritis without bleeding, duodenitis without bleeding, no active bleeding in upper GI tract. Recommend no anticoagulation for 3 days. Monitor H&H        Transfuse for hgB less than 7.0        Follow up with GI in 2 weeks post discharge   2. DVT      -3/10 CTA chest - New segmental pulmonary embolus within the right lower lobe. Unchanged segmental pulmonary embolus within the right middle lobe. No CT evidence of right heart strain.      -3/10 IVC filter placed. Thank you for allowing me to participate in this patients care. Plan discussed with Dr. Jesse Hernandes and he approves.     Signed By: Jailene Coughlin NP     March 11, 2022

## 2022-03-11 NOTE — PROGRESS NOTES
1125 am  Patient admitted from [facility] rehab and per Ian Gilbert from [facility] patient will be mary ann to return when medically stable as he was making progress in his therapy.

## 2022-03-11 NOTE — PROGRESS NOTES
Pt c/o lower abdominal pain says it is related to his chronic rubin that was placed due to retention. Wants rubin out. Says it has been switched several times and doesn't help with pain,  Rates 10/10. Ignacio paged and notified and ordered urology consult.  aware and will call consult.

## 2022-03-11 NOTE — ANESTHESIA POSTPROCEDURE EVALUATION
Procedure(s):  ESOPHAGOGASTRODUODENOSCOPY (EGD). total IV anesthesia, MAC    Anesthesia Post Evaluation      Multimodal analgesia: multimodal analgesia not used between 6 hours prior to anesthesia start to PACU discharge  Patient location during evaluation: bedside (Endoscopy suite)  Patient participation: complete - patient cannot participate  Level of consciousness: sleepy but conscious  Pain score: 0  Pain management: adequate  Airway patency: patent  Anesthetic complications: no  Cardiovascular status: acceptable  Respiratory status: acceptable and nasal cannula  Hydration status: acceptable  Comments: This patient remained on the stretcher. The patient was handed off to the endoscopy nursing team.  All questions regarding pre-, intra-, and postoperative care were answered. Post anesthesia nausea and vomiting:  none      INITIAL Post-op Vital signs: No vitals data found for the desired time range.

## 2022-03-11 NOTE — ANESTHESIA PREPROCEDURE EVALUATION
Relevant Problems   No relevant active problems       Anesthetic History   No history of anesthetic complications            Review of Systems / Medical History  Patient summary reviewed, nursing notes reviewed and pertinent labs reviewed    Pulmonary    COPD               Neuro/Psych   Within defined limits           Cardiovascular    Hypertension          CAD    Exercise tolerance: <4 METS  Comments: Echo:   Left Ventricle: Left ventricle size is normal. Normal wall thickness. Findings consistent with concentric hypertrophy. Normal wall motion. Normal left ventricular systolic function. Grade I diastolic dysfunction with normal LAP.    GI/Hepatic/Renal     GERD           Endo/Other  Within defined limits           Other Findings   Comments: AC for dvt hx         Past Medical History:   Diagnosis Date    CAD (coronary artery disease)     Chronic anticoagulation     Chronic indwelling Toro catheter     Chronic obstructive pulmonary disease (HCC)     GERD (gastroesophageal reflux disease)     Hx of deep venous thrombosis     Hyperlipidemia     Hypertension     Syncope     started in 2018    Thromboembolus [facility])     DVT/PE's    Urine retention     Vocal cord dysfunction        Past Surgical History:   Procedure Laterality Date    HX CORONARY STENT PLACEMENT      HX HEENT      right lazy eye corrective sx    HX ORTHOPAEDIC      bone spurs removed 1996    IR IVC FILTER      placed 3/10/22    IR PLC IVC FILTER  3/10/2022    MT ABDOMEN SURGERY PROC UNLISTED      inguinal hernia 1984    MT ABDOMEN SURGERY PROC UNLISTED      bilateral inguinal hernia sx with mesh 1987    MT CARDIAC SURG PROCEDURE UNLIST      stent 1996, 2018    VASCULAR SURGERY PROCEDURE UNLIST         Current Outpatient Medications   Medication Instructions    acetaminophen (TYLENOL) 650 mg, Oral, EVERY 6 HOURS AS NEEDED    acetaminophen (TYLENOL) 650 mg, Oral, EVERY 6 HOURS AS NEEDED    amLODIPine (NORVASC) 10 mg, Oral, DAILY    aspirin 81 mg, DAILY    atorvastatin (LIPITOR) 40 mg, Oral, DAILY    clopidogreL (PLAVIX) 75 mg, DAILY    esomeprazole (NEXIUM) 40 mg, Oral, DAILY    finasteride (PROSCAR) 5 mg, Oral, DAILY    fluticasone-umeclidinium-vilanterol (TRELEGY ELLIPTA) 100-62.5-25 mcg inhaler 1 Puff, Inhalation, DAILY    ibuprofen (MOTRIN) 600 mg, Oral, 3 TIMES DAILY AS NEEDED    ipratropium-albuteroL (Combivent Respimat)  mcg/actuation inhaler 1 Puff, Inhalation, 2 TIMES DAILY AS NEEDED    methocarbamoL (ROBAXIN-750) 750 mg, Oral, 3 TIMES DAILY AS NEEDED    metoprolol succinate (TOPROL-XL) 25 mg, DAILY    nystatin (MYCOSTATIN) 500,000 Units, Oral, 4 TIMES DAILY, swish and spit    tamsulosin (FLOMAX) 0.4 mg, Oral, DAILY       Current Facility-Administered Medications   Medication Dose Route Frequency    sodium chloride (NS) flush 5-40 mL  5-40 mL IntraVENous Q8H    sodium chloride (NS) flush 5-40 mL  5-40 mL IntraVENous PRN    acetaminophen (TYLENOL) tablet 650 mg  650 mg Oral Q6H PRN    Or    acetaminophen (TYLENOL) suppository 650 mg  650 mg Rectal Q6H PRN    polyethylene glycol (MIRALAX) packet 17 g  17 g Oral DAILY PRN    ondansetron (ZOFRAN ODT) tablet 4 mg  4 mg Oral Q8H PRN    Or    ondansetron (ZOFRAN) injection 4 mg  4 mg IntraVENous Q6H PRN    atorvastatin (LIPITOR) tablet 40 mg  40 mg Oral DAILY    pantoprazole (PROTONIX) tablet 40 mg  40 mg Oral ACB&D    finasteride (PROSCAR) tablet 5 mg  5 mg Oral DAILY    fluticasone-umeclidinium-vilanterol (TRELEGY ELLIPTA) inhaler 1 Puff  1 Puff Inhalation DAILY    albuterol-ipratropium (DUO-NEB) 2.5 MG-0.5 MG/3 ML  3 mL Nebulization Q4H PRN    tamsulosin (FLOMAX) capsule 0.4 mg  0.4 mg Oral DAILY    traMADoL (ULTRAM) tablet 50 mg  50 mg Oral Q6H PRN       Patient Vitals for the past 24 hrs:   Temp Pulse Resp BP SpO2   03/11/22 0821     94 %   03/11/22 0814 36.4 °C (97.6 °F) 91 18 126/84 94 %   03/11/22 0445 36.5 °C (97.7 °F) 93 18 126/79 93 %   03/10/22 2134 36.6 °C (97.9 °F) 94 16 137/77 94 %   03/10/22 1915  92 18 (!) 143/90 95 %   03/10/22 1700  (!) 101 17 114/76 95 %   03/10/22 1500  92 17 123/81 96 %   03/10/22 1300  89 18 125/78 95 %       Lab Results   Component Value Date/Time    WBC 5.6 03/11/2022 08:47 AM    HGB 10.0 (L) 03/11/2022 08:47 AM    HCT 30.4 (L) 03/11/2022 08:47 AM    PLATELET 683 42/14/0966 08:47 AM    MCV 91.0 03/11/2022 08:47 AM     Lab Results   Component Value Date/Time    Sodium 140 03/11/2022 08:47 AM    Potassium 3.8 03/11/2022 08:47 AM    Chloride 106 03/11/2022 08:47 AM    CO2 30 03/11/2022 08:47 AM    Anion gap 4 (L) 03/11/2022 08:47 AM    Glucose 92 03/11/2022 08:47 AM    BUN 11 03/11/2022 08:47 AM    Creatinine 0.75 03/11/2022 08:47 AM    BUN/Creatinine ratio 15 03/11/2022 08:47 AM    GFR est AA >60 03/11/2022 08:47 AM    GFR est non-AA >60 03/11/2022 08:47 AM    Calcium 8.5 03/11/2022 08:47 AM     No results found for: APTT, PTP, INR, INREXT  Lab Results   Component Value Date/Time    Glucose 92 03/11/2022 08:47 AM    Glucose (POC) 130 (H) 02/17/2022 11:52 AM     Physical Exam    Airway  Mallampati: II  TM Distance: 4 - 6 cm  Neck ROM: normal range of motion   Mouth opening: Normal     Cardiovascular    Rhythm: regular  Rate: normal         Dental    Dentition: Poor dentition     Pulmonary  Breath sounds clear to auscultation               Abdominal  GI exam deferred       Other Findings            Anesthetic Plan    ASA: 3  Anesthesia type: total IV anesthesia and MAC          Induction: Intravenous  Anesthetic plan and risks discussed with: Patient and Family

## 2022-03-11 NOTE — PROGRESS NOTES
Subjective   Subjective:      HPI   Patient feeling better. Denies any blood in the urine or stool. No more episodes of vomiting blood.   Objective     Current Facility-Administered Medications:     sodium chloride (NS) flush 5-40 mL, 5-40 mL, IntraVENous, Q8H, Simran Tubbs PA-C, 10 mL at 03/11/22 0625    sodium chloride (NS) flush 5-40 mL, 5-40 mL, IntraVENous, PRN, Simran Tubbs PA-C    acetaminophen (TYLENOL) tablet 650 mg, 650 mg, Oral, Q6H PRN, 650 mg at 03/10/22 1732 **OR** acetaminophen (TYLENOL) suppository 650 mg, 650 mg, Rectal, Q6H PRN, Simran Tubbs PA-C    polyethylene glycol (MIRALAX) packet 17 g, 17 g, Oral, DAILY PRN, Simran Tubbs PA-C    ondansetron (ZOFRAN ODT) tablet 4 mg, 4 mg, Oral, Q8H PRN **OR** ondansetron (ZOFRAN) injection 4 mg, 4 mg, IntraVENous, Q6H PRN, Simran Tubbs PA-C, 4 mg at 03/11/22 0546    atorvastatin (LIPITOR) tablet 40 mg, 40 mg, Oral, DAILY, Simran Tubbs PA-C, 40 mg at 03/11/22 0836    pantoprazole (PROTONIX) tablet 40 mg, 40 mg, Oral, ACB&D, Simran Tubbs PA-C, 40 mg at 03/10/22 1731    finasteride (PROSCAR) tablet 5 mg, 5 mg, Oral, DAILY, Simran Tubbs PA-C, 5 mg at 03/11/22 0836    fluticasone-umeclidinium-vilanterol (TRELEGY ELLIPTA) inhaler 1 Puff, 1 Puff, Inhalation, DAILY, Simran Tubbs PA-C, 1 Puff at 03/11/22 0900    albuterol-ipratropium (DUO-NEB) 2.5 MG-0.5 MG/3 ML, 3 mL, Nebulization, Q4H PRN, Simran Tubbs PA-C    tamsulosin (FLOMAX) capsule 0.4 mg, 0.4 mg, Oral, DAILY, Simran Tubbs PA-C, 0.4 mg at 03/11/22 0836    traMADoL (ULTRAM) tablet 50 mg, 50 mg, Oral, Q6H PRN, Simran Tubbs PA-C, 50 mg at 03/11/22 0419    Objective:     Visit Vitals  /83   Pulse 90   Temp 97.7 °F (36.5 °C)   Resp 16   Ht 6' 3\" (1.905 m)   Wt 102.1 kg (225 lb)   SpO2 94%   BMI 28.12 kg/m²      Physical Exam   Patient alert oriented x3  Lungs: CTA  Heart: Regular  Abdomen: Soft, nontender, bowel sounds present  Extremities :no edema  @Objective    [email or web address]     Data Review:   Recent Results (from the past 24 hour(s))   METABOLIC PANEL, BASIC    Collection Time: 03/11/22  8:47 AM   Result Value Ref Range    Sodium 140 136 - 145 mmol/L    Potassium 3.8 3.5 - 5.1 mmol/L    Chloride 106 97 - 108 mmol/L    CO2 30 21 - 32 mmol/L    Anion gap 4 (L) 5 - 15 mmol/L    Glucose 92 65 - 100 mg/dL    BUN 11 6 - 20 mg/dL    Creatinine 0.75 0.70 - 1.30 mg/dL    BUN/Creatinine ratio 15 12 - 20      GFR est AA >60 >60 ml/min/1.73m2    GFR est non-AA >60 >60 ml/min/1.73m2    Calcium 8.5 8.5 - 10.1 mg/dL   CBC WITH AUTOMATED DIFF    Collection Time: 03/11/22  8:47 AM   Result Value Ref Range    WBC 5.6 4.1 - 11.1 K/uL    RBC 3.34 (L) 4.10 - 5.70 M/uL    HGB 10.0 (L) 12.1 - 17.0 g/dL    HCT 30.4 (L) 36.6 - 50.3 %    MCV 91.0 80.0 - 99.0 FL    MCH 29.9 26.0 - 34.0 PG    MCHC 32.9 30.0 - 36.5 g/dL    RDW 16.7 (H) 11.5 - 14.5 %    PLATELET 486 097 - 475 K/uL    MPV 8.7 (L) 8.9 - 12.9 FL    NRBC 0.0 0.0  WBC    ABSOLUTE NRBC 0.00 0.00 - 0.01 K/uL    NEUTROPHILS 59 32 - 75 %    LYMPHOCYTES 30 12 - 49 %    MONOCYTES 9 5 - 13 %    EOSINOPHILS 1 0 - 7 %    BASOPHILS 0 0 - 1 %    IMMATURE GRANULOCYTES 1 (H) 0 - 0.5 %    ABS. NEUTROPHILS 3.3 1.8 - 8.0 K/UL    ABS. LYMPHOCYTES 1.7 0.8 - 3.5 K/UL    ABS. MONOCYTES 0.5 0.0 - 1.0 K/UL    ABS. EOSINOPHILS 0.1 0.0 - 0.4 K/UL    ABS. BASOPHILS 0.0 0.0 - 0.1 K/UL    ABS. IMM. GRANS. 0.1 (H) 0.00 - 0.04 K/UL    DF AUTOMATED     COVID-19 RAPID TEST    Collection Time: 03/11/22 10:40 AM   Result Value Ref Range    COVID-19 rapid test Not Detected Not Detected         Assessment   Assessment/Plan: 1. Recurrent PE:Pt has left Soleal thrombus. Anticoagulation held due to GI bleeding. IR consulted for IVC filter. Status post IVC filter. GI consulted. S/p EGD. Check report. Will discuss with GI     2. Anemia: Monitor H&H. Hb stable. Patient with GI bleeding. Transfuse PRBC if hemoglobin less than 7 or symptomatic.     3. Syncope: could be vasovagal.pt has small PE.     4.  Coronary artery disease. recent MI. Cardiology consult noted. Plavix and ASA held due to GI bleeding.     5.  Urinary retention with chronic indwelling catheter and hematuria.  Urology consulted.

## 2022-03-11 NOTE — ROUTINE PROCESS
TRANSFER - OUT REPORT:    Verbal report given to Nicky (name) on [id number] [address]  being transferred to  (unit) for routine post - op       Report consisted of patients Situation, Background, Assessment and   Recommendations(SBAR). Information from the following report(s) SBAR, Procedure Summary, Intake/Output, Recent Results and Cardiac Rhythm SR was reviewed with the receiving nurse. Opportunity for questions and clarification was provided.       Patient transported with:   [employer]

## 2022-03-11 NOTE — PROGRESS NOTES
Hospitalist Progress Note    Subjective:   Daily Progress Note: 3/11/2022 6:38 PM    Hospital Course:  73 y.o. male, recent non-STEMI history of COPD, GERD, hypertension, recent prolonged hospitalization that required intubation/ventilation, complicated with acute DVT that presented from [facility] rehab on 3/10/2022 after a syncopal episode. Of note patient was discharged with oral anticoagulation Xarelto when he was discharged to [facility] rehab. He has been there for approximately 6 days. IR was consulted. IVC filter was placed. GI consulted. S/p EGD. Subjective:  Patient is requesting rubin to be taken out. Patient states that rubin was placed at [facility] when he was admitted to ICU in February. He last saw his urologist 3 years ago.     Current Facility-Administered Medications   Medication Dose Route Frequency    sodium chloride (NS) flush 5-40 mL  5-40 mL IntraVENous Q8H    sodium chloride (NS) flush 5-40 mL  5-40 mL IntraVENous PRN    acetaminophen (TYLENOL) tablet 650 mg  650 mg Oral Q6H PRN    Or    acetaminophen (TYLENOL) suppository 650 mg  650 mg Rectal Q6H PRN    polyethylene glycol (MIRALAX) packet 17 g  17 g Oral DAILY PRN    ondansetron (ZOFRAN ODT) tablet 4 mg  4 mg Oral Q8H PRN    Or    ondansetron (ZOFRAN) injection 4 mg  4 mg IntraVENous Q6H PRN    atorvastatin (LIPITOR) tablet 40 mg  40 mg Oral DAILY    pantoprazole (PROTONIX) tablet 40 mg  40 mg Oral ACB&D    finasteride (PROSCAR) tablet 5 mg  5 mg Oral DAILY    fluticasone-umeclidinium-vilanterol (TRELEGY ELLIPTA) inhaler 1 Puff  1 Puff Inhalation DAILY    albuterol-ipratropium (DUO-NEB) 2.5 MG-0.5 MG/3 ML  3 mL Nebulization Q4H PRN    tamsulosin (FLOMAX) capsule 0.4 mg  0.4 mg Oral DAILY    traMADoL (ULTRAM) tablet 50 mg  50 mg Oral Q6H PRN        Review of Systems  Constitutional: No fevers, No chills, No sweats, No fatigue, No Weakness  Eyes: No redness  Ears, nose, mouth, throat, and face: No nasal congestion, No sore throat, No voice change  Respiratory: No Shortness of Breath, No cough, No wheezing  Cardiovascular: No chest pain, No palpitations, No extremity edema  Gastrointestinal: No nausea, No vomiting, No diarrhea, No abdominal pain  Genitourinary: No frequency, No dysuria, No hematuria  Integument/breast: No skin lesion(s)   Neurological: No Confusion, No headaches, No dizziness      Objective:     Visit Vitals  /83   Pulse 90   Temp 97.7 °F (36.5 °C)   Resp 16   Ht 6' 3\" (1.905 m)   Wt 102.1 kg (225 lb)   SpO2 94%   BMI 28.12 kg/m²      O2 Device: None (Room air)    Temp (24hrs), Av.7 °F (36.5 °C), Min:97.6 °F (36.4 °C), Max:97.9 °F (36.6 °C)      701 - 1900  In: 200 [I.V.:200]  Out: -    190 -  0700  In: -   Out: 450 [Urine:450]    PHYSICAL EXAM:  Constitutional: No acute distress  Skin: Extremities and face reveal no rashes. HEENT: Sclerae anicteric. Extra-occular muscles are intact. No oral ulcers. The neck is supple and no masses. Cardiovascular: Regular rate and rhythm. Respiratory:  Clear breath sounds bilaterally with no wheezes, rales, or rhonchi. GI: Abdomen nondistended, soft, and nontender. Normal active bowel sounds. Musculoskeletal: No pitting edema of the lower legs. Able to move all ext  Neurological:  Patient is alert and oriented.  Cranial nerves II-XII grossly intact  Psychiatric: Mood appears appropriate       Data Review    Recent Results (from the past 24 hour(s))   METABOLIC PANEL, BASIC    Collection Time: 22  8:47 AM   Result Value Ref Range    Sodium 140 136 - 145 mmol/L    Potassium 3.8 3.5 - 5.1 mmol/L    Chloride 106 97 - 108 mmol/L    CO2 30 21 - 32 mmol/L    Anion gap 4 (L) 5 - 15 mmol/L    Glucose 92 65 - 100 mg/dL    BUN 11 6 - 20 mg/dL    Creatinine 0.75 0.70 - 1.30 mg/dL    BUN/Creatinine ratio 15 12 - 20      GFR est AA >60 >60 ml/min/1.73m2    GFR est non-AA >60 >60 ml/min/1.73m2    Calcium 8.5 8.5 - 10.1 mg/dL CBC WITH AUTOMATED DIFF    Collection Time: 03/11/22  8:47 AM   Result Value Ref Range    WBC 5.6 4.1 - 11.1 K/uL    RBC 3.34 (L) 4.10 - 5.70 M/uL    HGB 10.0 (L) 12.1 - 17.0 g/dL    HCT 30.4 (L) 36.6 - 50.3 %    MCV 91.0 80.0 - 99.0 FL    MCH 29.9 26.0 - 34.0 PG    MCHC 32.9 30.0 - 36.5 g/dL    RDW 16.7 (H) 11.5 - 14.5 %    PLATELET 281 057 - 309 K/uL    MPV 8.7 (L) 8.9 - 12.9 FL    NRBC 0.0 0.0  WBC    ABSOLUTE NRBC 0.00 0.00 - 0.01 K/uL    NEUTROPHILS 59 32 - 75 %    LYMPHOCYTES 30 12 - 49 %    MONOCYTES 9 5 - 13 %    EOSINOPHILS 1 0 - 7 %    BASOPHILS 0 0 - 1 %    IMMATURE GRANULOCYTES 1 (H) 0 - 0.5 %    ABS. NEUTROPHILS 3.3 1.8 - 8.0 K/UL    ABS. LYMPHOCYTES 1.7 0.8 - 3.5 K/UL    ABS. MONOCYTES 0.5 0.0 - 1.0 K/UL    ABS. EOSINOPHILS 0.1 0.0 - 0.4 K/UL    ABS. BASOPHILS 0.0 0.0 - 0.1 K/UL    ABS. IMM. GRANS. 0.1 (H) 0.00 - 0.04 K/UL    DF AUTOMATED     COVID-19 RAPID TEST    Collection Time: 03/11/22 10:40 AM   Result Value Ref Range    COVID-19 rapid test Not Detected Not Detected           Assessment / Plan:  1. Acute on chronic PE/age indeterminate occlusive thrombus in the left soleal vein  S/p IVC filter placement    2. Syncopal episode secondary to #1    3. Recent non-STEMI/chronic grade 1 diastolic dysfunction with preserved ejection fraction/CAD  Appreciate cardiology consult    4. COPD without exacerbation  Stable    5. Chronic indwelling catheter/urinary retention/hematuria  Discontinue rubin  Urology consult    6. GERD with hematemesis  Continue PPI    7. Generalized weakness      25.0 - 29.9 Overweight / Body mass index is 28.12 kg/m².     Code status: Full  Prophylaxis: IVC filter placed due to concern for GI bleed  Recommended Disposition: Home w/Family       time spent 35 minutes involving direct patient care as well as reviewing patient's labs and coordination of care with nursing staff     Care Plan discussed with: Patient/Family/RN/Case Management        Total time spent with patient: 35 minutes.

## 2022-03-11 NOTE — CONSULTS
Consult    Patient: Angy Mac MRN: [id number]  SSN: [id number]    YOB: 1955  Age: 73 y.o. Sex: male       Subjective:      Date of  Admission: 3/10/2022     Admission type: Emergency    Angy Mac is a 73 y.o. male history of PE, recent NSTEMI, coronary artery disease status post PCI to left circumflex. He is known to me from his recent hospital admission for NSTEMI. He had a prolonged hospitalization that required intubation. Due to confusion post extubation, cardiac catheterization was deferred and he was discharged to rehab with plans for further outpatient work-up. At the facility, he had an episode of chest pain followed by a syncopal episode yesterday and he was brought to the hospital.  CTA showed a new PE and he underwent IVC filter placement as well. Today, he is awake, alert and oriented which is a remarkable change compared to his prior admission. He denies any current chest pain or shortness of breath. He was in significant distress from his Toro catheter which has since been removed and he feels better. He has no particular complaints when seen by me. There are plans for EGD due to concerns for GI bleed, history of which she has as well.     Primary Care Provider: Sierra Garcia MD  Past Medical History:   Diagnosis Date    CAD (coronary artery disease)     Chronic anticoagulation     Chronic indwelling Toro catheter     Chronic obstructive pulmonary disease (HCC)     GERD (gastroesophageal reflux disease)     Hx of deep venous thrombosis     Hyperlipidemia     Hypertension     Syncope     started in 2018    [facility]embolus [facility])     DVT/PE's    Urine retention     Vocal cord dysfunction       Past Surgical History:   Procedure Laterality Date    HX CORONARY STENT PLACEMENT      HX HEENT      right lazy eye corrective sx    HX ORTHOPAEDIC      bone spurs removed 1996    IR IVC FILTER      placed 3/10/22    IR PLC IVC FILTER  3/10/2022    MO ABDOMEN SURGERY PROC UNLISTED      inguinal hernia 1984    AK ABDOMEN SURGERY PROC UNLISTED      bilateral inguinal hernia sx with mesh 1987    AK CARDIAC SURG PROCEDURE UNLIST      stent 1996, 2018    VASCULAR SURGERY PROCEDURE UNLIST       No family history on file. Social History     Tobacco Use    Smoking status: Former Smoker    Smokeless tobacco: Never Used    Tobacco comment: quit 2021   Substance Use Topics    Alcohol use: Not Currently      Current Facility-Administered Medications   Medication Dose Route Frequency    sodium chloride (NS) flush 5-40 mL  5-40 mL IntraVENous Q8H    sodium chloride (NS) flush 5-40 mL  5-40 mL IntraVENous PRN    acetaminophen (TYLENOL) tablet 650 mg  650 mg Oral Q6H PRN    Or    acetaminophen (TYLENOL) suppository 650 mg  650 mg Rectal Q6H PRN    polyethylene glycol (MIRALAX) packet 17 g  17 g Oral DAILY PRN    ondansetron (ZOFRAN ODT) tablet 4 mg  4 mg Oral Q8H PRN    Or    ondansetron (ZOFRAN) injection 4 mg  4 mg IntraVENous Q6H PRN    atorvastatin (LIPITOR) tablet 40 mg  40 mg Oral DAILY    pantoprazole (PROTONIX) tablet 40 mg  40 mg Oral ACB&D    finasteride (PROSCAR) tablet 5 mg  5 mg Oral DAILY    fluticasone-umeclidinium-vilanterol (TRELEGY ELLIPTA) inhaler 1 Puff  1 Puff Inhalation DAILY    albuterol-ipratropium (DUO-NEB) 2.5 MG-0.5 MG/3 ML  3 mL Nebulization Q4H PRN    tamsulosin (FLOMAX) capsule 0.4 mg  0.4 mg Oral DAILY    traMADoL (ULTRAM) tablet 50 mg  50 mg Oral Q6H PRN        Allergies   Allergen Reactions    Sulfa (Sulfonamide Antibiotics) Other (comments)     syncope          Review of Systems:  A comprehensive review of systems was negative except for that written in the History of Present Illness.        Subjective:     Visit Vitals  /84 (BP Patient Position: At rest;Semi fowlers)   Pulse 91   Temp 97.6 °F (36.4 °C)   Resp 18   Ht 6' 3\" (1.905 m)   Wt 102.1 kg (225 lb)   SpO2 94%   BMI 28.12 kg/m²        Physical Exam:  Visit Vitals  /84 (BP Patient Position: At rest;Semi fowlers)   Pulse 91   Temp 97.6 °F (36.4 °C)   Resp 18   Ht 6' 3\" (1.905 m)   Wt 102.1 kg (225 lb)   SpO2 94%   BMI 28.12 kg/m²     General Appearance:  Well developed, well nourished,alert and oriented x 3, and individual in no acute distress. Ears/Nose/Mouth/Throat:   Hearing grossly normal.         Neck: Supple. Chest:   Lungs clear to auscultation bilaterally. Cardiovascular:  Regular rate and rhythm, S1, S2 normal, no murmur. Abdomen:   Soft, non-tender, bowel sounds are active. Extremities: No edema bilaterally. Skin: Warm and dry.                Cardiographics:  Telemetry: normal sinus rhythm    Data Reviewed:   BMP:   Lab Results   Component Value Date/Time     03/11/2022 08:47 AM    K 3.8 03/11/2022 08:47 AM     03/11/2022 08:47 AM    CO2 30 03/11/2022 08:47 AM    AGAP 4 (L) 03/11/2022 08:47 AM    GLU 92 03/11/2022 08:47 AM    BUN 11 03/11/2022 08:47 AM    CREA 0.75 03/11/2022 08:47 AM    GFRAA >60 03/11/2022 08:47 AM    GFRNA >60 03/11/2022 08:47 AM     CMP:   Lab Results   Component Value Date/Time     03/11/2022 08:47 AM    K 3.8 03/11/2022 08:47 AM     03/11/2022 08:47 AM    CO2 30 03/11/2022 08:47 AM    AGAP 4 (L) 03/11/2022 08:47 AM    GLU 92 03/11/2022 08:47 AM    BUN 11 03/11/2022 08:47 AM    CREA 0.75 03/11/2022 08:47 AM    GFRAA >60 03/11/2022 08:47 AM    GFRNA >60 03/11/2022 08:47 AM    CA 8.5 03/11/2022 08:47 AM     CBC:   Lab Results   Component Value Date/Time    WBC 5.6 03/11/2022 08:47 AM    HGB 10.0 (L) 03/11/2022 08:47 AM    HCT 30.4 (L) 03/11/2022 08:47 AM     03/11/2022 08:47 AM     All Cardiac Markers in the last 24 hours: No results found for: CPK, CK, CKMMB, CKMB, RCK3, CKMBT, CKNDX, CKND1, SUNNY, TROPT, TROIQ, ROSA M, TROPT, TNIPOC, BNP, BNPP  Recent Glucose Results:   Lab Results   Component Value Date/Time    GLU 92 03/11/2022 08:47 AM     ABG: No results found for: PH, PHI, PCO2, PCO2I, PO2, PO2I, HCO3, HCO3I, FIO2, FIO2I  COAGS: No results found for: APTT, PTP, INR, INREXT, INREXT  Liver Panel: No results found for: ALB, CBIL, TBIL, TP, GLOB, AGRAT, ASTPOC, ALTPOC, ALT, AP     Assessment:   Acute PE  History of PE in the past  Recent NSTEMI  Syncope  COPD  Coronary artery disease status post PCI  Hypertension  Hyperlipidemia     Plan:   56-year-old male with a past medical history as above who is admitted for PE  -Patient with second episode of PE. He was also found to have an age-indeterminate DVT in the soleal vein. On recent discharge, he was discharged on Xarelto. He is planned to undergo EGD today. He has a history of GI bleed in the past as well. He had an IVC filter placed yesterday as well. Continue to manage this as per primary team plan. -Chest pain possibly secondary to PE. I doubt this is the cause for his syncopal episode due to the small PE.   His syncope is possibly vasovagal, however we will continue to monitor.  -He will need ischemia work-up down the line due to recent NSTEMI however we will await for current pressing issues to resolve before this is definitively planned  -Aspirin and Plavix have been held, these may be restarted if and when that is okay with GI  -We will give further recommendations pending his clinical course    Thank you for allowing us to participate in the care of your patient

## 2022-03-11 NOTE — PROGRESS NOTES
Problem: Falls - Risk of  Goal: *Absence of Falls  Description: Document Heavenly Embs Fall Risk and appropriate interventions in the flowsheet.   Outcome: Progressing Towards Goal  Note: Fall Risk Interventions:            Medication Interventions: Teach patient to arise slowly                   Problem: Patient Education: Go to Patient Education Activity  Goal: Patient/Family Education  Outcome: Progressing Towards Goal

## 2022-03-12 VITALS
OXYGEN SATURATION: 93 % | RESPIRATION RATE: 18 BRPM | WEIGHT: 225 LBS | DIASTOLIC BLOOD PRESSURE: 52 MMHG | BODY MASS INDEX: 27.98 KG/M2 | HEIGHT: 75 IN | TEMPERATURE: 98 F | HEART RATE: 87 BPM | SYSTOLIC BLOOD PRESSURE: 98 MMHG

## 2022-03-12 PROCEDURE — 74011250637 HC RX REV CODE- 250/637: Performed by: INTERNAL MEDICINE

## 2022-03-12 PROCEDURE — 74011000250 HC RX REV CODE- 250: Performed by: PHYSICIAN ASSISTANT

## 2022-03-12 PROCEDURE — 74011250637 HC RX REV CODE- 250/637: Performed by: PHYSICIAN ASSISTANT

## 2022-03-12 PROCEDURE — 74011250636 HC RX REV CODE- 250/636: Performed by: INTERNAL MEDICINE

## 2022-03-12 RX ORDER — HYDROMORPHONE HYDROCHLORIDE 2 MG/1
1 TABLET ORAL
Status: COMPLETED | OUTPATIENT
Start: 2022-03-12 | End: 2022-03-12

## 2022-03-12 RX ADMIN — TRAMADOL HYDROCHLORIDE 50 MG: 50 TABLET, COATED ORAL at 14:18

## 2022-03-12 RX ADMIN — PANTOPRAZOLE SODIUM 40 MG: 40 TABLET, DELAYED RELEASE ORAL at 07:35

## 2022-03-12 RX ADMIN — ONDANSETRON 4 MG: 4 TABLET, ORALLY DISINTEGRATING ORAL at 07:40

## 2022-03-12 RX ADMIN — SODIUM CHLORIDE, PRESERVATIVE FREE 10 ML: 5 INJECTION INTRAVENOUS at 05:29

## 2022-03-12 RX ADMIN — TAMSULOSIN HYDROCHLORIDE 0.4 MG: 0.4 CAPSULE ORAL at 08:09

## 2022-03-12 RX ADMIN — TRAMADOL HYDROCHLORIDE 50 MG: 50 TABLET, COATED ORAL at 07:40

## 2022-03-12 RX ADMIN — ATORVASTATIN CALCIUM 40 MG: 40 TABLET, FILM COATED ORAL at 08:09

## 2022-03-12 RX ADMIN — HYDROMORPHONE HYDROCHLORIDE 1 MG: 2 TABLET ORAL at 10:25

## 2022-03-12 RX ADMIN — SODIUM CHLORIDE, PRESERVATIVE FREE 10 ML: 5 INJECTION INTRAVENOUS at 14:19

## 2022-03-12 RX ADMIN — FINASTERIDE 5 MG: 5 TABLET, FILM COATED ORAL at 08:09

## 2022-03-12 RX ADMIN — HYDROMORPHONE HYDROCHLORIDE 0.5 MG: 1 INJECTION, SOLUTION INTRAMUSCULAR; INTRAVENOUS; SUBCUTANEOUS at 00:37

## 2022-03-12 NOTE — DISCHARGE SUMMARY
Hospitalist Discharge Summary     Patient ID:  Marco Antonio Lucas  [id number]  73 y.o.  1955  3/10/2022    PCP on record: Tammy Llamas MD    Admit date: 3/10/2022  Discharge date and time: 3/12/2022    DISCHARGE DIAGNOSIS:  Acute on chronic PE  Age indeterminate occlusive thrombus in left soleal vein  Syncope s/s PE  GERD      CONSULTATIONS:  IP CONSULT TO INTERVENTIONAL RADIOLOGY  IP CONSULT TO GASTROENTEROLOGY  IP CONSULT TO HEMATOLOGY  IP CONSULT TO UROLOGY  IP CONSULT TO CARDIOLOGY    Excerpted HPI from H&P of Elsa Denney MD:    73 y. o. male, recent non-STEMI history of COPD, GERD, hypertension, recent prolonged hospitalization that required intubation/ventilation, complicated with acute DVT that presented from [facility] rehab on 3/10/2022 after a syncopal episode.  Of note patient was discharged with oral anticoagulation Xarelto when he was discharged to [facility] rehab. Pranay Corrales has been there for approximately 6 days. ______________________________________________________________________  DISCHARGE SUMMARY/HOSPITAL COURSE:  for full details see H&P, daily progress notes, labs, consult notes. IR was consulted and IVC was placed on 3/10. GI consulted, EGD done on 3/11 shows chronic gastritis without bleeding, duodenitis without bleeding, no active bleeding in upper GI tract. GI recommends no anti coagulation for 3 days. Patient came in with rubin, as per patient, rubin was placed on last admission while he was admitted to ICU and requested it to be removed. Voiding trial done. Patient has been urinating without any problem after rubin has been removed. Patient reports that he has chronic pain due to inguinal hernia, prostate surgery, and requesting for pain medications. I explained to him that inguinal hernia and prostate surgery should't give him pain for 2  Years. Hb has been stable throughout hospitalization, no further episode of bleeding. Cardiology was consulted for NSTEMI.   Patient reports that he is feeling better and states that he is ready for discharge.          _______________________________________________________________________  Patient seen and examined by me on discharge day. Pertinent Findings:  Gen:    Not in distress  Chest: Clear lungs  CVS:   Regular rhythm. No edema  Abd:  Soft, not distended, not tender  Neuro:  Alert, Oriented X 3  _______________________________________________________________________  DISCHARGE MEDICATIONS:   Current Discharge Medication List      START taking these medications    Details   rivaroxaban (Xarelto) 20 mg tab tablet Take 1 Tablet by mouth daily (with dinner). Hold for 3 days, and start taking from 3/15  Qty: 30 Tablet, Refills: 0  Start date: 3/15/2022         CONTINUE these medications which have NOT CHANGED    Details   methocarbamoL (Robaxin-750) 750 mg tablet Take 1 Tablet by mouth three (3) times daily as needed for Muscle Spasm(s). Qty: 20 Tablet, Refills: 0      amLODIPine (NORVASC) 10 mg tablet Take 1 Tablet by mouth daily. Qty: 30 Tablet, Refills: 0      atorvastatin (Lipitor) 40 mg tablet Take 40 mg by mouth daily. clopidogreL (PLAVIX) 75 mg tab Take 75 mg by mouth daily. esomeprazole (NexIUM) 40 mg capsule Take 40 mg by mouth daily. finasteride (PROSCAR) 5 mg tablet Take 5 mg by mouth daily. fluticasone-umeclidinium-vilanterol (TRELEGY ELLIPTA) 100-62.5-25 mcg inhaler Take 1 Puff by inhalation daily. ipratropium-albuteroL (Combivent Respimat)  mcg/actuation inhaler Take 1 Puff by inhalation two (2) times daily as needed. tamsulosin (Flomax) 0.4 mg capsule Take 0.4 mg by mouth daily. metoprolol succinate (TOPROL-XL) 25 mg XL tablet Take 25 mg by mouth daily.          STOP taking these medications       acetaminophen (TYLENOL) 325 mg tablet Comments:   Reason for Stopping:         ibuprofen (MOTRIN) 600 mg tablet Comments:   Reason for Stopping:         acetaminophen (TYLENOL) 325 mg tablet Comments: Reason for Stopping:         nystatin (MYCOSTATIN) 100,000 unit/mL suspension Comments:   Reason for Stopping:         aspirin 81 mg chewable tablet Comments:   Reason for Stopping:                 Patient Follow Up Instructions: Activity: Activity as tolerated  Diet: Cardiac Diet  Wound Care: None needed    Follow-up with PCP, cardiology, hematology, GI, urology in 2 weeks.   Follow-up tests/labs Hb  Follow-up Information     Follow up With Specialties Details Why Contact Jackelyn    Sandy Villegas, [address]   [address] [address]  [phone]          ________________________________________________________________    Risk of deterioration: Moderate    Condition at Discharge:  Stable  __________________________________________________________________    Disposition  IP Rehab    ____________________________________________________________________    Code Status: Full Code  ___________________________________________________________________      Total time in minutes spent coordinating this discharge (includes going over instructions, follow-up, prescriptions, and preparing report for sign off to her PCP) :  35 minutes    Signed:  Mike Clemente MD

## 2022-03-12 NOTE — PROGRESS NOTES
Problem: Patient Education: Go to Patient Education Activity  Goal: Patient/Family Education  3/12/2022 0738 by Anthony Alexis RN  Outcome: Progressing Towards Goal  3/12/2022 0737 by Anthony Alexis RN  Outcome: Progressing Towards Goal  3/12/2022 0641 by Anthony Alexis RN  Outcome: Progressing Towards Goal  3/12/2022 0640 by Anthony Alexis RN  Outcome: Progressing Towards Goal  3/12/2022 0455 by Anthony Alexis RN  Outcome: Progressing Towards Goal     Problem: Falls - Risk of  Goal: *Absence of Falls  Description: Document Margarita Vernon Fall Risk and appropriate interventions in the flowsheet.   3/12/2022 0738 by Anthony Alexis RN  Outcome: Progressing Towards Goal  Note: Fall Risk Interventions:            Medication Interventions: Teach patient to arise slowly                3/12/2022 0737 by Anthony Alexis RN  Outcome: Progressing Towards Goal  Note: Fall Risk Interventions:            Medication Interventions: Teach patient to arise slowly                3/12/2022 0641 by Anthony Alexis RN  Outcome: Progressing Towards Goal  Note: Fall Risk Interventions:            Medication Interventions: Teach patient to arise slowly                3/12/2022 0640 by Anthony Alexis RN  Outcome: Progressing Towards Goal  Note: Fall Risk Interventions:            Medication Interventions: Teach patient to arise slowly                3/12/2022 0455 by Anthony Alexis RN  Outcome: Progressing Towards Goal  Note: Fall Risk Interventions:            Medication Interventions: Teach patient to arise slowly

## 2022-03-12 NOTE — PROGRESS NOTES
Cardiology Progress Note      3/12/2022 2:10 PM    Admit Date: 3/10/2022    Admit Diagnosis: Pulmonary embolism ([facility] [address] [I26.99]      Subjective:   Patient seen and examined. He is chest pain-free. No overnight events. Visit Vitals  /72 (BP 1 Location: Right upper arm, BP Patient Position: At rest)   Pulse (!) 115   Temp 98.4 °F (36.9 °C)   Resp 18   Ht 6' 3\" (1.905 m)   Wt 102.1 kg (225 lb)   SpO2 93%   BMI 28.12 kg/m²     Current Facility-Administered Medications   Medication Dose Route Frequency    sodium chloride (NS) flush 5-40 mL  5-40 mL IntraVENous Q8H    sodium chloride (NS) flush 5-40 mL  5-40 mL IntraVENous PRN    acetaminophen (TYLENOL) tablet 650 mg  650 mg Oral Q6H PRN    Or    acetaminophen (TYLENOL) suppository 650 mg  650 mg Rectal Q6H PRN    polyethylene glycol (MIRALAX) packet 17 g  17 g Oral DAILY PRN    ondansetron (ZOFRAN ODT) tablet 4 mg  4 mg Oral Q8H PRN    Or    ondansetron (ZOFRAN) injection 4 mg  4 mg IntraVENous Q6H PRN    atorvastatin (LIPITOR) tablet 40 mg  40 mg Oral DAILY    pantoprazole (PROTONIX) tablet 40 mg  40 mg Oral ACB&D    finasteride (PROSCAR) tablet 5 mg  5 mg Oral DAILY    fluticasone-umeclidinium-vilanterol (TRELEGY ELLIPTA) inhaler 1 Puff  1 Puff Inhalation DAILY    albuterol-ipratropium (DUO-NEB) 2.5 MG-0.5 MG/3 ML  3 mL Nebulization Q4H PRN    tamsulosin (FLOMAX) capsule 0.4 mg  0.4 mg Oral DAILY    traMADoL (ULTRAM) tablet 50 mg  50 mg Oral Q6H PRN         Objective:      Physical Exam:  Visit Vitals  /72 (BP 1 Location: Right upper arm, BP Patient Position: At rest)   Pulse (!) 115   Temp 98.4 °F (36.9 °C)   Resp 18   Ht 6' 3\" (1.905 m)   Wt 102.1 kg (225 lb)   SpO2 93%   BMI 28.12 kg/m²     General Appearance:  Well developed, well nourished,alert and oriented x 3, and individual in no acute distress. Ears/Nose/Mouth/Throat:   Hearing grossly normal.         Neck: Supple. Chest:   Lungs clear to auscultation bilaterally. Cardiovascular:  Regular rate and rhythm, S1, S2 normal, no murmur. Abdomen:   Soft, non-tender, bowel sounds are active. Extremities: No edema bilaterally. Skin: Warm and dry. Data Review:   Labs:  No results found for this or any previous visit (from the past 24 hour(s)). Telemetry: normal sinus rhythm      Assessment:   Acute PE  History of PE in the past  Recent NSTEMI  Syncope  COPD  Coronary artery disease status post PCI  Hypertension  Hyperlipidemia    Plan:   59-year-old male with a past medical history as above who is admitted for PE  -Patient with second episode of PE. He was also found to have an age-indeterminate DVT in the soleal vein. On recent discharge, he was discharged on Xarelto. He is planned to undergo EGD today. He has a history of GI bleed in the past as well. He had an IVC filter placed yesterday as well. Continue to manage this as per primary team plan. -Chest pain possibly secondary to PE. I doubt this is the cause for his syncopal episode due to the small PE. His syncope is possibly vasovagal, however we will continue to monitor.  -He will need ischemia work-up down the line due to recent NSTEMI however we will await for current pressing issues to resolve before this is definitively planned. Plan discharged to rehab later today which is reasonable. He will follow up with Dr. Bennie Valdovinos as an outpatient at which time the timing of his ischemia work-up can be decided.   -Aspirin and Plavix have been held, these may be restarted if and when that is okay with GI  -Discussed with primary team     Thank you for allowing us to participate in the care of your patient

## 2022-03-12 NOTE — PROGRESS NOTES
Problem: Falls - Risk of  Goal: *Absence of Falls  Description: Document [place] Penane Fall Risk and appropriate interventions in the flowsheet.   Outcome: Progressing Towards Goal  Note: Fall Risk Interventions:            Medication Interventions: Teach patient to arise slowly                   Problem: Patient Education: Go to Patient Education Activity  Goal: Patient/Family Education  Outcome: Progressing Towards Goal

## 2022-03-12 NOTE — PROGRESS NOTES
Problem: Falls - Risk of  Goal: *Absence of Falls  Description: Document Lidya Kathy Fall Risk and appropriate interventions in the flowsheet. 3/12/2022 0640 by William Duong RN  Outcome: Progressing Towards Goal  Note: Fall Risk Interventions:            Medication Interventions: Teach patient to arise slowly                3/12/2022 0455 by William Duong RN  Outcome: Progressing Towards Goal  Note: Fall Risk Interventions:            Medication Interventions: Teach patient to arise slowly                   Problem: Patient Education: Go to Patient Education Activity  Goal: Patient/Family Education  3/12/2022 0640 by William Duong RN  Outcome: Progressing Towards Goal  3/12/2022 0455 by William Duong RN  Outcome: Progressing Towards Goal     Problem: Falls - Risk of  Goal: *Absence of Falls  Description: Document Lidya Kathy Fall Risk and appropriate interventions in the flowsheet.   3/12/2022 0640 by William Duong RN  Outcome: Progressing Towards Goal  Note: Fall Risk Interventions:            Medication Interventions: Teach patient to arise slowly                3/12/2022 0455 by William Duong RN  Outcome: Progressing Towards Goal  Note: Fall Risk Interventions:            Medication Interventions: Teach patient to arise slowly

## 2022-03-12 NOTE — PROGRESS NOTES
Problem: Falls - Risk of  Goal: *Absence of Falls  Description: Document Kala Crandall Fall Risk and appropriate interventions in the flowsheet.   3/12/2022 0641 by Serene Rousseau RN  Outcome: Progressing Towards Goal  Note: Fall Risk Interventions:            Medication Interventions: Teach patient to arise slowly                3/12/2022 0640 by Serene Rousseau RN  Outcome: Progressing Towards Goal  Note: Fall Risk Interventions:            Medication Interventions: Teach patient to arise slowly                3/12/2022 0455 by Serene Rousseau RN  Outcome: Progressing Towards Goal  Note: Fall Risk Interventions:            Medication Interventions: Teach patient to arise slowly                   Problem: Patient Education: Go to Patient Education Activity  Goal: Patient/Family Education  3/12/2022 0641 by Serene Rousseau RN  Outcome: Progressing Towards Goal  3/12/2022 0640 by Serene Rousseau RN  Outcome: Progressing Towards Goal  3/12/2022 0455 by Serene Rousseau RN  Outcome: Progressing Towards Goal

## 2022-03-12 NOTE — PROGRESS NOTES
Problem: Falls - Risk of  Goal: *Absence of Falls  Description: Document Shellie Barber Fall Risk and appropriate interventions in the flowsheet.   3/12/2022 0737 by Tom Early RN  Outcome: Progressing Towards Goal  Note: Fall Risk Interventions:            Medication Interventions: Teach patient to arise slowly                3/12/2022 0641 by Tom Early RN  Outcome: Progressing Towards Goal  Note: Fall Risk Interventions:            Medication Interventions: Teach patient to arise slowly                3/12/2022 0640 by Tom Early RN  Outcome: Progressing Towards Goal  Note: Fall Risk Interventions:            Medication Interventions: Teach patient to arise slowly                3/12/2022 0455 by Tom Early RN  Outcome: Progressing Towards Goal  Note: Fall Risk Interventions:            Medication Interventions: Teach patient to arise slowly                   Problem: Patient Education: Go to Patient Education Activity  Goal: Patient/Family Education  3/12/2022 0737 by Tom Early RN  Outcome: Progressing Towards Goal  3/12/2022 0641 by Tom Early RN  Outcome: Progressing Towards Goal  3/12/2022 0640 by Tom Early RN  Outcome: Progressing Towards Goal  3/12/2022 0455 by Tom Early RN  Outcome: Progressing Towards Goal     Problem: Patient Education: Go to Patient Education Activity  Goal: Patient/Family Education  3/12/2022 0640 by Tom Early RN  Outcome: Progressing Towards Goal

## 2022-03-12 NOTE — PROGRESS NOTES
CM in to speak with patient regarding transportation to [facility]. Wife will pick him up to transport at 3:30 when she gets out of work. Candis Robert from [facility] notified of transport time. Report can be called to [phone]. Clear for discharge from case management standpoint.

## 2022-03-15 NOTE — PROGRESS NOTES
CHIEF COMPLAINT: Bloating and diarrhea    Problem List:  Oncology/Hematology History Overview Note   1. Low-grade lymphoma:  2. History of iron deficiency anemia with last colonoscopy 11/2014, accordingto the patient, with a history of ulcerative colitis treated by Dr. Edge.  3. History of basal cell carcinomas.   4. Cataracts.   5. Remote history of deep vein thrombosis with vena cava filter in place.   6. Diverticulitis by history.   7. History of gout.   8. Hypertension.   9. History of 3 different kidney surgeries.   10. Cholecystectomy.   11. Chronic neuropathy.   12. History of ARMAAN.   13. History of back surgeries.   14. History of total hip replacement.   15. History of ankle fracture.   16. History of appendectomy.   17. History of iron deficiency anemia, resolved as of 09/18/2015 with a history of Negrete's esophagus on EGD by Dr. Edge.  18.  Epistaxis    Oncology history timeline:  a) Splenomegaly on hospitalization 02/2016. She had a week of nausea, vomiting and diarrhea with dehydration. The nausea, vomiting, and dehydration have resolved but on CT they found retroperitoneal adenopathy with some splenomegaly that was not bulky. She denies any ongoing fevers or chills or bed drenching night sweats or unexplained weight loss and no change in the color, caliber or consistency of her stools. She had a slightly elevated beta-2 microglobulin of 3.5 but no monoclonal spike and a white count 5300, hemoglobin 11.5, platelets 158,000, sedimentation rate 45, C-reactive protein 13.6 and a normal LDH of 318 with normal liver enzymes. Slight hyponatremia with sodium of 134 and hypokalemia with potassium of 3.3 on 02/18/2016. She had slight decrease in immunoglobulin G to 580 and immunoglobulin A to 35 on testing while in the hospital.   b) Bone marrow biopsy of 04/25/2016 showed a low-grade lymphoma of  undetermined phenotype most likely a variant of marginal zone lymphoma or a CD19 negative follicular  Nutrition Assessment     Type and Reason for Visit: Initial (TF Recs)    Nutrition Recommendations/Plan:   Start/Initiated as medically ready, Promote, 1.0, continuous @ 25ml w/ water flushes 165ml q3hrs   ProSource 8pkt/day, q3hrs, providing 480kcals, 120gpro  Providinkcals (60%), 158g(98%), 1823ml(100%)    Propofol 40mcg/kg/min/ 824kcals (104%)     Monitor and Record Wts, TF rates, Water flushes, OP, BMs in I/Os     Nutrition Assessment:  73 y.o. male w/ hypoxia and recurrent episodes of syncope, SOB & altered mental status. Intubated. abnormal troponin. Wife was in the room at the time of assessment, stated that he had a good appetite prior to adm and no recent wt. loss. On Versed and Propofol drip. Currently NPO, will provide TF regimens for when medically ready for feeds. Labs: glucose (130), hbg (11.7), HCT (34.5). meds: amlodipine, Lipitor, Plavix. Malnutrition Assessment:  Malnutrition Status: No malnutrition     Estimated Daily Nutrient Needs:  Energy (kcal):  1820kcals (14kcal/kg)  Protein (g):  162g (2g/idbw)       Fluid (ml/day):  1820ml    Nutrition Related Findings:  Intubated, well nourished, observed no fat or muscle wasting. No reported n/v,c/d. Bilateral lower limb edema. Minimum OP. Current Nutrition Therapies:  DIET NPO    Anthropometric Measures:  · Height:  6' (182.9 cm)  · Current Body Wt:  130 kg (286 lb 9.6 oz)  · BMI: 38.9    Nutrition Diagnosis:   · Inadequate oral intake related to impaired respiratory function as evidenced by intubation      Nutrition Intervention:  Food and/or Nutrient Delivery: Start tube feeding  Nutrition Education and Counseling: Education not indicated  Coordination of Nutrition Care: Continue to monitor while inpatient    Goals:  >60% of EENS vs ORG x2-3days, wt. stability -/+ . 05kg, skin intergity, lytes wnl       Nutrition Monitoring and Evaluation:   Behavioral-Environmental Outcomes: None identified  Food/Nutrient Intake Outcomes: Enteral lymphoma although she is BCL-6 negative and that argues against the latter possibility. There were no features to suggest hairy cell lymphoma or a mantle cell lymphoma and no large cell population. This was CD5 negative, CD10 negative, kappa clonal negative and bright for CD20 with dim surface light chain expression and bright cytoplasm, CD45 bright. The CD38 negative for plasmacytic differentiation with a hemoglobin of 11.4, white count 7200 with normal differential, and platelets of 163,000.   c) Liver, spleen ultrasound showed splenomegaly of 19.6 cm maximum dimension with 1.6 cm slightly dilated portal vein.  D)-3/20/2019 Dr. Edge EGD showed Negrete's esophagus in the lower third of the esophagus 6 cm in length.  Colonoscopy showed hemorrhoids, diverticuli, 11 mm proximal ascending colon flat polyp.  Dr. Edge relayed to the patient that this was a high-grade dysplasia in the Negrete's for which he is sending to  for ablation as well as needing removal of a tubulovillous adenoma unable to be removed endoscopically.   -3/27/2019 PET shows mild hypermetabolism within the lymph nodes to the left of the proximal third of the esophagus as well as near the tracheal bifurcation and no other area is of abnormal hypermetabolism.  Splenomegaly stable.  She has significant carotid stenosis especially on the right.  -1/12/2021 white count 4200 hemoglobin 10 platelets 114,000 unremarkable differential.  CMP unremarkable  -4/28/2021 Dr. Augustine performed attempted laparoscopic partial colectomy with subsequent open right hemicolectomy with ileocolonic stenosis but no residual polyp found in the proximal ascending colon after resection of the right colon and a small polyp in the distal part of the ascending colon.  Pathology showed residual adenomatous polyp tubular adenoma without high-grade dysplasia or invasive carcinoma and 13 benign lymph nodes.  Additional colonic segment benign with 4 benign lymph  nutrition intake/tolerance  Physical Signs/Symptoms Outcomes: Hemodynamic status    Discharge Planning:    No discharge needs at this time     Electronically signed by Walker Crane on 2/8/2022 at 4:12 PM nodes.  -5/18/2021 hematology follow-up visit: As stated previously, she has no desires for any form of systemic therapy for her marginal zone lymphoma and risk of splenomegaly as outlined on previous notes not just an operative risk but subsequent postoperative risks are such that she would forego that for now.  Get her blood counts today and just prior to a minute evaluation in 3 months.  No plans for scans in the absence of symptoms.  I will keep in mind the potential of palliative splenic radiation if she develops early satiety or other abdominal complaints from splenomegaly.    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    -2/1/2021 PET negative.  Spleen is unremarkable with homogeneous uptake.    -2/5/2021 EGD and colonoscopy with Dr. Edge showed Negrete's esophagus, hemorrhoids, diverticuli, proximal ascending colon polyp.  No mention of any colitis.    -4/28/2021 attempted laparoscopic partial colectomy with subsequent open right hemicolectomy with ileocolonic stenosis but no residual polyp found in the proximal ascending colon after resection of the right colon and a small polyp in the distal part of the ascending colon.  Pathology showed residual adenomatous polyp tubular adenoma without high-grade dysplasia or invasive carcinoma and 13 benign lymph nodes.  Additional colonic segment benign with 4 benign lymph nodes.    -3/3/2022 data:  Hemoglobin hemoglobin 9.8 with MCV of 91.9 and platelets slightly low 128,000 with normal white count 7100.  Unremarkable differential.    Creatinine 1.5 glucose 160 sodium 129 with potassium 4.7 and chloride 94 otherwise unremarkable CMP.  C-reactive protein normal 1.7.  Sedimentation rate 31, upper limit 30.  INR normal 0.99 with PTT 26.1.  100 mg/dL M spike on SPEP.  Normal quantitative immunoglobulins except for decreased IgE.  Serum immunofixation electrophoresis was unclear as to monoclonality.  Serum  kappa 33.8 lambda 27.4 both minimally elevated with normal ratio 1.23.  Serum viscosity normal 1.6.    -3/4/2022 CT chest abdomen pelvis without contrast: 2.5 mm noncalcified nodule right upper lobe and right lower lobe for which follow-up in 6 months for stability or resolution is suggested.  No suspicious lung nodules to suggest metastasis.  Massive enlargement of the spleen which is stable compared to June 2021.  1.4 cm and 1.5 cm juan luis hepatic adenopathy.  Stable retroperitoneal adenopathy largest 1.5 cm.  1.8 cm stable IVC node.    -3/15/2022 [facility] medical oncology follow-up visit: She is not hyperviscous and I doubt her epistaxis is related to the lymphoma or hyperviscosity and I do not think she has Waldenstrom's.  However, I do think she has massive splenomegaly and while that has been present for quite some time, I nonetheless think that a lot of her abdominal discomfort is due to the massive splenomegaly pushing her bowel to the right and she could have some bowel involvement of lymphoma that we could put her through endoscopy again but she had this back a little over a year ago with no obvious bowel involvement and no obvious colitis.  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  She would not consent to anything stronger than that regardless and it took a little convincing to get her to go along with this plan but she is willing to try provided that she has no major allergic reactions etc.  We will give first dose in [place] after chemo preparation visit we will use peripheral veins and I will have her follow-up in a few weeks with my nurse practitioner in [place] to make sure she is tolerating this well and to set up follow-up visits in the weeks and months ahead.  Repeat CTs I  would ask my nurse practitioner to get ordered for approximately 4 months out from the end of Rituxan.  In the meantime, from the epistaxis standpoint I asked her to follow-up with ENT.   We discussed the protracted immune suppression that would be associated with the Rituxan and the fact that it would likely make vaccinations less helpful but that does not mean she should not get them.  We also discussed the possibility of allergic reactions.  Once all of this is done, she will need to follow-up with whoever is going to take over from Dr. 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     Lymphoma, low grade (HCC)   4/25/2016 Initial Diagnosis    Lymphoma, low grade     5/30/2017 Imaging    CT chest/abdomen/pelvis no obvious pulmonary nodules identified, no pleural effusions.  Stable appearance of adenopathy within the abdomen compared to earlier study as well as splenomegaly.     12/6/2017 Imaging    CT chest/abdomen/pelvis stable, essentially unchanged prominent mediastinal lymph nodes, unchanged right lower lobe medial basal segmental focal fibrotic findings.  Scattered noncalcified small lung nodules appear unchanged.  Stable spine or megaly.  Scattered prominent retroperitoneal lymph nodes now appear smaller.  Unchanged spinal findings consistent with diffuse idiopathic skeletal hyperostosis and prior operative intervention at L4-5.       6/8/2018 Imaging    CT chest abdomen and pelvis without contrast: Stable, no significant change in the appearance compared to earlier study from December 6, 2017.     3/1/2019 Imaging    CT chest, abdomen and pelvis impression: No significant interval change from last year.  Stable appearance of the significantly enlarged spleen which measures up to nearly 20 x 12 cm.  Moderate, scattered upper abdominal adenopathy with innumerable scattered gastrohepatic, retroperitoneal, and mesenteric nodes measuring  roughly between 1-2 cm are stable as well.  No significant progression.  Mild mediastinal adenopathy appears stable as well.     3/20/2019 -  Other Event    EGD showed Negrete's esophagus in the lower third of the esophagus 6 cm in length.  Colonoscopy showed hemorrhoids, diverticuli, 11 mm proximal ascending colon flat polyp.  Dr. Edge relayed to the patient that this was a high-grade dysplasia in the Negrete's for which he is sending to [facility] for ablation as well as needing removal of a tubulovillous adenoma unable to be removed endoscopically.     3/27/2019 Imaging    PET shows mild hypermetabolism within the lymph nodes to the left of the proximal third of the esophagus as well as near the tracheal bifurcation and no other area is of abnormal hypermetabolism.  Splenomegaly stable.  She has significant carotid stenosis especially on the right.     2/1/2021 Imaging    PET IMPRESSION:  Negative PET/CT scan. No evidence of recurrent or active  lymphoma. Enlargement seen of the spleen.     3/4/2022 Imaging    -3/3/2022 data:  Hemoglobin hemoglobin 9.8 with MCV of 91.9 and platelets slightly low 128,000 with normal white count 7100.  Unremarkable differential.    Creatinine 1.5 glucose 160 sodium 129 with potassium 4.7 and chloride 94 otherwise unremarkable CMP.  C-reactive protein normal 1.7.  Sedimentation rate 31, upper limit 30.  INR normal 0.99 with PTT 26.1.  100 mg/dL M spike on SPEP.  Normal quantitative immunoglobulins except for decreased IgE.  Serum immunofixation electrophoresis was unclear as to monoclonality.  Serum kappa 33.8 lambda 27.4 both minimally elevated with normal ratio 1.23.  Serum viscosity normal 1.6.    -3/4/2022 CT chest abdomen pelvis without contrast: 2.5 mm noncalcified nodule right upper lobe and right lower lobe for which follow-up in 6 months for stability or resolution is suggested.  No suspicious lung nodules to suggest metastasis.  Massive enlargement of the spleen which is  "stable compared to June 2021.  1.4 cm and 1.5 cm juan luis hepatic adenopathy.  Stable retroperitoneal adenopathy largest 1.5 cm.  1.8 cm stable IVC node.     3/21/2022 -  Chemotherapy    OP LYMPHOMA (CLL) RiTUXimab (Weekly X 4)         HISTORY OF PRESENT ILLNESS:  The patient is a 84 y.o. female, here for follow up on management of lymphoma.  Has seen ENT and had a couple of spots cauterized.  Still with occasional epistaxis.  Main issue is intermittent diarrhea with abdominal bloating and right lower quadrant pain intermittent and crampy in nature without evidence of diverticulitis on current images but with massive splenomegaly nonetheless stable compared to June 2021    Past Medical History:   Diagnosis Date   • Anemia 09/18/2015   • Cataract     h/o   • Diverticulitis    • DVT (deep venous thrombosis) (HCC)    • Gout    • History of basal cell carcinoma    • Hypertension           Past Surgical History:   Procedure Laterality Date   • APPENDECTOMY     • BACK SURGERY      h/o   • CHOLECYSTECTOMY     • HYSTERECTOMY      jaja   • KIDNEY SURGERY      x3    • ORIF ANKLE FRACTURE      h/o   • TOTAL HIP ARTHROPLASTY      h/o       Allergies   Allergen Reactions   • Hydrocodone    • Keflex [Cephalexin]    • Lortab [Hydrocodone-Acetaminophen]    • Magnesium Citrate    • Penicillins        Family History and Social History reviewed and changed as necessary    REVIEW OF SYSTEM:   Other than diarrhea and crampy abdominal pains and epistaxis periodically no other complaints    PHYSICAL EXAM:  No bulky cervical axillary or inguinal adenopathy.  Abdomen mildly bloated but nontender and slightly distended without reflex or guarding.    Vitals:    03/15/22 1422   BP: 138/65   Pulse: 72   Resp: 20   Temp: 98.2 °F (36.8 °C)   SpO2: 98%   Weight: 76.7 kg (169 lb)   Height: 167.6 cm (66\")     Vitals:    03/15/22 1422   PainSc:   6   PainLoc: Abdomen          ECOG score: 2           Vitals reviewed.    ECOG: (2) Ambulatory & Capable of " Self Care, Unable to Carry Out Work Activity, Up & About Greater Than 50% of Waking Hours    No results found for: HGB, HCT, MCV, PLT, WBC, NEUTROABS, LYMPHSABS, MONOSABS, EOSABS, BASOSABS    No results found for: GLUCOSE, BUN, CREATININE, NA, K, CL, CO2, CALCIUM, PROTEINTOT, ALBUMIN, BILITOT, ALKPHOS, AST, ALT          ASSESSMENT & PLAN:  1. Low-grade lymphoma:  2. History of iron deficiency anemia with last colonoscopy 11/2014, accordingto the patient, with a history of ulcerative colitis treated by Dr. Edge.  3. History of basal cell carcinomas.   4. Cataracts.   5. Remote history of deep vein thrombosis with vena cava filter in place.   6. Diverticulitis by history.   7. History of gout.   8. Hypertension.   9. History of 3 different kidney surgeries.   10. Cholecystectomy.   11. Chronic neuropathy.   12. History of ARMAAN.   13. History of back surgeries.   14. History of total hip replacement.   15. History of ankle fracture.   16. History of appendectomy.   17. History of iron deficiency anemia, resolved as of 09/18/2015 with a history of Negrete's esophagus on EGD by Dr. Edge.  18.  Epistaxis    Oncology history timeline:  a) Splenomegaly on hospitalization 02/2016. She had a week of nausea, vomiting and diarrhea with dehydration. The nausea, vomiting, and dehydration have resolved but on CT they found retroperitoneal adenopathy with some splenomegaly that was not bulky. She denies any ongoing fevers or chills or bed drenching night sweats or unexplained weight loss and no change in the color, caliber or consistency of her stools. She had a slightly elevated beta-2 microglobulin of 3.5 but no monoclonal spike and a white count 5300, hemoglobin 11.5, platelets 158,000, sedimentation rate 45, C-reactive protein 13.6 and a normal LDH of 318 with normal liver enzymes. Slight hyponatremia with sodium of 134 and hypokalemia with potassium of 3.3 on 02/18/2016. She had slight decrease in immunoglobulin G to  580 and immunoglobulin A to 35 on testing while in the hospital.   b) Bone marrow biopsy of 04/25/2016 showed a low-grade lymphoma of  undetermined phenotype most likely a variant of marginal zone lymphoma or a CD19 negative follicular lymphoma although she is BCL-6 negative and that argues against the latter possibility. There were no features to suggest hairy cell lymphoma or a mantle cell lymphoma and no large cell population. This was CD5 negative, CD10 negative, kappa clonal negative and bright for CD20 with dim surface light chain expression and bright cytoplasm, CD45 bright. The CD38 negative for plasmacytic differentiation with a hemoglobin of 11.4, white count 7200 with normal differential, and platelets of 163,000.   c) Liver, spleen ultrasound showed splenomegaly of 19.6 cm maximum dimension with 1.6 cm slightly dilated portal vein.  D)-3/20/2019 Dr. Edge EGD showed Negrete's esophagus in the lower third of the esophagus 6 cm in length.  Colonoscopy showed hemorrhoids, diverticuli, 11 mm proximal ascending colon flat polyp.  Dr. Edge relayed to the patient that this was a high-grade dysplasia in the Negrete's for which he is sending to  for ablation as well as needing removal of a tubulovillous adenoma unable to be removed endoscopically.   -3/27/2019 PET shows mild hypermetabolism within the lymph nodes to the left of the proximal third of the esophagus as well as near the tracheal bifurcation and no other area is of abnormal hypermetabolism.  Splenomegaly stable.  She has significant carotid stenosis especially on the right.  -1/12/2021 white count 4200 hemoglobin 10 platelets 114,000 unremarkable differential.  CMP unremarkable  -4/28/2021 Dr. Augustine performed attempted laparoscopic partial colectomy with subsequent open right hemicolectomy with ileocolonic stenosis but no residual polyp found in the proximal ascending colon after resection of the right colon and a small polyp in the  distal part of the ascending colon.  Pathology showed residual adenomatous polyp tubular adenoma without high-grade dysplasia or invasive carcinoma and 13 benign lymph nodes.  Additional colonic segment benign with 4 benign lymph nodes.  -5/18/2021 hematology follow-up visit: As stated previously, she has no desires for any form of systemic therapy for her marginal zone lymphoma and risk of splenomegaly as outlined on previous notes not just an operative risk but subsequent postoperative risks are such that she would forego that for now.  Get her blood counts today and just prior to a minute evaluation in 3 months.  No plans for scans in the absence of symptoms.  I will keep in mind the potential of palliative splenic radiation if she develops early satiety or other abdominal complaints from splenomegaly.    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    -2/1/2021 PET negative.  Spleen is unremarkable with homogeneous uptake.    -2/5/2021 EGD and colonoscopy with Dr. Edge showed Negrete's esophagus, hemorrhoids, diverticuli, proximal ascending colon polyp.  No mention of any colitis.    -4/28/2021 attempted laparoscopic partial colectomy with subsequent open right hemicolectomy with ileocolonic stenosis but no residual polyp found in the proximal ascending colon after resection of the right colon and a small polyp in the distal part of the ascending colon.  Pathology showed residual adenomatous polyp tubular adenoma without high-grade dysplasia or invasive carcinoma and 13 benign lymph nodes.  Additional colonic segment benign with 4 benign lymph nodes.    -3/3/2022 data:  Hemoglobin hemoglobin 9.8 with MCV of 91.9 and platelets slightly low 128,000 with normal white count 7100.  Unremarkable differential.    Creatinine 1.5 glucose 160 sodium 129 with potassium 4.7 and chloride 94 otherwise unremarkable CMP.  C-reactive protein normal 1.7.   Sedimentation rate 31, upper limit 30.  INR normal 0.99 with PTT 26.1.  100 mg/dL M spike on SPEP.  Normal quantitative immunoglobulins except for decreased IgE.  Serum immunofixation electrophoresis was unclear as to monoclonality.  Serum kappa 33.8 lambda 27.4 both minimally elevated with normal ratio 1.23.  Serum viscosity normal 1.6.    -3/4/2022 CT chest abdomen pelvis without contrast: 2.5 mm noncalcified nodule right upper lobe and right lower lobe for which follow-up in 6 months for stability or resolution is suggested.  No suspicious lung nodules to suggest metastasis.  Massive enlargement of the spleen which is stable compared to June 2021.  1.4 cm and 1.5 cm juan luis hepatic adenopathy.  Stable retroperitoneal adenopathy largest 1.5 cm.  1.8 cm stable IVC node.    -3/15/2022 [facility] medical oncology follow-up visit: She is not hyperviscous and I doubt her epistaxis is related to the lymphoma or hyperviscosity and I do not think she has Waldenstrom's.  However, I do think she has massive splenomegaly and while that has been present for quite some time, I nonetheless think that a lot of her abdominal discomfort is due to the massive splenomegaly pushing her bowel to the right and she could have some bowel involvement of lymphoma that we could put her through endoscopy again but she had this back a little over a year ago with no obvious bowel involvement and no obvious colitis.  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  She would not consent to anything stronger than that regardless and it took a little convincing to get her to go along with this plan but she is willing to try provided that she has no major allergic reactions etc.  We will give first dose in [place] after chemo  preparation visit we will use peripheral veins and I will have her follow-up in a few weeks with my nurse practitioner in [place] to make sure she is tolerating this well and to set up follow-up visits in the weeks and months ahead.  Repeat CTs I would ask my nurse practitioner to get ordered for approximately 4 months out from the end of Rituxan.  In the meantime, from the epistaxis standpoint I asked her to follow-up with ENT.   We discussed the protracted immune suppression that would be associated with the Rituxan and the fact that it would likely make vaccinations less helpful but that does not mean she should not get them.  We also discussed the possibility of allergic reactions.  Once all of this is done, she will need to follow-up with whoever is going to take over from Dr. 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      Total time of care today inclusive of time spent today prior to her arrival reviewing interval data since I last saw her as well as notes from my nurse practitioner and cataloging all of that information and going over imaging images and reports and data from recent labs and during visit translating all that to the patient and her family and the plan as outlined and after visit instituting this plan and discussing side effects including allergic reaction potentiality's and suppression of her immunity took 1 hour of patient care time throughout the day today.    Nelson Castañeda MD    03/15/2022

## 2022-03-18 PROBLEM — R82.81 PYURIA: Status: ACTIVE | Noted: 2022-03-02

## 2022-03-18 PROBLEM — I82.409 ACUTE DEEP VENOUS THROMBOSIS (HCC): Status: ACTIVE | Noted: 2022-03-02

## 2022-03-18 PROBLEM — I26.99 PULMONARY EMBOLISM (HCC): Status: ACTIVE | Noted: 2022-03-10

## 2022-03-19 PROBLEM — R31.9 HEMATURIA: Status: ACTIVE | Noted: 2022-03-02

## 2022-03-19 PROBLEM — G93.41 ACUTE METABOLIC ENCEPHALOPATHY: Status: ACTIVE | Noted: 2022-03-02

## 2022-03-19 PROBLEM — D62 ACUTE BLOOD LOSS ANEMIA: Status: ACTIVE | Noted: 2022-03-02

## 2022-03-19 PROBLEM — R53.81 DEBILITATED PATIENT: Status: ACTIVE | Noted: 2022-03-02

## 2022-03-20 PROBLEM — K92.2 LOWER GI BLEED: Status: ACTIVE | Noted: 2022-03-02

## 2022-12-12 NOTE — PROGRESS NOTES
CM reviewed clinical chart. Discharge disposition is home self care with wife. CM will continue to follow. Negative

## 2023-01-01 ENCOUNTER — APPOINTMENT (OUTPATIENT)
Facility: HOSPITAL | Age: 68
DRG: 207 | End: 2023-01-01
Payer: MEDICARE

## 2023-01-01 ENCOUNTER — HOSPICE ADMISSION (OUTPATIENT)
Age: 68
End: 2023-01-01
Payer: MEDICARE

## 2023-01-01 ENCOUNTER — APPOINTMENT (OUTPATIENT)
Facility: HOSPITAL | Age: 68
DRG: 207 | End: 2023-01-01
Attending: INTERNAL MEDICINE
Payer: MEDICARE

## 2023-01-01 ENCOUNTER — HOSPITAL ENCOUNTER (INPATIENT)
Facility: HOSPITAL | Age: 68
LOS: 9 days | Discharge: HOSPICE | DRG: 207 | End: 2023-11-27
Attending: STUDENT IN AN ORGANIZED HEALTH CARE EDUCATION/TRAINING PROGRAM | Admitting: INTERNAL MEDICINE
Payer: MEDICARE

## 2023-01-01 ENCOUNTER — HOSPITAL ENCOUNTER (INPATIENT)
Age: 68
LOS: 3 days | End: 2023-11-30
Attending: FAMILY MEDICINE | Admitting: FAMILY MEDICINE
Payer: MEDICARE

## 2023-01-01 VITALS
HEIGHT: 75 IN | WEIGHT: 197.75 LBS | BODY MASS INDEX: 24.59 KG/M2 | HEART RATE: 122 BPM | RESPIRATION RATE: 22 BRPM | SYSTOLIC BLOOD PRESSURE: 127 MMHG | DIASTOLIC BLOOD PRESSURE: 81 MMHG | TEMPERATURE: 98.6 F | OXYGEN SATURATION: 94 %

## 2023-01-01 VITALS
DIASTOLIC BLOOD PRESSURE: 50 MMHG | SYSTOLIC BLOOD PRESSURE: 90 MMHG | HEART RATE: 135 BPM | RESPIRATION RATE: 12 BRPM | OXYGEN SATURATION: 50 % | TEMPERATURE: 97.7 F

## 2023-01-01 DIAGNOSIS — J12.82 PNEUMONIA DUE TO COVID-19 VIRUS: ICD-10-CM

## 2023-01-01 DIAGNOSIS — U07.1 PNEUMONIA DUE TO COVID-19 VIRUS: ICD-10-CM

## 2023-01-01 DIAGNOSIS — J44.1 CHRONIC OBSTRUCTIVE PULMONARY DISEASE WITH ACUTE EXACERBATION (HCC): ICD-10-CM

## 2023-01-01 DIAGNOSIS — J96.02 ACUTE RESPIRATORY FAILURE WITH HYPOXIA AND HYPERCAPNIA (HCC): Primary | ICD-10-CM

## 2023-01-01 DIAGNOSIS — J96.01 ACUTE RESPIRATORY FAILURE WITH HYPOXIA AND HYPERCAPNIA (HCC): Primary | ICD-10-CM

## 2023-01-01 LAB
ALBUMIN SERPL-MCNC: 2 G/DL (ref 3.5–5)
ALBUMIN SERPL-MCNC: 2 G/DL (ref 3.5–5)
ALBUMIN SERPL-MCNC: 2.1 G/DL (ref 3.5–5)
ALBUMIN SERPL-MCNC: 2.2 G/DL (ref 3.5–5)
ALBUMIN SERPL-MCNC: 2.2 G/DL (ref 3.5–5)
ALBUMIN SERPL-MCNC: 2.3 G/DL (ref 3.5–5)
ALBUMIN SERPL-MCNC: 2.3 G/DL (ref 3.5–5)
ALBUMIN SERPL-MCNC: 2.4 G/DL (ref 3.5–5)
ALBUMIN SERPL-MCNC: 2.5 G/DL (ref 3.5–5)
ALBUMIN SERPL-MCNC: 2.7 G/DL (ref 3.5–5)
ALBUMIN SERPL-MCNC: 2.7 G/DL (ref 3.5–5)
ALBUMIN SERPL-MCNC: 3 G/DL (ref 3.5–5)
ALBUMIN/GLOB SERPL: 0.5 (ref 1.1–2.2)
ALBUMIN/GLOB SERPL: 0.6 (ref 1.1–2.2)
ALBUMIN/GLOB SERPL: 0.6 (ref 1.1–2.2)
ALP SERPL-CCNC: 101 U/L (ref 45–117)
ALP SERPL-CCNC: 67 U/L (ref 45–117)
ALP SERPL-CCNC: 70 U/L (ref 45–117)
ALP SERPL-CCNC: 72 U/L (ref 45–117)
ALP SERPL-CCNC: 73 U/L (ref 45–117)
ALP SERPL-CCNC: 73 U/L (ref 45–117)
ALP SERPL-CCNC: 76 U/L (ref 45–117)
ALP SERPL-CCNC: 79 U/L (ref 45–117)
ALT SERPL-CCNC: 216 U/L (ref 12–78)
ALT SERPL-CCNC: 256 U/L (ref 12–78)
ALT SERPL-CCNC: 52 U/L (ref 12–78)
ALT SERPL-CCNC: 53 U/L (ref 12–78)
ALT SERPL-CCNC: 54 U/L (ref 12–78)
ALT SERPL-CCNC: 58 U/L (ref 12–78)
ALT SERPL-CCNC: 65 U/L (ref 12–78)
ALT SERPL-CCNC: 80 U/L (ref 12–78)
ANION GAP SERPL CALC-SCNC: 3 MMOL/L (ref 5–15)
ANION GAP SERPL CALC-SCNC: 5 MMOL/L (ref 5–15)
ANION GAP SERPL CALC-SCNC: 6 MMOL/L (ref 5–15)
ANION GAP SERPL CALC-SCNC: 7 MMOL/L (ref 5–15)
ARTERIAL PATENCY WRIST A: NO
ARTERIAL PATENCY WRIST A: YES
AST SERPL W P-5'-P-CCNC: 117 U/L (ref 15–37)
AST SERPL W P-5'-P-CCNC: 35 U/L (ref 15–37)
AST SERPL W P-5'-P-CCNC: 36 U/L (ref 15–37)
AST SERPL W P-5'-P-CCNC: 40 U/L (ref 15–37)
AST SERPL W P-5'-P-CCNC: 41 U/L (ref 15–37)
AST SERPL W P-5'-P-CCNC: 70 U/L (ref 15–37)
AST SERPL W P-5'-P-CCNC: 85 U/L (ref 15–37)
AST SERPL W P-5'-P-CCNC: ABNORMAL U/L (ref 15–37)
BACTERIA SPEC CULT: ABNORMAL
BACTERIA SPEC CULT: NORMAL
BACTERIA SPEC CULT: NORMAL
BASE EXCESS BLD CALC-SCNC: 6.8 MMOL/L
BASE EXCESS BLD CALC-SCNC: 7.8 MMOL/L
BASE EXCESS BLDA CALC-SCNC: 0 MMOL/L (ref 0–3)
BASE EXCESS BLDA CALC-SCNC: 0.5 MMOL/L (ref 0–3)
BASE EXCESS BLDA CALC-SCNC: 1.2 MMOL/L (ref 0–3)
BASE EXCESS BLDA CALC-SCNC: 3.3 MMOL/L (ref 0–3)
BASE EXCESS BLDA CALC-SCNC: 3.4 MMOL/L (ref 0–3)
BASE EXCESS BLDA CALC-SCNC: 6.2 MMOL/L (ref 0–3)
BASE EXCESS BLDA CALC-SCNC: 7.3 MMOL/L (ref 0–3)
BASE EXCESS BLDA CALC-SCNC: 7.5 MMOL/L (ref 0–3)
BASE EXCESS BLDA CALC-SCNC: 8.1 MMOL/L (ref 0–3)
BASE EXCESS BLDA CALC-SCNC: 8.2 MMOL/L (ref 0–3)
BASOPHILS # BLD: 0 K/UL (ref 0–0.1)
BASOPHILS # BLD: 0.1 K/UL (ref 0–0.1)
BASOPHILS NFR BLD: 0 % (ref 0–1)
BASOPHILS NFR BLD: 1 % (ref 0–1)
BDY SITE: ABNORMAL
BILIRUB DIRECT SERPL-MCNC: 0.2 MG/DL (ref 0–0.2)
BILIRUB DIRECT SERPL-MCNC: 0.3 MG/DL (ref 0–0.2)
BILIRUB SERPL-MCNC: 0.4 MG/DL (ref 0.2–1)
BILIRUB SERPL-MCNC: 0.5 MG/DL (ref 0.2–1)
BILIRUB SERPL-MCNC: 0.8 MG/DL (ref 0.2–1)
BNP SERPL-MCNC: 1817 PG/ML
BNP SERPL-MCNC: 2873 PG/ML
BODY TEMPERATURE: 100.1
BODY TEMPERATURE: 97
BODY TEMPERATURE: 97
BODY TEMPERATURE: 97.7
BODY TEMPERATURE: 98.1
BODY TEMPERATURE: 98.4
BODY TEMPERATURE: 98.7
BODY TEMPERATURE: 98.8
BODY TEMPERATURE: 98.9
BODY TEMPERATURE: 99.9
BUN SERPL-MCNC: 19 MG/DL (ref 6–20)
BUN SERPL-MCNC: 19 MG/DL (ref 6–20)
BUN SERPL-MCNC: 21 MG/DL (ref 6–20)
BUN SERPL-MCNC: 23 MG/DL (ref 6–20)
BUN SERPL-MCNC: 23 MG/DL (ref 6–20)
BUN SERPL-MCNC: 30 MG/DL (ref 6–20)
BUN SERPL-MCNC: 34 MG/DL (ref 6–20)
BUN SERPL-MCNC: 42 MG/DL (ref 6–20)
BUN SERPL-MCNC: 48 MG/DL (ref 6–20)
BUN SERPL-MCNC: 49 MG/DL (ref 6–20)
BUN SERPL-MCNC: 52 MG/DL (ref 6–20)
BUN SERPL-MCNC: 54 MG/DL (ref 6–20)
BUN/CREAT SERPL: 18 (ref 12–20)
BUN/CREAT SERPL: 19 (ref 12–20)
BUN/CREAT SERPL: 23 (ref 12–20)
BUN/CREAT SERPL: 30 (ref 12–20)
BUN/CREAT SERPL: 37 (ref 12–20)
BUN/CREAT SERPL: 46 (ref 12–20)
BUN/CREAT SERPL: 46 (ref 12–20)
BUN/CREAT SERPL: 54 (ref 12–20)
BUN/CREAT SERPL: 68 (ref 12–20)
CA-I BLD-MCNC: 1.12 MMOL/L (ref 1.12–1.32)
CA-I BLD-MCNC: 1.15 MMOL/L (ref 1.13–1.32)
CA-I BLD-MCNC: 1.17 MMOL/L (ref 1.12–1.32)
CA-I BLD-MCNC: 1.18 MMOL/L (ref 1.13–1.32)
CA-I BLD-MCNC: 1.2 MMOL/L (ref 1.13–1.32)
CA-I BLD-MCNC: 7.5 MG/DL (ref 8.5–10.1)
CA-I BLD-MCNC: 7.7 MG/DL (ref 8.5–10.1)
CA-I BLD-MCNC: 7.9 MG/DL (ref 8.5–10.1)
CA-I BLD-MCNC: 8 MG/DL (ref 8.5–10.1)
CA-I BLD-MCNC: 8.1 MG/DL (ref 8.5–10.1)
CA-I BLD-MCNC: 8.3 MG/DL (ref 8.5–10.1)
CA-I BLD-MCNC: 8.4 MG/DL (ref 8.5–10.1)
CA-I BLD-MCNC: 8.4 MG/DL (ref 8.5–10.1)
CA-I BLD-MCNC: 8.6 MG/DL (ref 8.5–10.1)
CA-I BLD-MCNC: 8.6 MG/DL (ref 8.5–10.1)
CA-I BLD-MCNC: 8.9 MG/DL (ref 8.5–10.1)
CA-I BLD-MCNC: 9.2 MG/DL (ref 8.5–10.1)
CHLORIDE BLD-SCNC: 93 MMOL/L (ref 98–107)
CHLORIDE BLD-SCNC: 95 MMOL/L (ref 98–107)
CHLORIDE SERPL-SCNC: 100 MMOL/L (ref 97–108)
CHLORIDE SERPL-SCNC: 102 MMOL/L (ref 97–108)
CHLORIDE SERPL-SCNC: 103 MMOL/L (ref 97–108)
CHLORIDE SERPL-SCNC: 104 MMOL/L (ref 97–108)
CHLORIDE SERPL-SCNC: 104 MMOL/L (ref 97–108)
CHLORIDE SERPL-SCNC: 105 MMOL/L (ref 97–108)
CHLORIDE SERPL-SCNC: 105 MMOL/L (ref 97–108)
CHLORIDE SERPL-SCNC: 106 MMOL/L (ref 97–108)
CHLORIDE SERPL-SCNC: 94 MMOL/L (ref 97–108)
CHLORIDE SERPL-SCNC: 97 MMOL/L (ref 97–108)
CHLORIDE SERPL-SCNC: 99 MMOL/L (ref 97–108)
CHLORIDE SERPL-SCNC: 99 MMOL/L (ref 97–108)
CO2 BLD-SCNC: 34 MMOL/L
CO2 BLD-SCNC: 35 MMOL/L
CO2 SERPL-SCNC: 28 MMOL/L (ref 21–32)
CO2 SERPL-SCNC: 28 MMOL/L (ref 21–32)
CO2 SERPL-SCNC: 29 MMOL/L (ref 21–32)
CO2 SERPL-SCNC: 29 MMOL/L (ref 21–32)
CO2 SERPL-SCNC: 32 MMOL/L (ref 21–32)
CO2 SERPL-SCNC: 32 MMOL/L (ref 21–32)
CO2 SERPL-SCNC: 33 MMOL/L (ref 21–32)
CO2 SERPL-SCNC: 34 MMOL/L (ref 21–32)
CO2 SERPL-SCNC: 34 MMOL/L (ref 21–32)
CO2 SERPL-SCNC: 36 MMOL/L (ref 21–32)
COHGB MFR BLD: 0.2 % (ref 1–2)
COHGB MFR BLD: 0.2 % (ref 1–2)
COHGB MFR BLD: 0.3 % (ref 1–2)
COHGB MFR BLD: 0.3 % (ref 1–2)
COHGB MFR BLD: 0.4 % (ref 1–2)
COHGB MFR BLD: 0.8 % (ref 1–2)
COHGB MFR BLD: 0.9 % (ref 1–2)
COHGB MFR BLD: 0.9 % (ref 1–2)
COHGB MFR BLD: 1.3 % (ref 1–2)
COHGB MFR BLD: 1.4 % (ref 1–2)
CREAT SERPL-MCNC: 0.77 MG/DL (ref 0.7–1.3)
CREAT SERPL-MCNC: 0.98 MG/DL (ref 0.7–1.3)
CREAT SERPL-MCNC: 1 MG/DL (ref 0.7–1.3)
CREAT SERPL-MCNC: 1.05 MG/DL (ref 0.7–1.3)
CREAT SERPL-MCNC: 1.06 MG/DL (ref 0.7–1.3)
CREAT SERPL-MCNC: 1.08 MG/DL (ref 0.7–1.3)
CREAT SERPL-MCNC: 1.13 MG/DL (ref 0.7–1.3)
CREAT SERPL-MCNC: 1.15 MG/DL (ref 0.7–1.3)
CREAT SERPL-MCNC: 1.19 MG/DL (ref 0.7–1.3)
CREAT SERPL-MCNC: 1.26 MG/DL (ref 0.7–1.3)
CREAT SERPL-MCNC: 1.29 MG/DL (ref 0.7–1.3)
CREAT SERPL-MCNC: 1.31 MG/DL (ref 0.7–1.3)
CREAT UR-MCNC: 0.76 MG/DL (ref 0.6–1.3)
CREAT UR-MCNC: 1.23 MG/DL (ref 0.6–1.3)
CRP SERPL-MCNC: 0.64 MG/DL (ref 0–0.6)
CRP SERPL-MCNC: 1.99 MG/DL (ref 0–0.6)
CRP SERPL-MCNC: 13.6 MG/DL (ref 0–0.6)
CRP SERPL-MCNC: 19.5 MG/DL (ref 0–0.6)
CRP SERPL-MCNC: 20.1 MG/DL (ref 0–0.6)
CRP SERPL-MCNC: 3.82 MG/DL (ref 0–0.6)
DIFFERENTIAL METHOD BLD: ABNORMAL
ECHO AV MEAN GRADIENT: 13 MMHG
ECHO AV MEAN VELOCITY: 1.7 M/S
ECHO AV PEAK GRADIENT: 22 MMHG
ECHO AV PEAK VELOCITY: 2.4 M/S
ECHO AV VELOCITY RATIO: 0.88
ECHO AV VTI: 36.9 CM
ECHO BSA: 2.45 M2
ECHO LA AREA 4C: 9.5 CM2
ECHO LA MAJOR AXIS: 4.5 CM
ECHO LA VOL MOD A4C: 17 ML (ref 18–58)
ECHO LA VOLUME INDEX MOD A4C: 8 ML/M2 (ref 16–34)
ECHO LV E' LATERAL VELOCITY: 12 CM/S
ECHO LV E' SEPTAL VELOCITY: 8 CM/S
ECHO LV EDV A4C: 75 ML
ECHO LV EDV INDEX A4C: 33 ML/M2
ECHO LV EJECTION FRACTION A4C: 72 %
ECHO LV ESV A4C: 21 ML
ECHO LV ESV INDEX A4C: 9 ML/M2
ECHO LVOT AV VTI INDEX: 0.83
ECHO LVOT MEAN GRADIENT: 9 MMHG
ECHO LVOT PEAK GRADIENT: 18 MMHG
ECHO LVOT PEAK VELOCITY: 2.1 M/S
ECHO LVOT VTI: 30.5 CM
ECHO MV A VELOCITY: 0.93 M/S
ECHO MV E DECELERATION TIME (DT): 234 MS
ECHO MV E VELOCITY: 0.65 M/S
ECHO MV E/A RATIO: 0.7
ECHO MV E/E' LATERAL: 5.42
ECHO MV E/E' RATIO (AVERAGED): 6.77
ECHO MV LVOT VTI INDEX: 0.9
ECHO MV MAX VELOCITY: 1.4 M/S
ECHO MV MEAN GRADIENT: 4 MMHG
ECHO MV MEAN VELOCITY: 1 M/S
ECHO MV PEAK GRADIENT: 7 MMHG
ECHO MV VTI: 27.6 CM
ECHO RA AREA 4C: 21.9 CM2
ECHO RA END SYSTOLIC VOLUME APICAL 4 CHAMBER INDEX BSA: 33 ML/M2
ECHO RA VOLUME: 74 ML
ECHO RV FREE WALL PEAK S': 16 CM/S
ECHO RV TAPSE: 2.2 CM (ref 1.7–?)
EKG ATRIAL RATE: 115 BPM
EKG DIAGNOSIS: NORMAL
EKG P AXIS: 67 DEGREES
EKG P-R INTERVAL: 186 MS
EKG Q-T INTERVAL: 380 MS
EKG QRS DURATION: 86 MS
EKG QTC CALCULATION (BAZETT): 525 MS
EKG R AXIS: 46 DEGREES
EKG T AXIS: 65 DEGREES
EKG VENTRICULAR RATE: 115 BPM
EOSINOPHIL # BLD: 0 K/UL (ref 0–0.4)
EOSINOPHIL # BLD: 0.1 K/UL (ref 0–0.4)
EOSINOPHIL # BLD: 0.1 K/UL (ref 0–0.4)
EOSINOPHIL # BLD: 0.2 K/UL (ref 0–0.4)
EOSINOPHIL NFR BLD: 0 % (ref 0–7)
EOSINOPHIL NFR BLD: 1 % (ref 0–7)
EOSINOPHIL NFR BLD: 1 % (ref 0–7)
EOSINOPHIL NFR BLD: 2 % (ref 0–7)
ERYTHROCYTE [DISTWIDTH] IN BLOOD BY AUTOMATED COUNT: 17.2 % (ref 11.5–14.5)
ERYTHROCYTE [DISTWIDTH] IN BLOOD BY AUTOMATED COUNT: 17.3 % (ref 11.5–14.5)
ERYTHROCYTE [DISTWIDTH] IN BLOOD BY AUTOMATED COUNT: 17.3 % (ref 11.5–14.5)
ERYTHROCYTE [DISTWIDTH] IN BLOOD BY AUTOMATED COUNT: 18 % (ref 11.5–14.5)
ERYTHROCYTE [DISTWIDTH] IN BLOOD BY AUTOMATED COUNT: 18.1 % (ref 11.5–14.5)
ERYTHROCYTE [DISTWIDTH] IN BLOOD BY AUTOMATED COUNT: 18.5 % (ref 11.5–14.5)
FIO2 ON VENT: 100 %
FIO2 ON VENT: 30 %
FIO2 ON VENT: 35 %
FIO2 ON VENT: 40 %
FIO2 ON VENT: 40 %
FIO2 ON VENT: 50 %
FIO2 ON VENT: 60 %
FIO2 ON VENT: 60 %
FLUAV AG NPH QL IA: NEGATIVE
FLUBV AG NOSE QL IA: NEGATIVE
GAS FLOW.O2 SETTING OXYMISER: 12
GAS FLOW.O2 SETTING OXYMISER: 14
GAS FLOW.O2 SETTING OXYMISER: 14
GLOBULIN SER CALC-MCNC: 4.2 G/DL (ref 2–4)
GLOBULIN SER CALC-MCNC: 4.4 G/DL (ref 2–4)
GLOBULIN SER CALC-MCNC: 4.5 G/DL (ref 2–4)
GLOBULIN SER CALC-MCNC: 4.6 G/DL (ref 2–4)
GLOBULIN SER CALC-MCNC: 4.6 G/DL (ref 2–4)
GLOBULIN SER CALC-MCNC: 4.9 G/DL (ref 2–4)
GLUCOSE BLD STRIP.AUTO-MCNC: 125 MG/DL (ref 65–100)
GLUCOSE BLD STRIP.AUTO-MCNC: 146 MG/DL (ref 65–100)
GLUCOSE BLD STRIP.AUTO-MCNC: 148 MG/DL (ref 65–100)
GLUCOSE BLD STRIP.AUTO-MCNC: 148 MG/DL (ref 65–100)
GLUCOSE BLD STRIP.AUTO-MCNC: 156 MG/DL (ref 65–100)
GLUCOSE BLD STRIP.AUTO-MCNC: 158 MG/DL (ref 65–100)
GLUCOSE BLD STRIP.AUTO-MCNC: 160 MG/DL (ref 65–100)
GLUCOSE BLD STRIP.AUTO-MCNC: 162 MG/DL (ref 65–100)
GLUCOSE BLD STRIP.AUTO-MCNC: 165 MG/DL (ref 65–100)
GLUCOSE BLD STRIP.AUTO-MCNC: 175 MG/DL (ref 65–100)
GLUCOSE BLD STRIP.AUTO-MCNC: 177 MG/DL (ref 65–100)
GLUCOSE BLD STRIP.AUTO-MCNC: 177 MG/DL (ref 65–100)
GLUCOSE BLD STRIP.AUTO-MCNC: 179 MG/DL (ref 65–100)
GLUCOSE BLD STRIP.AUTO-MCNC: 183 MG/DL (ref 65–100)
GLUCOSE BLD STRIP.AUTO-MCNC: 188 MG/DL (ref 65–100)
GLUCOSE BLD STRIP.AUTO-MCNC: 189 MG/DL (ref 65–100)
GLUCOSE BLD STRIP.AUTO-MCNC: 204 MG/DL (ref 65–100)
GLUCOSE SERPL-MCNC: 104 MG/DL (ref 65–100)
GLUCOSE SERPL-MCNC: 125 MG/DL (ref 65–100)
GLUCOSE SERPL-MCNC: 134 MG/DL (ref 65–100)
GLUCOSE SERPL-MCNC: 140 MG/DL (ref 65–100)
GLUCOSE SERPL-MCNC: 147 MG/DL (ref 65–100)
GLUCOSE SERPL-MCNC: 160 MG/DL (ref 65–100)
GLUCOSE SERPL-MCNC: 163 MG/DL (ref 65–100)
GLUCOSE SERPL-MCNC: 167 MG/DL (ref 65–100)
GLUCOSE SERPL-MCNC: 203 MG/DL (ref 65–100)
GLUCOSE SERPL-MCNC: 209 MG/DL (ref 65–100)
GLUCOSE SERPL-MCNC: 210 MG/DL (ref 65–100)
GLUCOSE SERPL-MCNC: 240 MG/DL (ref 65–100)
GRAM STN SPEC: ABNORMAL
HCO3 BLD-SCNC: 34.7 MMOL/L (ref 19–28)
HCO3 BLD-SCNC: 35.4 MMOL/L (ref 19–28)
HCO3 BLDA-SCNC: 25 MMOL/L (ref 22–26)
HCO3 BLDA-SCNC: 26 MMOL/L (ref 22–26)
HCO3 BLDA-SCNC: 28 MMOL/L (ref 22–26)
HCO3 BLDA-SCNC: 29 MMOL/L (ref 22–26)
HCO3 BLDA-SCNC: 29 MMOL/L (ref 22–26)
HCO3 BLDA-SCNC: 31 MMOL/L (ref 22–26)
HCO3 BLDA-SCNC: 33 MMOL/L (ref 22–26)
HCO3 BLDA-SCNC: 33 MMOL/L (ref 22–26)
HCO3 BLDA-SCNC: 35 MMOL/L (ref 22–26)
HCO3 BLDA-SCNC: 37 MMOL/L (ref 22–26)
HCT VFR BLD AUTO: 30.1 % (ref 36.6–50.3)
HCT VFR BLD AUTO: 31.2 % (ref 36.6–50.3)
HCT VFR BLD AUTO: 31.2 % (ref 36.6–50.3)
HCT VFR BLD AUTO: 31.4 % (ref 36.6–50.3)
HCT VFR BLD AUTO: 31.7 % (ref 36.6–50.3)
HCT VFR BLD AUTO: 33.3 % (ref 36.6–50.3)
HCT VFR BLD AUTO: 34.4 % (ref 36.6–50.3)
HCT VFR BLD AUTO: 34.5 % (ref 36.6–50.3)
HCT VFR BLD AUTO: 34.6 % (ref 36.6–50.3)
HCT VFR BLD AUTO: 36.1 % (ref 36.6–50.3)
HGB BLD-MCNC: 10.1 G/DL (ref 12.1–17)
HGB BLD-MCNC: 10.6 G/DL (ref 12.1–17)
HGB BLD-MCNC: 10.7 G/DL (ref 12.1–17)
HGB BLD-MCNC: 11 G/DL (ref 12.1–17)
HGB BLD-MCNC: 11 G/DL (ref 12.1–17)
HGB BLD-MCNC: 11.5 G/DL (ref 12.1–17)
HGB BLD-MCNC: 9.4 G/DL (ref 12.1–17)
HGB BLD-MCNC: 9.5 G/DL (ref 12.1–17)
HGB BLD-MCNC: 9.7 G/DL (ref 12.1–17)
HGB BLD-MCNC: 9.7 G/DL (ref 12.1–17)
IMM GRANULOCYTES # BLD AUTO: 0 K/UL
IMM GRANULOCYTES # BLD AUTO: 0 K/UL (ref 0–0.04)
IMM GRANULOCYTES # BLD AUTO: 0 K/UL (ref 0–0.04)
IMM GRANULOCYTES # BLD AUTO: 0.2 K/UL (ref 0–0.04)
IMM GRANULOCYTES NFR BLD AUTO: 0 %
IMM GRANULOCYTES NFR BLD AUTO: 0 % (ref 0–0.5)
IMM GRANULOCYTES NFR BLD AUTO: 0 % (ref 0–0.5)
IMM GRANULOCYTES NFR BLD AUTO: 1 % (ref 0–0.5)
IPAP/PIP: 13
LACTATE BLD-SCNC: 0.76 MMOL/L (ref 0.4–2)
LACTATE BLD-SCNC: <0.4 MMOL/L (ref 0.4–2)
LACTATE SERPL-SCNC: 1.2 MMOL/L (ref 0.4–2)
LYMPHOCYTES # BLD: 0.7 K/UL (ref 0.8–3.5)
LYMPHOCYTES # BLD: 0.8 K/UL (ref 0.8–3.5)
LYMPHOCYTES # BLD: 1.1 K/UL (ref 0.8–3.5)
LYMPHOCYTES # BLD: 1.3 K/UL (ref 0.8–3.5)
LYMPHOCYTES # BLD: 1.3 K/UL (ref 0.8–3.5)
LYMPHOCYTES # BLD: 1.4 K/UL (ref 0.8–3.5)
LYMPHOCYTES # BLD: 2.2 K/UL (ref 0.8–3.5)
LYMPHOCYTES # BLD: 2.2 K/UL (ref 0.8–3.5)
LYMPHOCYTES # BLD: 2.6 K/UL (ref 0.8–3.5)
LYMPHOCYTES # BLD: 3.2 K/UL (ref 0.8–3.5)
LYMPHOCYTES NFR BLD: 10 % (ref 12–49)
LYMPHOCYTES NFR BLD: 14 % (ref 12–49)
LYMPHOCYTES NFR BLD: 15 % (ref 12–49)
LYMPHOCYTES NFR BLD: 19 % (ref 12–49)
LYMPHOCYTES NFR BLD: 24 % (ref 12–49)
LYMPHOCYTES NFR BLD: 29 % (ref 12–49)
LYMPHOCYTES NFR BLD: 30 % (ref 12–49)
LYMPHOCYTES NFR BLD: 31 % (ref 12–49)
LYMPHOCYTES NFR BLD: 8 % (ref 12–49)
LYMPHOCYTES NFR BLD: 9 % (ref 12–49)
Lab: ABNORMAL
Lab: NORMAL
Lab: NORMAL
MAGNESIUM SERPL-MCNC: 2 MG/DL (ref 1.6–2.4)
MAGNESIUM SERPL-MCNC: 2.1 MG/DL (ref 1.6–2.4)
MAGNESIUM SERPL-MCNC: 2.2 MG/DL (ref 1.6–2.4)
MAGNESIUM SERPL-MCNC: 2.2 MG/DL (ref 1.6–2.4)
MAGNESIUM SERPL-MCNC: 2.6 MG/DL (ref 1.6–2.4)
MCH RBC QN AUTO: 23.5 PG (ref 26–34)
MCH RBC QN AUTO: 23.5 PG (ref 26–34)
MCH RBC QN AUTO: 23.6 PG (ref 26–34)
MCH RBC QN AUTO: 23.6 PG (ref 26–34)
MCH RBC QN AUTO: 23.8 PG (ref 26–34)
MCH RBC QN AUTO: 23.9 PG (ref 26–34)
MCH RBC QN AUTO: 24 PG (ref 26–34)
MCH RBC QN AUTO: 24.1 PG (ref 26–34)
MCH RBC QN AUTO: 24.2 PG (ref 26–34)
MCH RBC QN AUTO: 24.4 PG (ref 26–34)
MCHC RBC AUTO-ENTMCNC: 30.4 G/DL (ref 30–36.5)
MCHC RBC AUTO-ENTMCNC: 30.9 G/DL (ref 30–36.5)
MCHC RBC AUTO-ENTMCNC: 31.1 G/DL (ref 30–36.5)
MCHC RBC AUTO-ENTMCNC: 31.1 G/DL (ref 30–36.5)
MCHC RBC AUTO-ENTMCNC: 31.2 G/DL (ref 30–36.5)
MCHC RBC AUTO-ENTMCNC: 31.8 G/DL (ref 30–36.5)
MCHC RBC AUTO-ENTMCNC: 31.8 G/DL (ref 30–36.5)
MCHC RBC AUTO-ENTMCNC: 31.9 G/DL (ref 30–36.5)
MCV RBC AUTO: 73.8 FL (ref 80–99)
MCV RBC AUTO: 74.2 FL (ref 80–99)
MCV RBC AUTO: 74.6 FL (ref 80–99)
MCV RBC AUTO: 75.6 FL (ref 80–99)
MCV RBC AUTO: 75.7 FL (ref 80–99)
MCV RBC AUTO: 76 FL (ref 80–99)
MCV RBC AUTO: 77 FL (ref 80–99)
MCV RBC AUTO: 77.2 FL (ref 80–99)
MCV RBC AUTO: 77.7 FL (ref 80–99)
MCV RBC AUTO: 78.4 FL (ref 80–99)
METAMYELOCYTES NFR BLD MANUAL: 1 %
METAMYELOCYTES NFR BLD MANUAL: 3 %
METHGB MFR BLD: 0 % (ref 0–1.4)
METHGB MFR BLD: 0.1 % (ref 0–1.4)
METHGB MFR BLD: 0.1 % (ref 0–1.4)
METHGB MFR BLD: 0.3 % (ref 0–1.4)
MONOCYTES # BLD: 0.2 K/UL (ref 0–1)
MONOCYTES # BLD: 0.3 K/UL (ref 0–1)
MONOCYTES # BLD: 0.4 K/UL (ref 0–1)
MONOCYTES # BLD: 0.5 K/UL (ref 0–1)
MONOCYTES # BLD: 0.6 K/UL (ref 0–1)
MONOCYTES # BLD: 0.6 K/UL (ref 0–1)
MONOCYTES # BLD: 0.9 K/UL (ref 0–1)
MONOCYTES NFR BLD: 2 % (ref 5–13)
MONOCYTES NFR BLD: 4 % (ref 5–13)
MONOCYTES NFR BLD: 4 % (ref 5–13)
MONOCYTES NFR BLD: 5 % (ref 5–13)
MONOCYTES NFR BLD: 5 % (ref 5–13)
MONOCYTES NFR BLD: 6 % (ref 5–13)
MONOCYTES NFR BLD: 7 % (ref 5–13)
MONOCYTES NFR BLD: 9 % (ref 5–13)
MYELOCYTES NFR BLD MANUAL: 1 %
MYELOCYTES NFR BLD MANUAL: 2 %
MYELOCYTES NFR BLD MANUAL: 3 %
NEUTS BAND NFR BLD MANUAL: 2 % (ref 0–6)
NEUTS BAND NFR BLD MANUAL: 2 % (ref 0–6)
NEUTS BAND NFR BLD MANUAL: 3 % (ref 0–6)
NEUTS BAND NFR BLD MANUAL: 9 % (ref 0–6)
NEUTS SEG # BLD: 10 K/UL (ref 1.8–8)
NEUTS SEG # BLD: 4.5 K/UL (ref 1.8–8)
NEUTS SEG # BLD: 5.6 K/UL (ref 1.8–8)
NEUTS SEG # BLD: 5.7 K/UL (ref 1.8–8)
NEUTS SEG # BLD: 6 K/UL (ref 1.8–8)
NEUTS SEG # BLD: 6.2 K/UL (ref 1.8–8)
NEUTS SEG # BLD: 6.5 K/UL (ref 1.8–8)
NEUTS SEG # BLD: 6.8 K/UL (ref 1.8–8)
NEUTS SEG # BLD: 7.2 K/UL (ref 1.8–8)
NEUTS SEG # BLD: 7.9 K/UL (ref 1.8–8)
NEUTS SEG NFR BLD: 60 % (ref 32–75)
NEUTS SEG NFR BLD: 62 % (ref 32–75)
NEUTS SEG NFR BLD: 65 % (ref 32–75)
NEUTS SEG NFR BLD: 66 % (ref 32–75)
NEUTS SEG NFR BLD: 68 % (ref 32–75)
NEUTS SEG NFR BLD: 76 % (ref 32–75)
NEUTS SEG NFR BLD: 80 % (ref 32–75)
NEUTS SEG NFR BLD: 81 % (ref 32–75)
NEUTS SEG NFR BLD: 82 % (ref 32–75)
NEUTS SEG NFR BLD: 83 % (ref 32–75)
NRBC # BLD: 0 K/UL (ref 0–0.01)
NRBC BLD-RTO: 0 PER 100 WBC
OXYHGB MFR BLD: 87.8 % (ref 95–99)
OXYHGB MFR BLD: 91.3 % (ref 95–99)
OXYHGB MFR BLD: 93 % (ref 95–99)
OXYHGB MFR BLD: 93.6 % (ref 95–99)
OXYHGB MFR BLD: 94.5 % (ref 95–99)
OXYHGB MFR BLD: 95.1 % (ref 95–99)
OXYHGB MFR BLD: 95.4 % (ref 95–99)
OXYHGB MFR BLD: 95.6 % (ref 95–99)
OXYHGB MFR BLD: 97 % (ref 95–99)
OXYHGB MFR BLD: 98 % (ref 95–99)
PCO2 BLD: 62.7 MMHG (ref 35–45)
PCO2 BLD: 64.7 MMHG (ref 35–45)
PCO2 BLDA: 40 MMHG (ref 35–45)
PCO2 BLDA: 42 MMHG (ref 35–45)
PCO2 BLDA: 47 MMHG (ref 35–45)
PCO2 BLDA: 48 MMHG (ref 35–45)
PCO2 BLDA: 58 MMHG (ref 35–45)
PCO2 BLDA: 61 MMHG (ref 35–45)
PCO2 BLDA: 80 MMHG (ref 35–45)
PEEP RESPIRATORY: 5
PERFORMED BY:: ABNORMAL
PH BLD: 7.34 (ref 7.35–7.45)
PH BLD: 7.36 (ref 7.35–7.45)
PH BLDA: 7.29 (ref 7.35–7.45)
PH BLDA: 7.31 (ref 7.35–7.45)
PH BLDA: 7.36 (ref 7.35–7.45)
PH BLDA: 7.37 (ref 7.35–7.45)
PH BLDA: 7.4 (ref 7.35–7.45)
PH BLDA: 7.4 (ref 7.35–7.45)
PH BLDA: 7.44 (ref 7.35–7.45)
PH BLDA: 7.44 (ref 7.35–7.45)
PH BLDA: 7.45 (ref 7.35–7.45)
PH BLDA: 7.46 (ref 7.35–7.45)
PHOSPHATE SERPL-MCNC: 1.2 MG/DL (ref 2.6–4.7)
PHOSPHATE SERPL-MCNC: 1.8 MG/DL (ref 2.6–4.7)
PHOSPHATE SERPL-MCNC: 2.1 MG/DL (ref 2.6–4.7)
PHOSPHATE SERPL-MCNC: 3 MG/DL (ref 2.6–4.7)
PHOSPHATE SERPL-MCNC: 3.1 MG/DL (ref 2.6–4.7)
PHOSPHATE SERPL-MCNC: 3.5 MG/DL (ref 2.6–4.7)
PHOSPHATE SERPL-MCNC: 3.5 MG/DL (ref 2.6–4.7)
PHOSPHATE SERPL-MCNC: 3.8 MG/DL (ref 2.6–4.7)
PLATELET # BLD AUTO: 181 K/UL (ref 150–400)
PLATELET # BLD AUTO: 199 K/UL (ref 150–400)
PLATELET # BLD AUTO: 212 K/UL (ref 150–400)
PLATELET # BLD AUTO: 251 K/UL (ref 150–400)
PLATELET # BLD AUTO: 252 K/UL (ref 150–400)
PLATELET # BLD AUTO: 265 K/UL (ref 150–400)
PLATELET # BLD AUTO: 325 K/UL (ref 150–400)
PLATELET # BLD AUTO: 345 K/UL (ref 150–400)
PLATELET # BLD AUTO: 359 K/UL (ref 150–400)
PLATELET # BLD AUTO: 387 K/UL (ref 150–400)
PLATELET COMMENT: ABNORMAL
PMV BLD AUTO: 10.2 FL (ref 8.9–12.9)
PMV BLD AUTO: 10.6 FL (ref 8.9–12.9)
PMV BLD AUTO: 9.1 FL (ref 8.9–12.9)
PMV BLD AUTO: 9.4 FL (ref 8.9–12.9)
PMV BLD AUTO: 9.4 FL (ref 8.9–12.9)
PMV BLD AUTO: 9.5 FL (ref 8.9–12.9)
PMV BLD AUTO: 9.5 FL (ref 8.9–12.9)
PMV BLD AUTO: 9.6 FL (ref 8.9–12.9)
PMV BLD AUTO: 9.7 FL (ref 8.9–12.9)
PMV BLD AUTO: 9.9 FL (ref 8.9–12.9)
PO2 BLD: 354 MMHG (ref 75–100)
PO2 BLD: 46 MMHG (ref 75–100)
PO2 BLDA: 100 MMHG (ref 80–100)
PO2 BLDA: 108 MMHG (ref 80–100)
PO2 BLDA: 138 MMHG (ref 80–100)
PO2 BLDA: 57 MMHG (ref 80–100)
PO2 BLDA: 68 MMHG (ref 80–100)
PO2 BLDA: 68 MMHG (ref 80–100)
PO2 BLDA: 74 MMHG (ref 80–100)
PO2 BLDA: 89 MMHG (ref 80–100)
PO2 BLDA: 96 MMHG (ref 80–100)
PO2 BLDA: 97 MMHG (ref 80–100)
POTASSIUM BLD-SCNC: 2.9 MMOL/L (ref 3.5–5.5)
POTASSIUM BLD-SCNC: 3.4 MMOL/L (ref 3.5–5.5)
POTASSIUM SERPL-SCNC: 2.8 MMOL/L (ref 3.5–5.1)
POTASSIUM SERPL-SCNC: 3 MMOL/L (ref 3.5–5.1)
POTASSIUM SERPL-SCNC: 3.1 MMOL/L (ref 3.5–5.1)
POTASSIUM SERPL-SCNC: 3.2 MMOL/L (ref 3.5–5.1)
POTASSIUM SERPL-SCNC: 3.8 MMOL/L (ref 3.5–5.1)
POTASSIUM SERPL-SCNC: 3.8 MMOL/L (ref 3.5–5.1)
POTASSIUM SERPL-SCNC: 4 MMOL/L (ref 3.5–5.1)
POTASSIUM SERPL-SCNC: 4 MMOL/L (ref 3.5–5.1)
POTASSIUM SERPL-SCNC: ABNORMAL MMOL/L (ref 3.5–5.1)
PROCALCITONIN SERPL-MCNC: 0.27 NG/ML
PROCALCITONIN SERPL-MCNC: 0.43 NG/ML
PROT SERPL-MCNC: 6.2 G/DL (ref 6.4–8.2)
PROT SERPL-MCNC: 6.4 G/DL (ref 6.4–8.2)
PROT SERPL-MCNC: 6.6 G/DL (ref 6.4–8.2)
PROT SERPL-MCNC: 6.7 G/DL (ref 6.4–8.2)
PROT SERPL-MCNC: 6.8 G/DL (ref 6.4–8.2)
PROT SERPL-MCNC: 7.1 G/DL (ref 6.4–8.2)
PROT SERPL-MCNC: 7.2 G/DL (ref 6.4–8.2)
PROT SERPL-MCNC: 7.9 G/DL (ref 6.4–8.2)
RBC # BLD AUTO: 3.98 M/UL (ref 4.1–5.7)
RBC # BLD AUTO: 3.98 M/UL (ref 4.1–5.7)
RBC # BLD AUTO: 4.04 M/UL (ref 4.1–5.7)
RBC # BLD AUTO: 4.04 M/UL (ref 4.1–5.7)
RBC # BLD AUTO: 4.25 M/UL (ref 4.1–5.7)
RBC # BLD AUTO: 4.47 M/UL (ref 4.1–5.7)
RBC # BLD AUTO: 4.51 M/UL (ref 4.1–5.7)
RBC # BLD AUTO: 4.54 M/UL (ref 4.1–5.7)
RBC # BLD AUTO: 4.66 M/UL (ref 4.1–5.7)
RBC # BLD AUTO: 4.77 M/UL (ref 4.1–5.7)
RBC MORPH BLD: ABNORMAL
SAO2 % BLD: 100 %
SAO2 % BLD: 78 %
SAO2 % BLD: 89 % (ref 95–99)
SAO2 % BLD: 92 % (ref 95–99)
SAO2 % BLD: 93 % (ref 95–99)
SAO2 % BLD: 94 % (ref 95–99)
SAO2 % BLD: 96 % (ref 95–99)
SAO2 % BLD: 96 % (ref 95–99)
SAO2 % BLD: 97 % (ref 95–99)
SAO2 % BLD: 98 % (ref 95–99)
SAO2% DEVICE SAO2% SENSOR NAME: ABNORMAL
SARS-COV-2 RDRP RESP QL NAA+PROBE: DETECTED
SODIUM BLD-SCNC: 140 MMOL/L (ref 136–145)
SODIUM BLD-SCNC: 141 MMOL/L (ref 136–145)
SODIUM SERPL-SCNC: 135 MMOL/L (ref 136–145)
SODIUM SERPL-SCNC: 136 MMOL/L (ref 136–145)
SODIUM SERPL-SCNC: 138 MMOL/L (ref 136–145)
SODIUM SERPL-SCNC: 138 MMOL/L (ref 136–145)
SODIUM SERPL-SCNC: 139 MMOL/L (ref 136–145)
SODIUM SERPL-SCNC: 140 MMOL/L (ref 136–145)
SODIUM SERPL-SCNC: 141 MMOL/L (ref 136–145)
SPECIMEN SITE: ABNORMAL
VENTILATION MODE VENT: ABNORMAL
VT SETTING VENT: 500
VT SETTING VENT: 500
WBC # BLD AUTO: 10.5 K/UL (ref 4.1–11.1)
WBC # BLD AUTO: 12.2 K/UL (ref 4.1–11.1)
WBC # BLD AUTO: 6.9 K/UL (ref 4.1–11.1)
WBC # BLD AUTO: 7.3 K/UL (ref 4.1–11.1)
WBC # BLD AUTO: 7.3 K/UL (ref 4.1–11.1)
WBC # BLD AUTO: 8.5 K/UL (ref 4.1–11.1)
WBC # BLD AUTO: 9.1 K/UL (ref 4.1–11.1)
WBC # BLD AUTO: 9.1 K/UL (ref 4.1–11.1)
WBC # BLD AUTO: 9.5 K/UL (ref 4.1–11.1)
WBC # BLD AUTO: 9.6 K/UL (ref 4.1–11.1)

## 2023-01-01 PROCEDURE — 6360000002 HC RX W HCPCS: Performed by: INTERNAL MEDICINE

## 2023-01-01 PROCEDURE — 86140 C-REACTIVE PROTEIN: CPT

## 2023-01-01 PROCEDURE — 94761 N-INVAS EAR/PLS OXIMETRY MLT: CPT

## 2023-01-01 PROCEDURE — 71045 X-RAY EXAM CHEST 1 VIEW: CPT

## 2023-01-01 PROCEDURE — 82803 BLOOD GASES ANY COMBINATION: CPT

## 2023-01-01 PROCEDURE — 84132 ASSAY OF SERUM POTASSIUM: CPT

## 2023-01-01 PROCEDURE — 6370000000 HC RX 637 (ALT 250 FOR IP): Performed by: INTERNAL MEDICINE

## 2023-01-01 PROCEDURE — 36415 COLL VENOUS BLD VENIPUNCTURE: CPT

## 2023-01-01 PROCEDURE — 96376 TX/PRO/DX INJ SAME DRUG ADON: CPT

## 2023-01-01 PROCEDURE — 36600 WITHDRAWAL OF ARTERIAL BLOOD: CPT

## 2023-01-01 PROCEDURE — 0656 HSPC GENERAL INPATIENT

## 2023-01-01 PROCEDURE — 2700000000 HC OXYGEN THERAPY PER DAY

## 2023-01-01 PROCEDURE — 2000000000 HC ICU R&B

## 2023-01-01 PROCEDURE — 5A1955Z RESPIRATORY VENTILATION, GREATER THAN 96 CONSECUTIVE HOURS: ICD-10-PCS | Performed by: STUDENT IN AN ORGANIZED HEALTH CARE EDUCATION/TRAINING PROGRAM

## 2023-01-01 PROCEDURE — 83735 ASSAY OF MAGNESIUM: CPT

## 2023-01-01 PROCEDURE — 83605 ASSAY OF LACTIC ACID: CPT

## 2023-01-01 PROCEDURE — G0299 HHS/HOSPICE OF RN EA 15 MIN: HCPCS

## 2023-01-01 PROCEDURE — 94640 AIRWAY INHALATION TREATMENT: CPT

## 2023-01-01 PROCEDURE — 84145 PROCALCITONIN (PCT): CPT

## 2023-01-01 PROCEDURE — 2500000003 HC RX 250 WO HCPCS: Performed by: INTERNAL MEDICINE

## 2023-01-01 PROCEDURE — 80053 COMPREHEN METABOLIC PANEL: CPT

## 2023-01-01 PROCEDURE — 85025 COMPLETE CBC W/AUTO DIFF WBC: CPT

## 2023-01-01 PROCEDURE — 6360000002 HC RX W HCPCS: Performed by: STUDENT IN AN ORGANIZED HEALTH CARE EDUCATION/TRAINING PROGRAM

## 2023-01-01 PROCEDURE — 2580000003 HC RX 258: Performed by: INTERNAL MEDICINE

## 2023-01-01 PROCEDURE — 82330 ASSAY OF CALCIUM: CPT

## 2023-01-01 PROCEDURE — 93306 TTE W/DOPPLER COMPLETE: CPT

## 2023-01-01 PROCEDURE — 82962 GLUCOSE BLOOD TEST: CPT

## 2023-01-01 PROCEDURE — 87186 SC STD MICRODIL/AGAR DIL: CPT

## 2023-01-01 PROCEDURE — 82947 ASSAY GLUCOSE BLOOD QUANT: CPT

## 2023-01-01 PROCEDURE — 99222 1ST HOSP IP/OBS MODERATE 55: CPT | Performed by: FAMILY MEDICINE

## 2023-01-01 PROCEDURE — 94003 VENT MGMT INPAT SUBQ DAY: CPT

## 2023-01-01 PROCEDURE — 80076 HEPATIC FUNCTION PANEL: CPT

## 2023-01-01 PROCEDURE — 80048 BASIC METABOLIC PNL TOTAL CA: CPT

## 2023-01-01 PROCEDURE — 87185 SC STD ENZYME DETCJ PER NZM: CPT

## 2023-01-01 PROCEDURE — 6370000000 HC RX 637 (ALT 250 FOR IP)

## 2023-01-01 PROCEDURE — 87070 CULTURE OTHR SPECIMN AEROBIC: CPT

## 2023-01-01 PROCEDURE — 87040 BLOOD CULTURE FOR BACTERIA: CPT

## 2023-01-01 PROCEDURE — 87635 SARS-COV-2 COVID-19 AMP PRB: CPT

## 2023-01-01 PROCEDURE — 80069 RENAL FUNCTION PANEL: CPT

## 2023-01-01 PROCEDURE — 83880 ASSAY OF NATRIURETIC PEPTIDE: CPT

## 2023-01-01 PROCEDURE — 99291 CRITICAL CARE FIRST HOUR: CPT

## 2023-01-01 PROCEDURE — 84100 ASSAY OF PHOSPHORUS: CPT

## 2023-01-01 PROCEDURE — 94762 N-INVAS EAR/PLS OXIMTRY CONT: CPT

## 2023-01-01 PROCEDURE — 87804 INFLUENZA ASSAY W/OPTIC: CPT

## 2023-01-01 PROCEDURE — 31500 INSERT EMERGENCY AIRWAY: CPT

## 2023-01-01 PROCEDURE — 99232 SBSQ HOSP IP/OBS MODERATE 35: CPT | Performed by: FAMILY MEDICINE

## 2023-01-01 PROCEDURE — XW0DXM6 INTRODUCTION OF BARICITINIB INTO MOUTH AND PHARYNX, EXTERNAL APPROACH, NEW TECHNOLOGY GROUP 6: ICD-10-PCS | Performed by: INTERNAL MEDICINE

## 2023-01-01 PROCEDURE — 6360000002 HC RX W HCPCS

## 2023-01-01 PROCEDURE — 2500000003 HC RX 250 WO HCPCS: Performed by: STUDENT IN AN ORGANIZED HEALTH CARE EDUCATION/TRAINING PROGRAM

## 2023-01-01 PROCEDURE — 96374 THER/PROPH/DIAG INJ IV PUSH: CPT

## 2023-01-01 PROCEDURE — 2100000000 HC CCU R&B

## 2023-01-01 PROCEDURE — 93005 ELECTROCARDIOGRAM TRACING: CPT | Performed by: STUDENT IN AN ORGANIZED HEALTH CARE EDUCATION/TRAINING PROGRAM

## 2023-01-01 PROCEDURE — 84295 ASSAY OF SERUM SODIUM: CPT

## 2023-01-01 PROCEDURE — 0BH17EZ INSERTION OF ENDOTRACHEAL AIRWAY INTO TRACHEA, VIA NATURAL OR ARTIFICIAL OPENING: ICD-10-PCS | Performed by: STUDENT IN AN ORGANIZED HEALTH CARE EDUCATION/TRAINING PROGRAM

## 2023-01-01 PROCEDURE — 87077 CULTURE AEROBIC IDENTIFY: CPT

## 2023-01-01 PROCEDURE — 94002 VENT MGMT INPAT INIT DAY: CPT

## 2023-01-01 PROCEDURE — 99285 EMERGENCY DEPT VISIT HI MDM: CPT

## 2023-01-01 PROCEDURE — 6370000000 HC RX 637 (ALT 250 FOR IP): Performed by: STUDENT IN AN ORGANIZED HEALTH CARE EDUCATION/TRAINING PROGRAM

## 2023-01-01 PROCEDURE — 87205 SMEAR GRAM STAIN: CPT

## 2023-01-01 RX ORDER — EPINEPHRINE 0.3 MG/.3ML
0.3 INJECTION SUBCUTANEOUS ONCE
Status: DISCONTINUED | OUTPATIENT
Start: 2023-01-01 | End: 2023-01-01

## 2023-01-01 RX ORDER — DEXAMETHASONE SODIUM PHOSPHATE 4 MG/ML
4 INJECTION, SOLUTION INTRA-ARTICULAR; INTRALESIONAL; INTRAMUSCULAR; INTRAVENOUS; SOFT TISSUE EVERY 6 HOURS
Status: DISCONTINUED | OUTPATIENT
Start: 2023-01-01 | End: 2023-01-01

## 2023-01-01 RX ORDER — HYDROMORPHONE HYDROCHLORIDE 2 MG/ML
2 INJECTION, SOLUTION INTRAMUSCULAR; INTRAVENOUS; SUBCUTANEOUS EVERY 4 HOURS
Status: DISCONTINUED | OUTPATIENT
Start: 2023-01-01 | End: 2023-01-01

## 2023-01-01 RX ORDER — ACETAMINOPHEN 650 MG/1
650 SUPPOSITORY RECTAL EVERY 4 HOURS PRN
Status: DISCONTINUED | OUTPATIENT
Start: 2023-01-01 | End: 2023-01-01

## 2023-01-01 RX ORDER — SODIUM CHLORIDE 9 MG/ML
INJECTION, SOLUTION INTRAVENOUS PRN
Status: DISCONTINUED | OUTPATIENT
Start: 2023-01-01 | End: 2023-01-01

## 2023-01-01 RX ORDER — POTASSIUM CHLORIDE 7.45 MG/ML
10 INJECTION INTRAVENOUS
Status: DISPENSED | OUTPATIENT
Start: 2023-01-01 | End: 2023-01-01

## 2023-01-01 RX ORDER — PROPOFOL 10 MG/ML
5-50 INJECTION, EMULSION INTRAVENOUS
Status: DISCONTINUED | OUTPATIENT
Start: 2023-01-01 | End: 2023-01-01 | Stop reason: SDUPTHER

## 2023-01-01 RX ORDER — FUROSEMIDE 20 MG/1
20 TABLET ORAL DAILY
COMMUNITY

## 2023-01-01 RX ORDER — MORPHINE SULFATE 2 MG/ML
2 INJECTION, SOLUTION INTRAMUSCULAR; INTRAVENOUS
Status: CANCELLED | OUTPATIENT
Start: 2023-01-01

## 2023-01-01 RX ORDER — METHOCARBAMOL 750 MG/1
750 TABLET, FILM COATED ORAL 3 TIMES DAILY PRN
COMMUNITY

## 2023-01-01 RX ORDER — HYDROMORPHONE HYDROCHLORIDE 2 MG/ML
6 INJECTION, SOLUTION INTRAMUSCULAR; INTRAVENOUS; SUBCUTANEOUS
Status: DISCONTINUED | OUTPATIENT
Start: 2023-01-01 | End: 2023-01-01 | Stop reason: HOSPADM

## 2023-01-01 RX ORDER — NALOXONE HYDROCHLORIDE 0.4 MG/ML
0.4 INJECTION, SOLUTION INTRAMUSCULAR; INTRAVENOUS; SUBCUTANEOUS ONCE
Status: COMPLETED | OUTPATIENT
Start: 2023-01-01 | End: 2023-01-01

## 2023-01-01 RX ORDER — SODIUM CHLORIDE 9 MG/ML
INJECTION, SOLUTION INTRAVENOUS PRN
Status: DISCONTINUED | OUTPATIENT
Start: 2023-01-01 | End: 2023-01-01 | Stop reason: HOSPADM

## 2023-01-01 RX ORDER — ENOXAPARIN SODIUM 100 MG/ML
30 INJECTION SUBCUTANEOUS 2 TIMES DAILY
Status: DISCONTINUED | OUTPATIENT
Start: 2023-01-01 | End: 2023-01-01

## 2023-01-01 RX ORDER — ACETAMINOPHEN 650 MG/1
650 SUPPOSITORY RECTAL EVERY 4 HOURS PRN
Status: DISCONTINUED | OUTPATIENT
Start: 2023-01-01 | End: 2023-01-01 | Stop reason: HOSPADM

## 2023-01-01 RX ORDER — SODIUM CHLORIDE 0.9 % (FLUSH) 0.9 %
5-40 SYRINGE (ML) INJECTION PRN
Status: DISCONTINUED | OUTPATIENT
Start: 2023-01-01 | End: 2023-01-01

## 2023-01-01 RX ORDER — METHOCARBAMOL 750 MG/1
750 TABLET, FILM COATED ORAL 3 TIMES DAILY PRN
Status: DISCONTINUED | OUTPATIENT
Start: 2023-01-01 | End: 2023-01-01

## 2023-01-01 RX ORDER — LORAZEPAM 2 MG/ML
1 CONCENTRATE ORAL EVERY 4 HOURS PRN
COMMUNITY

## 2023-01-01 RX ORDER — LORAZEPAM 2 MG/ML
4 INJECTION INTRAMUSCULAR
Status: DISCONTINUED | OUTPATIENT
Start: 2023-01-01 | End: 2023-01-01 | Stop reason: HOSPADM

## 2023-01-01 RX ORDER — SCOLOPAMINE TRANSDERMAL SYSTEM 1 MG/1
1 PATCH, EXTENDED RELEASE TRANSDERMAL
Status: DISCONTINUED | OUTPATIENT
Start: 2023-01-01 | End: 2023-01-01 | Stop reason: HOSPADM

## 2023-01-01 RX ORDER — HYDROMORPHONE HYDROCHLORIDE 2 MG/ML
4 INJECTION, SOLUTION INTRAMUSCULAR; INTRAVENOUS; SUBCUTANEOUS
Status: DISCONTINUED | OUTPATIENT
Start: 2023-01-01 | End: 2023-01-01

## 2023-01-01 RX ORDER — LORAZEPAM 2 MG/ML
1 INJECTION INTRAMUSCULAR
Status: DISCONTINUED | OUTPATIENT
Start: 2023-01-01 | End: 2023-01-01

## 2023-01-01 RX ORDER — FENTANYL 50 UG/1
1 PATCH TRANSDERMAL
COMMUNITY

## 2023-01-01 RX ORDER — LORAZEPAM 2 MG/ML
2 INJECTION INTRAMUSCULAR
Status: DISCONTINUED | OUTPATIENT
Start: 2023-01-01 | End: 2023-01-01

## 2023-01-01 RX ORDER — MORPHINE SULFATE 4 MG/ML
4 INJECTION, SOLUTION INTRAMUSCULAR; INTRAVENOUS
Status: DISCONTINUED | OUTPATIENT
Start: 2023-01-01 | End: 2023-01-01

## 2023-01-01 RX ORDER — FENTANYL CITRATE-0.9 % NACL/PF 10 MCG/ML
25-200 PLASTIC BAG, INJECTION (ML) INTRAVENOUS CONTINUOUS
Status: DISCONTINUED | OUTPATIENT
Start: 2023-01-01 | End: 2023-01-01

## 2023-01-01 RX ORDER — POTASSIUM CHLORIDE 7.45 MG/ML
10 INJECTION INTRAVENOUS PRN
Status: DISCONTINUED | OUTPATIENT
Start: 2023-01-01 | End: 2023-01-01

## 2023-01-01 RX ORDER — FUROSEMIDE 10 MG/ML
40 INJECTION INTRAMUSCULAR; INTRAVENOUS EVERY 12 HOURS
Status: DISCONTINUED | OUTPATIENT
Start: 2023-01-01 | End: 2023-01-01

## 2023-01-01 RX ORDER — IPRATROPIUM BROMIDE AND ALBUTEROL SULFATE 2.5; .5 MG/3ML; MG/3ML
1 SOLUTION RESPIRATORY (INHALATION)
Status: DISCONTINUED | OUTPATIENT
Start: 2023-01-01 | End: 2023-01-01 | Stop reason: HOSPADM

## 2023-01-01 RX ORDER — ACETAMINOPHEN 650 MG/1
650 SUPPOSITORY RECTAL EVERY 6 HOURS PRN
Status: CANCELLED | OUTPATIENT
Start: 2023-01-01

## 2023-01-01 RX ORDER — LORAZEPAM 1 MG/1
2 TABLET ORAL
Status: DISCONTINUED | OUTPATIENT
Start: 2023-01-01 | End: 2023-01-01

## 2023-01-01 RX ORDER — ACETAMINOPHEN 650 MG/1
650 SUPPOSITORY RECTAL EVERY 6 HOURS PRN
Status: DISCONTINUED | OUTPATIENT
Start: 2023-01-01 | End: 2023-01-01 | Stop reason: HOSPADM

## 2023-01-01 RX ORDER — SODIUM CHLORIDE 9 MG/ML
INJECTION, SOLUTION INTRAVENOUS PRN
Status: CANCELLED | OUTPATIENT
Start: 2023-01-01

## 2023-01-01 RX ORDER — FUROSEMIDE 10 MG/ML
40 INJECTION INTRAMUSCULAR; INTRAVENOUS DAILY
Status: DISCONTINUED | OUTPATIENT
Start: 2023-01-01 | End: 2023-01-01 | Stop reason: HOSPADM

## 2023-01-01 RX ORDER — ROCURONIUM BROMIDE 10 MG/ML
100 INJECTION, SOLUTION INTRAVENOUS
Status: COMPLETED | OUTPATIENT
Start: 2023-01-01 | End: 2023-01-01

## 2023-01-01 RX ORDER — DEXAMETHASONE 4 MG/1
4 TABLET ORAL EVERY 12 HOURS SCHEDULED
Status: DISCONTINUED | OUTPATIENT
Start: 2023-01-01 | End: 2023-01-01 | Stop reason: HOSPADM

## 2023-01-01 RX ORDER — TOBRAMYCIN INHALATION 300 MG/4ML
300 SOLUTION RESPIRATORY (INHALATION)
Status: DISCONTINUED | OUTPATIENT
Start: 2023-01-01 | End: 2023-01-01 | Stop reason: HOSPADM

## 2023-01-01 RX ORDER — FUROSEMIDE 10 MG/ML
40 INJECTION INTRAMUSCULAR; INTRAVENOUS DAILY
Status: DISCONTINUED | OUTPATIENT
Start: 2023-01-01 | End: 2023-01-01

## 2023-01-01 RX ORDER — POTASSIUM CHLORIDE 20 MEQ/1
40 TABLET, EXTENDED RELEASE ORAL PRN
Status: DISCONTINUED | OUTPATIENT
Start: 2023-01-01 | End: 2023-01-01

## 2023-01-01 RX ORDER — IPRATROPIUM BROMIDE AND ALBUTEROL SULFATE 2.5; .5 MG/3ML; MG/3ML
1 SOLUTION RESPIRATORY (INHALATION)
Status: CANCELLED | OUTPATIENT
Start: 2023-01-01

## 2023-01-01 RX ORDER — HYDROMORPHONE HYDROCHLORIDE 1 MG/ML
1 INJECTION, SOLUTION INTRAMUSCULAR; INTRAVENOUS; SUBCUTANEOUS
Status: DISCONTINUED | OUTPATIENT
Start: 2023-01-01 | End: 2023-01-01

## 2023-01-01 RX ORDER — POLYETHYLENE GLYCOL 3350 17 G/17G
17 POWDER, FOR SOLUTION ORAL DAILY PRN
Status: DISCONTINUED | OUTPATIENT
Start: 2023-01-01 | End: 2023-01-01

## 2023-01-01 RX ORDER — ONDANSETRON 2 MG/ML
4 INJECTION INTRAMUSCULAR; INTRAVENOUS EVERY 6 HOURS PRN
Status: DISCONTINUED | OUTPATIENT
Start: 2023-01-01 | End: 2023-01-01 | Stop reason: HOSPADM

## 2023-01-01 RX ORDER — PANTOPRAZOLE SODIUM 40 MG/1
40 TABLET, DELAYED RELEASE ORAL
Status: DISCONTINUED | OUTPATIENT
Start: 2023-01-01 | End: 2023-01-01

## 2023-01-01 RX ORDER — FUROSEMIDE 10 MG/ML
40 INJECTION INTRAMUSCULAR; INTRAVENOUS DAILY
Status: CANCELLED | OUTPATIENT
Start: 2023-01-01

## 2023-01-01 RX ORDER — HYDROMORPHONE HYDROCHLORIDE 2 MG/ML
2 INJECTION, SOLUTION INTRAMUSCULAR; INTRAVENOUS; SUBCUTANEOUS
Status: DISCONTINUED | OUTPATIENT
Start: 2023-01-01 | End: 2023-01-01

## 2023-01-01 RX ORDER — MORPHINE SULFATE 100 MG/5ML
15 SOLUTION ORAL EVERY 4 HOURS PRN
COMMUNITY

## 2023-01-01 RX ORDER — SODIUM CHLORIDE 0.9 % (FLUSH) 0.9 %
5-40 SYRINGE (ML) INJECTION EVERY 12 HOURS SCHEDULED
Status: DISCONTINUED | OUTPATIENT
Start: 2023-01-01 | End: 2023-01-01

## 2023-01-01 RX ORDER — ACETAZOLAMIDE 250 MG/1
500 TABLET ORAL
Status: COMPLETED | OUTPATIENT
Start: 2023-01-01 | End: 2023-01-01

## 2023-01-01 RX ORDER — SODIUM CHLORIDE 0.9 % (FLUSH) 0.9 %
5-40 SYRINGE (ML) INJECTION EVERY 12 HOURS SCHEDULED
Status: DISCONTINUED | OUTPATIENT
Start: 2023-01-01 | End: 2023-01-01 | Stop reason: HOSPADM

## 2023-01-01 RX ORDER — GABAPENTIN 100 MG/1
200 CAPSULE ORAL 3 TIMES DAILY
COMMUNITY

## 2023-01-01 RX ORDER — EPINEPHRINE 1 MG/ML
0.1 INJECTION, SOLUTION INTRAMUSCULAR; SUBCUTANEOUS ONCE
Status: DISCONTINUED | OUTPATIENT
Start: 2023-01-01 | End: 2023-01-01

## 2023-01-01 RX ORDER — LORAZEPAM 2 MG/ML
1 INJECTION INTRAMUSCULAR
Status: DISCONTINUED | OUTPATIENT
Start: 2023-01-01 | End: 2023-01-01 | Stop reason: HOSPADM

## 2023-01-01 RX ORDER — GLYCOPYRROLATE 0.2 MG/ML
0.2 INJECTION INTRAMUSCULAR; INTRAVENOUS EVERY 6 HOURS PRN
Status: DISCONTINUED | OUTPATIENT
Start: 2023-01-01 | End: 2023-01-01

## 2023-01-01 RX ORDER — PROPOFOL 10 MG/ML
INJECTION, EMULSION INTRAVENOUS
Status: COMPLETED
Start: 2023-01-01 | End: 2023-01-01

## 2023-01-01 RX ORDER — NOREPINEPHRINE BITARTRATE 0.06 MG/ML
1-100 INJECTION, SOLUTION INTRAVENOUS CONTINUOUS
Status: DISCONTINUED | OUTPATIENT
Start: 2023-01-01 | End: 2023-01-01

## 2023-01-01 RX ORDER — ACETAMINOPHEN 325 MG/1
650 TABLET ORAL EVERY 6 HOURS PRN
Status: DISCONTINUED | OUTPATIENT
Start: 2023-01-01 | End: 2023-01-01

## 2023-01-01 RX ORDER — BUDESONIDE 0.5 MG/2ML
0.5 INHALANT ORAL
Status: DISCONTINUED | OUTPATIENT
Start: 2023-01-01 | End: 2023-01-01

## 2023-01-01 RX ORDER — KETOROLAC TROMETHAMINE 30 MG/ML
30 INJECTION, SOLUTION INTRAMUSCULAR; INTRAVENOUS EVERY 6 HOURS PRN
Status: DISCONTINUED | OUTPATIENT
Start: 2023-01-01 | End: 2023-01-01 | Stop reason: HOSPADM

## 2023-01-01 RX ORDER — ACETAMINOPHEN 650 MG/1
650 SUPPOSITORY RECTAL EVERY 4 HOURS PRN
Status: CANCELLED | OUTPATIENT
Start: 2023-01-01

## 2023-01-01 RX ORDER — FLUOXETINE HYDROCHLORIDE 40 MG/1
40 CAPSULE ORAL DAILY
COMMUNITY

## 2023-01-01 RX ORDER — OXYCODONE HYDROCHLORIDE 30 MG/1
30 TABLET ORAL EVERY 4 HOURS PRN
COMMUNITY

## 2023-01-01 RX ORDER — FUROSEMIDE 10 MG/ML
40 INJECTION INTRAMUSCULAR; INTRAVENOUS ONCE
Status: COMPLETED | OUTPATIENT
Start: 2023-01-01 | End: 2023-01-01

## 2023-01-01 RX ORDER — FENTANYL CITRATE-0.9 % NACL/PF 10 MCG/ML
50 PLASTIC BAG, INJECTION (ML) INTRAVENOUS CONTINUOUS
Status: DISCONTINUED | OUTPATIENT
Start: 2023-01-01 | End: 2023-01-01

## 2023-01-01 RX ORDER — TOBRAMYCIN INHALATION 300 MG/4ML
300 SOLUTION RESPIRATORY (INHALATION)
Status: DISCONTINUED | OUTPATIENT
Start: 2023-01-01 | End: 2023-01-01

## 2023-01-01 RX ORDER — METOPROLOL SUCCINATE 50 MG/1
50 TABLET, EXTENDED RELEASE ORAL DAILY
Status: DISCONTINUED | OUTPATIENT
Start: 2023-01-01 | End: 2023-01-01

## 2023-01-01 RX ORDER — ACETAMINOPHEN 160 MG/5ML
650 LIQUID ORAL EVERY 4 HOURS PRN
Status: DISCONTINUED | OUTPATIENT
Start: 2023-01-01 | End: 2023-01-01 | Stop reason: HOSPADM

## 2023-01-01 RX ORDER — TOBRAMYCIN INHALATION 300 MG/4ML
300 SOLUTION RESPIRATORY (INHALATION)
Status: CANCELLED | OUTPATIENT
Start: 2023-01-01

## 2023-01-01 RX ORDER — PROPOFOL 10 MG/ML
5-50 INJECTION, EMULSION INTRAVENOUS CONTINUOUS
Status: DISCONTINUED | OUTPATIENT
Start: 2023-01-01 | End: 2023-01-01

## 2023-01-01 RX ORDER — POTASSIUM CHLORIDE 1.5 G/1.58G
40 POWDER, FOR SOLUTION ORAL EVERY 6 HOURS
Status: DISCONTINUED | OUTPATIENT
Start: 2023-01-01 | End: 2023-01-01

## 2023-01-01 RX ORDER — ONDANSETRON 4 MG/1
4 TABLET, ORALLY DISINTEGRATING ORAL EVERY 8 HOURS PRN
Status: DISCONTINUED | OUTPATIENT
Start: 2023-01-01 | End: 2023-01-01

## 2023-01-01 RX ORDER — MORPHINE SULFATE 4 MG/ML
4 INJECTION, SOLUTION INTRAMUSCULAR; INTRAVENOUS
Status: CANCELLED | OUTPATIENT
Start: 2023-01-01

## 2023-01-01 RX ORDER — EPINEPHRINE 0.3 MG/.3ML
INJECTION SUBCUTANEOUS
Status: DISCONTINUED
Start: 2023-01-01 | End: 2023-01-01 | Stop reason: WASHOUT

## 2023-01-01 RX ORDER — NALOXONE HYDROCHLORIDE 0.4 MG/ML
0.4 INJECTION, SOLUTION INTRAMUSCULAR; INTRAVENOUS; SUBCUTANEOUS PRN
Status: DISCONTINUED | OUTPATIENT
Start: 2023-01-01 | End: 2023-01-01

## 2023-01-01 RX ORDER — ONDANSETRON 2 MG/ML
4 INJECTION INTRAMUSCULAR; INTRAVENOUS EVERY 6 HOURS PRN
Status: DISCONTINUED | OUTPATIENT
Start: 2023-01-01 | End: 2023-01-01

## 2023-01-01 RX ORDER — KETAMINE HYDROCHLORIDE 10 MG/ML
150 INJECTION INTRAMUSCULAR; INTRAVENOUS
Status: COMPLETED | OUTPATIENT
Start: 2023-01-01 | End: 2023-01-01

## 2023-01-01 RX ORDER — CHLORHEXIDINE GLUCONATE ORAL RINSE 1.2 MG/ML
15 SOLUTION DENTAL 2 TIMES DAILY
Status: DISCONTINUED | OUTPATIENT
Start: 2023-01-01 | End: 2023-01-01

## 2023-01-01 RX ORDER — LANSOPRAZOLE 30 MG/1
30 TABLET, ORALLY DISINTEGRATING, DELAYED RELEASE ORAL
Status: DISCONTINUED | OUTPATIENT
Start: 2023-01-01 | End: 2023-01-01

## 2023-01-01 RX ORDER — LORAZEPAM 2 MG/ML
1 INJECTION INTRAMUSCULAR
Status: CANCELLED | OUTPATIENT
Start: 2023-01-01

## 2023-01-01 RX ORDER — IPRATROPIUM BROMIDE AND ALBUTEROL SULFATE 2.5; .5 MG/3ML; MG/3ML
1 SOLUTION RESPIRATORY (INHALATION)
Status: COMPLETED | OUTPATIENT
Start: 2023-01-01 | End: 2023-01-01

## 2023-01-01 RX ORDER — ACETAMINOPHEN 650 MG/1
650 SUPPOSITORY RECTAL EVERY 6 HOURS PRN
Status: DISCONTINUED | OUTPATIENT
Start: 2023-01-01 | End: 2023-01-01

## 2023-01-01 RX ORDER — POTASSIUM CHLORIDE 7.45 MG/ML
INJECTION INTRAVENOUS
Status: COMPLETED
Start: 2023-01-01 | End: 2023-01-01

## 2023-01-01 RX ORDER — MORPHINE SULFATE 2 MG/ML
2 INJECTION, SOLUTION INTRAMUSCULAR; INTRAVENOUS
Status: DISCONTINUED | OUTPATIENT
Start: 2023-01-01 | End: 2023-01-01 | Stop reason: HOSPADM

## 2023-01-01 RX ORDER — HYDROXYZINE PAMOATE 25 MG/1
50 CAPSULE ORAL EVERY 6 HOURS
Status: DISCONTINUED | OUTPATIENT
Start: 2023-01-01 | End: 2023-01-01

## 2023-01-01 RX ORDER — ATORVASTATIN CALCIUM 40 MG/1
40 TABLET, FILM COATED ORAL DAILY
Status: DISCONTINUED | OUTPATIENT
Start: 2023-01-01 | End: 2023-01-01

## 2023-01-01 RX ORDER — MORPHINE SULFATE 4 MG/ML
4 INJECTION, SOLUTION INTRAMUSCULAR; INTRAVENOUS
Status: DISCONTINUED | OUTPATIENT
Start: 2023-01-01 | End: 2023-01-01 | Stop reason: HOSPADM

## 2023-01-01 RX ORDER — FLUOXETINE HYDROCHLORIDE 20 MG/1
40 CAPSULE ORAL DAILY
Status: DISCONTINUED | OUTPATIENT
Start: 2023-01-01 | End: 2023-01-01

## 2023-01-01 RX ORDER — GLYCOPYRROLATE 0.2 MG/ML
0.2 INJECTION INTRAMUSCULAR; INTRAVENOUS EVERY 4 HOURS PRN
Status: DISCONTINUED | OUTPATIENT
Start: 2023-01-01 | End: 2023-01-01 | Stop reason: HOSPADM

## 2023-01-01 RX ORDER — BISACODYL 10 MG
10 SUPPOSITORY, RECTAL RECTAL DAILY PRN
Status: DISCONTINUED | OUTPATIENT
Start: 2023-01-01 | End: 2023-01-01 | Stop reason: HOSPADM

## 2023-01-01 RX ORDER — SODIUM CHLORIDE 0.9 % (FLUSH) 0.9 %
5-40 SYRINGE (ML) INJECTION EVERY 12 HOURS SCHEDULED
Status: CANCELLED | OUTPATIENT
Start: 2023-01-01

## 2023-01-01 RX ORDER — POTASSIUM CHLORIDE 20 MEQ/1
TABLET, EXTENDED RELEASE ORAL
Status: COMPLETED
Start: 2023-01-01 | End: 2023-01-01

## 2023-01-01 RX ORDER — HYDROMORPHONE HYDROCHLORIDE 2 MG/ML
6 INJECTION, SOLUTION INTRAMUSCULAR; INTRAVENOUS; SUBCUTANEOUS
Status: DISCONTINUED | OUTPATIENT
Start: 2023-01-01 | End: 2023-01-01

## 2023-01-01 RX ORDER — MAGNESIUM SULFATE IN WATER 40 MG/ML
2000 INJECTION, SOLUTION INTRAVENOUS PRN
Status: DISCONTINUED | OUTPATIENT
Start: 2023-01-01 | End: 2023-01-01

## 2023-01-01 RX ORDER — IPRATROPIUM BROMIDE AND ALBUTEROL SULFATE 2.5; .5 MG/3ML; MG/3ML
1 SOLUTION RESPIRATORY (INHALATION)
Status: DISCONTINUED | OUTPATIENT
Start: 2023-01-01 | End: 2023-01-01

## 2023-01-01 RX ORDER — MORPHINE SULFATE 2 MG/ML
2 INJECTION, SOLUTION INTRAMUSCULAR; INTRAVENOUS
Status: DISCONTINUED | OUTPATIENT
Start: 2023-01-01 | End: 2023-01-01

## 2023-01-01 RX ORDER — LORAZEPAM 2 MG/ML
4 INJECTION INTRAMUSCULAR EVERY 4 HOURS
Status: DISCONTINUED | OUTPATIENT
Start: 2023-01-01 | End: 2023-01-01

## 2023-01-01 RX ORDER — TAMSULOSIN HYDROCHLORIDE 0.4 MG/1
0.4 CAPSULE ORAL DAILY
Status: DISCONTINUED | OUTPATIENT
Start: 2023-01-01 | End: 2023-01-01

## 2023-01-01 RX ORDER — SCOLOPAMINE TRANSDERMAL SYSTEM 1 MG/1
1 PATCH, EXTENDED RELEASE TRANSDERMAL
Status: CANCELLED | OUTPATIENT
Start: 2023-01-01

## 2023-01-01 RX ADMIN — LORAZEPAM 4 MG: 2 INJECTION INTRAMUSCULAR at 19:47

## 2023-01-01 RX ADMIN — PIPERACILLIN AND TAZOBACTAM 3375 MG: 3; .375 INJECTION, POWDER, FOR SOLUTION INTRAVENOUS; PARENTERAL at 04:33

## 2023-01-01 RX ADMIN — ENOXAPARIN SODIUM 30 MG: 100 INJECTION SUBCUTANEOUS at 08:12

## 2023-01-01 RX ADMIN — PROPOFOL 50 MCG/KG/MIN: 10 INJECTION, EMULSION INTRAVENOUS at 15:52

## 2023-01-01 RX ADMIN — ACETAMINOPHEN 650 MG: 650 SOLUTION ORAL at 16:44

## 2023-01-01 RX ADMIN — BUDESONIDE 500 MCG: 0.5 INHALANT ORAL at 20:04

## 2023-01-01 RX ADMIN — SODIUM CHLORIDE 5 ML: 9 INJECTION, SOLUTION INTRAVENOUS at 16:14

## 2023-01-01 RX ADMIN — Medication 100 MCG/HR: at 11:04

## 2023-01-01 RX ADMIN — FUROSEMIDE 40 MG: 10 INJECTION, SOLUTION INTRAMUSCULAR; INTRAVENOUS at 15:20

## 2023-01-01 RX ADMIN — POTASSIUM CHLORIDE 10 MEQ: 7.46 INJECTION, SOLUTION INTRAVENOUS at 23:13

## 2023-01-01 RX ADMIN — TAMSULOSIN HYDROCHLORIDE 0.4 MG: 0.4 CAPSULE ORAL at 08:51

## 2023-01-01 RX ADMIN — DEXAMETHASONE 4 MG: 4 TABLET ORAL at 20:55

## 2023-01-01 RX ADMIN — PROPOFOL 50 MCG/KG/MIN: 10 INJECTION, EMULSION INTRAVENOUS at 01:23

## 2023-01-01 RX ADMIN — BUDESONIDE 500 MCG: 0.5 INHALANT ORAL at 08:06

## 2023-01-01 RX ADMIN — HYDROMORPHONE HYDROCHLORIDE 4 MG: 2 INJECTION, SOLUTION INTRAMUSCULAR; INTRAVENOUS; SUBCUTANEOUS at 01:14

## 2023-01-01 RX ADMIN — BUDESONIDE 500 MCG: 0.5 INHALANT ORAL at 21:11

## 2023-01-01 RX ADMIN — IPRATROPIUM BROMIDE AND ALBUTEROL SULFATE 1 DOSE: 2.5; .5 SOLUTION RESPIRATORY (INHALATION) at 08:14

## 2023-01-01 RX ADMIN — HYDROMORPHONE HYDROCHLORIDE 4 MG: 2 INJECTION, SOLUTION INTRAMUSCULAR; INTRAVENOUS; SUBCUTANEOUS at 21:37

## 2023-01-01 RX ADMIN — DEXAMETHASONE SODIUM PHOSPHATE 4 MG: 4 INJECTION, SOLUTION INTRA-ARTICULAR; INTRALESIONAL; INTRAMUSCULAR; INTRAVENOUS; SOFT TISSUE at 00:04

## 2023-01-01 RX ADMIN — MORPHINE SULFATE 4 MG: 4 INJECTION, SOLUTION INTRAMUSCULAR; INTRAVENOUS at 12:21

## 2023-01-01 RX ADMIN — HYDROMORPHONE HYDROCHLORIDE 2 MG: 2 INJECTION, SOLUTION INTRAMUSCULAR; INTRAVENOUS; SUBCUTANEOUS at 18:23

## 2023-01-01 RX ADMIN — ACETAZOLAMIDE 500 MG: 250 TABLET ORAL at 20:11

## 2023-01-01 RX ADMIN — BARICITINIB 4 MG: 2 TABLET, FILM COATED ORAL at 11:23

## 2023-01-01 RX ADMIN — BUDESONIDE 500 MCG: 0.5 INHALANT ORAL at 21:27

## 2023-01-01 RX ADMIN — PIPERACILLIN AND TAZOBACTAM 3375 MG: 3; .375 INJECTION, POWDER, FOR SOLUTION INTRAVENOUS; PARENTERAL at 04:30

## 2023-01-01 RX ADMIN — PROPOFOL 50 MCG/KG/MIN: 10 INJECTION, EMULSION INTRAVENOUS at 11:53

## 2023-01-01 RX ADMIN — CHLORHEXIDINE GLUCONATE 0.12% ORAL RINSE 15 ML: 1.2 LIQUID ORAL at 08:39

## 2023-01-01 RX ADMIN — DEXAMETHASONE SODIUM PHOSPHATE 4 MG: 4 INJECTION, SOLUTION INTRA-ARTICULAR; INTRALESIONAL; INTRAMUSCULAR; INTRAVENOUS; SOFT TISSUE at 01:24

## 2023-01-01 RX ADMIN — LORAZEPAM 4 MG: 2 INJECTION INTRAMUSCULAR at 02:18

## 2023-01-01 RX ADMIN — LORAZEPAM 4 MG: 2 INJECTION INTRAMUSCULAR at 11:39

## 2023-01-01 RX ADMIN — PROPOFOL 50 MCG/KG/MIN: 10 INJECTION, EMULSION INTRAVENOUS at 08:10

## 2023-01-01 RX ADMIN — Medication 50 MCG/HR: at 20:55

## 2023-01-01 RX ADMIN — Medication 125 MCG/HR: at 19:59

## 2023-01-01 RX ADMIN — PIPERACILLIN AND TAZOBACTAM 3375 MG: 3; .375 INJECTION, POWDER, FOR SOLUTION INTRAVENOUS; PARENTERAL at 04:14

## 2023-01-01 RX ADMIN — HYDROMORPHONE HYDROCHLORIDE 2 MG: 2 INJECTION, SOLUTION INTRAMUSCULAR; INTRAVENOUS; SUBCUTANEOUS at 09:40

## 2023-01-01 RX ADMIN — BARICITINIB 4 MG: 2 TABLET, FILM COATED ORAL at 08:20

## 2023-01-01 RX ADMIN — POTASSIUM & SODIUM PHOSPHATES POWDER PACK 280-160-250 MG 500 MG: 280-160-250 PACK at 15:55

## 2023-01-01 RX ADMIN — PROPOFOL 50 MCG/KG/MIN: 10 INJECTION, EMULSION INTRAVENOUS at 10:45

## 2023-01-01 RX ADMIN — CHLORHEXIDINE GLUCONATE 0.12% ORAL RINSE 15 ML: 1.2 LIQUID ORAL at 20:11

## 2023-01-01 RX ADMIN — IPRATROPIUM BROMIDE AND ALBUTEROL SULFATE 1 DOSE: 2.5; .5 SOLUTION RESPIRATORY (INHALATION) at 00:22

## 2023-01-01 RX ADMIN — LANSOPRAZOLE 30 MG: 30 TABLET, ORALLY DISINTEGRATING ORAL at 15:55

## 2023-01-01 RX ADMIN — Medication 100 MCG/HR: at 01:08

## 2023-01-01 RX ADMIN — LANSOPRAZOLE 30 MG: 30 TABLET, ORALLY DISINTEGRATING ORAL at 17:31

## 2023-01-01 RX ADMIN — LORAZEPAM 4 MG: 2 INJECTION INTRAMUSCULAR at 12:51

## 2023-01-01 RX ADMIN — SODIUM CHLORIDE, PRESERVATIVE FREE 10 ML: 5 INJECTION INTRAVENOUS at 08:20

## 2023-01-01 RX ADMIN — TAMSULOSIN HYDROCHLORIDE 0.4 MG: 0.4 CAPSULE ORAL at 08:20

## 2023-01-01 RX ADMIN — BUDESONIDE 500 MCG: 0.5 INHALANT ORAL at 08:25

## 2023-01-01 RX ADMIN — TOBRAMYCIN 300 MG: 300 SOLUTION RESPIRATORY (INHALATION) at 21:15

## 2023-01-01 RX ADMIN — LORAZEPAM 4 MG: 2 INJECTION INTRAMUSCULAR at 22:35

## 2023-01-01 RX ADMIN — LANSOPRAZOLE 30 MG: 30 TABLET, ORALLY DISINTEGRATING ORAL at 08:12

## 2023-01-01 RX ADMIN — PIPERACILLIN AND TAZOBACTAM 3375 MG: 3; .375 INJECTION, POWDER, FOR SOLUTION INTRAVENOUS; PARENTERAL at 20:00

## 2023-01-01 RX ADMIN — POTASSIUM CHLORIDE 40 MEQ: 1500 TABLET, EXTENDED RELEASE ORAL at 05:28

## 2023-01-01 RX ADMIN — MORPHINE SULFATE 4 MG: 4 INJECTION, SOLUTION INTRAMUSCULAR; INTRAVENOUS at 10:36

## 2023-01-01 RX ADMIN — BUDESONIDE 500 MCG: 0.5 INHALANT ORAL at 21:03

## 2023-01-01 RX ADMIN — PROPOFOL 20 MCG/KG/MIN: 10 INJECTION, EMULSION INTRAVENOUS at 20:54

## 2023-01-01 RX ADMIN — PIPERACILLIN AND TAZOBACTAM 3375 MG: 3; .375 INJECTION, POWDER, FOR SOLUTION INTRAVENOUS; PARENTERAL at 20:11

## 2023-01-01 RX ADMIN — SODIUM CHLORIDE, PRESERVATIVE FREE 10 ML: 5 INJECTION INTRAVENOUS at 08:41

## 2023-01-01 RX ADMIN — LORAZEPAM 4 MG: 2 INJECTION INTRAMUSCULAR at 03:31

## 2023-01-01 RX ADMIN — HYDROMORPHONE HYDROCHLORIDE 4 MG: 2 INJECTION, SOLUTION INTRAMUSCULAR; INTRAVENOUS; SUBCUTANEOUS at 00:00

## 2023-01-01 RX ADMIN — SODIUM CHLORIDE, PRESERVATIVE FREE 10 ML: 5 INJECTION INTRAVENOUS at 21:07

## 2023-01-01 RX ADMIN — ACETAMINOPHEN 650 MG: 650 SOLUTION ORAL at 11:46

## 2023-01-01 RX ADMIN — PROPOFOL 50 MCG/KG/MIN: 10 INJECTION, EMULSION INTRAVENOUS at 11:41

## 2023-01-01 RX ADMIN — FUROSEMIDE 40 MG: 10 INJECTION, SOLUTION INTRAMUSCULAR; INTRAVENOUS at 06:56

## 2023-01-01 RX ADMIN — SODIUM CHLORIDE, PRESERVATIVE FREE 10 ML: 5 INJECTION INTRAVENOUS at 08:13

## 2023-01-01 RX ADMIN — CHLORHEXIDINE GLUCONATE 0.12% ORAL RINSE 15 ML: 1.2 LIQUID ORAL at 22:20

## 2023-01-01 RX ADMIN — LORAZEPAM 1 MG: 2 INJECTION INTRAMUSCULAR; INTRAVENOUS at 06:23

## 2023-01-01 RX ADMIN — HYDROMORPHONE HYDROCHLORIDE 4 MG: 2 INJECTION, SOLUTION INTRAMUSCULAR; INTRAVENOUS; SUBCUTANEOUS at 14:41

## 2023-01-01 RX ADMIN — DEXAMETHASONE 4 MG: 4 TABLET ORAL at 20:45

## 2023-01-01 RX ADMIN — LANSOPRAZOLE 30 MG: 30 TABLET, ORALLY DISINTEGRATING ORAL at 06:27

## 2023-01-01 RX ADMIN — PIPERACILLIN AND TAZOBACTAM 3375 MG: 3; .375 INJECTION, POWDER, FOR SOLUTION INTRAVENOUS; PARENTERAL at 20:20

## 2023-01-01 RX ADMIN — PIPERACILLIN AND TAZOBACTAM 3375 MG: 3; .375 INJECTION, POWDER, FOR SOLUTION INTRAVENOUS; PARENTERAL at 04:35

## 2023-01-01 RX ADMIN — HYDROMORPHONE HYDROCHLORIDE 2 MG: 2 INJECTION, SOLUTION INTRAMUSCULAR; INTRAVENOUS; SUBCUTANEOUS at 06:12

## 2023-01-01 RX ADMIN — CHLORHEXIDINE GLUCONATE 0.12% ORAL RINSE 15 ML: 1.2 LIQUID ORAL at 08:52

## 2023-01-01 RX ADMIN — KETOROLAC TROMETHAMINE 30 MG: 30 INJECTION, SOLUTION INTRAMUSCULAR; INTRAVENOUS at 12:46

## 2023-01-01 RX ADMIN — DEXAMETHASONE SODIUM PHOSPHATE 4 MG: 4 INJECTION, SOLUTION INTRA-ARTICULAR; INTRALESIONAL; INTRAMUSCULAR; INTRAVENOUS; SOFT TISSUE at 01:14

## 2023-01-01 RX ADMIN — DEXAMETHASONE 4 MG: 4 TABLET ORAL at 20:23

## 2023-01-01 RX ADMIN — GLYCOPYRROLATE 0.2 MG: 0.2 INJECTION INTRAMUSCULAR; INTRAVENOUS at 22:11

## 2023-01-01 RX ADMIN — MORPHINE SULFATE 4 MG: 4 INJECTION, SOLUTION INTRAMUSCULAR; INTRAVENOUS at 21:38

## 2023-01-01 RX ADMIN — BARICITINIB 4 MG: 2 TABLET, FILM COATED ORAL at 08:10

## 2023-01-01 RX ADMIN — TOBRAMYCIN 300 MG: 300 SOLUTION RESPIRATORY (INHALATION) at 21:26

## 2023-01-01 RX ADMIN — PROPOFOL 50 MCG/KG/MIN: 10 INJECTION, EMULSION INTRAVENOUS at 11:48

## 2023-01-01 RX ADMIN — IPRATROPIUM BROMIDE AND ALBUTEROL SULFATE 1 DOSE: 2.5; .5 SOLUTION RESPIRATORY (INHALATION) at 21:27

## 2023-01-01 RX ADMIN — SODIUM CHLORIDE, PRESERVATIVE FREE 10 ML: 5 INJECTION INTRAVENOUS at 20:30

## 2023-01-01 RX ADMIN — BUDESONIDE 500 MCG: 0.5 INHALANT ORAL at 08:44

## 2023-01-01 RX ADMIN — FLUOXETINE 40 MG: 20 CAPSULE ORAL at 08:51

## 2023-01-01 RX ADMIN — CHLORHEXIDINE GLUCONATE 0.12% ORAL RINSE 15 ML: 1.2 LIQUID ORAL at 19:26

## 2023-01-01 RX ADMIN — DEXAMETHASONE SODIUM PHOSPHATE 4 MG: 4 INJECTION, SOLUTION INTRA-ARTICULAR; INTRALESIONAL; INTRAMUSCULAR; INTRAVENOUS; SOFT TISSUE at 11:28

## 2023-01-01 RX ADMIN — METOPROLOL SUCCINATE 50 MG: 50 TABLET, EXTENDED RELEASE ORAL at 09:14

## 2023-01-01 RX ADMIN — ACETAZOLAMIDE 500 MG: 250 TABLET ORAL at 15:27

## 2023-01-01 RX ADMIN — PIPERACILLIN AND TAZOBACTAM 3375 MG: 3; .375 INJECTION, POWDER, FOR SOLUTION INTRAVENOUS; PARENTERAL at 12:30

## 2023-01-01 RX ADMIN — DEXAMETHASONE SODIUM PHOSPHATE 4 MG: 4 INJECTION, SOLUTION INTRA-ARTICULAR; INTRALESIONAL; INTRAMUSCULAR; INTRAVENOUS; SOFT TISSUE at 06:08

## 2023-01-01 RX ADMIN — CHLORHEXIDINE GLUCONATE 0.12% ORAL RINSE 15 ML: 1.2 LIQUID ORAL at 20:23

## 2023-01-01 RX ADMIN — LORAZEPAM 1 MG: 2 INJECTION INTRAMUSCULAR at 14:28

## 2023-01-01 RX ADMIN — PROPOFOL 50 MCG/KG/MIN: 10 INJECTION, EMULSION INTRAVENOUS at 22:04

## 2023-01-01 RX ADMIN — LANSOPRAZOLE 30 MG: 30 TABLET, ORALLY DISINTEGRATING ORAL at 09:14

## 2023-01-01 RX ADMIN — LORAZEPAM 4 MG: 2 INJECTION INTRAMUSCULAR at 22:11

## 2023-01-01 RX ADMIN — IPRATROPIUM BROMIDE AND ALBUTEROL SULFATE 1 DOSE: 2.5; .5 SOLUTION RESPIRATORY (INHALATION) at 08:44

## 2023-01-01 RX ADMIN — IPRATROPIUM BROMIDE AND ALBUTEROL SULFATE 1 DOSE: 2.5; .5 SOLUTION RESPIRATORY (INHALATION) at 01:11

## 2023-01-01 RX ADMIN — HYDROMORPHONE HYDROCHLORIDE 4 MG: 2 INJECTION, SOLUTION INTRAMUSCULAR; INTRAVENOUS; SUBCUTANEOUS at 11:08

## 2023-01-01 RX ADMIN — PIPERACILLIN AND TAZOBACTAM 3375 MG: 3; .375 INJECTION, POWDER, FOR SOLUTION INTRAVENOUS; PARENTERAL at 20:45

## 2023-01-01 RX ADMIN — HYDROMORPHONE HYDROCHLORIDE 4 MG: 2 INJECTION, SOLUTION INTRAMUSCULAR; INTRAVENOUS; SUBCUTANEOUS at 19:50

## 2023-01-01 RX ADMIN — IPRATROPIUM BROMIDE AND ALBUTEROL SULFATE 1 DOSE: 2.5; .5 SOLUTION RESPIRATORY (INHALATION) at 08:25

## 2023-01-01 RX ADMIN — PROPOFOL 50 MCG/KG/MIN: 10 INJECTION, EMULSION INTRAVENOUS at 04:21

## 2023-01-01 RX ADMIN — ATORVASTATIN CALCIUM 40 MG: 40 TABLET, FILM COATED ORAL at 08:40

## 2023-01-01 RX ADMIN — POTASSIUM CHLORIDE 10 MEQ: 7.46 INJECTION, SOLUTION INTRAVENOUS at 00:00

## 2023-01-01 RX ADMIN — IPRATROPIUM BROMIDE AND ALBUTEROL SULFATE 1 DOSE: 2.5; .5 SOLUTION RESPIRATORY (INHALATION) at 13:55

## 2023-01-01 RX ADMIN — PROPOFOL 50 MCG/KG/MIN: 10 INJECTION, EMULSION INTRAVENOUS at 13:14

## 2023-01-01 RX ADMIN — MORPHINE SULFATE 4 MG: 4 INJECTION, SOLUTION INTRAMUSCULAR; INTRAVENOUS at 11:03

## 2023-01-01 RX ADMIN — CHLORHEXIDINE GLUCONATE 0.12% ORAL RINSE 15 ML: 1.2 LIQUID ORAL at 09:13

## 2023-01-01 RX ADMIN — Medication 50 MCG/HR: at 11:00

## 2023-01-01 RX ADMIN — PIPERACILLIN AND TAZOBACTAM 3375 MG: 3; .375 INJECTION, POWDER, FOR SOLUTION INTRAVENOUS; PARENTERAL at 11:52

## 2023-01-01 RX ADMIN — LANSOPRAZOLE 30 MG: 30 TABLET, ORALLY DISINTEGRATING ORAL at 07:53

## 2023-01-01 RX ADMIN — CHLORHEXIDINE GLUCONATE 0.12% ORAL RINSE 15 ML: 1.2 LIQUID ORAL at 20:45

## 2023-01-01 RX ADMIN — PIPERACILLIN AND TAZOBACTAM 3375 MG: 3; .375 INJECTION, POWDER, FOR SOLUTION INTRAVENOUS; PARENTERAL at 04:07

## 2023-01-01 RX ADMIN — IPRATROPIUM BROMIDE AND ALBUTEROL SULFATE 1 DOSE: 2.5; .5 SOLUTION RESPIRATORY (INHALATION) at 21:11

## 2023-01-01 RX ADMIN — METOPROLOL SUCCINATE 50 MG: 50 TABLET, EXTENDED RELEASE ORAL at 08:26

## 2023-01-01 RX ADMIN — IPRATROPIUM BROMIDE AND ALBUTEROL SULFATE 1 DOSE: 2.5; .5 SOLUTION RESPIRATORY (INHALATION) at 20:04

## 2023-01-01 RX ADMIN — ATORVASTATIN CALCIUM 40 MG: 40 TABLET, FILM COATED ORAL at 08:12

## 2023-01-01 RX ADMIN — ATORVASTATIN CALCIUM 40 MG: 40 TABLET, FILM COATED ORAL at 08:20

## 2023-01-01 RX ADMIN — FUROSEMIDE 40 MG: 10 INJECTION, SOLUTION INTRAMUSCULAR; INTRAVENOUS at 18:07

## 2023-01-01 RX ADMIN — ACETAZOLAMIDE 500 MG: 250 TABLET ORAL at 09:42

## 2023-01-01 RX ADMIN — ATORVASTATIN CALCIUM 40 MG: 40 TABLET, FILM COATED ORAL at 08:52

## 2023-01-01 RX ADMIN — PROPOFOL 45 MCG/KG/MIN: 10 INJECTION, EMULSION INTRAVENOUS at 00:44

## 2023-01-01 RX ADMIN — POTASSIUM & SODIUM PHOSPHATES POWDER PACK 280-160-250 MG 500 MG: 280-160-250 PACK at 11:00

## 2023-01-01 RX ADMIN — LORAZEPAM 1 MG: 2 INJECTION INTRAMUSCULAR; INTRAVENOUS at 10:36

## 2023-01-01 RX ADMIN — LORAZEPAM 1 MG: 2 INJECTION INTRAMUSCULAR; INTRAVENOUS at 19:38

## 2023-01-01 RX ADMIN — LORAZEPAM 4 MG: 2 INJECTION INTRAMUSCULAR at 07:48

## 2023-01-01 RX ADMIN — LORAZEPAM 4 MG: 2 INJECTION INTRAMUSCULAR at 01:52

## 2023-01-01 RX ADMIN — Medication 100 MCG/HR: at 06:40

## 2023-01-01 RX ADMIN — PROPOFOL 50 MCG/KG/MIN: 10 INJECTION, EMULSION INTRAVENOUS at 18:29

## 2023-01-01 RX ADMIN — LORAZEPAM 4 MG: 2 INJECTION INTRAMUSCULAR at 05:39

## 2023-01-01 RX ADMIN — IPRATROPIUM BROMIDE AND ALBUTEROL SULFATE 1 DOSE: 2.5; .5 SOLUTION RESPIRATORY (INHALATION) at 13:18

## 2023-01-01 RX ADMIN — IPRATROPIUM BROMIDE AND ALBUTEROL SULFATE 1 DOSE: 2.5; .5 SOLUTION RESPIRATORY (INHALATION) at 13:33

## 2023-01-01 RX ADMIN — IPRATROPIUM BROMIDE AND ALBUTEROL SULFATE 1 DOSE: 2.5; .5 SOLUTION RESPIRATORY (INHALATION) at 02:16

## 2023-01-01 RX ADMIN — PIPERACILLIN AND TAZOBACTAM 3375 MG: 3; .375 INJECTION, POWDER, FOR SOLUTION INTRAVENOUS; PARENTERAL at 11:45

## 2023-01-01 RX ADMIN — PROPOFOL 45 MCG/KG/MIN: 10 INJECTION, EMULSION INTRAVENOUS at 14:19

## 2023-01-01 RX ADMIN — BARICITINIB 4 MG: 2 TABLET, FILM COATED ORAL at 09:19

## 2023-01-01 RX ADMIN — METOPROLOL SUCCINATE 50 MG: 50 TABLET, EXTENDED RELEASE ORAL at 08:10

## 2023-01-01 RX ADMIN — FLUOXETINE 40 MG: 20 CAPSULE ORAL at 08:12

## 2023-01-01 RX ADMIN — BUDESONIDE 500 MCG: 0.5 INHALANT ORAL at 20:27

## 2023-01-01 RX ADMIN — FUROSEMIDE 40 MG: 10 INJECTION, SOLUTION INTRAMUSCULAR; INTRAVENOUS at 09:20

## 2023-01-01 RX ADMIN — PROPOFOL 50 MCG/KG/MIN: 10 INJECTION, EMULSION INTRAVENOUS at 08:00

## 2023-01-01 RX ADMIN — POTASSIUM CHLORIDE 10 MEQ: 7.46 INJECTION, SOLUTION INTRAVENOUS at 16:15

## 2023-01-01 RX ADMIN — POTASSIUM CHLORIDE 10 MEQ: 7.46 INJECTION, SOLUTION INTRAVENOUS at 23:01

## 2023-01-01 RX ADMIN — PIPERACILLIN AND TAZOBACTAM 3375 MG: 3; .375 INJECTION, POWDER, FOR SOLUTION INTRAVENOUS; PARENTERAL at 04:04

## 2023-01-01 RX ADMIN — IPRATROPIUM BROMIDE AND ALBUTEROL SULFATE 1 DOSE: 2.5; .5 SOLUTION RESPIRATORY (INHALATION) at 08:06

## 2023-01-01 RX ADMIN — LORAZEPAM 4 MG: 2 INJECTION INTRAMUSCULAR at 14:41

## 2023-01-01 RX ADMIN — PROPOFOL 50 MCG/KG/MIN: 10 INJECTION, EMULSION INTRAVENOUS at 21:06

## 2023-01-01 RX ADMIN — CHLORHEXIDINE GLUCONATE 0.12% ORAL RINSE 15 ML: 1.2 LIQUID ORAL at 21:06

## 2023-01-01 RX ADMIN — PROPOFOL 50 MCG/KG/MIN: 10 INJECTION, EMULSION INTRAVENOUS at 14:42

## 2023-01-01 RX ADMIN — Medication 100 MCG/HR: at 04:23

## 2023-01-01 RX ADMIN — LORAZEPAM 4 MG: 2 INJECTION INTRAMUSCULAR at 05:41

## 2023-01-01 RX ADMIN — POTASSIUM & SODIUM PHOSPHATES POWDER PACK 280-160-250 MG 500 MG: 280-160-250 PACK at 02:37

## 2023-01-01 RX ADMIN — FLUOXETINE 40 MG: 20 CAPSULE ORAL at 08:40

## 2023-01-01 RX ADMIN — LORAZEPAM 4 MG: 2 INJECTION INTRAMUSCULAR at 14:08

## 2023-01-01 RX ADMIN — PROPOFOL 50 MCG/KG/MIN: 10 INJECTION, EMULSION INTRAVENOUS at 22:06

## 2023-01-01 RX ADMIN — PROPOFOL 50 MCG/KG/MIN: 10 INJECTION, EMULSION INTRAVENOUS at 04:36

## 2023-01-01 RX ADMIN — POTASSIUM CHLORIDE 40 MEQ: 1500 TABLET, EXTENDED RELEASE ORAL at 19:26

## 2023-01-01 RX ADMIN — DOXYCYCLINE 100 MG: 100 INJECTION, POWDER, LYOPHILIZED, FOR SOLUTION INTRAVENOUS at 06:57

## 2023-01-01 RX ADMIN — ROCURONIUM BROMIDE 100 MG: 10 INJECTION, SOLUTION INTRAVENOUS at 20:08

## 2023-01-01 RX ADMIN — KETOROLAC TROMETHAMINE 30 MG: 30 INJECTION, SOLUTION INTRAMUSCULAR; INTRAVENOUS at 05:42

## 2023-01-01 RX ADMIN — DOXYCYCLINE 100 MG: 100 INJECTION, POWDER, LYOPHILIZED, FOR SOLUTION INTRAVENOUS at 06:00

## 2023-01-01 RX ADMIN — PROPOFOL 50 MCG/KG/MIN: 10 INJECTION, EMULSION INTRAVENOUS at 08:25

## 2023-01-01 RX ADMIN — Medication 125 MCG/HR: at 04:25

## 2023-01-01 RX ADMIN — SODIUM CHLORIDE, PRESERVATIVE FREE 10 ML: 5 INJECTION INTRAVENOUS at 21:01

## 2023-01-01 RX ADMIN — LORAZEPAM 2 MG: 2 INJECTION INTRAMUSCULAR at 14:43

## 2023-01-01 RX ADMIN — CHLORHEXIDINE GLUCONATE 0.12% ORAL RINSE 15 ML: 1.2 LIQUID ORAL at 08:12

## 2023-01-01 RX ADMIN — PROPOFOL 50 MCG/KG/MIN: 10 INJECTION, EMULSION INTRAVENOUS at 05:00

## 2023-01-01 RX ADMIN — CHLORHEXIDINE GLUCONATE 0.12% ORAL RINSE 15 ML: 1.2 LIQUID ORAL at 21:22

## 2023-01-01 RX ADMIN — LORAZEPAM 4 MG: 2 INJECTION INTRAMUSCULAR at 23:03

## 2023-01-01 RX ADMIN — SODIUM CHLORIDE, PRESERVATIVE FREE 10 ML: 5 INJECTION INTRAVENOUS at 09:14

## 2023-01-01 RX ADMIN — PROPOFOL 50 MCG/KG/MIN: 10 INJECTION, EMULSION INTRAVENOUS at 01:05

## 2023-01-01 RX ADMIN — LORAZEPAM 4 MG: 2 INJECTION INTRAMUSCULAR at 17:02

## 2023-01-01 RX ADMIN — Medication 125 MCG/HR: at 03:13

## 2023-01-01 RX ADMIN — ENOXAPARIN SODIUM 30 MG: 100 INJECTION SUBCUTANEOUS at 20:30

## 2023-01-01 RX ADMIN — LANSOPRAZOLE 30 MG: 30 TABLET, ORALLY DISINTEGRATING ORAL at 06:08

## 2023-01-01 RX ADMIN — MORPHINE SULFATE 2 MG: 2 INJECTION, SOLUTION INTRAMUSCULAR; INTRAVENOUS at 08:36

## 2023-01-01 RX ADMIN — Medication 100 MCG/HR: at 21:33

## 2023-01-01 RX ADMIN — DEXAMETHASONE SODIUM PHOSPHATE 4 MG: 4 INJECTION, SOLUTION INTRA-ARTICULAR; INTRALESIONAL; INTRAMUSCULAR; INTRAVENOUS; SOFT TISSUE at 17:33

## 2023-01-01 RX ADMIN — Medication 125 MCG/HR: at 10:47

## 2023-01-01 RX ADMIN — FLUOXETINE 40 MG: 20 CAPSULE ORAL at 08:20

## 2023-01-01 RX ADMIN — NALOXONE HYDROCHLORIDE 0.4 MG: 0.4 INJECTION, SOLUTION INTRAMUSCULAR; INTRAVENOUS; SUBCUTANEOUS at 18:46

## 2023-01-01 RX ADMIN — PROPOFOL 50 MCG/KG/MIN: 10 INJECTION, EMULSION INTRAVENOUS at 08:26

## 2023-01-01 RX ADMIN — FLUOXETINE 40 MG: 20 CAPSULE ORAL at 08:26

## 2023-01-01 RX ADMIN — POTASSIUM CHLORIDE 10 MEQ: 7.46 INJECTION, SOLUTION INTRAVENOUS at 23:00

## 2023-01-01 RX ADMIN — CHLORHEXIDINE GLUCONATE 0.12% ORAL RINSE 15 ML: 1.2 LIQUID ORAL at 08:10

## 2023-01-01 RX ADMIN — LORAZEPAM 1 MG: 2 INJECTION INTRAMUSCULAR; INTRAVENOUS at 11:03

## 2023-01-01 RX ADMIN — LANSOPRAZOLE 30 MG: 30 TABLET, ORALLY DISINTEGRATING ORAL at 15:27

## 2023-01-01 RX ADMIN — CHLORHEXIDINE GLUCONATE 0.12% ORAL RINSE 15 ML: 1.2 LIQUID ORAL at 08:20

## 2023-01-01 RX ADMIN — BUDESONIDE 500 MCG: 0.5 INHALANT ORAL at 21:37

## 2023-01-01 RX ADMIN — LORAZEPAM 4 MG: 2 INJECTION INTRAMUSCULAR at 04:56

## 2023-01-01 RX ADMIN — IPRATROPIUM BROMIDE AND ALBUTEROL SULFATE 1 DOSE: 2.5; .5 SOLUTION RESPIRATORY (INHALATION) at 13:35

## 2023-01-01 RX ADMIN — Medication 100 MCG/HR: at 00:01

## 2023-01-01 RX ADMIN — DOXYCYCLINE 100 MG: 100 INJECTION, POWDER, LYOPHILIZED, FOR SOLUTION INTRAVENOUS at 06:09

## 2023-01-01 RX ADMIN — PROPOFOL 50 MCG/KG/MIN: 10 INJECTION, EMULSION INTRAVENOUS at 12:02

## 2023-01-01 RX ADMIN — DOXYCYCLINE 100 MG: 100 INJECTION, POWDER, LYOPHILIZED, FOR SOLUTION INTRAVENOUS at 05:32

## 2023-01-01 RX ADMIN — DEXAMETHASONE 4 MG: 4 TABLET ORAL at 08:40

## 2023-01-01 RX ADMIN — MORPHINE SULFATE 4 MG: 4 INJECTION, SOLUTION INTRAMUSCULAR; INTRAVENOUS at 19:38

## 2023-01-01 RX ADMIN — POTASSIUM CHLORIDE 10 MEQ: 7.46 INJECTION, SOLUTION INTRAVENOUS at 20:58

## 2023-01-01 RX ADMIN — PROPOFOL 40 MCG/KG/MIN: 10 INJECTION, EMULSION INTRAVENOUS at 17:35

## 2023-01-01 RX ADMIN — GLYCOPYRROLATE 0.2 MG: 0.2 INJECTION INTRAMUSCULAR; INTRAVENOUS at 05:39

## 2023-01-01 RX ADMIN — MORPHINE SULFATE 2 MG: 2 INJECTION, SOLUTION INTRAMUSCULAR; INTRAVENOUS at 06:23

## 2023-01-01 RX ADMIN — DOXYCYCLINE 100 MG: 100 INJECTION, POWDER, LYOPHILIZED, FOR SOLUTION INTRAVENOUS at 17:36

## 2023-01-01 RX ADMIN — HYDROMORPHONE HYDROCHLORIDE 4 MG: 2 INJECTION, SOLUTION INTRAMUSCULAR; INTRAVENOUS; SUBCUTANEOUS at 00:27

## 2023-01-01 RX ADMIN — KETOROLAC TROMETHAMINE 30 MG: 30 INJECTION, SOLUTION INTRAMUSCULAR; INTRAVENOUS at 04:56

## 2023-01-01 RX ADMIN — BARICITINIB 4 MG: 2 TABLET, FILM COATED ORAL at 08:28

## 2023-01-01 RX ADMIN — PROPOFOL 50 MCG/KG/MIN: 10 INJECTION, EMULSION INTRAVENOUS at 00:43

## 2023-01-01 RX ADMIN — POTASSIUM & SODIUM PHOSPHATES POWDER PACK 280-160-250 MG 500 MG: 280-160-250 PACK at 12:46

## 2023-01-01 RX ADMIN — LANSOPRAZOLE 30 MG: 30 TABLET, ORALLY DISINTEGRATING ORAL at 06:20

## 2023-01-01 RX ADMIN — MORPHINE SULFATE 2 MG: 2 INJECTION, SOLUTION INTRAMUSCULAR; INTRAVENOUS at 02:37

## 2023-01-01 RX ADMIN — LORAZEPAM 4 MG: 2 INJECTION INTRAMUSCULAR at 18:41

## 2023-01-01 RX ADMIN — BUDESONIDE 500 MCG: 0.5 INHALANT ORAL at 19:43

## 2023-01-01 RX ADMIN — LORAZEPAM 4 MG: 2 INJECTION INTRAMUSCULAR at 17:10

## 2023-01-01 RX ADMIN — DEXAMETHASONE SODIUM PHOSPHATE 4 MG: 4 INJECTION, SOLUTION INTRA-ARTICULAR; INTRALESIONAL; INTRAMUSCULAR; INTRAVENOUS; SOFT TISSUE at 05:30

## 2023-01-01 RX ADMIN — PROPOFOL 50 MCG/KG/MIN: 10 INJECTION, EMULSION INTRAVENOUS at 21:39

## 2023-01-01 RX ADMIN — PROPOFOL 40 MCG/KG/MIN: 10 INJECTION, EMULSION INTRAVENOUS at 18:07

## 2023-01-01 RX ADMIN — IPRATROPIUM BROMIDE AND ALBUTEROL SULFATE 1 DOSE: 2.5; .5 SOLUTION RESPIRATORY (INHALATION) at 04:03

## 2023-01-01 RX ADMIN — POTASSIUM BICARBONATE 40 MEQ: 782 TABLET, EFFERVESCENT ORAL at 17:33

## 2023-01-01 RX ADMIN — PIPERACILLIN AND TAZOBACTAM 3375 MG: 3; .375 INJECTION, POWDER, FOR SOLUTION INTRAVENOUS; PARENTERAL at 03:30

## 2023-01-01 RX ADMIN — LORAZEPAM 1 MG: 2 INJECTION INTRAMUSCULAR; INTRAVENOUS at 21:38

## 2023-01-01 RX ADMIN — METOPROLOL SUCCINATE 50 MG: 50 TABLET, EXTENDED RELEASE ORAL at 09:19

## 2023-01-01 RX ADMIN — LORAZEPAM 1 MG: 2 INJECTION INTRAMUSCULAR; INTRAVENOUS at 12:45

## 2023-01-01 RX ADMIN — KETAMINE HYDROCHLORIDE 150 MG: 10 INJECTION INTRAMUSCULAR; INTRAVENOUS at 20:07

## 2023-01-01 RX ADMIN — ACETAZOLAMIDE 500 MG: 250 TABLET ORAL at 02:24

## 2023-01-01 RX ADMIN — HYDROMORPHONE HYDROCHLORIDE 4 MG: 2 INJECTION, SOLUTION INTRAMUSCULAR; INTRAVENOUS; SUBCUTANEOUS at 05:38

## 2023-01-01 RX ADMIN — ENOXAPARIN SODIUM 30 MG: 100 INJECTION SUBCUTANEOUS at 09:13

## 2023-01-01 RX ADMIN — HYDROMORPHONE HYDROCHLORIDE 4 MG: 2 INJECTION, SOLUTION INTRAMUSCULAR; INTRAVENOUS; SUBCUTANEOUS at 04:56

## 2023-01-01 RX ADMIN — ATORVASTATIN CALCIUM 40 MG: 40 TABLET, FILM COATED ORAL at 08:26

## 2023-01-01 RX ADMIN — POTASSIUM CHLORIDE 10 MEQ: 7.46 INJECTION, SOLUTION INTRAVENOUS at 02:30

## 2023-01-01 RX ADMIN — GLYCOPYRROLATE 0.2 MG: 0.2 INJECTION INTRAMUSCULAR; INTRAVENOUS at 18:23

## 2023-01-01 RX ADMIN — HYDROMORPHONE HYDROCHLORIDE 4 MG: 2 INJECTION, SOLUTION INTRAMUSCULAR; INTRAVENOUS; SUBCUTANEOUS at 18:42

## 2023-01-01 RX ADMIN — CHLORHEXIDINE GLUCONATE 0.12% ORAL RINSE 15 ML: 1.2 LIQUID ORAL at 08:25

## 2023-01-01 RX ADMIN — DEXAMETHASONE SODIUM PHOSPHATE 4 MG: 4 INJECTION, SOLUTION INTRA-ARTICULAR; INTRALESIONAL; INTRAMUSCULAR; INTRAVENOUS; SOFT TISSUE at 11:24

## 2023-01-01 RX ADMIN — KETOROLAC TROMETHAMINE 30 MG: 30 INJECTION, SOLUTION INTRAMUSCULAR; INTRAVENOUS at 10:02

## 2023-01-01 RX ADMIN — LANSOPRAZOLE 30 MG: 30 TABLET, ORALLY DISINTEGRATING ORAL at 15:19

## 2023-01-01 RX ADMIN — SODIUM CHLORIDE, PRESERVATIVE FREE 10 ML: 5 INJECTION INTRAVENOUS at 21:23

## 2023-01-01 RX ADMIN — ATORVASTATIN CALCIUM 40 MG: 40 TABLET, FILM COATED ORAL at 09:19

## 2023-01-01 RX ADMIN — GLYCOPYRROLATE 0.2 MG: 0.2 INJECTION INTRAMUSCULAR; INTRAVENOUS at 18:42

## 2023-01-01 RX ADMIN — FUROSEMIDE 40 MG: 10 INJECTION, SOLUTION INTRAMUSCULAR; INTRAVENOUS at 06:08

## 2023-01-01 RX ADMIN — IPRATROPIUM BROMIDE AND ALBUTEROL SULFATE 1 DOSE: 2.5; .5 SOLUTION RESPIRATORY (INHALATION) at 06:37

## 2023-01-01 RX ADMIN — LORAZEPAM 4 MG: 2 INJECTION INTRAMUSCULAR at 19:51

## 2023-01-01 RX ADMIN — LANSOPRAZOLE 30 MG: 30 TABLET, ORALLY DISINTEGRATING ORAL at 06:19

## 2023-01-01 RX ADMIN — PIPERACILLIN AND TAZOBACTAM 3375 MG: 3; .375 INJECTION, POWDER, FOR SOLUTION INTRAVENOUS; PARENTERAL at 11:41

## 2023-01-01 RX ADMIN — KETOROLAC TROMETHAMINE 30 MG: 30 INJECTION, SOLUTION INTRAMUSCULAR; INTRAVENOUS at 22:28

## 2023-01-01 RX ADMIN — LORAZEPAM 4 MG: 2 INJECTION INTRAMUSCULAR at 09:39

## 2023-01-01 RX ADMIN — DEXAMETHASONE 4 MG: 4 TABLET ORAL at 08:52

## 2023-01-01 RX ADMIN — ATORVASTATIN CALCIUM 40 MG: 40 TABLET, FILM COATED ORAL at 08:10

## 2023-01-01 RX ADMIN — PROPOFOL 50 MCG/KG/MIN: 10 INJECTION, EMULSION INTRAVENOUS at 04:35

## 2023-01-01 RX ADMIN — POTASSIUM CHLORIDE 10 MEQ: 7.46 INJECTION, SOLUTION INTRAVENOUS at 06:52

## 2023-01-01 RX ADMIN — BUDESONIDE 500 MCG: 0.5 INHALANT ORAL at 21:06

## 2023-01-01 RX ADMIN — PROPOFOL 50 MCG/KG/MIN: 10 INJECTION, EMULSION INTRAVENOUS at 18:23

## 2023-01-01 RX ADMIN — POTASSIUM CHLORIDE 10 MEQ: 7.46 INJECTION, SOLUTION INTRAVENOUS at 00:06

## 2023-01-01 RX ADMIN — TOBRAMYCIN 300 MG: 300 SOLUTION RESPIRATORY (INHALATION) at 21:05

## 2023-01-01 RX ADMIN — IPRATROPIUM BROMIDE AND ALBUTEROL SULFATE 1 DOSE: 2.5; .5 SOLUTION RESPIRATORY (INHALATION) at 14:08

## 2023-01-01 RX ADMIN — TOBRAMYCIN 300 MG: 300 SOLUTION RESPIRATORY (INHALATION) at 08:22

## 2023-01-01 RX ADMIN — SODIUM CHLORIDE, PRESERVATIVE FREE 10 ML: 5 INJECTION INTRAVENOUS at 09:20

## 2023-01-01 RX ADMIN — LORAZEPAM 4 MG: 2 INJECTION INTRAMUSCULAR at 18:24

## 2023-01-01 RX ADMIN — LORAZEPAM 4 MG: 2 INJECTION INTRAMUSCULAR at 16:50

## 2023-01-01 RX ADMIN — PROPOFOL 50 MCG/KG/MIN: 10 INJECTION, EMULSION INTRAVENOUS at 11:40

## 2023-01-01 RX ADMIN — NALXONE HYDROCHLORIDE 0.4 MG: 0.4 INJECTION INTRAMUSCULAR; INTRAVENOUS; SUBCUTANEOUS at 16:55

## 2023-01-01 RX ADMIN — MORPHINE SULFATE 4 MG: 4 INJECTION, SOLUTION INTRAMUSCULAR; INTRAVENOUS at 15:54

## 2023-01-01 RX ADMIN — DEXAMETHASONE SODIUM PHOSPHATE 4 MG: 4 INJECTION, SOLUTION INTRA-ARTICULAR; INTRALESIONAL; INTRAMUSCULAR; INTRAVENOUS; SOFT TISSUE at 18:35

## 2023-01-01 RX ADMIN — DEXAMETHASONE 4 MG: 4 TABLET ORAL at 09:19

## 2023-01-01 RX ADMIN — SODIUM CHLORIDE, PRESERVATIVE FREE 10 ML: 5 INJECTION INTRAVENOUS at 20:45

## 2023-01-01 RX ADMIN — BUDESONIDE 500 MCG: 0.5 INHALANT ORAL at 07:51

## 2023-01-01 RX ADMIN — DEXAMETHASONE 4 MG: 4 TABLET ORAL at 11:40

## 2023-01-01 RX ADMIN — LORAZEPAM 4 MG: 2 INJECTION INTRAMUSCULAR at 23:59

## 2023-01-01 RX ADMIN — IPRATROPIUM BROMIDE AND ALBUTEROL SULFATE 1 DOSE: 2.5; .5 SOLUTION RESPIRATORY (INHALATION) at 07:52

## 2023-01-01 RX ADMIN — ENOXAPARIN SODIUM 30 MG: 100 INJECTION SUBCUTANEOUS at 09:19

## 2023-01-01 RX ADMIN — LORAZEPAM 4 MG: 2 INJECTION INTRAMUSCULAR at 00:27

## 2023-01-01 RX ADMIN — LANSOPRAZOLE 30 MG: 30 TABLET, ORALLY DISINTEGRATING ORAL at 15:04

## 2023-01-01 RX ADMIN — GLYCOPYRROLATE 0.2 MG: 0.2 INJECTION INTRAMUSCULAR; INTRAVENOUS at 01:53

## 2023-01-01 RX ADMIN — PROPOFOL 45 MCG/KG/MIN: 10 INJECTION, EMULSION INTRAVENOUS at 08:24

## 2023-01-01 RX ADMIN — Medication 100 MCG/HR: at 02:30

## 2023-01-01 RX ADMIN — BUDESONIDE 500 MCG: 0.5 INHALANT ORAL at 08:22

## 2023-01-01 RX ADMIN — ACETAMINOPHEN 650 MG: 325 TABLET ORAL at 04:14

## 2023-01-01 RX ADMIN — DEXAMETHASONE SODIUM PHOSPHATE 4 MG: 4 INJECTION, SOLUTION INTRA-ARTICULAR; INTRALESIONAL; INTRAMUSCULAR; INTRAVENOUS; SOFT TISSUE at 17:52

## 2023-01-01 RX ADMIN — POTASSIUM CHLORIDE 10 MEQ: 7.46 INJECTION, SOLUTION INTRAVENOUS at 01:23

## 2023-01-01 RX ADMIN — LANSOPRAZOLE 30 MG: 30 TABLET, ORALLY DISINTEGRATING ORAL at 17:33

## 2023-01-01 RX ADMIN — POTASSIUM & SODIUM PHOSPHATES POWDER PACK 280-160-250 MG 500 MG: 280-160-250 PACK at 00:06

## 2023-01-01 RX ADMIN — Medication 100 MCG/HR: at 20:05

## 2023-01-01 RX ADMIN — LORAZEPAM 1 MG: 2 INJECTION INTRAMUSCULAR; INTRAVENOUS at 15:53

## 2023-01-01 RX ADMIN — IPRATROPIUM BROMIDE AND ALBUTEROL SULFATE 1 DOSE: 2.5; .5 SOLUTION RESPIRATORY (INHALATION) at 02:21

## 2023-01-01 RX ADMIN — ENOXAPARIN SODIUM 30 MG: 100 INJECTION SUBCUTANEOUS at 08:10

## 2023-01-01 RX ADMIN — HYDROMORPHONE HYDROCHLORIDE 2 MG: 2 INJECTION, SOLUTION INTRAMUSCULAR; INTRAVENOUS; SUBCUTANEOUS at 22:36

## 2023-01-01 RX ADMIN — HYDROMORPHONE HYDROCHLORIDE 2 MG: 2 INJECTION, SOLUTION INTRAMUSCULAR; INTRAVENOUS; SUBCUTANEOUS at 02:03

## 2023-01-01 RX ADMIN — FUROSEMIDE 40 MG: 10 INJECTION, SOLUTION INTRAMUSCULAR; INTRAVENOUS at 08:26

## 2023-01-01 RX ADMIN — PROPOFOL 50 MCG/KG/MIN: 10 INJECTION, EMULSION INTRAVENOUS at 15:03

## 2023-01-01 RX ADMIN — PIPERACILLIN AND TAZOBACTAM 4500 MG: 4; .5 INJECTION, POWDER, LYOPHILIZED, FOR SOLUTION INTRAVENOUS at 22:23

## 2023-01-01 RX ADMIN — PIPERACILLIN AND TAZOBACTAM 3375 MG: 3; .375 INJECTION, POWDER, FOR SOLUTION INTRAVENOUS; PARENTERAL at 21:06

## 2023-01-01 RX ADMIN — POTASSIUM CHLORIDE 10 MEQ: 7.46 INJECTION, SOLUTION INTRAVENOUS at 03:30

## 2023-01-01 RX ADMIN — HYDROMORPHONE HYDROCHLORIDE 4 MG: 2 INJECTION, SOLUTION INTRAMUSCULAR; INTRAVENOUS; SUBCUTANEOUS at 01:52

## 2023-01-01 RX ADMIN — IPRATROPIUM BROMIDE AND ALBUTEROL SULFATE 1 DOSE: 2.5; .5 SOLUTION RESPIRATORY (INHALATION) at 06:30

## 2023-01-01 RX ADMIN — LORAZEPAM 1 MG: 2 INJECTION INTRAMUSCULAR; INTRAVENOUS at 02:38

## 2023-01-01 RX ADMIN — LORAZEPAM 4 MG: 2 INJECTION INTRAMUSCULAR at 20:45

## 2023-01-01 RX ADMIN — PROPOFOL 50 MCG/KG/MIN: 10 INJECTION, EMULSION INTRAVENOUS at 18:41

## 2023-01-01 RX ADMIN — IPRATROPIUM BROMIDE AND ALBUTEROL SULFATE 1 DOSE: 2.5; .5 SOLUTION RESPIRATORY (INHALATION) at 01:09

## 2023-01-01 RX ADMIN — IPRATROPIUM BROMIDE AND ALBUTEROL SULFATE 1 DOSE: 2.5; .5 SOLUTION RESPIRATORY (INHALATION) at 00:33

## 2023-01-01 RX ADMIN — HYDROMORPHONE HYDROCHLORIDE 4 MG: 2 INJECTION, SOLUTION INTRAMUSCULAR; INTRAVENOUS; SUBCUTANEOUS at 22:11

## 2023-01-01 RX ADMIN — HYDROMORPHONE HYDROCHLORIDE 4 MG: 2 INJECTION, SOLUTION INTRAMUSCULAR; INTRAVENOUS; SUBCUTANEOUS at 12:51

## 2023-01-01 RX ADMIN — SODIUM CHLORIDE, PRESERVATIVE FREE 10 ML: 5 INJECTION INTRAVENOUS at 08:52

## 2023-01-01 RX ADMIN — ENOXAPARIN SODIUM 30 MG: 100 INJECTION SUBCUTANEOUS at 20:10

## 2023-01-01 RX ADMIN — CHLORHEXIDINE GLUCONATE 0.12% ORAL RINSE 15 ML: 1.2 LIQUID ORAL at 20:55

## 2023-01-01 RX ADMIN — PROPOFOL 50 MCG/KG/MIN: 10 INJECTION, EMULSION INTRAVENOUS at 06:51

## 2023-01-01 RX ADMIN — IPRATROPIUM BROMIDE AND ALBUTEROL SULFATE 1 DOSE: 2.5; .5 SOLUTION RESPIRATORY (INHALATION) at 21:37

## 2023-01-01 RX ADMIN — BARICITINIB 4 MG: 2 TABLET, FILM COATED ORAL at 10:45

## 2023-01-01 RX ADMIN — IPRATROPIUM BROMIDE AND ALBUTEROL SULFATE 1 DOSE: 2.5; .5 SOLUTION RESPIRATORY (INHALATION) at 21:06

## 2023-01-01 RX ADMIN — HYDROMORPHONE HYDROCHLORIDE 4 MG: 2 INJECTION, SOLUTION INTRAMUSCULAR; INTRAVENOUS; SUBCUTANEOUS at 23:02

## 2023-01-01 RX ADMIN — DEXAMETHASONE SODIUM PHOSPHATE 4 MG: 4 INJECTION, SOLUTION INTRA-ARTICULAR; INTRALESIONAL; INTRAMUSCULAR; INTRAVENOUS; SOFT TISSUE at 00:12

## 2023-01-01 RX ADMIN — POTASSIUM CHLORIDE 10 MEQ: 7.46 INJECTION, SOLUTION INTRAVENOUS at 18:06

## 2023-01-01 RX ADMIN — HYDROMORPHONE HYDROCHLORIDE 1 MG: 1 INJECTION, SOLUTION INTRAMUSCULAR; INTRAVENOUS; SUBCUTANEOUS at 14:43

## 2023-01-01 RX ADMIN — LORAZEPAM 4 MG: 2 INJECTION INTRAMUSCULAR at 11:08

## 2023-01-01 RX ADMIN — LORAZEPAM 4 MG: 2 INJECTION INTRAMUSCULAR at 01:14

## 2023-01-01 RX ADMIN — NALOXONE HYDROCHLORIDE 0.4 MG: 0.4 INJECTION, SOLUTION INTRAMUSCULAR; INTRAVENOUS; SUBCUTANEOUS at 16:06

## 2023-01-01 RX ADMIN — SODIUM CHLORIDE, PRESERVATIVE FREE 10 ML: 5 INJECTION INTRAVENOUS at 20:56

## 2023-01-01 RX ADMIN — METOPROLOL SUCCINATE 50 MG: 50 TABLET, EXTENDED RELEASE ORAL at 08:52

## 2023-01-01 RX ADMIN — POTASSIUM CHLORIDE 10 MEQ: 7.46 INJECTION, SOLUTION INTRAVENOUS at 04:38

## 2023-01-01 RX ADMIN — POTASSIUM BICARBONATE 40 MEQ: 782 TABLET, EFFERVESCENT ORAL at 15:20

## 2023-01-01 RX ADMIN — POTASSIUM CHLORIDE 40 MEQ: 1500 TABLET, EXTENDED RELEASE ORAL at 04:29

## 2023-01-01 RX ADMIN — PROPOFOL 50 MCG/KG/MIN: 10 INJECTION, EMULSION INTRAVENOUS at 03:35

## 2023-01-01 RX ADMIN — IPRATROPIUM BROMIDE AND ALBUTEROL SULFATE 1 DOSE: 2.5; .5 SOLUTION RESPIRATORY (INHALATION) at 15:55

## 2023-01-01 RX ADMIN — HYDROMORPHONE HYDROCHLORIDE 2 MG: 2 INJECTION, SOLUTION INTRAMUSCULAR; INTRAVENOUS; SUBCUTANEOUS at 05:42

## 2023-01-01 RX ADMIN — FUROSEMIDE 40 MG: 10 INJECTION, SOLUTION INTRAMUSCULAR; INTRAVENOUS at 08:39

## 2023-01-01 RX ADMIN — TAMSULOSIN HYDROCHLORIDE 0.4 MG: 0.4 CAPSULE ORAL at 08:26

## 2023-01-01 RX ADMIN — FLUOXETINE 40 MG: 20 CAPSULE ORAL at 09:13

## 2023-01-01 RX ADMIN — PROPOFOL 50 MCG/KG/MIN: 10 INJECTION, EMULSION INTRAVENOUS at 16:10

## 2023-01-01 RX ADMIN — METOPROLOL SUCCINATE 50 MG: 50 TABLET, EXTENDED RELEASE ORAL at 08:12

## 2023-01-01 RX ADMIN — ACETAZOLAMIDE 500 MG: 250 TABLET ORAL at 12:45

## 2023-01-01 RX ADMIN — ENOXAPARIN SODIUM 30 MG: 100 INJECTION SUBCUTANEOUS at 08:20

## 2023-01-01 RX ADMIN — PROPOFOL 50 MCG/KG/MIN: 10 INJECTION, EMULSION INTRAVENOUS at 21:53

## 2023-01-01 RX ADMIN — FLUOXETINE 40 MG: 20 CAPSULE ORAL at 08:10

## 2023-01-01 RX ADMIN — PIPERACILLIN AND TAZOBACTAM 3375 MG: 3; .375 INJECTION, POWDER, FOR SOLUTION INTRAVENOUS; PARENTERAL at 21:22

## 2023-01-01 RX ADMIN — POTASSIUM CHLORIDE 10 MEQ: 7.46 INJECTION, SOLUTION INTRAVENOUS at 23:06

## 2023-01-01 RX ADMIN — Medication 100 MCG/HR: at 16:10

## 2023-01-01 RX ADMIN — DEXAMETHASONE SODIUM PHOSPHATE 4 MG: 4 INJECTION, SOLUTION INTRA-ARTICULAR; INTRALESIONAL; INTRAMUSCULAR; INTRAVENOUS; SOFT TISSUE at 05:26

## 2023-01-01 RX ADMIN — HYDROMORPHONE HYDROCHLORIDE 2 MG: 2 INJECTION, SOLUTION INTRAMUSCULAR; INTRAVENOUS; SUBCUTANEOUS at 19:33

## 2023-01-01 RX ADMIN — CHLORHEXIDINE GLUCONATE 0.12% ORAL RINSE 15 ML: 1.2 LIQUID ORAL at 09:20

## 2023-01-01 RX ADMIN — ENOXAPARIN SODIUM 30 MG: 100 INJECTION SUBCUTANEOUS at 21:22

## 2023-01-01 RX ADMIN — SODIUM CHLORIDE, PRESERVATIVE FREE 10 ML: 5 INJECTION INTRAVENOUS at 08:43

## 2023-01-01 RX ADMIN — LORAZEPAM 1 MG: 2 INJECTION INTRAMUSCULAR; INTRAVENOUS at 23:42

## 2023-01-01 RX ADMIN — DOXYCYCLINE 100 MG: 100 INJECTION, POWDER, LYOPHILIZED, FOR SOLUTION INTRAVENOUS at 17:52

## 2023-01-01 RX ADMIN — PROPOFOL 50 MCG/KG/MIN: 10 INJECTION, EMULSION INTRAVENOUS at 01:07

## 2023-01-01 RX ADMIN — POTASSIUM & SODIUM PHOSPHATES POWDER PACK 280-160-250 MG 500 MG: 280-160-250 PACK at 21:07

## 2023-01-01 RX ADMIN — HYDROMORPHONE HYDROCHLORIDE 2 MG: 2 INJECTION, SOLUTION INTRAMUSCULAR; INTRAVENOUS; SUBCUTANEOUS at 16:50

## 2023-01-01 RX ADMIN — MORPHINE SULFATE 4 MG: 4 INJECTION, SOLUTION INTRAMUSCULAR; INTRAVENOUS at 23:42

## 2023-01-01 RX ADMIN — PIPERACILLIN AND TAZOBACTAM 3375 MG: 3; .375 INJECTION, POWDER, FOR SOLUTION INTRAVENOUS; PARENTERAL at 12:03

## 2023-01-01 RX ADMIN — POTASSIUM CHLORIDE 10 MEQ: 7.46 INJECTION, SOLUTION INTRAVENOUS at 22:21

## 2023-01-01 RX ADMIN — ATORVASTATIN CALCIUM 40 MG: 40 TABLET, FILM COATED ORAL at 11:00

## 2023-01-01 RX ADMIN — DEXAMETHASONE SODIUM PHOSPHATE 4 MG: 4 INJECTION, SOLUTION INTRA-ARTICULAR; INTRALESIONAL; INTRAMUSCULAR; INTRAVENOUS; SOFT TISSUE at 18:06

## 2023-01-01 RX ADMIN — POTASSIUM CHLORIDE 10 MEQ: 7.46 INJECTION, SOLUTION INTRAVENOUS at 00:05

## 2023-01-01 RX ADMIN — POTASSIUM CHLORIDE 10 MEQ: 7.46 INJECTION, SOLUTION INTRAVENOUS at 05:44

## 2023-01-01 RX ADMIN — DOXYCYCLINE 100 MG: 100 INJECTION, POWDER, LYOPHILIZED, FOR SOLUTION INTRAVENOUS at 18:06

## 2023-01-01 RX ADMIN — POTASSIUM BICARBONATE 40 MEQ: 782 TABLET, EFFERVESCENT ORAL at 21:22

## 2023-01-01 RX ADMIN — ENOXAPARIN SODIUM 30 MG: 100 INJECTION SUBCUTANEOUS at 08:52

## 2023-01-01 RX ADMIN — Medication 100 MCG/HR: at 15:05

## 2023-01-01 RX ADMIN — LANSOPRAZOLE 30 MG: 30 TABLET, ORALLY DISINTEGRATING ORAL at 08:26

## 2023-01-01 RX ADMIN — PIPERACILLIN AND TAZOBACTAM 3375 MG: 3; .375 INJECTION, POWDER, FOR SOLUTION INTRAVENOUS; PARENTERAL at 12:45

## 2023-01-01 RX ADMIN — HYDROMORPHONE HYDROCHLORIDE 4 MG: 2 INJECTION, SOLUTION INTRAMUSCULAR; INTRAVENOUS; SUBCUTANEOUS at 03:31

## 2023-01-01 RX ADMIN — LANSOPRAZOLE 30 MG: 30 TABLET, ORALLY DISINTEGRATING ORAL at 15:20

## 2023-01-01 RX ADMIN — LORAZEPAM 4 MG: 2 INJECTION INTRAMUSCULAR at 05:10

## 2023-01-01 RX ADMIN — TAMSULOSIN HYDROCHLORIDE 0.4 MG: 0.4 CAPSULE ORAL at 08:12

## 2023-01-01 RX ADMIN — PIPERACILLIN AND TAZOBACTAM 3375 MG: 3; .375 INJECTION, POWDER, FOR SOLUTION INTRAVENOUS; PARENTERAL at 12:44

## 2023-01-01 RX ADMIN — BUDESONIDE 500 MCG: 0.5 INHALANT ORAL at 08:14

## 2023-01-01 RX ADMIN — TOBRAMYCIN 300 MG: 300 SOLUTION RESPIRATORY (INHALATION) at 21:36

## 2023-01-01 RX ADMIN — TAMSULOSIN HYDROCHLORIDE 0.4 MG: 0.4 CAPSULE ORAL at 08:40

## 2023-01-01 RX ADMIN — GLYCOPYRROLATE 0.2 MG: 0.2 INJECTION INTRAMUSCULAR; INTRAVENOUS at 10:02

## 2023-01-01 RX ADMIN — PROPOFOL 50 MCG/KG/MIN: 10 INJECTION, EMULSION INTRAVENOUS at 22:15

## 2023-01-01 RX ADMIN — ACETAZOLAMIDE 500 MG: 250 TABLET ORAL at 19:26

## 2023-01-01 RX ADMIN — ENOXAPARIN SODIUM 30 MG: 100 INJECTION SUBCUTANEOUS at 21:06

## 2023-01-01 RX ADMIN — LORAZEPAM 2 MG: 2 INJECTION INTRAMUSCULAR at 15:02

## 2023-01-01 RX ADMIN — FUROSEMIDE 40 MG: 10 INJECTION, SOLUTION INTRAMUSCULAR; INTRAVENOUS at 08:10

## 2023-01-01 RX ADMIN — LORAZEPAM 1 MG: 2 INJECTION INTRAMUSCULAR; INTRAVENOUS at 17:38

## 2023-01-01 RX ADMIN — HYDROMORPHONE HYDROCHLORIDE 6 MG: 2 INJECTION, SOLUTION INTRAMUSCULAR; INTRAVENOUS; SUBCUTANEOUS at 07:48

## 2023-01-01 RX ADMIN — TOBRAMYCIN 300 MG: 300 SOLUTION RESPIRATORY (INHALATION) at 08:14

## 2023-01-01 RX ADMIN — LORAZEPAM 4 MG: 2 INJECTION INTRAMUSCULAR at 23:23

## 2023-01-01 RX ADMIN — PROPOFOL 50 MCG/KG/MIN: 10 INJECTION, EMULSION INTRAVENOUS at 00:46

## 2023-01-01 RX ADMIN — FLUOXETINE 40 MG: 20 CAPSULE ORAL at 09:19

## 2023-01-01 RX ADMIN — PROPOFOL 50 MCG/KG/MIN: 10 INJECTION, EMULSION INTRAVENOUS at 02:09

## 2023-01-01 RX ADMIN — SODIUM CHLORIDE, PRESERVATIVE FREE 10 ML: 5 INJECTION INTRAVENOUS at 06:57

## 2023-01-01 RX ADMIN — POTASSIUM & SODIUM PHOSPHATES POWDER PACK 280-160-250 MG 500 MG: 280-160-250 PACK at 17:33

## 2023-01-01 RX ADMIN — PROPOFOL 45 MCG/KG/MIN: 10 INJECTION, EMULSION INTRAVENOUS at 21:20

## 2023-01-01 RX ADMIN — SODIUM CHLORIDE, PRESERVATIVE FREE 10 ML: 5 INJECTION INTRAVENOUS at 20:11

## 2023-01-01 RX ADMIN — HYDROMORPHONE HYDROCHLORIDE 1 MG: 1 INJECTION, SOLUTION INTRAMUSCULAR; INTRAVENOUS; SUBCUTANEOUS at 15:02

## 2023-01-01 RX ADMIN — HYDROMORPHONE HYDROCHLORIDE 4 MG: 2 INJECTION, SOLUTION INTRAMUSCULAR; INTRAVENOUS; SUBCUTANEOUS at 17:02

## 2023-01-01 RX ADMIN — IPRATROPIUM BROMIDE AND ALBUTEROL SULFATE 1 DOSE: 2.5; .5 SOLUTION RESPIRATORY (INHALATION) at 15:07

## 2023-01-01 RX ADMIN — SODIUM CHLORIDE: 9 INJECTION, SOLUTION INTRAVENOUS at 19:25

## 2023-01-01 RX ADMIN — PROPOFOL 45 MCG/KG/MIN: 10 INJECTION, EMULSION INTRAVENOUS at 08:35

## 2023-01-01 RX ADMIN — LORAZEPAM 1 MG: 2 INJECTION INTRAMUSCULAR; INTRAVENOUS at 12:21

## 2023-01-01 RX ADMIN — IPRATROPIUM BROMIDE AND ALBUTEROL SULFATE 1 DOSE: 2.5; .5 SOLUTION RESPIRATORY (INHALATION) at 21:03

## 2023-01-01 RX ADMIN — DEXAMETHASONE SODIUM PHOSPHATE 4 MG: 4 INJECTION, SOLUTION INTRA-ARTICULAR; INTRALESIONAL; INTRAMUSCULAR; INTRAVENOUS; SOFT TISSUE at 18:02

## 2023-01-01 RX ADMIN — TAMSULOSIN HYDROCHLORIDE 0.4 MG: 0.4 CAPSULE ORAL at 09:19

## 2023-01-01 RX ADMIN — POTASSIUM CHLORIDE 10 MEQ: 7.46 INJECTION, SOLUTION INTRAVENOUS at 04:39

## 2023-01-01 RX ADMIN — ENOXAPARIN SODIUM 30 MG: 100 INJECTION SUBCUTANEOUS at 20:45

## 2023-01-01 RX ADMIN — LORAZEPAM 4 MG: 2 INJECTION INTRAMUSCULAR at 02:04

## 2023-01-01 RX ADMIN — IPRATROPIUM BROMIDE AND ALBUTEROL SULFATE 1 DOSE: 2.5; .5 SOLUTION RESPIRATORY (INHALATION) at 19:43

## 2023-01-01 RX ADMIN — POTASSIUM & SODIUM PHOSPHATES POWDER PACK 280-160-250 MG 500 MG: 280-160-250 PACK at 05:24

## 2023-01-01 RX ADMIN — SODIUM CHLORIDE, PRESERVATIVE FREE 10 ML: 5 INJECTION INTRAVENOUS at 22:34

## 2023-01-01 RX ADMIN — LORAZEPAM 1 MG: 2 INJECTION INTRAMUSCULAR; INTRAVENOUS at 13:52

## 2023-01-01 RX ADMIN — ENOXAPARIN SODIUM 30 MG: 100 INJECTION SUBCUTANEOUS at 20:55

## 2023-01-01 RX ADMIN — ENOXAPARIN SODIUM 30 MG: 100 INJECTION SUBCUTANEOUS at 08:39

## 2023-01-01 RX ADMIN — LORAZEPAM 4 MG: 2 INJECTION INTRAMUSCULAR at 19:33

## 2023-01-01 RX ADMIN — TAMSULOSIN HYDROCHLORIDE 0.4 MG: 0.4 CAPSULE ORAL at 09:13

## 2023-01-01 RX ADMIN — HYDROMORPHONE HYDROCHLORIDE 1 MG: 1 INJECTION, SOLUTION INTRAMUSCULAR; INTRAVENOUS; SUBCUTANEOUS at 14:29

## 2023-01-01 RX ADMIN — TAMSULOSIN HYDROCHLORIDE 0.4 MG: 0.4 CAPSULE ORAL at 08:10

## 2023-01-01 RX ADMIN — FUROSEMIDE 40 MG: 10 INJECTION, SOLUTION INTRAMUSCULAR; INTRAVENOUS at 08:12

## 2023-01-01 RX ADMIN — GLYCOPYRROLATE 0.2 MG: 0.2 INJECTION INTRAMUSCULAR; INTRAVENOUS at 02:04

## 2023-01-01 RX ADMIN — ACETAMINOPHEN 650 MG: 650 SOLUTION ORAL at 20:42

## 2023-01-01 RX ADMIN — BUDESONIDE 500 MCG: 0.5 INHALANT ORAL at 06:30

## 2023-01-01 RX ADMIN — Medication 125 MCG/HR: at 19:13

## 2023-01-01 RX ADMIN — BUDESONIDE 500 MCG: 0.5 INHALANT ORAL at 06:37

## 2023-01-01 RX ADMIN — POTASSIUM CHLORIDE 10 MEQ: 7.46 INJECTION, SOLUTION INTRAVENOUS at 22:00

## 2023-01-01 RX ADMIN — Medication 100 MCG/HR: at 12:46

## 2023-01-01 RX ADMIN — DEXAMETHASONE SODIUM PHOSPHATE 4 MG: 4 INJECTION, SOLUTION INTRA-ARTICULAR; INTRALESIONAL; INTRAMUSCULAR; INTRAVENOUS; SOFT TISSUE at 12:45

## 2023-01-01 RX ADMIN — IPRATROPIUM BROMIDE AND ALBUTEROL SULFATE 1 DOSE: 2.5; .5 SOLUTION RESPIRATORY (INHALATION) at 05:27

## 2023-01-01 RX ADMIN — POTASSIUM CHLORIDE 10 MEQ: 7.46 INJECTION, SOLUTION INTRAVENOUS at 15:55

## 2023-01-01 RX ADMIN — PROPOFOL 50 MCG/KG/MIN: 10 INJECTION, EMULSION INTRAVENOUS at 09:13

## 2023-01-01 RX ADMIN — PROPOFOL 50 MCG/KG/MIN: 10 INJECTION, EMULSION INTRAVENOUS at 19:25

## 2023-01-01 RX ADMIN — ENOXAPARIN SODIUM 30 MG: 100 INJECTION SUBCUTANEOUS at 08:43

## 2023-01-01 RX ADMIN — HYDROMORPHONE HYDROCHLORIDE 2 MG: 2 INJECTION, SOLUTION INTRAMUSCULAR; INTRAVENOUS; SUBCUTANEOUS at 15:22

## 2023-01-01 RX ADMIN — DEXAMETHASONE SODIUM PHOSPHATE 4 MG: 4 INJECTION, SOLUTION INTRA-ARTICULAR; INTRALESIONAL; INTRAMUSCULAR; INTRAVENOUS; SOFT TISSUE at 01:18

## 2023-01-01 RX ADMIN — Medication 75 MCG/HR: at 09:38

## 2023-01-01 RX ADMIN — LORAZEPAM 1 MG: 2 INJECTION INTRAMUSCULAR; INTRAVENOUS at 08:36

## 2023-01-01 RX ADMIN — FUROSEMIDE 40 MG: 10 INJECTION, SOLUTION INTRAMUSCULAR; INTRAVENOUS at 08:52

## 2023-01-01 RX ADMIN — SODIUM CHLORIDE, PRESERVATIVE FREE 10 ML: 5 INJECTION INTRAVENOUS at 08:26

## 2023-01-01 RX ADMIN — TOBRAMYCIN 300 MG: 300 SOLUTION RESPIRATORY (INHALATION) at 09:49

## 2023-01-01 RX ADMIN — PROPOFOL 50 MCG/KG/MIN: 10 INJECTION, EMULSION INTRAVENOUS at 15:31

## 2023-01-01 RX ADMIN — SODIUM CHLORIDE, PRESERVATIVE FREE 10 ML: 5 INJECTION INTRAVENOUS at 08:10

## 2023-01-01 RX ADMIN — METOPROLOL SUCCINATE 50 MG: 50 TABLET, EXTENDED RELEASE ORAL at 08:40

## 2023-01-01 RX ADMIN — IPRATROPIUM BROMIDE AND ALBUTEROL SULFATE 1 DOSE: 2.5; .5 SOLUTION RESPIRATORY (INHALATION) at 20:27

## 2023-01-01 RX ADMIN — LORAZEPAM 4 MG: 2 INJECTION INTRAMUSCULAR at 21:37

## 2023-01-01 RX ADMIN — DEXAMETHASONE SODIUM PHOSPHATE 4 MG: 4 INJECTION, SOLUTION INTRA-ARTICULAR; INTRALESIONAL; INTRAMUSCULAR; INTRAVENOUS; SOFT TISSUE at 06:56

## 2023-01-01 RX ADMIN — PIPERACILLIN AND TAZOBACTAM 3375 MG: 3; .375 INJECTION, POWDER, FOR SOLUTION INTRAVENOUS; PARENTERAL at 04:00

## 2023-01-01 RX ADMIN — PROPOFOL 45 MCG/KG/MIN: 10 INJECTION, EMULSION INTRAVENOUS at 05:08

## 2023-01-01 RX ADMIN — HYDROMORPHONE HYDROCHLORIDE 4 MG: 2 INJECTION, SOLUTION INTRAMUSCULAR; INTRAVENOUS; SUBCUTANEOUS at 05:10

## 2023-01-01 RX ADMIN — IPRATROPIUM BROMIDE AND ALBUTEROL SULFATE 1 DOSE: 2.5; .5 SOLUTION RESPIRATORY (INHALATION) at 08:22

## 2023-01-01 RX ADMIN — PROPOFOL 50 MCG/KG/MIN: 10 INJECTION, EMULSION INTRAVENOUS at 18:18

## 2023-01-01 RX ADMIN — MORPHINE SULFATE 4 MG: 4 INJECTION, SOLUTION INTRAMUSCULAR; INTRAVENOUS at 17:38

## 2023-01-01 RX ADMIN — DEXAMETHASONE SODIUM PHOSPHATE 4 MG: 4 INJECTION, SOLUTION INTRA-ARTICULAR; INTRALESIONAL; INTRAMUSCULAR; INTRAVENOUS; SOFT TISSUE at 11:23

## 2023-01-01 RX ADMIN — IPRATROPIUM BROMIDE AND ALBUTEROL SULFATE 1 DOSE: 2.5; .5 SOLUTION RESPIRATORY (INHALATION) at 13:38

## 2023-01-01 RX ADMIN — BARICITINIB 4 MG: 2 TABLET, FILM COATED ORAL at 08:45

## 2023-01-01 RX ADMIN — POTASSIUM BICARBONATE 40 MEQ: 782 TABLET, EFFERVESCENT ORAL at 11:23

## 2023-01-01 ASSESSMENT — PULMONARY FUNCTION TESTS
PIF_VALUE: 19
PIF_VALUE: 21
PIF_VALUE: 22
PIF_VALUE: 19
PIF_VALUE: 18
PIF_VALUE: 21
PIF_VALUE: 24
PIF_VALUE: 20
PIF_VALUE: 19
PIF_VALUE: 18
PIF_VALUE: 19
PIF_VALUE: 20
PIF_VALUE: 19
PIF_VALUE: 22
PIF_VALUE: 19
PIF_VALUE: 21
PIF_VALUE: 22
PIF_VALUE: 21
PIF_VALUE: 19
PIF_VALUE: 20
PIF_VALUE: 19
PIF_VALUE: 18
PIF_VALUE: 19
PIF_VALUE: 20
PIF_VALUE: 19
PIF_VALUE: 16
PIF_VALUE: 26
PIF_VALUE: 19
PIF_VALUE: 19
PIF_VALUE: 22
PIF_VALUE: 20
PIF_VALUE: 19
PIF_VALUE: 24
PIF_VALUE: 19
PIF_VALUE: 17
PIF_VALUE: 18
PIF_VALUE: 19
PIF_VALUE: 21
PIF_VALUE: 18
PIF_VALUE: 20
PIF_VALUE: 21
PIF_VALUE: 19
PIF_VALUE: 19
PIF_VALUE: 24
PIF_VALUE: 22
PIF_VALUE: 21
PIF_VALUE: 25
PIF_VALUE: 15
PIF_VALUE: 19
PIF_VALUE: 21
PIF_VALUE: 21
PIF_VALUE: 19
PIF_VALUE: 22
PIF_VALUE: 19
PIF_VALUE: 21
PIF_VALUE: 19
PIF_VALUE: 18
PIF_VALUE: 19
PIF_VALUE: 20
PIF_VALUE: 19
PIF_VALUE: 20
PIF_VALUE: 21
PIF_VALUE: 19
PIF_VALUE: 20
PIF_VALUE: 19
PIF_VALUE: 18
PIF_VALUE: 19
PIF_VALUE: 19
PIF_VALUE: 20
PIF_VALUE: 19
PIF_VALUE: 18
PIF_VALUE: 20
PIF_VALUE: 19
PIF_VALUE: 17
PIF_VALUE: 18
PIF_VALUE: 19
PIF_VALUE: 20
PIF_VALUE: 19
PIF_VALUE: 23
PIF_VALUE: 21
PIF_VALUE: 20
PIF_VALUE: 19
PIF_VALUE: 20
PIF_VALUE: 19
PIF_VALUE: 20
PIF_VALUE: 19
PIF_VALUE: 19
PIF_VALUE: 18
PIF_VALUE: 19
PIF_VALUE: 20
PIF_VALUE: 19
PIF_VALUE: 18
PIF_VALUE: 20
PIF_VALUE: 19
PIF_VALUE: 19
PIF_VALUE: 20
PIF_VALUE: 20
PIF_VALUE: 19
PIF_VALUE: 20
PIF_VALUE: 20
PIF_VALUE: 19
PIF_VALUE: 20
PIF_VALUE: 19
PIF_VALUE: 18
PIF_VALUE: 21
PIF_VALUE: 19
PIF_VALUE: 18
PIF_VALUE: 20
PIF_VALUE: 21
PIF_VALUE: 19
PIF_VALUE: 20
PIF_VALUE: 19
PIF_VALUE: 17
PIF_VALUE: 19
PIF_VALUE: 18
PIF_VALUE: 19
PIF_VALUE: 26
PIF_VALUE: 19
PIF_VALUE: 19
PIF_VALUE: 17
PIF_VALUE: 19
PIF_VALUE: 20
PIF_VALUE: 19
PIF_VALUE: 21
PIF_VALUE: 19
PIF_VALUE: 18
PIF_VALUE: 19
PIF_VALUE: 21
PIF_VALUE: 20
PIF_VALUE: 19
PIF_VALUE: 19
PIF_VALUE: 20
PIF_VALUE: 19
PIF_VALUE: 18
PIF_VALUE: 19

## 2023-01-01 ASSESSMENT — PAIN SCALES - GENERAL
PAINLEVEL_OUTOF10: 0
PAINLEVEL_OUTOF10: 6
PAINLEVEL_OUTOF10: 6
PAINLEVEL_OUTOF10: 2
PAINLEVEL_OUTOF10: 0
PAINLEVEL_OUTOF10: 6
PAINLEVEL_OUTOF10: 0
PAINLEVEL_OUTOF10: 1

## 2023-01-01 ASSESSMENT — PAIN SCALES - WONG BAKER: WONGBAKER_NUMERICALRESPONSE: 0

## 2023-01-01 ASSESSMENT — PAIN DESCRIPTION - LOCATION: LOCATION: CHEST

## 2023-01-01 ASSESSMENT — PAIN DESCRIPTION - DESCRIPTORS: DESCRIPTORS: ACHING;CRUSHING

## 2023-01-01 ASSESSMENT — PAIN DESCRIPTION - ORIENTATION: ORIENTATION: ANTERIOR

## 2023-11-18 PROBLEM — J96.02 ACUTE RESPIRATORY FAILURE WITH HYPOXIA AND HYPERCAPNIA (HCC): Status: ACTIVE | Noted: 2023-01-01

## 2023-11-18 PROBLEM — J96.01 ACUTE RESPIRATORY FAILURE WITH HYPOXIA AND HYPERCAPNIA (HCC): Status: ACTIVE | Noted: 2023-01-01

## 2023-11-18 NOTE — H&P
History & Physical    Primary Care Provider: Ligia Basilio MD  Source of Information: Patient/family     Chief complaint:   Chief Complaint   Patient presents with    Respiratory Distress        History of Presenting Illness:   Shonda Maher is a 80 y.o. male with advanced end stage COPD follows with Dr Roge Pollock. The patient has been on hospice for the past one year, managed with oxygen, morphine, oxycodone, lorazepam.    11/18/23 presents to hospital with increased shortness of breath, altered mentation, development over the past few days. Wife was advised by hospice to increase dose of morphine to help work of breathing which she did but without improvement in his condition. Reportedly tested positive for Covid in the outpatient setting. Patient and wife decided to cancel the hospice plan and present to hospital. During the ED course found in acute on chronic hypoxic hypercapnic respiratory failure. Wife preferred intubation if needed. During the ED course received naloxone with improvement in his mentation and respiratory distress. Managed with high flow oxygen. I have been asked to admit to hospital for further stabilization and management of his condition. I discussed with the wife by telephone who expresses preference for full hospital management, full code, intubation ventilation as needed. Review of Systems:  A comprehensive review of systems was negative except for that written in the History of Present Illness.      Past Medical History:   Diagnosis Date    CAD (coronary artery disease)     Chronic anticoagulation     Chronic indwelling Montague catheter     Chronic obstructive pulmonary disease (HCC)     GERD (gastroesophageal reflux disease)     Hx of deep venous thrombosis     Hyperlipidemia     Hypertension     Syncope     started in 2018    Thromboembolus [facility])     DVT/PE's    Urine retention     Vocal cord dysfunction         Past Surgical History:   Procedure Laterality Date    CORONARY

## 2023-11-18 NOTE — ED PROVIDER NOTES
[facility] EMERGENCY DEPT  EMERGENCY DEPARTMENT HISTORY AND PHYSICAL EXAM      Date: 11/18/2023  Patient Name: Chance Watts  MRN: [id number]  [address] [facility], [place], operated [facility] 1955  Date of evaluation: 11/18/2023  Provider: Ruiz Ryan MD   Note Started: 3:12 PM EST 11/18/23    HISTORY OF PRESENT ILLNESS     Chief Complaint   Patient presents with    Respiratory Distress       History Provided By: Patient    HPI: Chance Watts is a 80 y.o. male with PMH of CAD, COPD, GERD, HLD, HTN, and VTE who comes to the ED with respiratory distress. Patient was recently diagnosed with COVID. He comes to the ED due to altered mental status and worsening shortness of breath. Patient is somnolent but easily arousable and is denying pain. Reporting shortness of breath and no other symptoms.     PAST MEDICAL HISTORY   Past Medical History:  Past Medical History:   Diagnosis Date    CAD (coronary artery disease)     Chronic anticoagulation     Chronic indwelling Montague catheter     Chronic obstructive pulmonary disease (HCC)     GERD (gastroesophageal reflux disease)     Hx of deep venous thrombosis     Hyperlipidemia     Hypertension     Syncope     started in 2018    Thromboembolus [facility])     DVT/PE's    Urine retention     Vocal cord dysfunction        Past Surgical History:  Past Surgical History:   Procedure Laterality Date    CORONARY ANGIOPLASTY WITH STENT PLACEMENT      HEENT      right lazy eye corrective sx    IR IVC FILTER PLACEMENT W IMAGING  3/10/2022    IR IVC FILTER PLACEMENT W IMAGING 3/10/2022 [facility] RAD ANGIO IR    IR IVC FILTER PLACEMENT W IMAGING  3/10/2022    IR IVC FILTER PLACEMENT W IMAGING      placed 3/10/22    ORTHOPEDIC SURGERY      bone spurs removed 1996    MS ABDOMEN SURGERY PROC UNLISTED      inguinal hernia 1984    MS ABDOMEN SURGERY PROC UNLISTED      bilateral inguinal hernia sx with mesh 1987    MS CARDIAC SURG PROCEDURE UNLIST      stent 1996, 2018    VASCULAR SURGERY         Family History:  No family history on

## 2023-11-18 NOTE — ED TRIAGE NOTES
Pt presents from home by EMS that was called by his hospice provider. Pt is on hospice, is full code. Received lorazepam and morphine around 1400, hospice provider wanted pt to ER d/t bradypnea and hypoxia.  Pt is covid+

## 2023-11-18 NOTE — ED NOTES
Spoke with wife, wife does want life saving measures completed, and pt to be intubated if needed. Pt is a patient of Dr. Roselinda Closs and pt of at home hospice. Wife states that the medications administered, Ativan and Morphine, are patients normal at home regimen.       Av Chung RN  11/18/23 8427

## 2023-11-19 NOTE — ED NOTES
Dr. Niranjan Palmer intubating patient at this time. 2010 Positive color change. 2011 Bilateral breath sounds. 7.5 ETT placed 24 @ teeth, capnography confirmed.      Aleshia Hernandez RN  11/18/23 2011

## 2023-11-19 NOTE — CARE COORDINATION
11/19/23 1630   Service Assessment   Patient Orientation Unable to Assess   Cognition Other (see comment)  (unable to assist with DC planning)   History Provided By Significant Other   Primary Caregiver Spouse  ([address])   Support Systems Spouse/Significant Other   PCP Verified by CM Yes   Last Visit to PCP Within last 3 months   Current Functional Level Assistance with the following:;Bathing;Dressing; Toileting;Cooking;Housework; Shopping; Other (see comment)  (walker is available, as needed.)   Can patient return to prior living arrangement Yes   Ability to make needs known: Fair   Family able to assist with home care needs: Yes   Would you like for me to discuss the discharge plan with any other family members/significant others, and if so, who? Yes  (Fredy Severe)   [address]     The pt is current with At [facility]. He is on [email or web address] continuous.

## 2023-11-19 NOTE — ED NOTES
TRANSFER - OUT REPORT:    Verbal report given to Neema Bah on Renetta Nolasco  being transferred to 200 ICU for routine progression of patient care       Report consisted of patient's Situation, Background, Assessment and   Recommendations(SBAR). Information from the following report(s) Nurse Handoff Report, ED Encounter Summary, ED SBAR, [facility], and Recent Results was reviewed with the receiving nurse. [place] Fall Assessment:                           Lines:   Peripheral IV 11/18/23 Distal;Right Forearm (Active)       Peripheral IV 11/18/23 Left Antecubital (Active)       Peripheral IV 11/18/23 Right Antecubital (Active)        Opportunity for questions and clarification was provided.       Patient transported with:  Monitor, RN, Tech, RT, High Flow nasal cannula            Rhona Landaverde RN  11/18/23 6095

## 2023-11-22 NOTE — CARE COORDINATION
Remains vented and sedated. IDR goals of care w/the family. Patient was on home hospice prior to inpatient admission via [facility].          IAM Rivas

## 2023-11-27 PROBLEM — R45.1 RESTLESSNESS: Status: ACTIVE | Noted: 2023-01-01

## 2023-11-27 PROBLEM — U07.1 COVID-19 VIRUS INFECTION: Status: ACTIVE | Noted: 2023-01-01

## 2023-11-27 PROBLEM — R06.03 ACUTE RESPIRATORY DISTRESS: Status: ACTIVE | Noted: 2023-01-01

## 2023-11-27 PROBLEM — Z51.5 HOSPICE CARE: Status: ACTIVE | Noted: 2023-01-01

## 2023-11-27 PROBLEM — J44.9 END STAGE COPD (HCC): Status: ACTIVE | Noted: 2023-01-01

## 2023-11-27 NOTE — PLAN OF CARE
Problem: Safety - Adult  Goal: Free from fall injury  Recent Flowsheet Documentation  Taken 11/27/2023 1622 by Emil Bobby RN  Free From Fall Injury: Instruct family/caregiver on patient safety     Problem: Dyspnea Due to End of Life  Goal: Demonstrate understanding of and ability to manage respiratory symptoms at end of life  Description: Patient  and or family/caregiver will verbalize recall of breathing strategies to maintain an effective breathing pattern during the inpatient hospice stay. Outcome: [facility] 114 Progressing     Problem: Pain  Goal: Control of acute pain  Description: Patient  3 will exhibit a decrease in pain as evidenced by a pain rating less than  on the Adult Nonverbal Pain scale within 1 hour of receiving pain medication during the inpatient hospice stay.     Outcome: [facility] 114 Progressing

## 2023-11-27 NOTE — DISCHARGE SUMMARY
Physician Discharge Summary     Patient ID:    Virginia Campo  [id number]  80 y.o.  1955    Admit date: 11/18/2023    Discharge date : 11/27/2023      Final Diagnoses:   Chronic obstructive pulmonary disease with acute exacerbation (HCC) [J44.1]  Acute respiratory failure with hypoxia and hypercapnia (HCC) [J96.01, J96.02]  Pneumonia due to COVID-19 virus [U07.1, J12.82]      Reason for Hospitalization:  61-year-old male with end-stage COPD, had been on hospice for the past year and managed at home with oxygen, morphine, oxycodone and lorazepam.  Patient presented to the ED on 11/18 with increased shortness of breath, altered mental status. Hospice advised increasing the dose of morphine to help with his shortness of breath but this did not help. Patient had recently tested positive for COVID in the outpatient setting. Patient did receive Narcan in the ED but required intubation for acute on chronic hypoxic and hypercapnic respiratory failure. Chest x-ray showed bilateral interstitial changes felt to be CHF      Hospital Course:   Patient was admitted to the ICU, continued on mechanical ventilation. He was started on baricitinib and steroids. Diuretics were started. His wife requested any and all aggressive treatment, full code     Family meeting held on 11/26. All family was in agreement to transition over to terminal extubation and comfort care     Patient was extubated, comfort care order set placed. He was transferred to a private room    Patient seen by hospice appropriate for transfer to the [facility] house      Discharge Medications: Follow up Care: Follow-up Information    None          Diet: N.p.o.     Disposition:  Hospice in facility    Advanced Directive:   FULL    DNR      Discharge Exam:  General:  Somnolent   Lungs:   Rhonchi bilateral   Chest wall:  No tenderness or deformity.    Heart:  Regular rate and rhythm, S1, S2 normal, no murmur, click, rub

## 2023-11-27 NOTE — H&P
[address] [place] [address] [facility]  [phone]    Also face-to-face note if needed    Patient Name: Sanjana Zapata  YOB: 1955    Date of Provider Hospice Visit: 11/27/23    Level of Care:   [x] General Inpatient (GIP)    [] Routine   [] Respite    Current Location of Care:  [] [facility] [] [facility] [] [facility] [] [facility] [x] Hospice House [facility]    IF [facility], patient referred from:  [] [facility] [] [facility] [] [facility] [] [facility] [] Home [x] Other: [address]    Date of Original Hospice Admission: 11/27/2023  Hospice Medical Director at time of admission: [address] Diagnosis: End-stage COPD  Diagnoses RELATED to the terminal prognosis: Acute on chronic respite failure, COVID-19 pneumonia-tested originally positive on 11/13, encephalopathy  Other Diagnoses:      Venus Tanner is a 80y.o. year old who was admitted to [facility]. Patient is a 81-year-old with a history of end-stage COPD who had been on hospice with at home care and presents to the hospital on 11/18 with respiratory distress/respiratory failure. He had tested positive for COVID on 11/13 and had significant decline in the home. He was intubated at the time of admission and treated aggressively with antibiotics as well as antivirals to help with COVID. Continued to show evidence of decline and unable to wean from the ventilator successfully. He was compassionately extubated on 11/26 and the hospice team was called to evaluate. Patient showed evidence of respiratory distress, minimal responsiveness, secretions, including appropriate for GIP level care as he is not tolerating oral/sublingual medication. Patient transferred to [facility] for inpatient level care. Patient is 14 days from original positive COVID test.  He was retested on 11/18 at the time of admission to the hospital and of course was still positive.     The patient's principle diagnosis has resulted in some breath,

## 2023-11-27 NOTE — CARE COORDINATION
36: CM reviewed chart and spoke with primary physician. Patient is a candidate for [facility] per physician. Hospice order has been placed. CM sent referral and called Hospice RN, Shorty Gil. CM left message for Shorty Gil to call back. CM will continue to follow. 1106: Patient is being admitted under [facility].

## 2023-11-27 NOTE — PLAN OF CARE
Problem: Discharge Planning  Goal: Discharge to home or other facility with appropriate resources  Outcome: Progressing     Problem: Pain  Goal: Verbalizes/displays adequate comfort level or baseline comfort level  Outcome: Progressing     Problem: Skin/Tissue Integrity  Goal: Absence of new skin breakdown  Description: 1. Monitor for areas of redness and/or skin breakdown  2. Assess vascular access sites hourly  3. Every 4-6 hours minimum:  Change oxygen saturation probe site  4. Every 4-6 hours:  If on nasal continuous positive airway pressure, respiratory therapy assess nares and determine need for appliance change or resting period. Outcome: Progressing     Problem: Safety - Adult  Goal: Free from fall injury  Outcome: Progressing     Problem: ABCDS Injury Assessment  Goal: Absence of physical injury  Outcome: Progressing     Problem: Infection - Adult  Goal: Absence of infection at discharge  Outcome: Progressing  Goal: Absence of infection during hospitalization  Outcome: Progressing  Goal: Absence of fever/infection during anticipated neutropenic period  Outcome: Progressing     Problem: Anxiety  Goal: Will report anxiety at manageable levels  Description: INTERVENTIONS:  1. Administer medication as ordered  2. Teach and rehearse alternative coping skills  3. Provide emotional support with 1:1 interaction with staff  Outcome: Progressing     Problem: Coping  Goal: Pt/Family able to verbalize concerns and demonstrate effective coping strategies  Description: INTERVENTIONS:  1. Assist patient/family to identify coping skills, available support systems and cultural and spiritual values  2. Provide emotional support, including active listening and acknowledgement of concerns of patient and caregivers  3. Reduce environmental stimuli, as able  4. Instruct patient/family in relaxation techniques, as appropriate  5.  Assess for spiritual pain/suffering and initiate Spiritual Care, Psychosocial Clinical
Problem: Discharge Planning  Goal: Discharge to home or other facility with appropriate resources  Outcome: Progressing     Problem: Pain  Goal: Verbalizes/displays adequate comfort level or baseline comfort level  Outcome: Progressing     Problem: Skin/Tissue Integrity  Goal: Absence of new skin breakdown  Description: 1. Monitor for areas of redness and/or skin breakdown  2. Assess vascular access sites hourly  3. Every 4-6 hours minimum:  Change oxygen saturation probe site  4. Every 4-6 hours:  If on nasal continuous positive airway pressure, respiratory therapy assess nares and determine need for appliance change or resting period. Outcome: Progressing     Problem: Safety - Adult  Goal: Free from fall injury  Outcome: Progressing     Problem: Anxiety  Goal: Will report anxiety at manageable levels  Description: INTERVENTIONS:  1. Administer medication as ordered  2. Teach and rehearse alternative coping skills  3. Provide emotional support with 1:1 interaction with staff  Outcome: Progressing     Problem: Coping  Goal: Pt/Family able to verbalize concerns and demonstrate effective coping strategies  Description: INTERVENTIONS:  1. Assist patient/family to identify coping skills, available support systems and cultural and spiritual values  2. Provide emotional support, including active listening and acknowledgement of concerns of patient and caregivers  3. Reduce environmental stimuli, as able  4. Instruct patient/family in relaxation techniques, as appropriate  5.  Assess for spiritual pain/suffering and initiate Spiritual Care, Psychosocial Clinical Specialist consults as needed  Outcome: Progressing
Problem: Skin/Tissue Integrity  Goal: Absence of new skin breakdown  Description: 1. Monitor for areas of redness and/or skin breakdown  2. Assess vascular access sites hourly  3. Every 4-6 hours minimum:  Change oxygen saturation probe site  4. Every 4-6 hours:  If on nasal continuous positive airway pressure, respiratory therapy assess nares and determine need for appliance change or resting period.   Outcome: Progressing     Problem: Safety - Adult  Goal: Free from fall injury  Outcome: Progressing     Problem: ABCDS Injury Assessment  Goal: Absence of physical injury  Outcome: Progressing
Specialist consults as needed  Outcome: Progressing     Problem: Neurosensory - Adult  Goal: Achieves stable or improved neurological status  Outcome: Progressing  Goal: Achieves maximal functionality and self care  Outcome: Progressing     Problem: Respiratory - Adult  Goal: Achieves optimal ventilation and oxygenation  Outcome: Progressing     Problem: Musculoskeletal - Adult  Goal: Return mobility to safest level of function  Outcome: Progressing  Flowsheets (Taken 11/22/2023 2000)  Return Mobility to Safest Level of Function: Assess patient stability and activity tolerance for standing, transferring and ambulating with or without assistive devices  Goal: Return ADL status to a safe level of function  Outcome: Progressing     Problem: Genitourinary - Adult  Goal: Absence of urinary retention  Outcome: Progressing     Problem: Safety - Medical Restraint  Goal: Remains free of injury from restraints (Restraint for Interference with Medical Device)  Description: INTERVENTIONS:  1. Determine that other, less restrictive measures have been tried or would not be effective before applying the restraint  2. Evaluate the patient's condition at the time of restraint application  3. Inform patient/family regarding the reason for restraint  4.  Q2H: Monitor safety, psychosocial status, comfort, nutrition and hydration  Outcome: Progressing  Flowsheets (Taken 11/22/2023 0800 by Isak Hickey RN)  Remains free of injury from restraints (restraint for interference with medical device):   Determine that other, less restrictive measures have been tried or would not be effective before applying the restraint   Evaluate the patient's condition at the time of restraint application   Every 2 hours: Monitor safety, psychosocial status, comfort, nutrition and hydration     Problem: Skin/Tissue Integrity - Adult  Goal: Skin integrity remains intact  Outcome: Progressing     Problem: Gastrointestinal - Adult  Goal: Minimal or
Electrolytes maintained within normal limits  Recent Flowsheet Documentation  Taken 11/24/2023 2000 by Debbie Lagos RN  Electrolytes maintained within normal limits:   Monitor labs and assess patient for signs and symptoms of electrolyte imbalances   Administer electrolyte replacement as ordered   Monitor response to electrolyte replacements, including repeat lab results as appropriate

## 2023-11-28 NOTE — PLAN OF CARE
Problem: Pain  Goal: Verbalizes/displays adequate comfort level or baseline comfort level  11/28/2023 0826 by Tommy Brownlee RN  Outcome: Progressing  11/27/2023 2138 by Dori Elizabeth RN  Outcome: Progressing     Problem: Safety - Adult  Goal: Free from fall injury  11/28/2023 0826 by Tommy Brownlee RN  Outcome: Progressing  11/27/2023 2138 by Dori Elizabeth RN  Outcome: Progressing  Flowsheets (Taken 11/27/2023 1622 by Sheila Solano RN)  Free From Fall Injury: Instruct family/caregiver on patient safety

## 2023-11-28 NOTE — PLAN OF CARE
Problem: Safety - Medical Restraint  Goal: Remains free of injury from restraints (Restraint for Interference with Medical Device)  Description: INTERVENTIONS:  1. Determine that other, less restrictive measures have been tried or would not be effective before applying the restraint  2. Evaluate the patient's condition at the time of restraint application  3. Inform patient/family regarding the reason for restraint  4. Q2H: Monitor safety, psychosocial status, comfort, nutrition and hydration  Outcome: Progressing     Problem: Pain  Goal: Verbalizes/displays adequate comfort level or baseline comfort level  Outcome: Progressing     Problem: Skin/Tissue Integrity  Goal: Absence of new skin breakdown  Description: 1. Monitor for areas of redness and/or skin breakdown  2. Assess vascular access sites hourly  3. Every 4-6 hours minimum:  Change oxygen saturation probe site  4. Every 4-6 hours:  If on nasal continuous positive airway pressure, respiratory therapy assess nares and determine need for appliance change or resting period. Outcome: Progressing     Problem: Safety - Adult  Goal: Free from fall injury  Outcome: Progressing  Flowsheets (Taken 11/27/2023 1622 by Crystal Braden RN)  Free From Fall Injury: Instruct family/caregiver on patient safety     Problem: Dyspnea Due to End of Life  Goal: Demonstrate understanding of and ability to manage respiratory symptoms at end of life  Description: Patient  and or family/caregiver will verbalize recall of breathing strategies to maintain an effective breathing pattern during the inpatient hospice stay.         11/27/2023 2138 by Minesh Haider RN  Outcome: Progressing  11/27/2023 1720 by Crystal Braden RN  Outcome: [address] [facility] 114 Progressing     Problem: Hospice Orientation  Goal: Demonstrate understanding of hospice philosophy, plan of care, and inpatient hospice program  Description: The patient/family/caregiver will demonstrate understanding of hospice

## 2023-11-28 NOTE — HOSPICE
Initial spiritual care visit the with the patient, daughter and spouse. The patient did not respond and the spouse was sleeping . Daughter completed spiritual assessment. Family identifies as Scientologist.  No immediate spiritual anxiety noted  Daughter shared grief and coping. Offered grief support nad end-of-life discussions. She would like the  to continue to visit for end-of-life support.

## 2023-11-29 NOTE — PLAN OF CARE
Problem: Pain  Goal: Verbalizes/displays adequate comfort level or baseline comfort level  11/29/2023 0836 by Dasia Berumen RN  Outcome: Progressing  11/28/2023 2315 by Megan Abernathy RN  Outcome: Progressing     Problem: Skin/Tissue Integrity  Goal: Absence of new skin breakdown  Description: 1. Monitor for areas of redness and/or skin breakdown  2. Assess vascular access sites hourly  3. Every 4-6 hours minimum:  Change oxygen saturation probe site  4. Every 4-6 hours:  If on nasal continuous positive airway pressure, respiratory therapy assess nares and determine need for appliance change or resting period.   11/29/2023 0836 by Dasia Berumen RN  Outcome: Progressing  11/28/2023 2315 by Megan Abernathy RN  Outcome: Progressing

## 2023-11-29 NOTE — PLAN OF CARE
Problem: Pain  Goal: Verbalizes/displays adequate comfort level or baseline comfort level  Outcome: Progressing     Problem: Skin/Tissue Integrity  Goal: Absence of new skin breakdown  Description: 1. Monitor for areas of redness and/or skin breakdown  2. Assess vascular access sites hourly  3. Every 4-6 hours minimum:  Change oxygen saturation probe site  4. Every 4-6 hours:  If on nasal continuous positive airway pressure, respiratory therapy assess nares and determine need for appliance change or resting period. Outcome: Progressing     Problem: Safety - Adult  Goal: Free from fall injury  Outcome: Progressing     Problem: Dyspnea Due to End of Life  Goal: Demonstrate understanding of and ability to manage respiratory symptoms at end of life  Description: Patient  and or family/caregiver will verbalize recall of breathing strategies to maintain an effective breathing pattern during the inpatient hospice stay. Outcome: Progressing     Problem: Risk for Falls  Goal: Fall prevention  Description: Patient  will remain free from falls as evidenced by no witnessed or reported falls each shift during the inpatient hospice stay. Patient  and or family/caregiver will receive education on fall prevention as evidenced by verbalizing recall of using the call lights system, fall prevention devices, and asking for help during the admission process and ongoing as needed during the inpatient hospice stay. Outcome: Progressing     Problem: Pain  Goal: Control of acute pain  Description: Patient  will exhibit a decrease in pain as evidenced by a pain rating less than 3 on the Adult Nonverbal Pain scale within 1 hour of receiving pain medication during the inpatient hospice stay.     Outcome: Progressing     Problem: Anxiety/Agitation/Restlessness  Goal: Verbalize or staff assess the ability to manage anxiety  Description: Patient  will demonstrate appropriate behavior as evidenced by less than two episodes of

## 2023-11-30 NOTE — PLAN OF CARE
Problem: Pain  Goal: Verbalizes/displays adequate comfort level or baseline comfort level  Outcome: Adequate for Discharge     Problem: Skin/Tissue Integrity  Goal: Absence of new skin breakdown  Description: 1. Monitor for areas of redness and/or skin breakdown  2. Assess vascular access sites hourly  3. Every 4-6 hours minimum:  Change oxygen saturation probe site  4. Every 4-6 hours:  If on nasal continuous positive airway pressure, respiratory therapy assess nares and determine need for appliance change or resting period. Outcome: Adequate for Discharge     Problem: Safety - Adult  Goal: Free from fall injury  Outcome: Adequate for Discharge     Problem: Dyspnea Due to End of Life  Goal: Demonstrate understanding of and ability to manage respiratory symptoms at end of life  Description: Patient  and or family/caregiver will verbalize recall of breathing strategies to maintain an effective breathing pattern during the inpatient hospice stay. 11/30/2023 0509 by Meenu Diez RN  Outcome: Adequate for Discharge  11/29/2023 2212 by Meenu Diez RN  Outcome: Progressing     Problem: Hospice Orientation  Goal: Demonstrate understanding of hospice philosophy, plan of care, and inpatient hospice program  Description: The patient/family/caregiver will demonstrate understanding of hospice philosophy, plan of care and the inpatient hospice program as evidenced by participation in meeting the patient's psychosocial, spiritual, medical, and physical needs inclusive of medical supplies/equipment focusing on symptoms. 11/30/2023 0509 by Meenu Diez RN  Outcome: Adequate for Discharge  11/29/2023 2212 by Meenu Diez RN  Outcome: Progressing     Problem: Risk for Falls  Goal: Fall prevention  Description: Patient  will remain free from falls as evidenced by no witnessed or reported falls each shift during the inpatient hospice stay.      Patient  and or family/caregiver will receive

## 2023-11-30 NOTE — PLAN OF CARE
Problem: Pain  Goal: Verbalizes/displays adequate comfort level or baseline comfort level  11/29/2023 0836 by Ludy Glover RN  Outcome: Progressing     Problem: Skin/Tissue Integrity  Goal: Absence of new skin breakdown  Description: 1. Monitor for areas of redness and/or skin breakdown  2. Assess vascular access sites hourly  3. Every 4-6 hours minimum:  Change oxygen saturation probe site  4. Every 4-6 hours:  If on nasal continuous positive airway pressure, respiratory therapy assess nares and determine need for appliance change or resting period. 11/29/2023 0836 by Ludy Glover RN  Outcome: Progressing     Problem: Dyspnea Due to End of Life  Goal: Demonstrate understanding of and ability to manage respiratory symptoms at end of life  Description: Patient  and or family/caregiver will verbalize recall of breathing strategies to maintain an effective breathing pattern during the inpatient hospice stay. Outcome: Progressing     Problem: Hospice Orientation  Goal: Demonstrate understanding of hospice philosophy, plan of care, and inpatient hospice program  Description: The patient/family/caregiver will demonstrate understanding of hospice philosophy, plan of care and the inpatient hospice program as evidenced by participation in meeting the patient's psychosocial, spiritual, medical, and physical needs inclusive of medical supplies/equipment focusing on symptoms. Outcome: Progressing     Problem: Risk for Falls  Goal: Fall prevention  Description: Patient  will remain free from falls as evidenced by no witnessed or reported falls each shift during the inpatient hospice stay. Patient  and or family/caregiver will receive education on fall prevention as evidenced by verbalizing recall of using the call lights system, fall prevention devices, and asking for help during the admission process and ongoing as needed during the inpatient hospice stay.     Outcome: Progressing     Problem: Pain  Goal:

## 2023-12-02 NOTE — PROGRESS NOTES
0700 report received from [place], [address] family at bedside, asleep. Pt resting quietly. 0800 nursing assessment completed. Pt not responsive to verbal or tactile stimuli at this time. Lungs diminished with expiratory end wheezes. Wife and daughter at bedside, report pt had some labored breathing overnight with a fever. 0940 scheduled medications administered. 1030 rounds with Dr Paloma Barker, verbal order to increase scheduled and prn dilaudid to 4mg. And increase scheduled medication frequency to Q3hrs. 1108 scheduled medications administered. 1200 pt turned and repositioned on to right side. Opened eyes to movement, no eye tracking noted. Daughter at bedside. 1246 prn toradol administered for fever. Pt noted with labored breathing. 1251 prn dialudid and ativan administered. Educated family on status and prognosis/signs of imminent death. They were tearful but verbalized an understanding. Family had removed and turned off oxygen. 1330 pt resting with shallow respirations. 1441 scheduled medications administered. Wife at bedside. 1530 several family members at bedside, pt resting with shallow respirations. 1630 pt continues to rest with shallow respirations. Wife at bedside. 1700 scheduled medications administered. 1800 pt resting with shallow respirations, cool to touch, mottling, ashen in color. Daughter Tato Zapata reports she thinks patient is closer and will pass tonight. Educated on clinical indicators of end of life. 1842 pt with labored breathing, audible gurgling/secretions. Prn dilaudid, ativan, and robinul administered. Daughter and sister in law at bedside. Patient now with intermittent periods of apnea approx 10-15 seconds. 1900 report given to oncoming nurse.     1900 report given to oncoming nurse.      NAME OF PATIENT:  Melvin Reese    LEVEL OF CARE:  GIP    REASON FOR GIP:   Pain, despite numerous changes in medications, Medication adjustment
0700 report received from [place], [address] pt resting with shallow respirations, accessory muscle use noted. Scheduled ativan and dilaudid administered. Daughters at bedside. 0800 Verbal order Dr Champ Haskins to start a PCA of 3mg basal, and 3mg bolus with 15minute lockout. 2046 dilaudid pca started. Educated family at bedside on utilizing pca. Educated that PCA can be patient, family, or nurse driven. They verbalized an understanding with demonstration teachback. 2723 verbal order with readback Dr Champ Haskins, to increase pca bolus and basal to 4mg.     5336 dose/rate changed verified with Elza Hall, RN. Pca bolus utilized for labored breathing. 1002 prn robinul and toradol administered for secretions and fever. Pca bolus utilized for labored breathing. 1139 scheduled ativan administered and scopolamine patch changed. 1154 dilaudid pca syringe changed and verified with Elza Hall, JENA. Pca bolus dose utilized for labored breathing. 1230 pt resting with shallow respirations. 1330 pt noted with periods of apnea approx 30 seconds. 1408 scheduled ativan administered. 1530 pt with periods of apnea, cool to touch, audible secretions. Dilaudid pca bolus utilized. 1629 pca syringe changed with Elza Hall, JENA.    1700 telephone update provided to pt daughter-in-law, [place]. 1712 scheduled ativan administered. 1815 patient resting with eyes closed, relaxed face. 200 son at bedside, tearful; update provided. Several family members arrived to bedside. 1900 report given to oncoming nurse. NAME OF PATIENT:  Melvin DOMINGUEZ Mary Ann     LEVEL OF CARE:  GIP     REASON FOR GIP:   Pain, despite numerous changes in medications, Medication adjustment that must be monitored 24/7, and Stabilizing treatment that cannot take place at home     *PATIENT REMAINS ELIGIBLE FOR GIP LEVEL OF CARE AS EVIDENCED BY: (MUST BE ADDRESSED OF PATIENT GIP)  Frequent nursing assessment and IV medications.
[address] [facility] [employer] Bereavement/Condolence Call: This LMSW called pt's wife Guadalupe Augustine to offer condolences and support. No answer. LMSW left voicemail with contact information and offered [address] [place] [address] information for ongoing support.        IAM Pandey  [facility]    [phone]
1900 Report received from [facility], [address] Scheduled dilaudid and ativan administered, see MAR. Several family members at bedside, quiet, tearful. 2045 Pt resting in bed w/ eyes closed, neutral facial expression. Audible secretions noted. Family remains at bedside. 2137 Pt w/ increased work of breathing, use of accessory muscles. PRN dilaudid and ativan given for dyspnea. 2211 PRN dilaudid and ativan given for dyspnea. See MAR. PRN robinul given for audible secretions. 2228 Pt warm to the touch. Temp 100.4 axillary. PRN toradol given, see MAR.  2235 Bed bath given by Bernice Dumas CNA.  2302 Administered scheduled dilaudid and ativan, see MAR. 3 family members remain at bedside. 0000 PRN dilaudid and ativan given for labored breathing, see MAR. Use of accessory muscles noted. 0027 PRN dilaudid and ativan administered for labored breathing, see MAR. Oral care provided. Periods of apnea noted. 0114 PRN dilaudid and ativan administered for labored breathing, see MAR.  0152 Administered scheduled ativan and dilaudid, see MAR. PRN robinul given for audible secretions. 0230 Pt resting in bed, neutral facial expression. 0331 PRN dilaudid and ativan administered by Lorena Duque RN for labored breathing. 0415 Pt resting in bed w/ eyes closed, neutral facial expression. Daughters remain at bedside. 0456 Administered scheduled dilaudid and ativan, see MAR. Pt warm to the touch, temp 100.7 axillary. PRN toradol administered, see MAR. Pt repositioned w/ Lorena Duque RN. Turned to R side. Heels elevated. 550mL ld urine emptied from diza bag.  0510 Pt w/ labored breathing, use of accessory muscles noted. Administered PRN ativan and dilaudid, see MAR.  1040 Voicemail left for on-call MD Dr. Romie Coronado  8227 PRN dilaudid and ativan administered for labored breathing. Pt's head rising off pillow with each breath. PRN robinul given for audible secretions. 4213 Pt's daughters left [facility] to get coffee. 0630 Dr. Romie Coronado returned call.  PRN
1900- Report received from [facility] verified with Antony Morocho. Patient turned and repositioned on left side with pillow support. 1942- PCA syringe verified and changed with Neena Grubbs. Patient medicated with scheduled IV medications see MAR. Assessment complete,see flow sheet. Multiple family at bedside. 2045- Patient noted to have increased work of breathing and pulling of right shoulder. Patient medicated with prn IV lorazepam, see MAR.     2130- Patient resting in bed with eyes closed Respirations course and shallow. 2230- Patient resting in bed with eyes closed. Respirations irregular with periods of apnea. 2323- Patient medicated with scheduled IV medications, see MAR.     2329- New pca syringe verified and started with Jomar Justice RN. 0.12ml wasted and disposed with [facility]- Patient lying in left side with eyes closed. Respirations shallow with prolonged periods of apnea. Skin dusky and pale. 0125- Patient resting in bed with eyes closed and mouth open. Respirations shallow with slight audible secretions. Hands mottled and purple. 0410- Patient medicated with scheduled IV lorazepam,see Mar.    0327- Pts daughter Yovanny Navarro to nurses station to report she thinks patient has passed. Upon entering room no chest rise or pulse noted. After 2 full minutes of absent pulse and respirations, patient was pronounced by Arelis Lin RN on 0330 on 11/30/23. Daughter Yovanny Navarro at bedside. All family members notified by pts daughter Yovanny Navarro including Mrs Deann Shaikh. 0400- Post mortem care provided. IV's removed, cath intact. Indwelling diaz catheter removed. No additional family members returning to [facility] to view patient remains. Nurse stayed with daughter who was alone at bedside. Shiela tearful but grieving appropriately. All belongings taken home by [facility] including blankets, plaque. 5245- Dr Radha Mon notified of pts passing via email.  Sridevi Trujillo and [facility] called to pick
1910 Received report from off going RN, [address]. Assumed care of this 80 yr old male pt.  1930 Pt req pain med for pt, stated he was breathing hard. Found pt unresponsive w/ very mildly pulling resp. Assessment as charted. Pt breath sounds are very wet sounding. Family stated he had recently been turned. Pt medicated as ordered. 2000 Pt quiet, resp easy, labile rate. Family surrounding pt and anxious w/ resp changes. Explained to them that this is all normal and he did not appear uncomfortable. 2100 Pt and family all asleep in room. Pt  2140 Pt turned and repositioned for comfort in bed. He does not awaken. Oral care given. 2200 Scheduled meds given. Wife at bedside, \"I'm scared every breath is going to be his last.\" Reassured wife. 2300 No change noted. Wife dozing at bedside. 0000 Resp non labored, easy. Eyes closed, no facial grimace. 0100 Remains unresponsive, quiet. 0200 Complete bath and bed change done. Pt became working harder, dypsneic w/ turning/bathing. Scheduled meds given. Again reassured wife and daughter, they are hoovering.  0300 All as earlier , no c/o voiced. Pt continues unresponsive, family at side. 0400 No change noted. 0500 Resp a bit more pulling, will monitor. 0565 Meds given as ordered, oral care given. New IV L post forearm started, #20.  1291 Dilaudid 2 mg IV given as prn for continued dypsnea and tachy. Toradol 30 mg IV given earlier for Temp 101.1 ax. Family given \"Gone from my Sight\" Booklet to help answer some of their questions.               NAME OF PATIENT:  Melvin Reese    LEVEL OF CARE:  gip    REASON FOR GIP:   Pain, despite numerous changes in medications and Unmanageable respiratory distress    *PATIENT REMAINS ELIGIBLE FOR GIP LEVEL OF CARE AS EVIDENCED BY: (MUST BE ADDRESSED OF PATIENT GIP)      REASON FOR RESPITE:  N/a    O2 SAFETY:  Concentrator positioning (6\" from furniture/drapes), Tanks stored in [place] , No petroleum based products on face while oxygen in
[address] [facility]  [phone]    Inpatient Nursing Admission   Patient Name: Heraclio Lakhani  YOB: 1955  Age: 80 y.o. Date of Hospice Admission: 11/27/2023  Hospice Attending Elected by Patient: Leida Pineda MD  Primary Care Physician: Brannon Larose MD  Admitting RN: Camron Maldonado RN  : Sara Villalobos,     Level of Care (GIP/Routine/Respite): GIP  Facility of Care: [facility]  Patient Room: 08/01     HOSPICE SUMMARY   ER Visits/ Hospitalizations in past year: 4  Hospice Diagnosis: End stage COPD ([address]) [J44.9]  Onset Date of Hospice Diagnosis: 11/27/21  Summary of Disease Progression Leading to Hospice Diagnosis: Emily Brewer, 80 yrs  old Had been on hospice care for over a year, Recently hospitalized, 11/18/23,  w/ Covid and resp distress. Wife rescinded hospice, made him a full code. Pt was intubated and unresponsive for days. Wife then decides to return to hospice care. Pt was extubated and transferred to hospice house. Pt has ES-COPD. He remains unresponsive, non verbalree text this section/Can copy paste info if available and state: \"Per Dr. Merline Bland, Palliative Medicine [facility] of note]\" and enter copied summary. If typing info include \"Patient name, age, admitted to hospital date and reason from [facility] previous location], briefly list hospitalization course/treatment given and response leading to hospice decision. Reference palliative H&P if available or other specialities as needed ie. cardiology)    Diagnoses RELATED to the terminal prognosis: ES- COPD (Ex.  If Cancer is primary - include metastasis diagnoses, malignant ascites, etc.; If CHF is primary - include HTN, CAD, Hyperlipidemia)  Other Diagnoses:   Patient Active Problem List   Diagnosis    Pyuria    Hyperlipidemia    Hypertension    Pulmonary embolism (HCC)    Hx of deep venous thrombosis    Urine retention    Acute deep venous thrombosis (HCC)    CAD (coronary artery disease)
breathing is starting to increase with observed use of abdominal and intercostal muscles    1900-report given to on-coming shift
care    [x] I have reviewed and/or updated ACP information in the Advance Care Planning Navigator. This information is available in the [address] link in the patient's chart header. Primary Decision Maker ([address]):   Primary Decision Maker: Dinesh Delgado - [phone]    Resuscitation Status: DNR No additional code details  If DNR is there a Durable DNR on file? : [x] Yes [] No (If no, complete Durable DNR)    HISTORY     History obtained from: Chart, hospice liaison    CHIEF COMPLAINT: Short of breath  The patient is:   [] Verbal  [] Nonverbal  [x] Unresponsive    HPI/SUBJECTIVE: Patient is minimally responsive but shows evidence of increased work of breathing, respiratory distress, restlessness    11/28-patient unresponsive but shows some evidence of increased work of breathing, moderate accessory muscle use    11/29-slight-mild increased WOB with accessory muscle use.  Does appear calm       REVIEW OF SYSTEMS     The following systems were: [] reviewed  [x] unable to be reviewed    Positive ROS include:  Constitutional: fatigue, weakness, in pain, short of breath  Ears/nose/mouth/throat: increased airway secretions  Respiratory:shortness of breath, wheezing  Gastrointestinal:poor appetite, nausea, vomiting, abdominal pain, constipation, diarrhea  Musculoskeletal:pain, deformities, swelling legs  Neurologic:confusion, hallucinations, weakness  Psychiatric:anxiety, feeling depressed, poor sleep  Endocrine:     Adult Non-Verbal Pain Assessment Score: 3    Face  [] 0   No particular expression or smile  [x] 1   Occasional grimace, tearing, frowning, wrinkled forehead  [] 2   Frequent grimace, tearing, frowning, wrinkled forehead    Activity (movement)  [] 0   Lying quietly, normal position  [x] 1   Seeking attention through movement or slow, cautious movement  [] 2   Restless, excessive activity and/or withdrawal reflexes    Guarding  [x] 0   Lying quietly, no positioning of hands over
Unremarkable  Skin: Unremarkable  Neurologic: Nonresponsive  Psychiatric: Calm other:       Pertinent Lab and or Imaging Tests:  Lab Results   Component Value Date/Time     11/27/2023 04:35 AM    K 3.2 11/27/2023 04:35 AM     11/27/2023 04:35 AM    CO2 33 11/27/2023 04:35 AM    BUN 52 11/27/2023 04:35 AM    GFRAA >60 03/11/2022 08:47 AM     No results found for: \"TP\", \"ALBR\", \"ALB\"        Total time:   Counseling / coordination time:   > 50% counseling / coordination?:
burden evident   []  Alcohol abuse  []  Financial resources inadequate to meet basic needs (food/house/etc)  []  Financial resources inadequate to meet health care needs (supplies/equipment/medications)  []  Food/nutrition resources inadequate  []  Home environment unsafe/inadequate for home care  []  Homicidal risk  []  Lives alone or without concerned relatives  []  Multiple medications/complex schedule  []  Physical limitations increase likelihood of falls  []  Plan of care/treatments complicated  []  Substance use/abuse  []  Suicidal risk  []  Visual impairment threatens safety/ability to perform self-care  []  Other (specify):     Abuse/Neglect (actual/potential risks):  [x]  No signs of abuse/neglect  []  History of abuse/neglect                 []  Physical          []  Sexual  []  History of domestic violence  []  Lacks adequate physical care  []  Lacks emotional nurturing/support  []  Lacks appropriate stimulation/cognitive experiences  []  Left alone inappropriately  []  Lacks necessary supervision  []  Inadequate or delayed medical care  []  Unsafe environment (i.e guns/drug use/history of violence in the home/etc.)  []  Bruising or other physical signs of injury present  []  Refer to child/adult protective services  []  Other (specify):   Notes:      Current Sources of Stress (in Addition to Current Illness):   [x]  None reported  []  Bills/Debt    []  Career/Job change    []   (short term)    []   (long term)    []  Death of a child (recent)    []  Death of a parent (recent)   []  Death of a spouse (recent)   []  Employment status changed   []  Family discord    []  Financial loss/Inadequate inther (specify):come  []  Job loss  []  Legal issues unresolved  []  Lifestyle change  []  Marital discord  []  Marriage within the last year  []  Paperwork (insurance/legal/etc) overwhelming  []  Separation/Divorce  []  Other (specify):  Notes:       Interventions/Plan of Care   [x]  MSW will

## (undated) DEVICE — MOUTHPIECE ENDOSCP 20X27MM --

## (undated) DEVICE — THE ENDO CARRY-ON PROCEDURE KIT CONTAINS ALL OF THE SUPPLIES AND INFECTION PREVENTION PRODUCTS NEEDED FOR ENDOSCOPIC PROCEDURES: Brand: ENDO CARRY-ON PROCEDURE KIT

## (undated) DEVICE — CANNULA NSL O2 AD 7 FT END-TIDAL CARBON DIOX VENTFLO